# Patient Record
Sex: FEMALE | Race: BLACK OR AFRICAN AMERICAN | Employment: UNEMPLOYED | URBAN - METROPOLITAN AREA
[De-identification: names, ages, dates, MRNs, and addresses within clinical notes are randomized per-mention and may not be internally consistent; named-entity substitution may affect disease eponyms.]

---

## 2017-01-04 RX ORDER — ACYCLOVIR 400 MG/1
TABLET ORAL
Qty: 60 TAB | Refills: 5 | Status: SHIPPED | OUTPATIENT
Start: 2017-01-04 | End: 2017-06-29 | Stop reason: SDUPTHER

## 2017-01-09 DIAGNOSIS — J43.8 OTHER EMPHYSEMA (HCC): ICD-10-CM

## 2017-01-09 RX ORDER — FLUTICASONE PROPIONATE 50 MCG
SPRAY, SUSPENSION (ML) NASAL
Qty: 1 BOTTLE | Refills: 0 | Status: SHIPPED | OUTPATIENT
Start: 2017-01-09 | End: 2019-04-11 | Stop reason: SDUPTHER

## 2017-01-16 ENCOUNTER — HOSPITAL ENCOUNTER (EMERGENCY)
Age: 62
Discharge: HOME | End: 2017-01-16
Attending: EMERGENCY MEDICINE
Payer: MEDICARE

## 2017-01-16 VITALS
WEIGHT: 293 LBS | RESPIRATION RATE: 20 BRPM | DIASTOLIC BLOOD PRESSURE: 95 MMHG | OXYGEN SATURATION: 95 % | BODY MASS INDEX: 39.68 KG/M2 | TEMPERATURE: 97.5 F | HEART RATE: 109 BPM | HEIGHT: 72 IN | SYSTOLIC BLOOD PRESSURE: 160 MMHG

## 2017-01-16 DIAGNOSIS — M54.42 CHRONIC BILATERAL LOW BACK PAIN WITH BILATERAL SCIATICA: ICD-10-CM

## 2017-01-16 DIAGNOSIS — G89.29 CHRONIC BILATERAL LOW BACK PAIN WITH BILATERAL SCIATICA: ICD-10-CM

## 2017-01-16 DIAGNOSIS — M79.7 FIBROMYALGIA: ICD-10-CM

## 2017-01-16 DIAGNOSIS — M54.41 CHRONIC BILATERAL LOW BACK PAIN WITH BILATERAL SCIATICA: ICD-10-CM

## 2017-01-16 DIAGNOSIS — G89.4 CHRONIC PAIN SYNDROME: ICD-10-CM

## 2017-01-16 DIAGNOSIS — Z98.890 STATUS POST LAMINECTOMY: Primary | ICD-10-CM

## 2017-01-16 PROCEDURE — 99282 EMERGENCY DEPT VISIT SF MDM: CPT

## 2017-01-16 RX ORDER — NAPROXEN 500 MG/1
500 TABLET ORAL 2 TIMES DAILY WITH MEALS
Qty: 20 TAB | Refills: 0 | Status: SHIPPED | OUTPATIENT
Start: 2017-01-16 | End: 2017-01-26

## 2017-01-16 RX ORDER — HYDROCODONE BITARTRATE AND ACETAMINOPHEN 7.5; 325 MG/1; MG/1
1 TABLET ORAL
Qty: 15 TAB | Refills: 0 | Status: SHIPPED | OUTPATIENT
Start: 2017-01-16 | End: 2017-03-21 | Stop reason: DRUGHIGH

## 2017-01-16 NOTE — DISCHARGE INSTRUCTIONS
Back Pain: Care Instructions  Your Care Instructions    Back pain has many possible causes. It is often related to problems with muscles and ligaments of the back. It may also be related to problems with the nerves, discs, or bones of the back. Moving, lifting, standing, sitting, or sleeping in an awkward way can strain the back. Sometimes you don't notice the injury until later. Arthritis is another common cause of back pain. Although it may hurt a lot, back pain usually improves on its own within several weeks. Most people recover in 12 weeks or less. Using good home treatment and being careful not to stress your back can help you feel better sooner. Follow-up care is a key part of your treatment and safety. Be sure to make and go to all appointments, and call your doctor if you are having problems. Its also a good idea to know your test results and keep a list of the medicines you take. How can you care for yourself at home? · Sit or lie in positions that are most comfortable and reduce your pain. Try one of these positions when you lie down:  ¨ Lie on your back with your knees bent and supported by large pillows. ¨ Lie on the floor with your legs on the seat of a sofa or chair. Tina Shanita on your side with your knees and hips bent and a pillow between your legs. ¨ Lie on your stomach if it does not make pain worse. · Do not sit up in bed, and avoid soft couches and twisted positions. Bed rest can help relieve pain at first, but it delays healing. Avoid bed rest after the first day of back pain. · Change positions every 30 minutes. If you must sit for long periods of time, take breaks from sitting. Get up and walk around, or lie in a comfortable position. · Try using a heating pad on a low or medium setting for 15 to 20 minutes every 2 or 3 hours. Try a warm shower in place of one session with the heating pad. · You can also try an ice pack for 10 to 15 minutes every 2 to 3 hours.  Put a thin cloth between the ice pack and your skin. · Take pain medicines exactly as directed. ¨ If the doctor gave you a prescription medicine for pain, take it as prescribed. ¨ If you are not taking a prescription pain medicine, ask your doctor if you can take an over-the-counter medicine. · Take short walks several times a day. You can start with 5 to 10 minutes, 3 or 4 times a day, and work up to longer walks. Walk on level surfaces and avoid hills and stairs until your back is better. · Return to work and other activities as soon as you can. Continued rest without activity is usually not good for your back. · To prevent future back pain, do exercises to stretch and strengthen your back and stomach. Learn how to use good posture, safe lifting techniques, and proper body mechanics. When should you call for help? Call your doctor now or seek immediate medical care if:  · You have new or worsening numbness in your legs. · You have new or worsening weakness in your legs. (This could make it hard to stand up.)  · You lose control of your bladder or bowels. Watch closely for changes in your health, and be sure to contact your doctor if:  · Your pain gets worse. · You are not getting better after 2 weeks. Where can you learn more? Go to http://cassandra-monroe.info/. Enter H651 in the search box to learn more about \"Back Pain: Care Instructions. \"  Current as of: May 23, 2016  Content Version: 11.1  © 9313-0170 [employer]. Care instructions adapted under license by [employer] (which disclaims liability or warranty for this information). If you have questions about a medical condition or this instruction, always ask your healthcare professional. [address] any warranty or liability for your use of this information.

## 2017-01-16 NOTE — ED PROVIDER NOTES
HPI Comments: Carter Mensah is a 63 y.o. F with PMHx significant for Arthritis / Fibromyalgia / COPD / Asthma / Diabetes who presents ambulatory to [address] [facility] ED c/o lower back pain. She reports hx of back surgery six months ago with Dr. Grabiel Hoskins. Pt notes that on 17 she starts PT for her back. Pt states she is awaiting pain management follow-up and is not currently taking any medications at this time. She specifically denies any fever, nausea or vomiting. PCP: Daisy Canales MD    There are no other complaints, changes, or physical findings at this time. The history is provided by the patient. Past Medical History:   Diagnosis Date    Arthritis     Asthma     Chronic obstructive pulmonary disease [facility]     Chronic pain      back/hip    Diabetes ([facility]     Fibromyalgia     Gastroesophageal reflux disease without esophagitis 2016    Hyperlipidemia 2012    Hypertension     Obesity 2012    Unspecified sleep apnea      CAN'T TOLERATE CPAP       Past Surgical History:   Procedure Laterality Date    Hx orthopaedic       lubar fusion    Hx orthopaedic  2/16/15     RIGHT TOTAL KNEE ARTHROPLASTY    Hx orthopaedic  6/16/15     LEFT TOTAL KNEE ARTHROPLASTY       Hx hysterectomy       one ovary removed    Hx  section           Family History:   Problem Relation Age of Onset    Anesth Problems Mother     Cancer Mother      LUNG CA - SMOKER    Other Mother      CEREBRAL ANEURYSM    Diabetes Mother     Diabetes Father     Other Sister      VARICOSE VEINS THAT HURT AND BLEED    Heart Attack Brother     Other Other      MOTHER'S FATHER'S MOTHER  WITH ANEURYSM       Social History     Social History    Marital status: SINGLE     Spouse name: N/A    Number of children: N/A    Years of education: N/A     Occupational History    Not on file.      Social History Main Topics    Smoking status: Former Smoker     Packs/day: 1.00     Years: 37.00     Quit date: 12/4/2015    Smokeless tobacco: Never Used    Alcohol use Yes      Comment: VERY RARE WINE    Drug use: No    Sexual activity: Not Currently     Partners: Male     Other Topics Concern    Not on file     Social History Narrative         ALLERGIES: Allopurinol; Darvocet a500 [propoxyphene n-acetaminophen]; and Seafood [shellfish containing products]    Review of Systems   Constitutional: Negative for fatigue and fever. HENT: Negative for ear pain and sore throat. Eyes: Negative for pain, redness and visual disturbance. Respiratory: Negative for cough and shortness of breath. Cardiovascular: Negative for chest pain and palpitations. Gastrointestinal: Negative for abdominal pain, nausea and vomiting. Genitourinary: Negative for dysuria, frequency and urgency. Musculoskeletal: Positive for back pain. Negative for gait problem, neck pain and neck stiffness. Skin: Negative for rash and wound. Neurological: Negative for dizziness, weakness, light-headedness, numbness and headaches. Vitals:    01/16/17 1226   BP: (!) 160/95   Pulse: (!) 109   Resp: 20   Temp: 97.5 °F (36.4 °C)   SpO2: 95%   Weight: 140.8 kg (310 lb 6.5 oz)   Height: 6' (1.829 m)            Physical Exam   Constitutional: She is oriented to person, place, and time. She appears well-developed and well-nourished. HENT:   Head: Normocephalic and atraumatic. Right Ear: External ear normal.   Left Ear: External ear normal.   Nose: Nose normal.   Eyes: Conjunctivae and EOM are normal. Pupils are equal, round, and reactive to light. Neck: Normal range of motion. Neck supple. Cardiovascular: Regular rhythm and normal heart sounds. No murmur heard. Pulmonary/Chest: Effort normal and breath sounds normal. No respiratory distress. She has no wheezes. Abdominal: Soft. Bowel sounds are normal. There is no tenderness. There is no guarding. Musculoskeletal:   LOWER BACK:  Independently moves from laying to sitting to standing. Well-healed surgical scar with no bruising, redness or swelling. No step off. Diffuse muscular discomfort. No CVA tenderness   Neurological: She is alert and oriented to person, place, and time. No cranial nerve deficit or sensory deficit. Skin: Skin is warm and dry. No rash noted. Psychiatric: She has a normal mood and affect. Nursing note and vitals reviewed. MDM  Number of Diagnoses or Management Options  Chronic bilateral low back pain with bilateral sciatica:   Chronic pain syndrome:   Fibromyalgia:   Status post laminectomy:   Diagnosis management comments:     Afebrile; well appearing; no new injury; presentation reflects a chronic, well documented complaint. Plan as below. Amount and/or Complexity of Data Reviewed  Review and summarize past medical records: yes    Patient Progress  Patient progress: stable    ED Course       Procedures    IMPRESSION:  1. Status post laminectomy    2. Chronic bilateral low back pain with bilateral sciatica    3. Chronic pain syndrome    4. Fibromyalgia        PLAN:  1. Discharge Medication List as of 1/16/2017  2:08 PM      START taking these medications    Details   HYDROcodone-acetaminophen (NORCO) 7.5-325 mg per tablet Take 1 Tab by mouth every six (6) hours as needed for Pain. Max Daily Amount: 4 Tabs., Print, Disp-15 Tab, R-0      naproxen (NAPROSYN) 500 mg tablet Take 1 Tab by mouth two (2) times daily (with meals) for 10 days. , Print, Disp-20 Tab, R-0         CONTINUE these medications which have NOT CHANGED    Details   fluticasone (FLONASE) 50 mcg/actuation nasal spray INSTILL 2 SPRAYS IN EACH NOSTRIL EVERY DAY, Normal**Patient requests 90 days supply**Disp-1 Bottle, R-0      acyclovir (ZOVIRAX) 400 mg tablet TAKE 1 TABLET BY MOUTH TWICE DAILY, Normal, Disp-60 Tab, R-5      fluticasone-salmeterol (ADVAIR) 250-50 mcg/dose diskus inhaler Take 1 Puff by inhalation every twelve (12) hours. , Normal, Disp-1 Inhaler, R-0      potassium chloride SR (KLOR-CON 10) 10 mEq tablet TAKE 1 TABLET BY MOUTH EVERY DAY, Normal, Disp-100 Tab, R-0      clobetasol (TEMOVATE) 0.05 % ointment Historical Med      gemfibrozil (LOPID) 600 mg tablet TK 1 T PO BID, Historical Med, R-1      Loperamide (IMODIUM A-D) 2 mg chew Take 2 mg by mouth daily as needed., Normal, Disp-15 Tab, R-0      HYDROcodone-acetaminophen (NORCO) 5-325 mg per tablet Take 1 Tab by mouth daily as needed for Pain., Print, Disp-14 Tab, R-0      DULoxetine (CYMBALTA) 30 mg capsule 1 capsule twice daily. , Normal, Disp-180 Cap, R-1      omeprazole (PRILOSEC) 20 mg capsule TAKE 1 CAPSULE BY MOUTH EVERY DAY, Normal**Patient requests 90 days supply**Disp-30 Cap, R-5      cholecalciferol, vitamin D3, (VITAMIN D3) 2,000 unit tab Take 1 Tab by mouth daily. , Historical Med      SITagliptin (JANUVIA) 100 mg tablet Take 1 Tab by mouth daily. , Normal, Disp-90 Tab, R-3      lisinopril-hydrochlorothiazide (PRINZIDE, ZESTORETIC) 20-12.5 mg per tablet DO NOT TAKE FOR BLOOD PRESSURE LESS THAN 130/80, Normal, Disp-90 Tab, R-1      albuterol-ipratropium (DUO-NEB) 2.5 mg-0.5 mg/3 ml nebu INHALE 1 VIAL VIA NEBULIZER EVERY 4 HOURS AS NEEDED, Normal**Patient requests 90 days supply**Disp-1620 mL, R-5      metFORMIN (GLUCOPHAGE) 1,000 mg tablet TAKE 1 TABLET BY MOUTH TWICE DAILY WITH MEALS, Normal**Patient requests 90 days supply**Disp-180 Tab, R-2      gabapentin (NEURONTIN) 300 mg capsule TAKE 1 CAPSULE BY MOUTH THREE TIMES DAILY, Normal**Patient requests 90 days supply**Disp-270 Cap, R-2      albuterol (PROAIR HFA) 90 mcg/actuation inhaler Take 2 Puffs by inhalation every four (4) hours as needed for Wheezing., Normal, Disp-1 Inhaler, R-2      glucose blood VI test strips (ACCU-CHEK SMARTVIEW TEST STRIP) strip Test twice daily, Print, Disp-60 Each, R-11      Lancets (ACCU-CHEK FASTCLIX) misc Test twice daily, Print, Disp-60 Each, R-11           2.    Follow-up Information     Follow up With Details Comments [address] MD Solomon Schedule an appointment as soon as possible for a visit PRIMARY CARE: call to schedule follow up [facility]  [facility]   [phone]  MD Jewel Schedule an appointment as soon as possible for a visit PAIN MANAGEMENT: call to schedule follow up Joann Chambers [place]  [phone]          Return to ED if worse     DISCHARGE NOTE:  2:11 PM  The patient's results have been reviewed with family and/or caregiver. They verbally convey their understanding and agreement of the patient's signs, symptoms, diagnosis, treatment, and prognosis. They additionally agree to follow up as recommended in the discharge instructions or to return to the Emergency Room should the patient's condition change prior to their follow-up appointment. The family and/or caregiver verbally agrees with the care-plan and all of their questions have been answered. The discharge instructions have also been provided to the them along with educational information regarding the patient's diagnosis and a list of reasons why the patient would want to return to the ER prior to their follow-up appointment should their condition change. Written by Richelle Barreto. Shahab Cespedes ED Scribe, as dictated by Zuleyma Belcher. Attestations: This note is prepared by Richelle Barreto. Jess, acting as Scribe for Zuleyma Belcher. TESS Villaseñor: The scribe's documentation has been prepared under my direction and personally reviewed by me in its entirety. I confirm that the note above accurately reflects all work, treatment, procedures, and medical decision making performed by me.

## 2017-01-18 ENCOUNTER — PATIENT OUTREACH (OUTPATIENT)
Dept: FAMILY MEDICINE CLINIC | Age: 62
End: 2017-01-18

## 2017-01-18 NOTE — PROGRESS NOTES
This writer spoke with patient after verifying name and . Patient informed this writer that ortho provider believes pain is from fibromyalgia and  is helping to get appointment with Dr. Sanchez Cruz, pain management. His office has records and will advise patient of appointment. Patient is receiving outpatient physical therapy 2 days per week. She does not drive. Patient scheduled appointment with provider for tomorrow, 17 at 21 [phone]. Patient denies having pain medication stating she has many muscle relaxers but they do not work for her. This writer will meet with patient at appointment.

## 2017-01-19 ENCOUNTER — PATIENT OUTREACH (OUTPATIENT)
Dept: FAMILY MEDICINE CLINIC | Age: 62
End: 2017-01-19

## 2017-01-19 ENCOUNTER — OFFICE VISIT (OUTPATIENT)
Dept: FAMILY MEDICINE CLINIC | Age: 62
End: 2017-01-19

## 2017-01-19 VITALS
DIASTOLIC BLOOD PRESSURE: 81 MMHG | TEMPERATURE: 97 F | SYSTOLIC BLOOD PRESSURE: 138 MMHG | HEART RATE: 97 BPM | RESPIRATION RATE: 18 BRPM | WEIGHT: 293 LBS | BODY MASS INDEX: 39.68 KG/M2 | OXYGEN SATURATION: 98 % | HEIGHT: 72 IN

## 2017-01-19 DIAGNOSIS — G89.29 CHRONIC BILATERAL LOW BACK PAIN WITH BILATERAL SCIATICA: Primary | ICD-10-CM

## 2017-01-19 DIAGNOSIS — R53.82 CHRONIC FATIGUE: ICD-10-CM

## 2017-01-19 DIAGNOSIS — M48.061 SPINAL STENOSIS OF LUMBAR REGION: ICD-10-CM

## 2017-01-19 DIAGNOSIS — M79.7 FIBROMYALGIA: ICD-10-CM

## 2017-01-19 DIAGNOSIS — M54.42 CHRONIC BILATERAL LOW BACK PAIN WITH BILATERAL SCIATICA: Primary | ICD-10-CM

## 2017-01-19 DIAGNOSIS — M54.41 CHRONIC BILATERAL LOW BACK PAIN WITH BILATERAL SCIATICA: Primary | ICD-10-CM

## 2017-01-19 RX ORDER — NALOXONE HYDROCHLORIDE 0.4 MG/ML
0.4 INJECTION, SOLUTION INTRAMUSCULAR; INTRAVENOUS; SUBCUTANEOUS AS NEEDED
Qty: 1 ML | Refills: 0 | Status: SHIPPED | OUTPATIENT
Start: 2017-01-19 | End: 2017-07-18 | Stop reason: ALTCHOICE

## 2017-01-19 RX ORDER — POTASSIUM CHLORIDE 750 MG/1
TABLET, EXTENDED RELEASE ORAL
Refills: 11 | COMMUNITY
Start: 2016-11-10 | End: 2017-02-03 | Stop reason: SDUPTHER

## 2017-01-19 RX ORDER — BACLOFEN 10 MG/1
TABLET ORAL
Refills: 11 | COMMUNITY
Start: 2016-12-22 | End: 2017-10-27

## 2017-01-19 RX ORDER — LOPERAMIDE HYDROCHLORIDE 2 MG/1
CAPSULE ORAL
Refills: 0 | COMMUNITY
Start: 2016-12-06 | End: 2017-01-19 | Stop reason: SDUPTHER

## 2017-01-19 RX ORDER — HYDROCODONE BITARTRATE AND ACETAMINOPHEN 10; 325 MG/1; MG/1
1 TABLET ORAL
Qty: 15 TAB | Refills: 0 | Status: SHIPPED | OUTPATIENT
Start: 2017-01-19 | End: 2017-03-21 | Stop reason: SDUPTHER

## 2017-01-19 NOTE — PROGRESS NOTES
Chief Complaint   Patient presents with    Back Pain     severe, went to Ed at [address] [facility] on 1/16/17

## 2017-01-19 NOTE — PROGRESS NOTES
This writer meet with patient after verifying name and . Patient is currently using cane to aid in walking. She attended appointment with PCP. Ortho provider is Dr. Wilder Luis. This writer educated patient on importance of proper body mechanics. Patient informed this writer she is taking Naproxen and muscle relaxer and it is helping some. She uses the stronger pain medication for extreme pain only. Patient received call during visit to inform her she has appointment with pain management on 16. Patient agrees to this writer calling next week. for update.

## 2017-01-19 NOTE — MR AVS SNAPSHOT
Visit Information Date & Time Provider Department Dept. Phone Encounter #  
 1/19/2017 10:30 AM Roula Toussaint MD [facility] at [address] [id number] Upcoming Health Maintenance Date Due COLONOSCOPY 3/22/1975 BREAST CANCER SCRN MAMMOGRAM 6/5/2009 PAP AKA CERVICAL CYTOLOGY 7/14/2014 EYE EXAM RETINAL OR DILATED Q1 2/5/2015 FOOT EXAM Q1 1/21/2016 INFLUENZA AGE 9 TO ADULT 8/1/2016 LIPID PANEL Q1 8/26/2016 HEMOGLOBIN A1C Q6M 1/15/2017 MICROALBUMIN Q1 4/4/2017 DTaP/Tdap/Td series (2 - Td) 7/15/2026 Allergies as of 1/19/2017  Review Complete On: 1/19/2017 By: Roula Toussaint MD  
  
 Severity Noted Reaction Type Reactions Allopurinol  06/01/2011    Hives Darvocet A500 [Propoxyphene N-acetaminophen]  06/05/2011    Hives Seafood [Shellfish Containing Products]  06/01/2011    Itching NEW ALLERGY; REACTION WORSENS AS AMOUNT EATEN INCREASES Current Immunizations  Reviewed on 11/23/2015 Name Date Pneumococcal Vaccine (Unspecified Type) 1/1/2015 Tdap 7/15/2016 Not reviewed this visit You Were Diagnosed With   
  
 Codes Comments Chronic bilateral low back pain with bilateral sciatica    -  Primary ICD-10-CM: M54.42, M54.41, G89.29 ICD-9-CM: 724.2, 724.3, 338.29 Spinal stenosis of lumbar region     ICD-10-CM: M48.06 
ICD-9-CM: 724.02 Fibromyalgia     ICD-10-CM: M79.7 ICD-9-CM: 729.1 Chronic fatigue     ICD-10-CM: R53.82 
ICD-9-CM: 780.79 Vitals BP Pulse Temp Resp Height(growth percentile) Weight(growth percentile) 138/81 (BP 1 Location: Left arm, BP Patient Position: Sitting) 97 97 °F (36.1 °C) (Oral) 18 6' (1.829 m) 313 lb (142 kg) LMP SpO2 BMI OB Status Smoking Status 04/04/1996 98% 42.45 kg/m2 Hysterectomy Former Smoker Vitals History BMI and BSA Data Body Mass Index Body Surface Area  
 42.45 kg/m 2 2.69 m 2 Preferred Pharmacy Pharmacy Name Phone [address], [address] [place] [phone] Your Updated Medication List  
  
   
This list is accurate as of: 1/19/17 11:19 AM.  Always use your most recent med list.  
  
  
  
  
 acyclovir 400 mg tablet Commonly known as:  ZOVIRAX TAKE 1 TABLET BY MOUTH TWICE DAILY  
  
 albuterol 90 mcg/actuation inhaler Commonly known as:  PROAIR HFA Take 2 Puffs by inhalation every four (4) hours as needed for Wheezing. albuterol-ipratropium 2.5 mg-0.5 mg/3 ml Nebu Commonly known as:  Kiana Medicus INHALE 1 VIAL VIA NEBULIZER EVERY 4 HOURS AS NEEDED  
  
 baclofen 10 mg tablet Commonly known as:  LIORESAL TK 1 T PO Q 8 H PRF MUSCLE SPASM DULoxetine 30 mg capsule Commonly known as:  CYMBALTA 1 capsule twice daily. fluticasone 50 mcg/actuation nasal spray Commonly known as:  Lindfabián Gorham INSTILL 2 SPRAYS IN EACH NOSTRIL EVERY DAY  
  
 fluticasone-salmeterol 250-50 mcg/dose diskus inhaler Commonly known as:  ADVAIR Take 1 Puff by inhalation every twelve (12) hours. gabapentin 300 mg capsule Commonly known as:  NEURONTIN  
TAKE 1 CAPSULE BY MOUTH THREE TIMES DAILY gemfibrozil 600 mg tablet Commonly known as:  LOPID TK 1 T PO BID  
  
 glucose blood VI test strips strip Commonly known as:  [address] Test twice daily * HYDROcodone-acetaminophen 7.5-325 mg per tablet Commonly known as:  Nelma Rohan Take 1 Tab by mouth every six (6) hours as needed for Pain. Max Daily Amount: 4 Tabs. * HYDROcodone-acetaminophen  mg tablet Commonly known as:  Nelma Rohan Take 1 Tab by mouth every twelve (12) hours as needed for Pain. Max Daily Amount: 2 Tabs. Lancets Misc Commonly known as:  ACCU-CHEK FASTCLIX Test twice daily  
  
 lisinopril-hydroCHLOROthiazide 20-12.5 mg per tablet Commonly known as:  Helyn Blinks  
 DO NOT TAKE FOR BLOOD PRESSURE LESS THAN 130/80 Loperamide 2 mg Leeanna Goldmann Commonly known as:  IMODIUM A-D Take 2 mg by mouth daily as needed. metFORMIN 1,000 mg tablet Commonly known as:  GLUCOPHAGE  
TAKE 1 TABLET BY MOUTH TWICE DAILY WITH MEALS  
  
 naloxone 0.4 mg/mL injection Commonly known as:  NARCAN  
1 mL by IntraVENous route as needed. naproxen 500 mg tablet Commonly known as:  NAPROSYN Take 1 Tab by mouth two (2) times daily (with meals) for 10 days. omeprazole 20 mg capsule Commonly known as:  PRILOSEC  
TAKE 1 CAPSULE BY MOUTH EVERY DAY * potassium chloride 10 mEq tablet Commonly known as:  K-DUR, KLOR-CON TK 1 T PO  QD  
  
 * potassium chloride SR 10 mEq tablet Commonly known as:  KLOR-CON 10  
TAKE 1 TABLET BY MOUTH EVERY DAY SITagliptin 100 mg tablet Commonly known as:  Arthea Legato Take 1 Tab by mouth daily. VITAMIN D3 2,000 unit Tab Generic drug:  cholecalciferol (vitamin D3) Take 1 Tab by mouth daily. * Notice: This list has 4 medication(s) that are the same as other medications prescribed for you. Read the directions carefully, and ask your doctor or other care provider to review them with you. Prescriptions Printed Refills HYDROcodone-acetaminophen (NORCO)  mg tablet 0 Sig: Take 1 Tab by mouth every twelve (12) hours as needed for Pain. Max Daily Amount: 2 Tabs. Class: Print Route: Oral  
  
Prescriptions Sent to Pharmacy Refills  
 naloxone (NARCAN) 0.4 mg/mL injection 0 Si mL by IntraVENous route as needed. Class: Normal  
 Pharmacy: [facility] [address] [place] [address] Ph #: [phone] Route: IntraVENous We Performed the Following TSH RFX ON ABNORMAL TO FREE T4 [[id number] Custom] VITAMIN B12 & FOLATE [10108 CPT(R)] Introducing [facility]! [facility] introduces [employer] patient portal. Now you can access parts of your medical record, email your doctor's office, and request medication refills online. 1. In your internet browser, go to [email or web address] 2. Click on the First Time User? Click Here link in the Sign In box. You will see the New Member Sign Up page. 3. Enter your Xinrong Access Code exactly as it appears below. You will not need to use this code after youve completed the sign-up process. If you do not sign up before the expiration date, you must request a new code. · Xin[employer] Access Code: [email or web address] Expires: 2/27/2017  5:11 AM 
 
4. Enter the last four digits of your Social Security Number (xxxx) and Date of Birth (mm/dd/yyyy) as indicated and click Submit. You will be taken to the next sign-up page. 5. Create a Xinrong ID. This will be your Xinrong login ID and cannot be changed, so think of one that is secure and easy to remember. 6. Create a [employer]rong password. You can change your password at any time. 7. Enter your Password Reset Question and Answer. This can be used at a later time if you forget your password. 8. Enter your e-mail address. You will receive e-mail notification when new information is available in [address] 9. Click Sign Up. You can now view and download portions of your medical record. 10. Click the Download Summary menu link to download a portable copy of your medical information. If you have questions, please visit the Frequently Asked Questions section of the [employer] website. Remember, [employer]rong is NOT to be used for urgent needs. For medical emergencies, dial 911. Now available from your iPhone and Android! Please provide this summary of care documentation to your next provider. Your primary care clinician is listed as Lora Mcgee. If you have any questions after today's visit, please call [phone].

## 2017-01-19 NOTE — PROGRESS NOTES
Lula Carbone is a 63 y.o. female    1/16/17 went to ED for below chronic issue of back pain. PMHx significant for Arthritis / Fibromyalgia / COPD / Asthma / Diabetes who presents ambulatory to ED [facility] ED c/o lower back pain. She reports hx of back surgery six months ago with Dr. Christiano Mario. Pt notes that on 1/20/17 she starts PT for her back. Pt states she is awaiting pain management follow-up and is not currently taking any medications at this time. She specifically denies any fever, nausea or vomiting. She was given Norco 7.5/325 #15. She have 6 pills left, she have been taking 3pills per day. Low back pain without sciatica. Is seeing ortho and have had previous surgery and steroid injection. She have followed up with her orthopedic. She was then referred to Pain management, she have made contact, they're in the process of reviewing her case. No saddle anesthesia, loss of bowel or bladder control, fever, LE weakness. Discussed options. I will give her Norco, 10/325 but she's to cut the pill in half and use 1/2 pill BID PRN, with #15. And 1 refill until she can see her pain doctor. Also given the new recommendation i will also send out naloxone for her. She have been on more pain meds than this in the past.      Fatigue: chronic, with fibromyalgia, depression, chronic pain. Last TSH was in 2015. Review of Systems  Pertinent items are noted in HPI. Current Outpatient Prescriptions on File Prior to Visit   Medication Sig Dispense Refill    HYDROcodone-acetaminophen (NORCO) 7.5-325 mg per tablet Take 1 Tab by mouth every six (6) hours as needed for Pain. Max Daily Amount: 4 Tabs. 15 Tab 0    naproxen (NAPROSYN) 500 mg tablet Take 1 Tab by mouth two (2) times daily (with meals) for 10 days.  20 Tab 0    fluticasone (FLONASE) 50 mcg/actuation nasal spray INSTILL 2 SPRAYS IN EACH NOSTRIL EVERY DAY 1 Bottle 0    acyclovir (ZOVIRAX) 400 mg tablet TAKE 1 TABLET BY MOUTH TWICE DAILY 60 Tab 5    fluticasone-salmeterol (ADVAIR) 250-50 mcg/dose diskus inhaler Take 1 Puff by inhalation every twelve (12) hours. 1 Inhaler 0    potassium chloride SR (KLOR-CON 10) 10 mEq tablet TAKE 1 TABLET BY MOUTH EVERY  Tab 0    gemfibrozil (LOPID) 600 mg tablet TK 1 T PO BID  1    Loperamide (IMODIUM A-D) 2 mg chew Take 2 mg by mouth daily as needed. 15 Tab 0    DULoxetine (CYMBALTA) 30 mg capsule 1 capsule twice daily. 180 Cap 1    omeprazole (PRILOSEC) 20 mg capsule TAKE 1 CAPSULE BY MOUTH EVERY DAY 30 Cap 5    cholecalciferol, vitamin D3, (VITAMIN D3) 2,000 unit tab Take 1 Tab by mouth daily.  SITagliptin (JANUVIA) 100 mg tablet Take 1 Tab by mouth daily. 90 Tab 3    lisinopril-hydrochlorothiazide (PRINZIDE, ZESTORETIC) 20-12.5 mg per tablet DO NOT TAKE FOR BLOOD PRESSURE LESS THAN 130/80 90 Tab 1    albuterol-ipratropium (DUO-NEB) 2.5 mg-0.5 mg/3 ml nebu INHALE 1 VIAL VIA NEBULIZER EVERY 4 HOURS AS NEEDED 1620 mL 5    metFORMIN (GLUCOPHAGE) 1,000 mg tablet TAKE 1 TABLET BY MOUTH TWICE DAILY WITH MEALS (Patient taking differently: 500mg in am and 1000 mg pm) 180 Tab 2    gabapentin (NEURONTIN) 300 mg capsule TAKE 1 CAPSULE BY MOUTH THREE TIMES DAILY 270 Cap 2    albuterol (PROAIR HFA) 90 mcg/actuation inhaler Take 2 Puffs by inhalation every four (4) hours as needed for Wheezing. 1 Inhaler 2    glucose blood VI test strips (ACCU-CHEK SMARTVIEW TEST STRIP) strip Test twice daily 60 Each 11    Lancets (ACCU-CHEK FASTCLIX) misc Test twice daily 60 Each 11     No current facility-administered medications on file prior to visit.         Allergies   Allergen Reactions    Allopurinol Hives    Darvocet A500 [Propoxyphene N-Acetaminophen] Hives    Seafood [Shellfish Containing Products] Itching     NEW ALLERGY; REACTION WORSENS AS AMOUNT EATEN INCREASES       Past Medical History   Diagnosis Date    Arthritis     Asthma     Chronic obstructive pulmonary disease (HCC)     Chronic pain      back/hip    Diabetes ([facility] [address]     Fibromyalgia     Gastroesophageal reflux disease without esophagitis 2016    Hyperlipidemia 2012    Hypertension     Obesity 2012    Unspecified sleep apnea      CAN'T TOLERATE CPAP       Family History   Problem Relation Age of Onset    Anesth Problems Mother     Cancer Mother      LUNG CA - SMOKER    Other Mother      CEREBRAL ANEURYSM    Diabetes Mother     Diabetes Father     Other Sister      VARICOSE VEINS THAT HURT AND BLEED    Heart Attack Brother     Other Other      MOTHER'S FATHER'S MOTHER  WITH ANEURYSM   ([id number])    Social History     Social History    Marital status: SINGLE     Spouse name: N/A    Number of children: N/A    Years of education: N/A     Occupational History    Not on file. Social History Main Topics    Smoking status: Former Smoker     Packs/day: 1.00     Years: 37.00     Quit date: 2015    Smokeless tobacco: Never Used    Alcohol use Yes      Comment: VERY RARE WINE    Drug use: No    Sexual activity: Not Currently     Partners: Male     Other Topics Concern    Not on file     Social History Narrative       Physical Exam     Visit Vitals    /81 (BP 1 Location: Left arm, BP Patient Position: Sitting)    Pulse 97    Temp 97 °F (36.1 °C) (Oral)    Resp 18    Ht 6' (1.829 m)    Wt 313 lb (142 kg)    LMP 1996    SpO2 98%    BMI 42.45 kg/m2     General:  alert, cooperative, no distress, appears stated age, morbidly obese. Using the hospital wheelchair. Can walk well with walker   Head:  NCAT w/o lesions or tenderness.  Sclera white, eyes quiet    Mouth:  Lips, mucosa, and tongue normal. Teeth and gums normal   Lungs: clear to auscultation bilaterally   Heart:  regular rate and rhythm, S1, S2 normal, no murmur, click, rub or gallop   Abdomen: soft, non-tender   Neuro: normal without focal findings  mental status, speech normal, alert and oriented x iii  HAYLEE  cranial nerves 2-12 intact   Affect & Behavior: good grooming, full facial expressions, normal speech pattern and content, normal thought patterns, normal perception, good insight, normal reasoning        Assessments and Plans    Drew Gomez was seen today for back pain. Diagnoses and all orders for this visit:    Chronic bilateral low back pain with bilateral sciatica  -     HYDROcodone-acetaminophen (NORCO)  mg tablet; Take 1 Tab by mouth every twelve (12) hours as needed for Pain. Max Daily Amount: 2 Tabs. -     VITAMIN B12 & FOLATE  -     naloxone (NARCAN) 0.4 mg/mL injection; 1 mL by IntraVENous route as needed. Spinal stenosis of lumbar region  -     HYDROcodone-acetaminophen (NORCO)  mg tablet; Take 1 Tab by mouth every twelve (12) hours as needed for Pain. Max Daily Amount: 2 Tabs. -     VITAMIN B12 & FOLATE  -     naloxone (NARCAN) 0.4 mg/mL injection; 1 mL by IntraVENous route as needed. Fibromyalgia  -     HYDROcodone-acetaminophen (NORCO)  mg tablet; Take 1 Tab by mouth every twelve (12) hours as needed for Pain. Max Daily Amount: 2 Tabs. -     VITAMIN B12 & FOLATE  -     TSH RFX ON ABNORMAL TO FREE T4  -     naloxone (NARCAN) 0.4 mg/mL injection; 1 mL by IntraVENous route as needed. Chronic fatigue  -     VITAMIN B12 & FOLATE  -     TSH RFX ON ABNORMAL TO FREE T4      Follow-up Disposition:  Return in about 2 months (around 3/19/2017) for chronic care, fatigue, labs.       Yanely Lezama MD  1/19/2017

## 2017-01-30 DIAGNOSIS — F32.A DEPRESSION, UNSPECIFIED DEPRESSION TYPE: ICD-10-CM

## 2017-01-30 DIAGNOSIS — I10 ESSENTIAL HYPERTENSION WITH GOAL BLOOD PRESSURE LESS THAN 140/90: ICD-10-CM

## 2017-01-30 RX ORDER — LISINOPRIL AND HYDROCHLOROTHIAZIDE 12.5; 2 MG/1; MG/1
TABLET ORAL
Qty: 90 TAB | Refills: 1 | Status: SHIPPED | OUTPATIENT
Start: 2017-01-30 | End: 2017-03-21 | Stop reason: SDUPTHER

## 2017-01-30 RX ORDER — DULOXETIN HYDROCHLORIDE 30 MG/1
CAPSULE, DELAYED RELEASE ORAL
Qty: 180 CAP | Refills: 1 | Status: SHIPPED | OUTPATIENT
Start: 2017-01-30 | End: 2017-07-11

## 2017-02-01 ENCOUNTER — PATIENT OUTREACH (OUTPATIENT)
Dept: FAMILY MEDICINE CLINIC | Age: 62
End: 2017-02-01

## 2017-02-01 RX ORDER — DICLOFENAC SODIUM 75 MG/1
75 TABLET, DELAYED RELEASE ORAL 2 TIMES DAILY
COMMUNITY
Start: 2017-01-23 | End: 2017-04-10 | Stop reason: SDUPTHER

## 2017-02-01 NOTE — PROGRESS NOTES
This writer spoke with patient after verifying name and . Patient informed this writer she had appointment with pain management provider and was advised she has nerve damage. He prescribed Diclofenac 75 mg BID and it has improved pain. She is able to walk without cane. She is still attending PT and is able to sleep at night. She will follow up with pain management for injection on 17. This writer will follow up with patient after appointment.

## 2017-02-03 RX ORDER — POTASSIUM CHLORIDE 750 MG/1
TABLET, EXTENDED RELEASE ORAL
Qty: 90 TAB | Refills: 1 | Status: SHIPPED | OUTPATIENT
Start: 2017-02-03 | End: 2017-09-25 | Stop reason: SDUPTHER

## 2017-02-10 ENCOUNTER — PATIENT OUTREACH (OUTPATIENT)
Dept: FAMILY MEDICINE CLINIC | Age: 62
End: 2017-02-10

## 2017-02-10 NOTE — PROGRESS NOTES
This writer spoke with patient after verifying name and  regarding follow up to pian management. Patient informed this writer she had injection earlier this week. She does not complain of pain however is sore. It has gotten better everyday and she has appointment on Monday. She will call this writer and update after appointment.

## 2017-02-23 ENCOUNTER — PATIENT OUTREACH (OUTPATIENT)
Dept: FAMILY MEDICINE CLINIC | Age: 62
End: 2017-02-23

## 2017-03-07 RX ORDER — GABAPENTIN 300 MG/1
CAPSULE ORAL
Qty: 270 CAP | Refills: 2 | Status: SHIPPED | OUTPATIENT
Start: 2017-03-07 | End: 2017-11-01 | Stop reason: SDUPTHER

## 2017-03-15 ENCOUNTER — PATIENT OUTREACH (OUTPATIENT)
Dept: FAMILY MEDICINE CLINIC | Age: 62
End: 2017-03-15

## 2017-03-15 NOTE — PROGRESS NOTES
Spoke with patient after verifying name and . Patient informed this writer she is doing ok. She is saw pain management today, 3/15/17. She has PCP appointment on 3/21/17. She has attended PT due to transportation. This writer confirmed patient has medicare and medicaid. Patient advised medicaid offers transportation services and [employer]. Patient was not aware of service and  expressed thanks for information. Resolved CESILIA episode.

## 2017-03-20 RX ORDER — METFORMIN HYDROCHLORIDE 1000 MG/1
TABLET ORAL
Qty: 180 TAB | Refills: 2 | Status: SHIPPED | OUTPATIENT
Start: 2017-03-20 | End: 2017-05-02 | Stop reason: SDUPTHER

## 2017-03-21 ENCOUNTER — HOSPITAL ENCOUNTER (OUTPATIENT)
Dept: LAB | Age: 62
Discharge: HOME | End: 2017-03-21
Payer: MEDICARE

## 2017-03-21 ENCOUNTER — OFFICE VISIT (OUTPATIENT)
Dept: FAMILY MEDICINE CLINIC | Age: 62
End: 2017-03-21

## 2017-03-21 VITALS
OXYGEN SATURATION: 99 % | HEIGHT: 72 IN | SYSTOLIC BLOOD PRESSURE: 131 MMHG | HEART RATE: 92 BPM | WEIGHT: 293 LBS | RESPIRATION RATE: 16 BRPM | BODY MASS INDEX: 39.68 KG/M2 | TEMPERATURE: 95.7 F | DIASTOLIC BLOOD PRESSURE: 78 MMHG

## 2017-03-21 DIAGNOSIS — G89.4 CHRONIC PAIN SYNDROME: ICD-10-CM

## 2017-03-21 DIAGNOSIS — Z00.00 MEDICARE ANNUAL WELLNESS VISIT, INITIAL: Primary | ICD-10-CM

## 2017-03-21 DIAGNOSIS — I10 ESSENTIAL HYPERTENSION: ICD-10-CM

## 2017-03-21 DIAGNOSIS — I10 ESSENTIAL HYPERTENSION WITH GOAL BLOOD PRESSURE LESS THAN 140/90: ICD-10-CM

## 2017-03-21 DIAGNOSIS — M79.7 FIBROMYALGIA: ICD-10-CM

## 2017-03-21 DIAGNOSIS — N64.52 DISCHARGE FROM RIGHT NIPPLE: ICD-10-CM

## 2017-03-21 DIAGNOSIS — E08.8 DIABETES MELLITUS DUE TO UNDERLYING CONDITION, CONTROLLED, WITH COMPLICATION, WITHOUT LONG-TERM CURRENT USE OF INSULIN (HCC): ICD-10-CM

## 2017-03-21 DIAGNOSIS — E78.2 MIXED HYPERLIPIDEMIA: ICD-10-CM

## 2017-03-21 PROCEDURE — 84146 ASSAY OF PROLACTIN: CPT

## 2017-03-21 PROCEDURE — 83036 HEMOGLOBIN GLYCOSYLATED A1C: CPT

## 2017-03-21 PROCEDURE — 80053 COMPREHEN METABOLIC PANEL: CPT

## 2017-03-21 PROCEDURE — 80061 LIPID PANEL: CPT

## 2017-03-21 PROCEDURE — 84443 ASSAY THYROID STIM HORMONE: CPT

## 2017-03-21 PROCEDURE — 36415 COLL VENOUS BLD VENIPUNCTURE: CPT

## 2017-03-21 PROCEDURE — 82043 UR ALBUMIN QUANTITATIVE: CPT

## 2017-03-21 PROCEDURE — 85027 COMPLETE CBC AUTOMATED: CPT

## 2017-03-21 PROCEDURE — 84439 ASSAY OF FREE THYROXINE: CPT

## 2017-03-21 RX ORDER — HYDROCODONE BITARTRATE AND ACETAMINOPHEN 10; 325 MG/1; MG/1
1 TABLET ORAL
Qty: 15 TAB | Refills: 0 | Status: SHIPPED | OUTPATIENT
Start: 2017-03-21 | End: 2017-04-13 | Stop reason: DRUGHIGH

## 2017-03-21 RX ORDER — LISINOPRIL AND HYDROCHLOROTHIAZIDE 12.5; 2 MG/1; MG/1
TABLET ORAL
Qty: 90 TAB | Refills: 1 | Status: SHIPPED | OUTPATIENT
Start: 2017-03-21 | End: 2017-08-12 | Stop reason: SDUPTHER

## 2017-03-21 NOTE — PROGRESS NOTES
This is an Initial Medicare Annual Wellness Exam (AWV) (Performed 12 months after IPPE or effective date of Medicare Part B enrollment, Once in a lifetime)    I have reviewed the patient's medical history in detail and updated the computerized patient record. 3/15 and 3/17 Pt went to Pain specialist and had her nerves \"burned\" to decrease pain, will follow up with them in 6 weeks. Currently still have pain, not yet having resolution of her back pain, needs pain meds refill. Never had a colonoscopy. Will need one. Last mammogram a few years ago. No lump on self exam.  But sometimes she notice a thick white discharge out of her right breast.  She have seen OBGYN in the past.  It has resolved, but came back a couple of months back. Very little thick discharge. Doesn't stain her bra. It occurs maybe once every two weeks when she looks down notice a whitish coloration outside of her nipple. Unable to express any discharge at this time. Denies Headaches.      History     Past Medical History:   Diagnosis Date    Arthritis     Asthma     Chronic obstructive pulmonary disease (HCC)     Chronic pain     back/hip    Diabetes ([address]     Diabetes mellitus due to underlying condition, controlled, with complication, without long-term current use of insulin ([address] 3/21/2017    Fibromyalgia     Gastroesophageal reflux disease without esophagitis 2016    Hyperlipidemia 2012    Hypertension     Obesity 2012    Unspecified sleep apnea     CAN'T TOLERATE CPAP      Past Surgical History:   Procedure Laterality Date    HX  SECTION      HX HYSTERECTOMY      one ovary removed    HX ORTHOPAEDIC      lubar fusion    HX ORTHOPAEDIC  2/16/15    RIGHT TOTAL KNEE ARTHROPLASTY    HX ORTHOPAEDIC  6/16/15    LEFT TOTAL KNEE ARTHROPLASTY        Current Outpatient Prescriptions   Medication Sig Dispense Refill    HYDROcodone-acetaminophen (NORCO)  mg tablet Take 1 Tab by mouth every twelve (12) hours as needed for Pain. Max Daily Amount: 2 Tabs. 15 Tab 0    metFORMIN (GLUCOPHAGE) 1,000 mg tablet 500mg in am and 1000 mg pm 180 Tab 2    gabapentin (NEURONTIN) 300 mg capsule TAKE 1 CAPSULE BY MOUTH THREE TIMES DAILY 270 Cap 2    diclofenac EC (VOLTAREN) 75 mg EC tablet 75 mg two (2) times a day.  lisinopril-hydroCHLOROthiazide (PRINZIDE, ZESTORETIC) 20-12.5 mg per tablet DO NOT TAKE FOR BLOOD PRESSURE LESS THAN 130/80 90 Tab 1    DULoxetine (CYMBALTA) 30 mg capsule 1 capsule twice daily. 180 Cap 1    baclofen (LIORESAL) 10 mg tablet TK 1 T PO Q 8 H PRF MUSCLE SPASM  11    fluticasone (FLONASE) 50 mcg/actuation nasal spray INSTILL 2 SPRAYS IN EACH NOSTRIL EVERY DAY 1 Bottle 0    acyclovir (ZOVIRAX) 400 mg tablet TAKE 1 TABLET BY MOUTH TWICE DAILY 60 Tab 5    fluticasone-salmeterol (ADVAIR) 250-50 mcg/dose diskus inhaler Take 1 Puff by inhalation every twelve (12) hours. 1 Inhaler 0    potassium chloride SR (KLOR-CON 10) 10 mEq tablet TAKE 1 TABLET BY MOUTH EVERY  Tab 0    gemfibrozil (LOPID) 600 mg tablet TK 1 T PO BID  1    Loperamide (IMODIUM A-D) 2 mg chew Take 2 mg by mouth daily as needed. 15 Tab 0    omeprazole (PRILOSEC) 20 mg capsule TAKE 1 CAPSULE BY MOUTH EVERY DAY 30 Cap 5    cholecalciferol, vitamin D3, (VITAMIN D3) 2,000 unit tab Take 1 Tab by mouth daily.  SITagliptin (JANUVIA) 100 mg tablet Take 1 Tab by mouth daily. 90 Tab 3    albuterol-ipratropium (DUO-NEB) 2.5 mg-0.5 mg/3 ml nebu INHALE 1 VIAL VIA NEBULIZER EVERY 4 HOURS AS NEEDED 1620 mL 5    albuterol (PROAIR HFA) 90 mcg/actuation inhaler Take 2 Puffs by inhalation every four (4) hours as needed for Wheezing.  1 Inhaler 2    glucose blood VI test strips (ACCU-CHEK SMARTVIEW TEST STRIP) strip Test twice daily 60 Each 11    Lancets (ACCU-CHEK FASTCLIX) misc Test twice daily 60 Each 11    potassium chloride (K-DUR, KLOR-CON) 10 mEq tablet TK 1 T PO  QD 90 Tab 1    naloxone (NARCAN) 0.4 mg/mL injection 1 mL by IntraVENous route as needed. 1 mL 0     Allergies   Allergen Reactions    Allopurinol Hives    Darvocet A500 [Propoxyphene N-Acetaminophen] Hives    Seafood [Shellfish Containing Products] Itching     NEW ALLERGY; REACTION WORSENS AS AMOUNT EATEN INCREASES     Family History   Problem Relation Age of Onset    Anesth Problems Mother     Cancer Mother      LUNG CA - [de-identified]    Other Mother      CEREBRAL ANEURYSM    Diabetes Mother     Diabetes Father     Other Sister      VARICOSE VEINS THAT HURT AND BLEED    Heart Attack Brother     Other Other      MOTHER'S FATHER'S MOTHER  WITH ANEURYSM     Social History   Substance Use Topics    Smoking status: Former Smoker     Packs/day: 1.00     Years: 37.00     Quit date: 2015    Smokeless tobacco: Never Used    Alcohol use Yes      Comment: VERY RARE WINE     Patient Active Problem List   Diagnosis Code    Lumbar stenosis M48.06    Obesity E66.9    HTN (hypertension) I10    DM [facility]di[facility] mellitus) ([facility] E11.9    Hyperlipidemia E78.5    Depression F32.9    Asthma J45.909    Primary localized osteoarthrosis, lower leg M17.10    DJD (degenerative joint disease) of knee M17.9    Chronic pain G89.29    Fibromyalgia M79.7    COPD (chronic obstructive pulmonary disease) ([facility]) J44.9    Gastroesophageal reflux disease without esophagitis K21.9    Chronic low back pain with bilateral sciatica M54.41, M54.42, G89.29    Lumbar spondylosis M47.816    Spinal stenosis M48.00    Status post laminectomy Z98.890    Diabetes mellitus due to underlying condition, controlled, with complication, without long-term current use of insulin ([facility]) E08.8         Depression Risk Factor Screening:   No flowsheet data found. Alcohol Risk Factor Screening: On any occasion during the past 3 months, have you had more than 3 drinks containing alcohol? Yes    Do you average more than 7 drinks per week?   No    Functional Ability and Level of Safety:     Hearing Loss   normal-to-mild    Activities of Daily Living   Self-care. Requires assistance with: no ADLs    Fall Risk   No flowsheet data found. Abuse Screen   Patient is not abused    Review of Systems   A comprehensive review of systems was negative except for that written in the HPI. Physical Examination     No exam data present    Evaluation of Cognitive Function:  Mood/affect:  neutral  Appearance: age appropriate, overweight and well dressed  Family member/caregiver input: none present. Visit Vitals    /78 (BP 1 Location: Left arm, BP Patient Position: Sitting)    Pulse 92    Temp 95.7 °F (35.4 °C) (Oral)    Resp 16    Ht 6' (1.829 m)    Wt 321 lb (145.6 kg)    LMP 04/04/1996    SpO2 99%    BMI 43.54 kg/m2     General:  Alert, cooperative, no distress, appears stated age. Head:  Normocephalic, without obvious abnormality, atraumatic. Eyes:  Conjunctivae/corneas clear. PERRL, EOMs intact. Neck: Supple, symmetrical, trachea midline, no JVD. Lungs:   Clear to auscultation bilaterally. Chest wall:  No tenderness or deformity. Heart:  Regular rate and rhythm, S1, S2 normal, no murmur, click, rub or gallop. Breast Exam:  No tenderness, masses, or nipple abnormality. She was unable to express any discharge. Abdomen:   Soft, non-tender   Extremities: Extremities normal, atraumatic, no cyanosis or edema. Pulses: 2+ and symmetric all extremities. Skin: Skin color, texture, turgor normal. No rashes or lesions. Lymph nodes: Cervical, supraclavicular, and axillary nodes normal.   Neurologic: CNII-XII intact. Normal strength, sensation and reflexes throughout.        Patient Care Team:  Mona Zimmer MD as PCP - General (Family Practice)  Daphne Frye RN as Ambulatory Care Navigator    Advice/Referrals/Counseling   Education and counseling provided:  Are appropriate based on today's review and evaluation    Assessment/Plan     Sonia Camarena was seen today for annual wellness visit and back pain. Diagnoses and all orders for this visit:    Medicare annual wellness visit, initial  -     REFERRAL TO GASTROENTEROLOGY  -     ENEDINA MAMMO BI SCREENING INCL CAD; Future  -     CBC W/O DIFF  -     HEMOGLOBIN A1C W/O EAG  -     LIPID PANEL  -     MICROALBUMIN, UR, RAND W/ MICROALBUMIN/CREA RATIO  -     METABOLIC PANEL, COMPREHENSIVE  -     TSH RFX ON ABNORMAL TO FREE T4  -     CA SCREEN;PELVIC/BREAST EXAM    Chronic pain syndrome  -     HYDROcodone-acetaminophen (NORCO)  mg tablet; Take 1 Tab by mouth every twelve (12) hours as needed for Pain. Max Daily Amount: 2 Tabs. Discharge from right nipple - have had work up with previous doctor. No staining of bra. Unlikely nipple discharge in it's terminology, but will get prolactin to be safe. -     PROLACTIN    Fibromyalgia  -     HYDROcodone-acetaminophen (NORCO)  mg tablet; Take 1 Tab by mouth every twelve (12) hours as needed for Pain. Max Daily Amount: 2 Tabs. Diabetes mellitus due to underlying condition, controlled, with complication, without long-term current use of insulin (HCC)  -     HEMOGLOBIN A1C W/O EAG  -     LIPID PANEL  -     MICROALBUMIN, UR, RAND W/ MICROALBUMIN/CREA RATIO  -     METABOLIC PANEL, COMPREHENSIVE  -     REFERRAL TO OPHTHALMOLOGY    Essential hypertension  -     METABOLIC PANEL, COMPREHENSIVE    Mixed hyperlipidemia    Other orders  -     Cancel: REFERRAL FOR COLONOSCOPY    . Follow-up Disposition:  Return in about 4 weeks (around 4/18/2017) for labs, DM2, handicap donnieard. Hafsa Valencia MD  3/21/2017.

## 2017-03-21 NOTE — MR AVS SNAPSHOT
Visit Information Date & Time Provider Department Dept. Phone Encounter #  
 3/21/2017  8:45 AM Kelly Wall MD [facility] at [address] [id number] Follow-up Instructions Return in about 4 weeks (around 4/18/2017) for labs, DM2, handicap placard. Upcoming Health Maintenance Date Due COLONOSCOPY 3/22/1975 PAP AKA CERVICAL CYTOLOGY 7/14/2014 EYE EXAM RETINAL OR DILATED Q1 2/5/2015 FOOT EXAM Q1 1/21/2016 HEMOGLOBIN A1C Q6M 9/21/2017 MICROALBUMIN Q1 3/21/2018 LIPID PANEL Q1 3/21/2018 MEDICARE YEARLY EXAM 3/22/2018 BREAST CANCER SCRN MAMMOGRAM 3/21/2019 DTaP/Tdap/Td series (2 - Td) 7/15/2026 Allergies as of 3/21/2017  Review Complete On: 1/19/2017 By: Kelly Wall MD  
  
 Severity Noted Reaction Type Reactions Allopurinol  06/01/2011    Hives Darvocet A500 [Propoxyphene N-acetaminophen]  06/05/2011    Hives Seafood [Shellfish Containing Products]  06/01/2011    Itching NEW ALLERGY; REACTION WORSENS AS AMOUNT EATEN INCREASES Current Immunizations  Reviewed on 11/23/2015 Name Date Pneumococcal Vaccine (Unspecified Type) 1/1/2015 Tdap 7/15/2016 Not reviewed this visit You Were Diagnosed With   
  
 Codes Comments Medicare annual wellness visit, initial    -  Primary ICD-10-CM: Z00.00 ICD-9-CM: V70.0 Chronic pain syndrome     ICD-10-CM: G89.4 ICD-9-CM: 338.4 Discharge from right nipple     ICD-10-CM: N64.52 
ICD-9-CM: 611.79 Fibromyalgia     ICD-10-CM: M79.7 ICD-9-CM: 729.1 Diabetes mellitus due to underlying condition, controlled, with complication, without long-term current use of insulin ([facility]     ICD-10-CM: E08.8 ICD-9-CM: 249.90 Essential hypertension     ICD-10-CM: I10 
ICD-9-CM: 401.9 Mixed hyperlipidemia     ICD-10-CM: E78.2 ICD-9-CM: 272.2 Vitals BP Pulse Temp Resp Height(growth percentile) Weight(growth percentile) 131/78 (BP 1 Location: Left arm, BP Patient Position: Sitting) 92 95.7 °F (35.4 °C) (Oral) 16 6' (1.829 m) 321 lb (145.6 kg) LMP SpO2 BMI OB Status Smoking Status 04/04/1996 99% 43.54 kg/m2 Hysterectomy Former Smoker BMI and BSA Data Body Mass Index Body Surface Area 43.54 kg/m 2 2.72 m 2 Preferred Pharmacy Pharmacy Name Phone [facility] [place] [facility] [phone] Your Updated Medication List  
  
   
This list is accurate as of: 3/21/17  9:08 AM.  Always use your most recent med list.  
  
  
  
  
 acyclovir 400 mg tablet Commonly known as:  ZOVIRAX TAKE 1 TABLET BY MOUTH TWICE DAILY  
  
 albuterol 90 mcg/actuation inhaler Commonly known as:  PROAIR HFA Take 2 Puffs by inhalation every four (4) hours as needed for Wheezing. albuterol-ipratropium 2.5 mg-0.5 mg/3 ml Nebu Commonly known as:  Joselin Bennett INHALE 1 VIAL VIA NEBULIZER EVERY 4 HOURS AS NEEDED  
  
 baclofen 10 mg tablet Commonly known as:  LIORESAL TK 1 T PO Q 8 H PRF MUSCLE SPASM  
  
 diclofenac EC 75 mg EC tablet Commonly known as:  VOLTAREN  
75 mg two (2) times a day. DULoxetine 30 mg capsule Commonly known as:  CYMBALTA 1 capsule twice daily. fluticasone 50 mcg/actuation nasal spray Commonly known as:  Lis Shames INSTILL 2 SPRAYS IN EACH NOSTRIL EVERY DAY  
  
 fluticasone-salmeterol 250-50 mcg/dose diskus inhaler Commonly known as:  ADVAIR Take 1 Puff by inhalation every twelve (12) hours. gabapentin 300 mg capsule Commonly known as:  NEURONTIN  
TAKE 1 CAPSULE BY MOUTH THREE TIMES DAILY gemfibrozil 600 mg tablet Commonly known as:  LOPID TK 1 T PO BID  
  
 glucose blood VI test strips strip Commonly known as:  [address] Test twice daily HYDROcodone-acetaminophen  mg tablet Commonly known as:  Edwige Puckett Take 1 Tab by mouth every twelve (12) hours as needed for Pain.  Max Daily Amount: 2 Tabs. Lancets Misc Commonly known as:  ACCU-CHEK FASTCLIX Test twice daily  
  
 lisinopril-hydroCHLOROthiazide 20-12.5 mg per tablet Commonly known as:  PRINZIDE, ZESTORETIC  
DO NOT TAKE FOR BLOOD PRESSURE LESS THAN 130/80 Loperamide 2 mg Jeppie Nageotte Commonly known as:  IMODIUM A-D Take 2 mg by mouth daily as needed. metFORMIN 1,000 mg tablet Commonly known as:  GLUCOPHAGE  
500mg in am and 1000 mg pm  
  
 naloxone 0.4 mg/mL injection Commonly known as:  NARCAN  
1 mL by IntraVENous route as needed. omeprazole 20 mg capsule Commonly known as:  PRILOSEC  
TAKE 1 CAPSULE BY MOUTH EVERY DAY * potassium chloride SR 10 mEq tablet Commonly known as:  KLOR-CON 10  
TAKE 1 TABLET BY MOUTH EVERY DAY * potassium chloride 10 mEq tablet Commonly known as:  K-DUR, KLOR-CON TK 1 T PO  QD  
  
 SITagliptin 100 mg tablet Commonly known as:  Teodoro Carolina Take 1 Tab by mouth daily. VITAMIN D3 2,000 unit Tab Generic drug:  cholecalciferol (vitamin D3) Take 1 Tab by mouth daily. * Notice: This list has 2 medication(s) that are the same as other medications prescribed for you. Read the directions carefully, and ask your doctor or other care provider to review them with you. Prescriptions Printed Refills HYDROcodone-acetaminophen (NORCO)  mg tablet 0 Sig: Take 1 Tab by mouth every twelve (12) hours as needed for Pain. Max Daily Amount: 2 Tabs. Class: Print Route: Oral  
  
We Performed the Following CA SCREEN;PELVIC/BREAST EXAM [ [facility]] CBC W/O DIFF [93827 CPT(R)] HEMOGLOBIN A1C W/O EAG [22230 CPT(R)] LIPID PANEL [23045 CPT(R)] METABOLIC PANEL, COMPREHENSIVE [32902 CPT(R)] MICROALBUMIN, UR, RAND W/ MICROALBUMIN/CREA RATIO [id number] CPT(R)] PROLACTIN [49689 CPT(R)] REFERRAL TO GASTROENTEROLOGY [FZQ36 Custom] Comments:  
 Please evaluate patient for colonoscopy. REFERRAL TO OPHTHALMOLOGY [REF57 Custom] Comments:  
 Diabetic eye exam.  
 TSH RFX ON ABNORMAL TO FREE T4 [[id number] Custom] Follow-up Instructions Return in about 4 weeks (around 4/18/2017) for labs, DM2, handicap placard. To-Do List   
 03/21/2017 Imaging:  ENEDINA MAMMO BI SCREENING INCL CAD Referral Information Referral ID Referred By Referred To  
  
 [id number] [facility] [address] [facility] [address] [place], [address] [facility] Visits Status Start Date End Date 1 New Request 3/21/17 3/21/18 If your referral has a status of pending review or denied, additional information will be sent to support the outcome of this decision. Referral ID Referred By Referred To  
 [id number] [facility], [address] [facility] [address] [facility], [address] [facility] Phone: [phone] Fax: [phone] Visits Status Start Date End Date 1 New Request 3/21/17 3/21/18 If your referral has a status of pending review or denied, additional information will be sent to support the outcome of this decision. Introducing [facility]! Coral Dejesus introduces Zoove patient portal. Now you can access parts of your medical record, email your doctor's office, and request medication refills online. 1. In your internet browser, go to [email or web address] 2. Click on the First Time User? Click Here link in the Sign In box. You will see the New Member Sign Up page. 3. Enter your Zoove Access Code exactly as it appears below. You will not need to use this code after youve completed the sign-up process. If you do not sign up before the expiration date, you must request a new code. · Zoove Access Code: [email or web address] Expires: 6/19/2017  9:06 AM 
 
4.  Enter the last four digits of your Social Security Number (xxxx) and Date of Birth (mm/dd/yyyy) as indicated and click Submit. You will be taken to the next sign-up page. 5. Create a LearnStreet ID. This will be your LearnStreet login ID and cannot be changed, so think of one that is secure and easy to remember. 6. Create a LearnStreet password. You can change your password at any time. 7. Enter your Password Reset Question and Answer. This can be used at a later time if you forget your password. 8. Enter your e-mail address. You will receive e-mail notification when new information is available in [address] 9. Click Sign Up. You can now view and download portions of your medical record. 10. Click the Download Summary menu link to download a portable copy of your medical information. If you have questions, please visit the Frequently Asked Questions section of the Learn[employer] website. Remember, LearnStreet is NOT to be used for urgent needs. For medical emergencies, dial 911. Now available from your iPhone and Android! Please provide this summary of care documentation to your next provider. Your primary care clinician is listed as Tanner Mitchell. If you have any questions after today's visit, please call [phone].

## 2017-03-21 NOTE — PROGRESS NOTES
Chief Complaint   Patient presents with    Annual Wellness Visit     medicare    Back Pain     goes down front side of left leg   has white discharge coming out of nipple on right breast.

## 2017-03-22 LAB
ALBUMIN SERPL-MCNC: 4 G/DL (ref 3.5–5.5)
ALBUMIN/CREAT UR: 5.1 MG/G CREAT (ref 0–30)
ALBUMIN/GLOB SERPL: 1 {RATIO} (ref 1.2–2.2)
ALP SERPL-CCNC: 83 IU/L (ref 39–117)
ALT SERPL-CCNC: 12 IU/L (ref 0–32)
AST SERPL-CCNC: 15 IU/L (ref 0–40)
BILIRUB SERPL-MCNC: <0.2 MG/DL (ref 0–1.2)
BUN SERPL-MCNC: 23 MG/DL (ref 6–24)
BUN/CREAT SERPL: 22 (ref 9–23)
CALCIUM SERPL-MCNC: 9.5 MG/DL (ref 8.7–10.2)
CHLORIDE SERPL-SCNC: 99 MMOL/L (ref 96–106)
CHOLEST SERPL-MCNC: 207 MG/DL (ref 100–199)
CO2 SERPL-SCNC: 24 MMOL/L (ref 18–29)
CREAT SERPL-MCNC: 1.04 MG/DL (ref 0.57–1)
CREAT UR-MCNC: 247.8 MG/DL
ERYTHROCYTE [DISTWIDTH] IN BLOOD BY AUTOMATED COUNT: 13.8 % (ref 12.3–15.4)
GLOBULIN SER CALC-MCNC: 4.1 G/DL (ref 1.5–4.5)
GLUCOSE SERPL-MCNC: 159 MG/DL (ref 65–99)
HBA1C MFR BLD: 7.3 % (ref 4.8–5.6)
HCT VFR BLD AUTO: 33.9 % (ref 34–46.6)
HDLC SERPL-MCNC: 36 MG/DL
HGB BLD-MCNC: 10.9 G/DL (ref 11.1–15.9)
INTERPRETATION: NORMAL
LDLC SERPL CALC-MCNC: 140 MG/DL (ref 0–99)
Lab: NORMAL
MCH RBC QN AUTO: 26.9 PG (ref 26.6–33)
MCHC RBC AUTO-ENTMCNC: 32.2 G/DL (ref 31.5–35.7)
MCV RBC AUTO: 84 FL (ref 79–97)
MICROALBUMIN UR-MCNC: 12.6 UG/ML
PLATELET # BLD AUTO: 374 X10E3/UL (ref 150–379)
POTASSIUM SERPL-SCNC: 5 MMOL/L (ref 3.5–5.2)
PROLACTIN SERPL-MCNC: 13.8 NG/ML (ref 4.8–23.3)
PROT SERPL-MCNC: 8.1 G/DL (ref 6–8.5)
RBC # BLD AUTO: 4.05 X10E6/UL (ref 3.77–5.28)
SODIUM SERPL-SCNC: 138 MMOL/L (ref 134–144)
T4 FREE SERPL-MCNC: 0.98 NG/DL (ref 0.82–1.77)
TRIGL SERPL-MCNC: 154 MG/DL (ref 0–149)
TSH SERPL DL<=0.005 MIU/L-ACNC: 5.12 UIU/ML (ref 0.45–4.5)
VLDLC SERPL CALC-MCNC: 31 MG/DL (ref 5–40)
WBC # BLD AUTO: 8.4 X10E3/UL (ref 3.4–10.8)

## 2017-03-29 ENCOUNTER — PATIENT OUTREACH (OUTPATIENT)
Dept: FAMILY MEDICINE CLINIC | Age: 62
End: 2017-03-29

## 2017-03-29 NOTE — PROGRESS NOTES
Patient identified on high risk registry. NN evaluated patient for case management. Case management not needed at this time.

## 2017-04-05 ENCOUNTER — TELEPHONE (OUTPATIENT)
Dept: FAMILY MEDICINE CLINIC | Age: 62
End: 2017-04-05

## 2017-04-05 NOTE — TELEPHONE ENCOUNTER
Pls give pt a call, she has some questions about lab resulrt    Best number to reach her is [phone]

## 2017-04-06 ENCOUNTER — TELEPHONE (OUTPATIENT)
Dept: FAMILY MEDICINE CLINIC | Age: 62
End: 2017-04-06

## 2017-04-06 NOTE — TELEPHONE ENCOUNTER
Returned pts call, went over her concerns about lab work letter she received. She is concerned about kidney/ heart disease as she has lost her dad and 2 brothers to heart disease. Advised her to make appt. With Dr. Alicia Class if any further concerns.

## 2017-04-07 ENCOUNTER — HOSPITAL ENCOUNTER (OUTPATIENT)
Dept: MAMMOGRAPHY | Age: 62
Discharge: HOME | End: 2017-04-07
Attending: FAMILY MEDICINE
Payer: MEDICARE

## 2017-04-07 DIAGNOSIS — Z12.31 ENCOUNTER FOR SCREENING MAMMOGRAM FOR MALIGNANT NEOPLASM OF BREAST: ICD-10-CM

## 2017-04-07 PROCEDURE — 77067 SCR MAMMO BI INCL CAD: CPT

## 2017-04-10 DIAGNOSIS — M79.7 FIBROMYALGIA: ICD-10-CM

## 2017-04-10 DIAGNOSIS — G89.4 CHRONIC PAIN SYNDROME: ICD-10-CM

## 2017-04-10 NOTE — TELEPHONE ENCOUNTER
Pt wants a refill for HYDROcodone-acetaminophen (NORCO)  mg tablet   And diclofenac       Best number to reach her is [phone]

## 2017-04-11 ENCOUNTER — TELEPHONE (OUTPATIENT)
Dept: FAMILY MEDICINE CLINIC | Age: 62
End: 2017-04-11

## 2017-04-12 NOTE — TELEPHONE ENCOUNTER
Pt checking on med refills     She is afraid she will have to go to the emergency room   She knows she will have to  hydrocodone script at the office when it is written     Ref: refill request for HYDROcodone-acetaminophen (NORCO)  mg tablet   And diclofenac         Best number to reach her is [phone]

## 2017-04-12 NOTE — TELEPHONE ENCOUNTER
REf: Pt wants a refill for HYDROcodone-acetaminophen (NORCO)  mg tablet   And diclofenac         Best number to reach her is [phone]

## 2017-04-13 DIAGNOSIS — G89.29 CHRONIC LOW BACK PAIN WITH BILATERAL SCIATICA, UNSPECIFIED BACK PAIN LATERALITY: ICD-10-CM

## 2017-04-13 DIAGNOSIS — M48.061 SPINAL STENOSIS OF LUMBAR REGION: ICD-10-CM

## 2017-04-13 DIAGNOSIS — M54.41 CHRONIC LOW BACK PAIN WITH BILATERAL SCIATICA, UNSPECIFIED BACK PAIN LATERALITY: ICD-10-CM

## 2017-04-13 DIAGNOSIS — M54.42 CHRONIC LOW BACK PAIN WITH BILATERAL SCIATICA, UNSPECIFIED BACK PAIN LATERALITY: ICD-10-CM

## 2017-04-13 RX ORDER — HYDROCODONE BITARTRATE AND ACETAMINOPHEN 5; 325 MG/1; MG/1
1 TABLET ORAL
Qty: 15 TAB | Refills: 0 | Status: SHIPPED | OUTPATIENT
Start: 2017-04-13 | End: 2017-07-11

## 2017-04-13 RX ORDER — DICLOFENAC SODIUM 75 MG/1
75 TABLET, DELAYED RELEASE ORAL 2 TIMES DAILY
Qty: 60 TAB | Refills: 3 | Status: SHIPPED | OUTPATIENT
Start: 2017-04-13 | End: 2017-06-19 | Stop reason: SDUPTHER

## 2017-04-13 RX ORDER — HYDROCODONE BITARTRATE AND ACETAMINOPHEN 10; 325 MG/1; MG/1
1 TABLET ORAL
Qty: 15 TAB | Refills: 0 | OUTPATIENT
Start: 2017-04-13

## 2017-05-02 DIAGNOSIS — E08.8 DIABETES MELLITUS DUE TO UNDERLYING CONDITION, CONTROLLED, WITH COMPLICATION, WITHOUT LONG-TERM CURRENT USE OF INSULIN (HCC): Primary | ICD-10-CM

## 2017-05-02 RX ORDER — METFORMIN HYDROCHLORIDE 1000 MG/1
TABLET ORAL
Qty: 180 TAB | Refills: 2 | Status: SHIPPED | OUTPATIENT
Start: 2017-05-02 | End: 2017-09-06 | Stop reason: SDUPTHER

## 2017-05-02 NOTE — TELEPHONE ENCOUNTER
----- Message from Professor Tommie Miranda sent at 5/2/2017  9:05 AM EDT -----  Regarding: Dr. Lizbeth Chris SELF [facility]) is requesting a Rx refill of \"Metformin\" on behalf of the pt. Srinath Finley stated that his original refill request was sent to the practice via fax on 04/28/17, but they have not yet received a response. Please contact [phone] on the matter.

## 2017-05-17 ENCOUNTER — TELEPHONE (OUTPATIENT)
Dept: FAMILY MEDICINE CLINIC | Age: 62
End: 2017-05-17

## 2017-05-17 NOTE — TELEPHONE ENCOUNTER
----- Message from Kailey Medina sent at 5/17/2017  3:20 PM EDT -----  Regarding: Dr. Flash Bui  Patient needs a refill of Omeprazole [facility] at [phone].

## 2017-05-18 DIAGNOSIS — K21.9 GASTROESOPHAGEAL REFLUX DISEASE WITHOUT ESOPHAGITIS: ICD-10-CM

## 2017-05-18 RX ORDER — OMEPRAZOLE 20 MG/1
CAPSULE, DELAYED RELEASE ORAL
Qty: 30 CAP | Refills: 5 | Status: SHIPPED | OUTPATIENT
Start: 2017-05-18 | End: 2017-06-29 | Stop reason: SDUPTHER

## 2017-06-19 DIAGNOSIS — G89.29 OTHER CHRONIC PAIN: Primary | ICD-10-CM

## 2017-06-19 RX ORDER — DICLOFENAC SODIUM 75 MG/1
75 TABLET, DELAYED RELEASE ORAL 2 TIMES DAILY
Qty: 60 TAB | Refills: 1 | Status: SHIPPED | OUTPATIENT
Start: 2017-06-19 | End: 2017-07-12

## 2017-06-29 DIAGNOSIS — K21.9 GASTROESOPHAGEAL REFLUX DISEASE WITHOUT ESOPHAGITIS: ICD-10-CM

## 2017-06-29 RX ORDER — OMEPRAZOLE 20 MG/1
CAPSULE, DELAYED RELEASE ORAL
Qty: 30 CAP | Refills: 5 | Status: SHIPPED | OUTPATIENT
Start: 2017-06-29 | End: 2018-03-07 | Stop reason: SDUPTHER

## 2017-06-29 RX ORDER — ACYCLOVIR 400 MG/1
TABLET ORAL
Qty: 60 TAB | Refills: 5 | Status: SHIPPED | OUTPATIENT
Start: 2017-06-29 | End: 2017-10-29

## 2017-06-29 NOTE — TELEPHONE ENCOUNTER
Patient wants a refill for: acyclovir (ZOVIRAX) 400 mg tablet and   omeprazole (PRILOSEC) 20 mg capsule    To go to mail order pharmacy         Best number to reach her is [phone]

## 2017-07-12 ENCOUNTER — HOSPITAL ENCOUNTER (OUTPATIENT)
Age: 62
Setting detail: OUTPATIENT SURGERY
Discharge: HOME | End: 2017-07-12
Attending: SPECIALIST | Admitting: SPECIALIST
Payer: MEDICARE

## 2017-07-12 ENCOUNTER — ANESTHESIA (OUTPATIENT)
Dept: ENDOSCOPY | Age: 62
End: 2017-07-12
Payer: MEDICARE

## 2017-07-12 ENCOUNTER — ANESTHESIA EVENT (OUTPATIENT)
Dept: ENDOSCOPY | Age: 62
End: 2017-07-12
Payer: MEDICARE

## 2017-07-12 VITALS
WEIGHT: 293 LBS | DIASTOLIC BLOOD PRESSURE: 68 MMHG | TEMPERATURE: 97.8 F | BODY MASS INDEX: 39.68 KG/M2 | OXYGEN SATURATION: 97 % | RESPIRATION RATE: 16 BRPM | SYSTOLIC BLOOD PRESSURE: 123 MMHG | HEART RATE: 78 BPM | HEIGHT: 72 IN

## 2017-07-12 LAB
GLUCOSE BLD STRIP.AUTO-MCNC: 175 MG/DL (ref 65–100)
SERVICE CMNT-IMP: ABNORMAL

## 2017-07-12 PROCEDURE — 74011250637 HC RX REV CODE- 250/637: Performed by: SPECIALIST

## 2017-07-12 PROCEDURE — 74011250636 HC RX REV CODE- 250/636

## 2017-07-12 PROCEDURE — 88305 TISSUE EXAM BY PATHOLOGIST: CPT | Performed by: SPECIALIST

## 2017-07-12 PROCEDURE — 74011250636 HC RX REV CODE- 250/636: Performed by: SPECIALIST

## 2017-07-12 PROCEDURE — 74011000250 HC RX REV CODE- 250

## 2017-07-12 PROCEDURE — 77030009426 HC FCPS BIOP ENDOSC BSC -B: Performed by: SPECIALIST

## 2017-07-12 PROCEDURE — 76040000019: Performed by: SPECIALIST

## 2017-07-12 PROCEDURE — 76060000031 HC ANESTHESIA FIRST 0.5 HR: Performed by: SPECIALIST

## 2017-07-12 PROCEDURE — 82962 GLUCOSE BLOOD TEST: CPT

## 2017-07-12 RX ORDER — DEXTROMETHORPHAN/PSEUDOEPHED 2.5-7.5/.8
1.2 DROPS ORAL
Status: DISCONTINUED | OUTPATIENT
Start: 2017-07-12 | End: 2017-07-12 | Stop reason: HOSPADM

## 2017-07-12 RX ORDER — PROPOFOL 10 MG/ML
INJECTION, EMULSION INTRAVENOUS AS NEEDED
Status: DISCONTINUED | OUTPATIENT
Start: 2017-07-12 | End: 2017-07-12 | Stop reason: HOSPADM

## 2017-07-12 RX ORDER — SODIUM CHLORIDE 9 MG/ML
50 INJECTION, SOLUTION INTRAVENOUS CONTINUOUS
Status: DISCONTINUED | OUTPATIENT
Start: 2017-07-12 | End: 2017-07-12 | Stop reason: HOSPADM

## 2017-07-12 RX ORDER — SODIUM CHLORIDE 0.9 % (FLUSH) 0.9 %
5-10 SYRINGE (ML) INJECTION EVERY 8 HOURS
Status: DISCONTINUED | OUTPATIENT
Start: 2017-07-12 | End: 2017-07-12 | Stop reason: HOSPADM

## 2017-07-12 RX ORDER — SODIUM CHLORIDE 0.9 % (FLUSH) 0.9 %
5-10 SYRINGE (ML) INJECTION AS NEEDED
Status: DISCONTINUED | OUTPATIENT
Start: 2017-07-12 | End: 2017-07-12 | Stop reason: HOSPADM

## 2017-07-12 RX ORDER — LIDOCAINE HYDROCHLORIDE 20 MG/ML
INJECTION, SOLUTION EPIDURAL; INFILTRATION; INTRACAUDAL; PERINEURAL AS NEEDED
Status: DISCONTINUED | OUTPATIENT
Start: 2017-07-12 | End: 2017-07-12 | Stop reason: HOSPADM

## 2017-07-12 RX ORDER — FENTANYL CITRATE 50 UG/ML
25 INJECTION, SOLUTION INTRAMUSCULAR; INTRAVENOUS
Status: DISCONTINUED | OUTPATIENT
Start: 2017-07-12 | End: 2017-07-12 | Stop reason: HOSPADM

## 2017-07-12 RX ORDER — MIDAZOLAM HYDROCHLORIDE 1 MG/ML
1-2 INJECTION, SOLUTION INTRAMUSCULAR; INTRAVENOUS
Status: DISCONTINUED | OUTPATIENT
Start: 2017-07-12 | End: 2017-07-12 | Stop reason: HOSPADM

## 2017-07-12 RX ORDER — PROPOFOL 10 MG/ML
10-1000 INJECTION, EMULSION INTRAVENOUS
Status: DISCONTINUED | OUTPATIENT
Start: 2017-07-12 | End: 2017-07-12 | Stop reason: HOSPADM

## 2017-07-12 RX ADMIN — SODIUM CHLORIDE 50 ML/HR: 900 INJECTION, SOLUTION INTRAVENOUS at 11:20

## 2017-07-12 RX ADMIN — LIDOCAINE HYDROCHLORIDE 40 MG: 20 INJECTION, SOLUTION EPIDURAL; INFILTRATION; INTRACAUDAL; PERINEURAL at 11:29

## 2017-07-12 RX ADMIN — PROPOFOL 200 MG: 10 INJECTION, EMULSION INTRAVENOUS at 11:29

## 2017-07-12 RX ADMIN — SIMETHICONE 80 MG: 20 SUSPENSION/ DROPS ORAL at 11:46

## 2017-07-12 RX ADMIN — PROPOFOL 170 MG: 10 INJECTION, EMULSION INTRAVENOUS at 11:35

## 2017-07-12 NOTE — ANESTHESIA PREPROCEDURE EVALUATION
Anesthetic History   No history of anesthetic complications            Review of Systems / Medical History  Patient summary reviewed, nursing notes reviewed and pertinent labs reviewed    Pulmonary    COPD    Sleep apnea    Asthma        Neuro/Psych         Psychiatric history     Cardiovascular    Hypertension              Exercise tolerance: >4 METS     GI/Hepatic/Renal  Within defined limits              Endo/Other    Diabetes    Morbid obesity and arthritis     Other Findings              Physical Exam    Airway  Mallampati: II  TM Distance: 4 - 6 cm  Neck ROM: normal range of motion   Mouth opening: Normal     Cardiovascular  Regular rate and rhythm,  S1 and S2 normal,  no murmur, click, rub, or gallop             Dental  No notable dental hx       Pulmonary  Breath sounds clear to auscultation               Abdominal  GI exam deferred       Other Findings            Anesthetic Plan    ASA: 3  Anesthesia type: total IV anesthesia and MAC          Induction: Intravenous  Anesthetic plan and risks discussed with: Patient

## 2017-07-12 NOTE — PERIOP NOTES
Marbella Hicks  1955  [id number]    Situation:  Verbal report received from: Gladis Nelson; [facility], RN  Procedure: Procedure(s):  COLONOSCOPY  COLON BIOPSY    Background:    Preoperative diagnosis: SCREENING  Postoperative diagnosis: colon ulcer    :  Dr. Zohra Lance  Assistant(s): Endoscopy Technician-1: [facility]  Endoscopy RN-1: Festus Simmonds, RN    Specimens:   ID Type Source Tests Collected by Time Destination   1 : Ileo-cecal colon biopsy Preservative   Deanna Finley MD 7/12/2017 1138 Pathology   2 : Cecal ulcer biopsy Preservative   Deanna Finley MD 7/12/2017 1144 Pathology     H. Pylori  no    Assessment:  Intra-procedure medications   Anesthesia gave intra-procedure sedation and medications, see anesthesia flow sheet yes    Intravenous fluids: NS@ KVO     Vital signs stable     Abdominal assessment: round and soft     Recommendation:  Discharge patient per MD order.   Family or Friend   Permission to share finding with family or friend yes

## 2017-07-12 NOTE — H&P
Gastroenterology Outpatient History and Physical    Patient: Joe Oneil    Physician: Jessica Perez MD    Vital Signs: Blood pressure 117/75, pulse 84, temperature 98 °F (36.7 °C), resp. rate 25, height 6' (1.829 m), weight 146.2 kg (322 lb 5 oz), last menstrual period 1996, SpO2 97 %, not currently breastfeeding. Allergies: Allergies   Allergen Reactions    Allopurinol Hives    Darvocet A500 [Propoxyphene N-Acetaminophen] Hives    Seafood [Shellfish Containing Products] Itching     NEW ALLERGY; REACTION WORSENS AS AMOUNT EATEN INCREASES       Chief Complaint: CRC screening    History of Present Illness: 64 yo Obese BF for screening colonoscopy.     Justification for Procedure: above    History:  Past Medical History:   Diagnosis Date    Arthritis     Asthma     Chronic obstructive pulmonary disease (HCC)     Chronic pain     back/hip    Diabetes ([address]     Diabetes mellitus due to underlying condition, controlled, with complication, without long-term current use of insulin ([facility] 3/21/2017    Fibromyalgia     Gastroesophageal reflux disease without esophagitis 2016    Hyperlipidemia 2012    Hypertension     Obesity 2012    Unspecified sleep apnea     CAN'T TOLERATE CPAP      Past Surgical History:   Procedure Laterality Date    HX  SECTION      HX HYSTERECTOMY      one ovary removed    HX ORTHOPAEDIC  2012    lumbar fusion    HX ORTHOPAEDIC  2/16/15    RIGHT TOTAL KNEE ARTHROPLASTY    HX ORTHOPAEDIC  6/16/15    LEFT TOTAL KNEE ARTHROPLASTY         Social History     Social History    Marital status: SINGLE     Spouse name: N/A    Number of children: N/A    Years of education: N/A     Social History Main Topics    Smoking status: Former Smoker     Packs/day: 1.00     Years: 37.00     Quit date: 2015    Smokeless tobacco: Never Used    Alcohol use Yes      Comment: VERY RARE WINE    Drug use: No    Sexual activity: Not Currently Partners: Male     Other Topics Concern    None     Social History Narrative      Family History   Problem Relation Age of Onset    Anesth Problems Mother     Cancer Mother      LUNG CA - SMOKER    Other Mother      CEREBRAL ANEURYSM    Diabetes Mother     Diabetes Father     Other Sister      VARICOSE VEINS THAT HURT AND BLEED    Heart Attack Brother     Other Other      MOTHER'S FATHER'S MOTHER  WITH ANEURYSM       Medications:   Prior to Admission medications    Medication Sig Start Date End Date Taking? Authorizing Provider   omeprazole (PRILOSEC) 20 mg capsule TAKE 1 CAPSULE BY MOUTH EVERY DAY 17  Yes Pam Ricci MD   acyclovir (ZOVIRAX) 400 mg tablet TAKE 1 TABLET BY MOUTH TWICE DAILY 17  Yes Pam Ricci MD   diclofenac EC (VOLTAREN) 75 mg EC tablet Take 1 Tab by mouth two (2) times a day. 17  Yes Pam Ricci MD   metFORMIN (GLUCOPHAGE) 1,000 mg tablet 500mg in am and 1000 mg pm 17  Yes Pam Ricci MD   lisinopril-hydroCHLOROthiazide [facility], ZESTORETIC) 20-12.5 mg per tablet DO NOT TAKE FOR BLOOD PRESSURE LESS THAN 130/80 3/21/17  Yes Pam Ricci MD   gabapentin (NEURONTIN) 300 mg capsule TAKE 1 CAPSULE BY MOUTH THREE TIMES DAILY 3/7/17  Yes Pam Ricci MD   potassium chloride (K-DUR, KLOR-CON) 10 mEq tablet TK 1 T PO  QD 2/3/17  Yes Pam Ricci MD   baclofen (LIORESAL) 10 mg tablet TK 1 T PO Q 8 H PRF MUSCLE SPASM 16  Yes Historical Provider   fluticasone (FLONASE) 50 mcg/actuation nasal spray INSTILL 2 SPRAYS IN EACH NOSTRIL EVERY DAY 17  Yes Pam Ricci MD   fluticasone-salmeterol (ADVAIR) 250-50 mcg/dose diskus inhaler Take 1 Puff by inhalation every twelve (12) hours. 16  Yes Maria E Kumar MD   gemfibrozil (LOPID) 600 mg tablet TK 1 T PO BID 11/10/16  Yes Historical Provider   cholecalciferol, vitamin D3, (VITAMIN D3) 2,000 unit tab Take 1 Tab by mouth daily.    Yes Historical Provider   SITagliptin (JANUVIA) 100 mg tablet Take 1 Tab by mouth daily. 11/4/16  Yes Sanna Mccarty MD   albuterol-ipratropium (DUO-NEB) 2.5 mg-0.5 mg/3 ml nebu INHALE 1 VIAL VIA NEBULIZER EVERY 4 HOURS AS NEEDED 9/19/16  Yes Sanna Mccarty MD   albuterol (PROAIR HFA) 90 mcg/actuation inhaler Take 2 Puffs by inhalation every four (4) hours as needed for Wheezing. 12/31/15  Yes Sanna Mccarty MD   glucose blood VI test strips (ACCU-CHEK SMARTVIEW TEST STRIP) strip Test twice daily 6/16/15  Yes Bethany Hernández NP   naloxone [facility]) 0.4 mg/mL injection 1 mL by IntraVENous route as needed. 1/19/17   Sanna Mccarty MD   Lancets (ACCU-CHEK Aparna) misc Test twice daily 6/16/15   Bethany Hernández NP       Physical Exam:   General: alert, no distress   HEENT: Head: Normocephalic, no lesions, without obvious abnormality.    Heart: regular rate and rhythm, S1, S2 normal, no murmur, click, rub or gallop   Lungs: chest clear, no wheezing, rales, normal symmetric air entry   Abdominal: soft, obese, nontender, nondistended, + BS   Neurological: Grossly normal   Extremities: extremities normal, atraumatic, no cyanosis or edema     Findings/Diagnosis: CRC screening     Plan of Care/Planned Procedure: Colonoscopy with MAC    Signed By: Shant Rabago MD     July 12, 2017

## 2017-07-12 NOTE — ANESTHESIA POSTPROCEDURE EVALUATION
Post-Anesthesia Evaluation and Assessment    Patient: Ashley Barreto MRN: [id number]  SSN: [id number]    YOB: 1955  Age: 63 y.o. Sex: female       Cardiovascular Function/Vital Signs  Visit Vitals    BP 97/54    Pulse 84    Temp 36.6 °C (97.8 °F)    Resp 29    Ht 6' (1.829 m)    Wt 146.2 kg (322 lb 5 oz)    SpO2 98%    Breastfeeding No    BMI 43.71 kg/m2       Patient is status post total IV anesthesia, general anesthesia for Procedure(s):  COLONOSCOPY  COLON BIOPSY. Nausea/Vomiting: None    Postoperative hydration reviewed and adequate. Pain:  Pain Scale 1: Numeric (0 - 10) (07/12/17 1212)  Pain Intensity 1: 0 (07/12/17 1212)   Managed    Neurological Status: At baseline    Mental Status and Level of Consciousness: Arousable    Pulmonary Status:   O2 Device: Room air (07/12/17 1212)   Adequate oxygenation and airway patent    Complications related to anesthesia: None    Post-anesthesia assessment completed.  No concerns    Signed By: Rakesh Davis MD     July 12, 2017

## 2017-07-12 NOTE — IP AVS SNAPSHOT
[address] [facility] 
[phone] Patient: Bowen Ayala MRN: [id number] WPB:1[birth year]35[birth year]86 You are allergic to the following Allergen Reactions Allopurinol Hives Darvocet A500 (Propoxyphene N-Acetaminophen) Hives Seafood (Shellfish Containing Products) Itching NEW ALLERGY; REACTION WORSENS AS AMOUNT EATEN INCREASES Recent Documentation Height Weight Breastfeeding? BMI OB Status Smoking Status 1.829 m 146.2 kg No 43.71 kg/m2 Hysterectomy Former Smoker Emergency Contacts Name Discharge Info Relation Home Work Mobile [facility] FACILITY DISCHARGE CAREGIVER [3] Sister [23] [phone] Sandra Abbasi  Child [2] [phone] Victorina Mcneill  Other Relative [6] [phone] About your hospitalization You were admitted on:  July 12, 2017 You last received care in the:  [facility] You were discharged on:  July 12, 2017 Unit phone number:  [phone] Why you were hospitalized Your primary diagnosis was:  Not on File Providers Seen During Your Hospitalizations Provider Role Specialty Primary office phone Sean Jimenez MD Attending Provider Gastroenterology [phone] Your Primary Care Physician (PCP) Primary Care Physician Office Phone Office Fax Teresa Talley [phone] Follow-up Information None Your Appointments Tuesday July 18, 2017  8:30 AM EDT ROUTINE CARE with Brittany Richards MD  
[facility] at [facility] [address]) [address] [place] [address] [address] [facility]  
[phone] Current Discharge Medication List  
  
CONTINUE these medications which have NOT CHANGED Dose & Instructions Dispensing Information Comments Morning Noon Evening Bedtime  
 acyclovir 400 mg tablet Commonly known as:  ZOVIRAX Your last dose was: Your next dose is: TAKE 1 TABLET BY MOUTH TWICE DAILY Quantity:  60 Tab Refills:  5  
     
   
   
   
  
 albuterol 90 mcg/actuation inhaler Commonly known as:  PROAIR HFA Your last dose was: Your next dose is:    
   
   
 Dose:  2 Puff Take 2 Puffs by inhalation every four (4) hours as needed for Wheezing. Quantity:  1 Inhaler Refills:  2  
     
   
   
   
  
 albuterol-ipratropium 2.5 mg-0.5 mg/3 ml Nebu Commonly known as:  Joan Bowling Your last dose was: Your next dose is:    
   
   
 INHALE 1 VIAL VIA NEBULIZER EVERY 4 HOURS AS NEEDED Quantity:  1620 mL Refills:  5  
 **Patient requests 90 days supply**  
    
   
   
   
  
 baclofen 10 mg tablet Commonly known as:  LIORESAL Your last dose was: Your next dose is:    
   
   
 TK 1 T PO Q 8 H PRF MUSCLE SPASM Refills:  11  
     
   
   
   
  
 fluticasone 50 mcg/actuation nasal spray Commonly known as:  Cyndra Puna Your last dose was: Your next dose is:    
   
   
 INSTILL 2 SPRAYS IN EACH NOSTRIL EVERY DAY Quantity:  1 Bottle Refills:  0  
 **Patient requests 90 days supply**  
    
   
   
   
  
 fluticasone-salmeterol 250-50 mcg/dose diskus inhaler Commonly known as:  ADVAIR Your last dose was: Your next dose is:    
   
   
 Dose:  1 Puff Take 1 Puff by inhalation every twelve (12) hours. Quantity:  1 Inhaler Refills:  0  
     
   
   
   
  
 gabapentin 300 mg capsule Commonly known as:  NEURONTIN Your last dose was: Your next dose is: TAKE 1 CAPSULE BY MOUTH THREE TIMES DAILY Quantity:  270 Cap Refills:  2  
 **Patient requests 90 days supply**  
    
   
   
   
  
 gemfibrozil 600 mg tablet Commonly known as:  LOPID Your last dose was: Your next dose is:    
   
   
 TK 1 T PO BID Refills:  1 glucose blood VI test strips strip Commonly known as:  [address] Your last dose was: Your next dose is:    
   
   
 Test twice daily Quantity:  60 Each Refills:  11 Lancets Misc Commonly known as:  ACCU-CHEK FASTCLIX Your last dose was: Your next dose is:    
   
   
 Test twice daily Quantity:  60 Each Refills:  11  
     
   
   
   
  
 lisinopril-hydroCHLOROthiazide 20-12.5 mg per tablet Commonly known as:  Eather Revel Your last dose was: Your next dose is:    
   
   
 DO NOT TAKE FOR BLOOD PRESSURE LESS THAN 130/80 Quantity:  90 Tab Refills:  1  
     
   
   
   
  
 metFORMIN 1,000 mg tablet Commonly known as:  GLUCOPHAGE Your last dose was: Your next dose is:    
   
   
 500mg in am and 1000 mg pm  
 Quantity:  180 Tab Refills:  2  
 **Patient requests 90 days supply**  
    
   
   
   
  
 naloxone 0.4 mg/mL injection Commonly known as:  [employer] Your last dose was: Your next dose is:    
   
   
 Dose:  0.4 mg  
1 mL by IntraVENous route as needed. Quantity:  1 mL Refills:  0  
     
   
   
   
  
 omeprazole 20 mg capsule Commonly known as:  PRILOSEC Your last dose was: Your next dose is: TAKE 1 CAPSULE BY MOUTH EVERY DAY Quantity:  30 Cap Refills:  5  
 **Patient requests 90 days supply**  
    
   
   
   
  
 potassium chloride 10 mEq tablet Commonly known as:  KLOR-CON Your last dose was: Your next dose is:    
   
   
 TK 1 T PO  QD Quantity:  90 Tab Refills:  1 SITagliptin 100 mg tablet Commonly known as:  Sam Faye Your last dose was: Your next dose is:    
   
   
 Dose:  100 mg Take 1 Tab by mouth daily. Quantity:  90 Tab Refills:  3 VITAMIN D3 2,000 unit Tab Generic drug:  cholecalciferol (vitamin D3) Your last dose was: Your next dose is:    
   
   
 Dose:  1 Tab Take 1 Tab by mouth daily. Refills:  0 STOP taking these medications   
 diclofenac EC 75 mg EC tablet Commonly known as:  VOLTAREN Discharge Instructions Brittany Machado [id number] 
1955 COLON DISCHARGE INSTRUCTIONS Discomfort: 
Redness at IV site- apply warm compress to area; if redness or soreness persist- contact your physician There may be a slight amount of blood passed from the rectum Gaseous discomfort- walking, belching will help relieve any discomfort You may not operate a vehicle for 12 hours You may not engage in an occupation involving machinery or appliances for rest of today You may not drink alcoholic beverages for at least 12 hours Avoid making any critical decisions for at least 24 hour DIET: 
 Regular diet.  however -  remember your colon is empty and a heavy meal will produce gas. Avoid these foods:  vegetables, fried / greasy foods, carbonated drinks for today. MEDICATIONS: 
  
 
 Regarding Aspirin or Nonsteroidal medications, please see below. ACTIVITY: 
You may resume your normal daily activities it is recommended that you spend the remainder of the day resting -  avoid any strenuous activity. CALL M.D. ANY SIGN OF: Increasing pain, nausea, vomiting Abdominal distension (swelling) New increased bleeding (oral or rectal) Fever (chills) Pain in chest area Bloody discharge from nose or mouth Shortness of breath ONLY  Tylenol as needed for pain. Follow-up Instructions: 
 Call Dr. Joycelyn Zarate for results of procedure / biopsy in 4-5 days at telephone #  [phone] Make a followup visit with Dr. Joycelyn Zarate to discuss ulcer in the colon. Stop Diclofenac and other Ibuprofen-type medications due to the ulcers in the colon. Discharge Orders None ACO Transitions of Care Introducing [facility] [address]er offers a voluntary care coordination program to provide high quality service and care to [facility] fee-for-service beneficiaries. Merry Bumpers was designed to help you enhance your health and well-being through the following services: ? Transitions of Care  support for individuals who are transitioning from one care setting to another (example: Hospital to home). ? Chronic and Complex Care Coordination  support for individuals and caregivers of those with serious or chronic illnesses or with more than one chronic (ongoing) condition and those who take a number of different medications. If you meet specific medical criteria, a [address] [place] [address] Rd may call you directly to coordinate your care with your primary care physician and your other care providers. For questions about the [facility] programs, please, contact your physicians office. For general questions or additional information about Accountable Care Organizations: 
Please visit www.medicare.gov/acos. html or call 1-800-MEDICARE (1-531.939.4214) TTY users should call 3-930.388.1021. Introducing [facility]! Ariella Alicea introduces [employer] patient portal. Now you can access parts of your medical record, email your doctor's office, and request medication refills online. 1. In your internet browser, go to [email or web address] 2. Click on the First Time User? Click Here link in the Sign In box. You will see the New Member Sign Up page. 3. Enter your [employer] Access Code exactly as it appears below. You will not need to use this code after youve completed the sign-up process. If you do not sign up before the expiration date, you must request a new code. · [employer] Access Code: [email or web address] Expires: 10/10/2017  9:34 AM 
 
4.  Enter the last four digits of your Social Security Number (xxxx) and Date of Birth (mm/dd/yyyy) as indicated and click Submit. You will be taken to the next sign-up page. 5. Create a Clipsure ID. This will be your Clipsure login ID and cannot be changed, so think of one that is secure and easy to remember. 6. Create a Clipsure password. You can change your password at any time. 7. Enter your Password Reset Question and Answer. This can be used at a later time if you forget your password. 8. Enter your e-mail address. You will receive e-mail notification when new information is available in [address] 9. Click Sign Up. You can now view and download portions of your medical record. 10. Click the Download Summary menu link to download a portable copy of your medical information. If you have questions, please visit the Frequently Asked Questions section of the [employer] website. Remember, [employer]ure is NOT to be used for urgent needs. For medical emergencies, dial 911. Now available from your iPhone and Android! General Information Please provide this summary of care documentation to your next provider. Patient Signature:  ____________________________________________________________ Date:  ____________________________________________________________  
  
Kirsten [place] Provider Signature:  ____________________________________________________________ Date:  ____________________________________________________________

## 2017-07-12 NOTE — PERIOP NOTES
Anesthesia reports 370mg Propofol, 40mg Lidocaine and 500mL NS given during procedure. Received report from anesthesia staff on vital signs and status of patient.

## 2017-07-12 NOTE — PROCEDURES
Colonoscopy Procedure Note    Indications:   Screening colonoscopy    Referring Physician: Hipolito Georges MD  Anesthesia/Sedation: MAC anesthesia Propofol  Endoscopist:  Dr. Vu Mejia    Procedure in Detail:  Informed consent was obtained for the procedure, including sedation. Risks of perforation, hemorrhage, adverse drug reaction, and aspiration were discussed. The patient was placed in the left lateral decubitus position. Based on the pre-procedure assessment, including review of the patient's medical history, medications, allergies, and review of systems, she had been deemed to be an appropriate candidate for moderate sedation; she was therefore sedated with the medications listed above. The patient was monitored continuously with ECG tracing, pulse oximetry, blood pressure monitoring, and direct observations. A rectal examination was performed. The [id number] was inserted into the rectum and advanced under direct vision to the terminal ileum. The quality of the colonic preparation was adequate. A careful inspection was made as the colonoscope was withdrawn, including a retroflexed view of the rectum; findings and interventions are described below. Appropriate photodocumentation was obtained. Findings:     1. Scope advanced to the cecum and the most distal ileum. 2.  ++ Focal deep ulceration on the ileocecal valve distal end and another at the IC Valve orifice (S/P Bx in 2 separate jars). The appearance is suspicious for possible Crohn's ileocolitis vs. ? NSAID. The most distal ileum was normal upon inspection. Remaining mucosa throughout the entire colon was normal.  3.  No polyps seen. Therapies:  See above    Specimen: Specimens were collected as described above and sent to pathology. Complications: None were encountered during the procedure. EBL: < 10 ml.     Recommendations:   -f/u path to see if findings of Crohn's disease present;  F/u with Dr. Nathanael Bernheim  -Repeat colonoscopy in 5 years.   Signed By: Gloria Nuñez MD                        July 12, 2017

## 2017-07-12 NOTE — DISCHARGE INSTRUCTIONS
Yana May  [id number]  1955    COLON DISCHARGE INSTRUCTIONS  Discomfort:  Redness at IV site- apply warm compress to area; if redness or soreness persist- contact your physician  There may be a slight amount of blood passed from the rectum  Gaseous discomfort- walking, belching will help relieve any discomfort  You may not operate a vehicle for 12 hours  You may not engage in an occupation involving machinery or appliances for rest of today  You may not drink alcoholic beverages for at least 12 hours  Avoid making any critical decisions for at least 24 hour  DIET:   Regular diet. - however -  remember your colon is empty and a heavy meal will produce gas. Avoid these foods:  vegetables, fried / greasy foods, carbonated drinks for today. MEDICATIONS:        Regarding Aspirin or Nonsteroidal medications, please see below. ACTIVITY:  You may resume your normal daily activities it is recommended that you spend the remainder of the day resting -  avoid any strenuous activity. CALL M.D. ANY SIGN OF:  Increasing pain, nausea, vomiting  Abdominal distension (swelling)  New increased bleeding (oral or rectal)  Fever (chills)  Pain in chest area  Bloody discharge from nose or mouth  Shortness of breath    ONLY  Tylenol as needed for pain. Follow-up Instructions:   Call Dr. Maribel Patricio for results of procedure / biopsy in 4-5 days at telephone #  [phone]              Make a followup visit with Dr. Maribel Patricio to discuss ulcer in the colon. Stop Diclofenac and other Ibuprofen-type medications due to the ulcers in the colon.

## 2017-07-18 ENCOUNTER — HOSPITAL ENCOUNTER (OUTPATIENT)
Dept: LAB | Age: 62
Discharge: HOME | End: 2017-07-18
Payer: MEDICARE

## 2017-07-18 ENCOUNTER — OFFICE VISIT (OUTPATIENT)
Dept: FAMILY MEDICINE CLINIC | Age: 62
End: 2017-07-18

## 2017-07-18 VITALS
TEMPERATURE: 96.9 F | WEIGHT: 293 LBS | DIASTOLIC BLOOD PRESSURE: 75 MMHG | RESPIRATION RATE: 18 BRPM | SYSTOLIC BLOOD PRESSURE: 121 MMHG | OXYGEN SATURATION: 97 % | BODY MASS INDEX: 39.68 KG/M2 | HEART RATE: 88 BPM | HEIGHT: 72 IN

## 2017-07-18 DIAGNOSIS — M54.41 CHRONIC LOW BACK PAIN WITH BILATERAL SCIATICA, UNSPECIFIED BACK PAIN LATERALITY: ICD-10-CM

## 2017-07-18 DIAGNOSIS — M54.42 CHRONIC LOW BACK PAIN WITH BILATERAL SCIATICA, UNSPECIFIED BACK PAIN LATERALITY: ICD-10-CM

## 2017-07-18 DIAGNOSIS — G89.29 CHRONIC LOW BACK PAIN WITH BILATERAL SCIATICA, UNSPECIFIED BACK PAIN LATERALITY: ICD-10-CM

## 2017-07-18 DIAGNOSIS — I10 ESSENTIAL HYPERTENSION: ICD-10-CM

## 2017-07-18 DIAGNOSIS — E08.8 DIABETES MELLITUS DUE TO UNDERLYING CONDITION, CONTROLLED, WITH COMPLICATION, WITHOUT LONG-TERM CURRENT USE OF INSULIN (HCC): Primary | ICD-10-CM

## 2017-07-18 DIAGNOSIS — Z79.899 ENCOUNTER FOR LONG-TERM (CURRENT) USE OF OTHER MEDICATIONS: ICD-10-CM

## 2017-07-18 DIAGNOSIS — E78.2 MIXED HYPERLIPIDEMIA: ICD-10-CM

## 2017-07-18 PROCEDURE — 36415 COLL VENOUS BLD VENIPUNCTURE: CPT

## 2017-07-18 PROCEDURE — 82043 UR ALBUMIN QUANTITATIVE: CPT

## 2017-07-18 PROCEDURE — 80053 COMPREHEN METABOLIC PANEL: CPT

## 2017-07-18 PROCEDURE — 83036 HEMOGLOBIN GLYCOSYLATED A1C: CPT

## 2017-07-18 RX ORDER — ACETAMINOPHEN AND CODEINE PHOSPHATE 300; 30 MG/1; MG/1
1 TABLET ORAL
Qty: 30 TAB | Refills: 0 | Status: SHIPPED | OUTPATIENT
Start: 2017-07-18 | End: 2017-10-27

## 2017-07-18 RX ORDER — POTASSIUM CHLORIDE 750 MG/1
10 TABLET, FILM COATED, EXTENDED RELEASE ORAL DAILY
COMMUNITY
Start: 2017-07-05 | End: 2018-03-09 | Stop reason: SDUPTHER

## 2017-07-18 NOTE — PROGRESS NOTES
Narciso Quesada is a 63 y.o. female    Chronic pain: have been working with pain specialist, have had nerve denervation and was given lidoderm patch; though she think it helps minimally, she have been doing more with her life, she have been trying to walk daily, does more of ADL and even went to a cruise last weekend. She still suffer from pain after and haven't achieve enough control to do more of activities above. I discussed goal is not pain free but manageable so she can does ADL and some enjoyment. She'll f/u with pain clinic. She was supposed to get a spinal prednisone shot but the money issue didn't fall through, she'll f/u with them with her next disability check. I will write for her tylenol with codeine QDprn    Had colonoscopy with polyps. Also GI ulcer. To avoid all NSAIDs. DM2: she report taking her meds and no side effect. Last lab A1C 7.4%. needs lab monitoring. Review of Systems  Pertinent items are noted in HPI. Current Outpatient Prescriptions on File Prior to Visit   Medication Sig Dispense Refill    omeprazole (PRILOSEC) 20 mg capsule TAKE 1 CAPSULE BY MOUTH EVERY DAY 30 Cap 5    acyclovir (ZOVIRAX) 400 mg tablet TAKE 1 TABLET BY MOUTH TWICE DAILY 60 Tab 5    metFORMIN (GLUCOPHAGE) 1,000 mg tablet 500mg in am and 1000 mg pm 180 Tab 2    lisinopril-hydroCHLOROthiazide (PRINZIDE, ZESTORETIC) 20-12.5 mg per tablet DO NOT TAKE FOR BLOOD PRESSURE LESS THAN 130/80 90 Tab 1    gabapentin (NEURONTIN) 300 mg capsule TAKE 1 CAPSULE BY MOUTH THREE TIMES DAILY 270 Cap 2    potassium chloride (K-DUR, KLOR-CON) 10 mEq tablet TK 1 T PO  QD 90 Tab 1    baclofen (LIORESAL) 10 mg tablet TK 1 T PO Q 8 H PRF MUSCLE SPASM  11    fluticasone (FLONASE) 50 mcg/actuation nasal spray INSTILL 2 SPRAYS IN EACH NOSTRIL EVERY DAY 1 Bottle 0    fluticasone-salmeterol (ADVAIR) 250-50 mcg/dose diskus inhaler Take 1 Puff by inhalation every twelve (12) hours.  1 Inhaler 0    gemfibrozil (LOPID) 600 mg tablet TK 1 T PO BID  1    cholecalciferol, vitamin D3, (VITAMIN D3) 2,000 unit tab Take 1 Tab by mouth daily.  SITagliptin (JANUVIA) 100 mg tablet Take 1 Tab by mouth daily. 90 Tab 3    albuterol-ipratropium (DUO-NEB) 2.5 mg-0.5 mg/3 ml nebu INHALE 1 VIAL VIA NEBULIZER EVERY 4 HOURS AS NEEDED 1620 mL 5    albuterol (PROAIR HFA) 90 mcg/actuation inhaler Take 2 Puffs by inhalation every four (4) hours as needed for Wheezing. 1 Inhaler 2    glucose blood VI test strips (ACCU-CHEK SMARTVIEW TEST STRIP) strip Test twice daily 60 Each 11    Lancets (ACCU-CHEK FASTCLIX) misc Test twice daily 60 Each 11     No current facility-administered medications on file prior to visit.         Allergies   Allergen Reactions    Allopurinol Hives    [de-identified] A500 [Propoxyphene N-Acetaminophen] Hives    Seafood [Shellfish Containing Products] Itching     NEW ALLERGY; REACTION WORSENS AS AMOUNT EATEN INCREASES       Past Medical History:   Diagnosis Date    Arthritis     Asthma     Chronic obstructive pulmonary disease ([address]     Chronic pain     back/hip    Diabetes [facility]     Diabetes mellitus due to underlying condition, controlled, with complication, without long-term current use of insulin ([address] 3/21/2017    Fibromyalgia     Gastroesophageal reflux disease without esophagitis 2016    Hyperlipidemia 2012    Hypertension     Obesity 2012    Unspecified sleep apnea     CAN'T TOLERATE CPAP       Family History   Problem Relation Age of Onset    Anesth Problems Mother     Cancer Mother      LUNG CA - SMOKER    Other Mother      CEREBRAL ANEURYSM    Diabetes Mother     Diabetes Father     Other Sister      VARICOSE VEINS THAT HURT AND BLEED    Heart Attack Brother     Other Other      MOTHER'S FATHER'S MOTHER  WITH ANEURYSM     Social History     Social History    Marital status: SINGLE     Spouse name: N/A    Number of children: N/A    Years of education: N/A     Occupational History    Not on file. Social History Main Topics    Smoking status: Former Smoker     Packs/day: 1.00     Years: 37.00     Quit date: 12/4/2015    Smokeless tobacco: Never Used    Alcohol use Yes      Comment: VERY RARE WINE    Drug use: No    Sexual activity: Not Currently     Partners: Male     Other Topics Concern    Not on file     Social History Narrative       Physical Exam     Visit Vitals    /75 (BP 1 Location: Left arm, BP Patient Position: Sitting)    Pulse 88    Temp 96.9 °F (36.1 °C) (Oral)    Resp 18    Ht 6' (1.829 m)    Wt 325 lb 12.8 oz (147.8 kg)    LMP 04/04/1996 (LMP Unknown)    SpO2 97%    BMI 44.19 kg/m2     General:  alert, cooperative, no distress, appears stated age, morbidly obese. Walks with a cane. Head:  NCAT w/o lesions or tenderness. Sclera white, eyes quiet    Lungs: clear to auscultation bilaterally   Heart:  regular rate and rhythm, S1, S2 normal, no murmur, click, rub or gallop   Abdomen: soft, non-tender   Neuro: normal without focal findings  mental status, speech normal, alert and oriented x iii  HAYLEE  cranial nerves 2-12 intact   Affect & Behavior:  good grooming, full facial expressions, normal speech pattern and content, normal thought patterns, normal perception, good insight, normal reasoning        Assessments and Plans    Christopher Mcdaniel was seen today for results and diabetes.     Diagnoses and all orders for this visit:    Diabetes mellitus due to underlying condition, controlled, with complication, without long-term current use of insulin (HCC)  -     HEMOGLOBIN A1C WITH EAG  -     METABOLIC PANEL, COMPREHENSIVE  -     MICROALBUMIN, UR, RAND W/ MICROALBUMIN/CREA RATIO    Mixed hyperlipidemia  -     HEMOGLOBIN A1C WITH EAG  -     METABOLIC PANEL, COMPREHENSIVE  -     MICROALBUMIN, UR, RAND W/ MICROALBUMIN/CREA RATIO    Essential hypertension  -     HEMOGLOBIN A1C WITH EAG  -     METABOLIC PANEL, COMPREHENSIVE  -     MICROALBUMIN, UR, RAND W/ MICROALBUMIN/CREA RATIO    Encounter for long-term (current) use of other medications  -     HEMOGLOBIN A1C WITH EAG  -     METABOLIC PANEL, COMPREHENSIVE  -     MICROALBUMIN, UR, RAND W/ MICROALBUMIN/CREA RATIO    Chronic low back pain with bilateral sciatica, unspecified back pain laterality  -     acetaminophen-codeine (TYLENOL #3) 300-30 mg per tablet; Take 1 Tab by mouth daily as needed for Pain. Follow-up Disposition:  Return in about 3 months (around 10/18/2017) for diabetes, cholesterol, labs, pain.       Kristin Menchaca MD  7/18/2017

## 2017-07-18 NOTE — MR AVS SNAPSHOT
Visit Information Date & Time Provider Department Dept. Phone Encounter #  
 7/18/2017  8:30 AM Nayeli Sosa MD [facility] at [address] [id number] Follow-up Instructions Return in about 3 months (around 10/18/2017) for diabetes, cholesterol, labs, pain. Upcoming Health Maintenance Date Due  
 PAP AKA CERVICAL CYTOLOGY 7/14/2014 EYE EXAM RETINAL OR DILATED Q1 2/5/2015 FOOT EXAM Q1 1/21/2016 ZOSTER VACCINE AGE 60> 3/22/2017 INFLUENZA AGE 9 TO ADULT 8/1/2017 HEMOGLOBIN A1C Q6M 9/21/2017 MICROALBUMIN Q1 3/21/2018 LIPID PANEL Q1 3/21/2018 MEDICARE YEARLY EXAM 3/22/2018 BREAST CANCER SCRN MAMMOGRAM 4/7/2019 DTaP/Tdap/Td series (2 - Td) 7/15/2026 COLONOSCOPY 7/12/2027 Allergies as of 7/18/2017  Review Complete On: 7/18/2017 By: Nayeli Sosa MD  
  
 Severity Noted Reaction Type Reactions Allopurinol  06/01/2011    Hives Darvocet A500 [Propoxyphene N-acetaminophen]  06/05/2011    Hives Seafood [Shellfish Containing Products]  06/01/2011    Itching NEW ALLERGY; REACTION WORSENS AS AMOUNT EATEN INCREASES Current Immunizations  Reviewed on 11/23/2015 Name Date Pneumococcal Vaccine (Unspecified Type) 1/1/2015 Tdap 7/15/2016 Not reviewed this visit You Were Diagnosed With   
  
 Codes Comments Diabetes mellitus due to underlying condition, controlled, with complication, without long-term current use of insulin ([facility]    -  Primary ICD-10-CM: E08.8 ICD-9-CM: 249.90 Mixed hyperlipidemia     ICD-10-CM: E78.2 ICD-9-CM: 272.2 Essential hypertension     ICD-10-CM: I10 
ICD-9-CM: 401.9 Encounter for long-term (current) use of other medications     ICD-10-CM: Z79.899 ICD-9-CM: V58.69 Chronic low back pain with bilateral sciatica, unspecified back pain laterality     ICD-10-CM: M54.41, G89.29, M54.42 
ICD-9-CM: 724.2, 338.29 Vitals BP Pulse Temp Resp Height(growth percentile) Weight(growth percentile) 121/75 (BP 1 Location: Left arm, BP Patient Position: Sitting) 88 96.9 °F (36.1 °C) (Oral) 18 6' (1.829 m) 325 lb 12.8 oz (147.8 kg) LMP SpO2 BMI OB Status Smoking Status 04/04/1996 (LMP Unknown) 97% 44.19 kg/m2 Hysterectomy Former Smoker Vitals History BMI and BSA Data Body Mass Index Body Surface Area  
 44.19 kg/m 2 2.74 m 2 Preferred Pharmacy Pharmacy Name Phone Krunal Barraza [facility] [phone] Your Updated Medication List  
  
   
This list is accurate as of: 7/18/17  8:42 AM.  Always use your most recent med list.  
  
  
  
  
 acetaminophen-codeine 300-30 mg per tablet Commonly known as:  TYLENOL #3 Take 1 Tab by mouth daily as needed for Pain. acyclovir 400 mg tablet Commonly known as:  ZOVIRAX TAKE 1 TABLET BY MOUTH TWICE DAILY  
  
 albuterol 90 mcg/actuation inhaler Commonly known as:  PROAIR HFA Take 2 Puffs by inhalation every four (4) hours as needed for Wheezing. albuterol-ipratropium 2.5 mg-0.5 mg/3 ml Nebu Commonly known as:  Chau Rogue INHALE 1 VIAL VIA NEBULIZER EVERY 4 HOURS AS NEEDED  
  
 baclofen 10 mg tablet Commonly known as:  LIORESAL TK 1 T PO Q 8 H PRF MUSCLE SPASM  
  
 fluticasone 50 mcg/actuation nasal spray Commonly known as:  Charlie Bellevue INSTILL 2 SPRAYS IN EACH NOSTRIL EVERY DAY  
  
 fluticasone-salmeterol 250-50 mcg/dose diskus inhaler Commonly known as:  ADVAIR Take 1 Puff by inhalation every twelve (12) hours. gabapentin 300 mg capsule Commonly known as:  NEURONTIN  
TAKE 1 CAPSULE BY MOUTH THREE TIMES DAILY gemfibrozil 600 mg tablet Commonly known as:  LOPID TK 1 T PO BID  
  
 glucose blood VI test strips strip Commonly known as:  [address] Test twice daily Lancets Misc Commonly known as:  ACCU-CHEK FASTCLIX Test twice daily lisinopril-hydroCHLOROthiazide 20-12.5 mg per tablet Commonly known as:  PRINZIDE, ZESTORETIC  
DO NOT TAKE FOR BLOOD PRESSURE LESS THAN 130/80  
  
 metFORMIN 1,000 mg tablet Commonly known as:  GLUCOPHAGE  
500mg in am and 1000 mg pm  
  
 omeprazole 20 mg capsule Commonly known as:  PRILOSEC  
TAKE 1 CAPSULE BY MOUTH EVERY DAY * potassium chloride 10 mEq tablet Commonly known as:  KLOR-CON TK 1 T PO  QD  
  
 * potassium chloride SR 10 mEq tablet Commonly known as:  KLOR-CON 10 SITagliptin 100 mg tablet Commonly known as:  Yair Chen Take 1 Tab by mouth daily. VITAMIN D3 2,000 unit Tab Generic drug:  cholecalciferol (vitamin D3) Take 1 Tab by mouth daily. * Notice: This list has 2 medication(s) that are the same as other medications prescribed for you. Read the directions carefully, and ask your doctor or other care provider to review them with you. Prescriptions Printed Refills  
 acetaminophen-codeine (TYLENOL #3) 300-30 mg per tablet 0 Sig: Take 1 Tab by mouth daily as needed for Pain. Class: Print Route: Oral  
  
We Performed the Following HEMOGLOBIN A1C WITH EAG [03597 CPT(R)] METABOLIC PANEL, COMPREHENSIVE [98866 CPT(R)] MICROALBUMIN, UR, RAND W/ MICROALBUMIN/CREA RATIO P264390 CPT(R)] Follow-up Instructions Return in about 3 months (around 10/18/2017) for diabetes, cholesterol, labs, pain. Introducing [facility]! [employer] introduces [employer] patient portal. Now you can access parts of your medical record, email your doctor's office, and request medication refills online. 1. In your internet browser, go to [email or web address] 2. Click on the First Time User? Click Here link in the Sign In box. You will see the New Member Sign Up page. 3. Enter your [employer] Access Code exactly as it appears below. You will not need to use this code after youve completed the sign-up process.  If you do not sign up before the expiration date, you must request a new code. · [employer] Access Code: [email or web address] Expires: 10/10/2017  9:34 AM 
 
4. Enter the last four digits of your Social Security Number (xxxx) and Date of Birth (mm/dd/yyyy) as indicated and click Submit. You will be taken to the next sign-up page. 5. Create a [employer] ID. This will be your [employer] login ID and cannot be changed, so think of one that is secure and easy to remember. 6. Create a [employer] password. You can change your password at any time. 7. Enter your Password Reset Question and Answer. This can be used at a later time if you forget your password. 8. Enter your e-mail address. You will receive e-mail notification when new information is available in [address] 9. Click Sign Up. You can now view and download portions of your medical record. 10. Click the Download Summary menu link to download a portable copy of your medical information. If you have questions, please visit the Frequently Asked Questions section of the [employer] website. Remember, [employer] is NOT to be used for urgent needs. For medical emergencies, dial 911. Now available from your iPhone and Android! Please provide this summary of care documentation to your next provider. Your primary care clinician is listed as Gordo Magdaleno. If you have any questions after today's visit, please call [phone].

## 2017-07-18 NOTE — PROGRESS NOTES
Chief Complaint   Patient presents with    Results     labs, results of colonoscopy, did a biopsy    Diabetes

## 2017-07-19 LAB
ALBUMIN SERPL-MCNC: 4 G/DL (ref 3.6–4.8)
ALBUMIN/CREAT UR: <2.5 MG/G CREAT (ref 0–30)
ALBUMIN/GLOB SERPL: 1 {RATIO} (ref 1.2–2.2)
ALP SERPL-CCNC: 76 IU/L (ref 39–117)
ALT SERPL-CCNC: 10 IU/L (ref 0–32)
AST SERPL-CCNC: 11 IU/L (ref 0–40)
BILIRUB SERPL-MCNC: 0.2 MG/DL (ref 0–1.2)
BUN SERPL-MCNC: 37 MG/DL (ref 8–27)
BUN/CREAT SERPL: 32 (ref 12–28)
CALCIUM SERPL-MCNC: 9.9 MG/DL (ref 8.7–10.3)
CHLORIDE SERPL-SCNC: 99 MMOL/L (ref 96–106)
CO2 SERPL-SCNC: 22 MMOL/L (ref 18–29)
CREAT SERPL-MCNC: 1.14 MG/DL (ref 0.57–1)
CREAT UR-MCNC: 121 MG/DL
EST. AVERAGE GLUCOSE BLD GHB EST-MCNC: 171 MG/DL
GLOBULIN SER CALC-MCNC: 3.9 G/DL (ref 1.5–4.5)
GLUCOSE SERPL-MCNC: 144 MG/DL (ref 65–99)
HBA1C MFR BLD: 7.6 % (ref 4.8–5.6)
INTERPRETATION: NORMAL
Lab: NORMAL
MICROALBUMIN UR-MCNC: <3 UG/ML
POTASSIUM SERPL-SCNC: 5.3 MMOL/L (ref 3.5–5.2)
PROT SERPL-MCNC: 7.9 G/DL (ref 6–8.5)
SODIUM SERPL-SCNC: 139 MMOL/L (ref 134–144)

## 2017-08-16 DIAGNOSIS — I10 ESSENTIAL HYPERTENSION WITH GOAL BLOOD PRESSURE LESS THAN 140/90: ICD-10-CM

## 2017-08-16 NOTE — TELEPHONE ENCOUNTER
MD Tiffanie Nicholas LPN        Caller: Unspecified (Today, 12:22 PM)                     Hyun Stephen,     i'm not sure why the prescription says that, I just clicked on refill, didn't check.  She should take it everyday.       Please let pharmacist know. Thank you;.      Venkat Batista MD   8/16/2017     for Pharmacist

## 2017-08-16 NOTE — TELEPHONE ENCOUNTER
----- Message from Joleen Jain sent at 8/16/2017 12:12 PM EDT -----  Regarding: Dr. Lopez Whittington from [address] [place] [address] is requesting a call back to discuss  verification on directions for \"Lisinopril HTZ\"  20/12.5mg medication. Best contact phone number is [phone] option 3. And fax number [phone].

## 2017-08-18 RX ORDER — LISINOPRIL AND HYDROCHLOROTHIAZIDE 12.5; 2 MG/1; MG/1
TABLET ORAL
Qty: 90 TAB | Refills: 1 | Status: SHIPPED | OUTPATIENT
Start: 2017-08-18 | End: 2018-06-05 | Stop reason: DRUGHIGH

## 2017-09-06 DIAGNOSIS — E08.8 DIABETES MELLITUS DUE TO UNDERLYING CONDITION, CONTROLLED, WITH COMPLICATION, WITHOUT LONG-TERM CURRENT USE OF INSULIN (HCC): ICD-10-CM

## 2017-09-06 RX ORDER — METFORMIN HYDROCHLORIDE 1000 MG/1
TABLET ORAL
Qty: 180 TAB | Refills: 0 | Status: SHIPPED | OUTPATIENT
Start: 2017-09-06 | End: 2018-07-30 | Stop reason: SDUPTHER

## 2017-09-19 DIAGNOSIS — J43.8 OTHER EMPHYSEMA (HCC): ICD-10-CM

## 2017-09-20 RX ORDER — FLUTICASONE PROPIONATE AND SALMETEROL 250; 50 UG/1; UG/1
1 POWDER RESPIRATORY (INHALATION) EVERY 12 HOURS
Qty: 1 INHALER | Refills: 5 | Status: SHIPPED | OUTPATIENT
Start: 2017-09-20 | End: 2018-02-05 | Stop reason: SDUPTHER

## 2017-09-25 RX ORDER — POTASSIUM CHLORIDE 750 MG/1
TABLET, EXTENDED RELEASE ORAL
Qty: 90 TAB | Refills: 1 | Status: SHIPPED | OUTPATIENT
Start: 2017-09-25 | End: 2017-10-27

## 2017-10-04 DIAGNOSIS — E11.21 CONTROLLED TYPE 2 DIABETES MELLITUS WITH DIABETIC NEPHROPATHY, WITHOUT LONG-TERM CURRENT USE OF INSULIN (HCC): ICD-10-CM

## 2017-10-27 ENCOUNTER — HOSPITAL ENCOUNTER (OUTPATIENT)
Age: 62
Setting detail: OBSERVATION
Discharge: HOME | End: 2017-10-29
Attending: EMERGENCY MEDICINE | Admitting: INTERNAL MEDICINE
Payer: MEDICARE

## 2017-10-27 ENCOUNTER — APPOINTMENT (OUTPATIENT)
Dept: GENERAL RADIOLOGY | Age: 62
End: 2017-10-27
Attending: EMERGENCY MEDICINE
Payer: MEDICARE

## 2017-10-27 ENCOUNTER — APPOINTMENT (OUTPATIENT)
Dept: CT IMAGING | Age: 62
End: 2017-10-27
Attending: EMERGENCY MEDICINE
Payer: MEDICARE

## 2017-10-27 DIAGNOSIS — J81.0 ACUTE PULMONARY EDEMA (HCC): Primary | ICD-10-CM

## 2017-10-27 PROBLEM — I26.99 ACUTE PULMONARY EMBOLISM (HCC): Status: ACTIVE | Noted: 2017-10-27

## 2017-10-27 LAB
ALBUMIN SERPL-MCNC: 3.5 G/DL (ref 3.5–5)
ALBUMIN/GLOB SERPL: 0.6 {RATIO} (ref 1.1–2.2)
ALP SERPL-CCNC: 83 U/L (ref 45–117)
ALT SERPL-CCNC: 23 U/L (ref 12–78)
ANION GAP SERPL CALC-SCNC: 12 MMOL/L (ref 5–15)
AST SERPL-CCNC: 12 U/L (ref 15–37)
BASOPHILS # BLD: 0 K/UL (ref 0–0.1)
BASOPHILS NFR BLD: 0 % (ref 0–1)
BILIRUB SERPL-MCNC: 0.5 MG/DL (ref 0.2–1)
BNP SERPL-MCNC: 2510 PG/ML (ref 0–125)
BUN SERPL-MCNC: 21 MG/DL (ref 6–20)
BUN/CREAT SERPL: 18 (ref 12–20)
CALCIUM SERPL-MCNC: 9.3 MG/DL (ref 8.5–10.1)
CHLORIDE SERPL-SCNC: 98 MMOL/L (ref 97–108)
CO2 SERPL-SCNC: 24 MMOL/L (ref 21–32)
CREAT SERPL-MCNC: 1.19 MG/DL (ref 0.55–1.02)
EOSINOPHIL # BLD: 0.1 K/UL (ref 0–0.4)
EOSINOPHIL NFR BLD: 1 % (ref 0–7)
ERYTHROCYTE [DISTWIDTH] IN BLOOD BY AUTOMATED COUNT: 12.8 % (ref 11.5–14.5)
GLOBULIN SER CALC-MCNC: 5.7 G/DL (ref 2–4)
GLUCOSE BLD STRIP.AUTO-MCNC: 220 MG/DL (ref 65–100)
GLUCOSE BLD STRIP.AUTO-MCNC: 306 MG/DL (ref 65–100)
GLUCOSE SERPL-MCNC: 295 MG/DL (ref 65–100)
HCT VFR BLD AUTO: 40.3 % (ref 35–47)
HGB BLD-MCNC: 13.2 G/DL (ref 11.5–16)
LYMPHOCYTES # BLD: 2.9 K/UL (ref 0.8–3.5)
LYMPHOCYTES NFR BLD: 32 % (ref 12–49)
MCH RBC QN AUTO: 27.6 PG (ref 26–34)
MCHC RBC AUTO-ENTMCNC: 32.8 G/DL (ref 30–36.5)
MCV RBC AUTO: 84.3 FL (ref 80–99)
MONOCYTES # BLD: 0.7 K/UL (ref 0–1)
MONOCYTES NFR BLD: 8 % (ref 5–13)
NEUTS SEG # BLD: 5.2 K/UL (ref 1.8–8)
NEUTS SEG NFR BLD: 59 % (ref 32–75)
PLATELET # BLD AUTO: 457 K/UL (ref 150–400)
POTASSIUM SERPL-SCNC: 3.9 MMOL/L (ref 3.5–5.1)
PROT SERPL-MCNC: 9.2 G/DL (ref 6.4–8.2)
RBC # BLD AUTO: 4.78 M/UL (ref 3.8–5.2)
SERVICE CMNT-IMP: ABNORMAL
SERVICE CMNT-IMP: ABNORMAL
SODIUM SERPL-SCNC: 134 MMOL/L (ref 136–145)
TROPONIN I SERPL-MCNC: <0.04 NG/ML
WBC # BLD AUTO: 8.9 K/UL (ref 3.6–11)

## 2017-10-27 PROCEDURE — 99285 EMERGENCY DEPT VISIT HI MDM: CPT

## 2017-10-27 PROCEDURE — 74011000250 HC RX REV CODE- 250: Performed by: EMERGENCY MEDICINE

## 2017-10-27 PROCEDURE — 65660000000 HC RM CCU STEPDOWN

## 2017-10-27 PROCEDURE — 94760 N-INVAS EAR/PLS OXIMETRY 1: CPT

## 2017-10-27 PROCEDURE — 82962 GLUCOSE BLOOD TEST: CPT

## 2017-10-27 PROCEDURE — 74011250636 HC RX REV CODE- 250/636: Performed by: EMERGENCY MEDICINE

## 2017-10-27 PROCEDURE — 94640 AIRWAY INHALATION TREATMENT: CPT

## 2017-10-27 PROCEDURE — 36415 COLL VENOUS BLD VENIPUNCTURE: CPT | Performed by: EMERGENCY MEDICINE

## 2017-10-27 PROCEDURE — 77030029684 HC NEB SM VOL KT MONA -A

## 2017-10-27 PROCEDURE — 71275 CT ANGIOGRAPHY CHEST: CPT

## 2017-10-27 PROCEDURE — 93005 ELECTROCARDIOGRAM TRACING: CPT

## 2017-10-27 PROCEDURE — 85025 COMPLETE CBC W/AUTO DIFF WBC: CPT | Performed by: EMERGENCY MEDICINE

## 2017-10-27 PROCEDURE — 74011636320 HC RX REV CODE- 636/320: Performed by: EMERGENCY MEDICINE

## 2017-10-27 PROCEDURE — 74011250637 HC RX REV CODE- 250/637: Performed by: INTERNAL MEDICINE

## 2017-10-27 PROCEDURE — 74011000250 HC RX REV CODE- 250: Performed by: INTERNAL MEDICINE

## 2017-10-27 PROCEDURE — 83880 ASSAY OF NATRIURETIC PEPTIDE: CPT | Performed by: EMERGENCY MEDICINE

## 2017-10-27 PROCEDURE — 80053 COMPREHEN METABOLIC PANEL: CPT | Performed by: EMERGENCY MEDICINE

## 2017-10-27 PROCEDURE — 71010 XR CHEST PORT: CPT

## 2017-10-27 PROCEDURE — 74011636637 HC RX REV CODE- 636/637: Performed by: INTERNAL MEDICINE

## 2017-10-27 PROCEDURE — 84484 ASSAY OF TROPONIN QUANT: CPT | Performed by: EMERGENCY MEDICINE

## 2017-10-27 PROCEDURE — 74011250637 HC RX REV CODE- 250/637: Performed by: EMERGENCY MEDICINE

## 2017-10-27 RX ORDER — DULOXETIN HYDROCHLORIDE 30 MG/1
30 CAPSULE, DELAYED RELEASE ORAL 2 TIMES DAILY
Status: DISCONTINUED | OUTPATIENT
Start: 2017-10-27 | End: 2017-10-29 | Stop reason: HOSPADM

## 2017-10-27 RX ORDER — MAGNESIUM SULFATE 100 %
4 CRYSTALS MISCELLANEOUS AS NEEDED
Status: DISCONTINUED | OUTPATIENT
Start: 2017-10-27 | End: 2017-10-29 | Stop reason: HOSPADM

## 2017-10-27 RX ORDER — SODIUM CHLORIDE 0.9 % (FLUSH) 0.9 %
5-10 SYRINGE (ML) INJECTION AS NEEDED
Status: DISCONTINUED | OUTPATIENT
Start: 2017-10-27 | End: 2017-10-29 | Stop reason: HOSPADM

## 2017-10-27 RX ORDER — INSULIN LISPRO 100 [IU]/ML
INJECTION, SOLUTION INTRAVENOUS; SUBCUTANEOUS
Status: DISCONTINUED | OUTPATIENT
Start: 2017-10-27 | End: 2017-10-29 | Stop reason: HOSPADM

## 2017-10-27 RX ORDER — LISINOPRIL 20 MG/1
20 TABLET ORAL DAILY
Status: DISCONTINUED | OUTPATIENT
Start: 2017-10-28 | End: 2017-10-29 | Stop reason: HOSPADM

## 2017-10-27 RX ORDER — BACLOFEN 10 MG/1
10 TABLET ORAL 2 TIMES DAILY
COMMUNITY
End: 2018-06-05 | Stop reason: ALTCHOICE

## 2017-10-27 RX ORDER — POTASSIUM CHLORIDE 750 MG/1
10 TABLET, FILM COATED, EXTENDED RELEASE ORAL DAILY
Status: DISCONTINUED | OUTPATIENT
Start: 2017-10-28 | End: 2017-10-29 | Stop reason: HOSPADM

## 2017-10-27 RX ORDER — DULOXETIN HYDROCHLORIDE 30 MG/1
30 CAPSULE, DELAYED RELEASE ORAL 2 TIMES DAILY
COMMUNITY
End: 2017-11-01 | Stop reason: SDUPTHER

## 2017-10-27 RX ORDER — MELATONIN
2000 DAILY
Status: DISCONTINUED | OUTPATIENT
Start: 2017-10-28 | End: 2017-10-29 | Stop reason: HOSPADM

## 2017-10-27 RX ORDER — ASPIRIN 325 MG
325 TABLET ORAL
Status: COMPLETED | OUTPATIENT
Start: 2017-10-27 | End: 2017-10-27

## 2017-10-27 RX ORDER — SODIUM CHLORIDE 0.9 % (FLUSH) 0.9 %
10 SYRINGE (ML) INJECTION
Status: COMPLETED | OUTPATIENT
Start: 2017-10-27 | End: 2017-10-27

## 2017-10-27 RX ORDER — HYDROCHLOROTHIAZIDE 25 MG/1
12.5 TABLET ORAL DAILY
Status: DISCONTINUED | OUTPATIENT
Start: 2017-10-28 | End: 2017-10-29 | Stop reason: HOSPADM

## 2017-10-27 RX ORDER — IPRATROPIUM BROMIDE AND ALBUTEROL SULFATE 2.5; .5 MG/3ML; MG/3ML
3 SOLUTION RESPIRATORY (INHALATION)
Status: DISCONTINUED | OUTPATIENT
Start: 2017-10-27 | End: 2017-10-28

## 2017-10-27 RX ORDER — GEMFIBROZIL 600 MG/1
600 TABLET, FILM COATED ORAL DAILY
Status: DISCONTINUED | OUTPATIENT
Start: 2017-10-28 | End: 2017-10-29 | Stop reason: HOSPADM

## 2017-10-27 RX ORDER — SODIUM CHLORIDE 0.9 % (FLUSH) 0.9 %
5-10 SYRINGE (ML) INJECTION EVERY 8 HOURS
Status: DISCONTINUED | OUTPATIENT
Start: 2017-10-27 | End: 2017-10-29 | Stop reason: HOSPADM

## 2017-10-27 RX ORDER — PANTOPRAZOLE SODIUM 40 MG/1
40 TABLET, DELAYED RELEASE ORAL
Status: DISCONTINUED | OUTPATIENT
Start: 2017-10-27 | End: 2017-10-29 | Stop reason: HOSPADM

## 2017-10-27 RX ORDER — SODIUM CHLORIDE 9 MG/ML
1000 INJECTION, SOLUTION INTRAVENOUS CONTINUOUS
Status: DISCONTINUED | OUTPATIENT
Start: 2017-10-27 | End: 2017-10-29 | Stop reason: HOSPADM

## 2017-10-27 RX ORDER — IPRATROPIUM BROMIDE AND ALBUTEROL SULFATE 2.5; .5 MG/3ML; MG/3ML
3 SOLUTION RESPIRATORY (INHALATION)
Status: COMPLETED | OUTPATIENT
Start: 2017-10-27 | End: 2017-10-27

## 2017-10-27 RX ORDER — DOCUSATE SODIUM 100 MG/1
100 CAPSULE, LIQUID FILLED ORAL
Status: DISCONTINUED | OUTPATIENT
Start: 2017-10-27 | End: 2017-10-29 | Stop reason: HOSPADM

## 2017-10-27 RX ORDER — FLUTICASONE FUROATE AND VILANTEROL 100; 25 UG/1; UG/1
1 POWDER RESPIRATORY (INHALATION) DAILY
Status: DISCONTINUED | OUTPATIENT
Start: 2017-10-28 | End: 2017-10-29 | Stop reason: HOSPADM

## 2017-10-27 RX ORDER — GABAPENTIN 300 MG/1
300 CAPSULE ORAL 2 TIMES DAILY
Status: DISCONTINUED | OUTPATIENT
Start: 2017-10-27 | End: 2017-10-29 | Stop reason: HOSPADM

## 2017-10-27 RX ORDER — SODIUM CHLORIDE 9 MG/ML
50 INJECTION, SOLUTION INTRAVENOUS
Status: COMPLETED | OUTPATIENT
Start: 2017-10-27 | End: 2017-10-27

## 2017-10-27 RX ORDER — DEXTROSE 50 % IN WATER (D50W) INTRAVENOUS SYRINGE
12.5-25 AS NEEDED
Status: DISCONTINUED | OUTPATIENT
Start: 2017-10-27 | End: 2017-10-29 | Stop reason: HOSPADM

## 2017-10-27 RX ORDER — ONDANSETRON 2 MG/ML
4 INJECTION INTRAMUSCULAR; INTRAVENOUS
Status: DISCONTINUED | OUTPATIENT
Start: 2017-10-27 | End: 2017-10-29 | Stop reason: HOSPADM

## 2017-10-27 RX ORDER — BACLOFEN 10 MG/1
10 TABLET ORAL 2 TIMES DAILY
Status: DISCONTINUED | OUTPATIENT
Start: 2017-10-27 | End: 2017-10-29 | Stop reason: HOSPADM

## 2017-10-27 RX ADMIN — ASPIRIN 325 MG: 325 TABLET ORAL at 11:00

## 2017-10-27 RX ADMIN — SODIUM CHLORIDE 1000 ML: 900 INJECTION, SOLUTION INTRAVENOUS at 11:01

## 2017-10-27 RX ADMIN — IPRATROPIUM BROMIDE AND ALBUTEROL SULFATE 3 ML: .5; 3 SOLUTION RESPIRATORY (INHALATION) at 19:43

## 2017-10-27 RX ADMIN — GABAPENTIN 300 MG: 300 CAPSULE ORAL at 17:42

## 2017-10-27 RX ADMIN — DULOXETINE 30 MG: 30 CAPSULE, DELAYED RELEASE ORAL at 17:42

## 2017-10-27 RX ADMIN — BACLOFEN 10 MG: 10 TABLET ORAL at 17:47

## 2017-10-27 RX ADMIN — Medication 10 ML: at 17:43

## 2017-10-27 RX ADMIN — INSULIN LISPRO 3 UNITS: 100 INJECTION, SOLUTION INTRAVENOUS; SUBCUTANEOUS at 17:44

## 2017-10-27 RX ADMIN — IPRATROPIUM BROMIDE AND ALBUTEROL SULFATE 3 ML: .5; 3 SOLUTION RESPIRATORY (INHALATION) at 11:00

## 2017-10-27 RX ADMIN — Medication 10 ML: at 12:00

## 2017-10-27 RX ADMIN — Medication 10 ML: at 21:15

## 2017-10-27 RX ADMIN — PANTOPRAZOLE SODIUM 40 MG: 40 TABLET, DELAYED RELEASE ORAL at 17:42

## 2017-10-27 RX ADMIN — APIXABAN 10 MG: 5 TABLET, FILM COATED ORAL at 13:39

## 2017-10-27 RX ADMIN — INSULIN LISPRO 4 UNITS: 100 INJECTION, SOLUTION INTRAVENOUS; SUBCUTANEOUS at 21:15

## 2017-10-27 RX ADMIN — SODIUM CHLORIDE 50 ML/HR: 900 INJECTION, SOLUTION INTRAVENOUS at 12:00

## 2017-10-27 RX ADMIN — IOPAMIDOL 90 ML: 755 INJECTION, SOLUTION INTRAVENOUS at 12:00

## 2017-10-27 NOTE — IP AVS SNAPSHOT
[address] [facility] 
[phone] Patient: Shelby Evans MRN: [id number] [facility]:[birth year] My Medications STOP taking these medications   
 acyclovir 400 mg tablet Commonly known as:  ZOVIRAX TAKE these medications as instructed Instructions Each Dose to Equal  
 Morning Noon Evening Bedtime  
 albuterol 90 mcg/actuation inhaler Commonly known as:  PROAIR HFA Your last dose was: Your next dose is: Take 2 Puffs by inhalation every four (4) hours as needed for Wheezing. 2 Puff  
    
   
   
   
  
 albuterol-ipratropium 2.5 mg-0.5 mg/3 ml Nebu Commonly known as:  Mayela Enter Your last dose was: Your next dose is:    
   
   
 INHALE 1 VIAL VIA NEBULIZER EVERY 4 HOURS AS NEEDED  
     
   
   
   
  
 baclofen 10 mg tablet Commonly known as:  LIORESAL Your last dose was: Your next dose is: Take 10 mg by mouth two (2) times a day. Indications: muscle spasms 10 mg DULoxetine 30 mg capsule Commonly known as:  CYMBALTA Your last dose was: Your next dose is: Take 30 mg by mouth two (2) times a day. 30 mg  
    
   
   
   
  
 fluticasone 50 mcg/actuation nasal spray Commonly known as:  Destiny Belcher Your last dose was: Your next dose is:    
   
   
 INSTILL 2 SPRAYS IN EACH NOSTRIL EVERY DAY  
     
   
   
   
  
 fluticasone-salmeterol 250-50 mcg/dose diskus inhaler Commonly known as:  ADVAIR Your last dose was: Your next dose is: Take 1 Puff by inhalation every twelve (12) hours. 1 Puff  
    
   
   
   
  
 gabapentin 300 mg capsule Commonly known as:  NEURONTIN Your last dose was: Your next dose is: TAKE 1 CAPSULE BY MOUTH THREE TIMES DAILY gemfibrozil 600 mg tablet Commonly known as:  LOPID Your last dose was: Your next dose is:    
   
   
 TK 1 T PO BID  
     
   
   
   
  
 lisinopril-hydroCHLOROthiazide 20-12.5 mg per tablet Commonly known as:  Indra Wright Your last dose was: Your next dose is: Take 1 tab Daily  
     
   
   
   
  
 metFORMIN 1,000 mg tablet Commonly known as:  GLUCOPHAGE Your last dose was: Your next dose is:    
   
   
 500mg in am and 1000 mg pm  
     
   
   
   
  
 omeprazole 20 mg capsule Commonly known as:  PRILOSEC Your last dose was: Your next dose is: TAKE 1 CAPSULE BY MOUTH EVERY DAY  
     
   
   
   
  
 potassium chloride SR 10 mEq tablet Commonly known as:  KLOR-CON 10 Your last dose was: Your next dose is: Take 10 mEq by mouth daily. 10 mEq * rivaroxaban 15 mg Tab tablet Commonly known as:  Chelle Craig Your last dose was: Your next dose is: Take 1 Tab by mouth two (2) times daily (with meals) for 21 days. Start taking today with dinner 15 mg  
    
   
   
   
  
 * rivaroxaban 20 mg Tab tablet Commonly known as:  Chelle Craig Start taking on:  11/20/2017 Your last dose was: Your next dose is: Take 1 Tab by mouth daily (with breakfast). 20 mg SITagliptin 100 mg tablet Commonly known as:  Slim Rincon Your last dose was: Your next dose is: Take 1 Tab by mouth daily. 100 mg  
    
   
   
   
  
 VITAMIN D3 2,000 unit Tab Generic drug:  cholecalciferol (vitamin D3) Your last dose was: Your next dose is: Take 1 Tab by mouth daily. 1 Tab * Notice: This list has 2 medication(s) that are the same as other medications prescribed for you.  Read the directions carefully, and ask your doctor or other care provider to review them with you. Where to Get Your Medications Information on where to get these meds will be given to you by the nurse or doctor. ! Ask your nurse or doctor about these medications  
  rivaroxaban 15 mg Tab tablet  
 rivaroxaban 20 mg Tab tablet

## 2017-10-27 NOTE — ED PROVIDER NOTES
[address]  EMERGENCY DEPARTMENT HISTORY AND PHYSICAL EXAM       Date of Service: 10/27/2017   Patient Name: Margaret Muniz   YOB: 1955  Medical Record Number: [id number]    History of Presenting Illness     Chief Complaint   Patient presents with    Shortness of Breath     Pt came in with complaints of SOB for the last couple of days. History Provided By:  patient    Additional History:   Margaret Muniz is a 63 y.o. female with PMhx significant for fibromyalgia, COPD, hyperlipidemia, asthma, DM, and HTN who presents ambualtory to the ED with cc of KELLER and near syncopal feelings with ambulation x today. Pt notes that she has a hx of COPD and has not experienced sxs this bad in the past. She also reports a cough productive of mucous. She denies any recent travel, abx use or surgeries. She does not have a hx of DVT or PE. She is not taking anticoagulants. She denies any fevers, chills, sinus pressure, congestion, calf tenderness, CP, sore throat or abd pain. Social Hx: - Tobacco, + EtOH, - Illicit Drugs    There are no other complaints, changes or physical findings at this time.     Primary Care Provider: Yadira Weber MD       Past History     Past Medical History:   Past Medical History:   Diagnosis Date    Arthritis     Asthma     Chronic obstructive pulmonary disease ([facility] [place] [address]     Chronic pain     back/hip    Diabetes ([facility] [place] [address]     Diabetes mellitus due to underlying condition, controlled, with complication, without long-term current use of insulin ([facility] [place] [address] 3/21/2017    Fibromyalgia     Gastroesophageal reflux disease without esophagitis 5/13/2016    Hyperlipidemia 5/9/2012    Hypertension     Obesity 5/9/2012    Unspecified sleep apnea     CAN'T TOLERATE CPAP        Past Surgical History:   Past Surgical History:   Procedure Laterality Date    COLONOSCOPY N/A 7/12/2017    COLONOSCOPY performed by Kira Victor MD at [facility] ENDOSCOPY    HX [address]    HX HYSTERECTOMY      one ovary removed    HX ORTHOPAEDIC  2012    lumbar fusion    HX ORTHOPAEDIC  2/16/15    RIGHT TOTAL KNEE ARTHROPLASTY    HX ORTHOPAEDIC  6/16/15    LEFT TOTAL KNEE ARTHROPLASTY           Family History:   Family History   Problem Relation Age of Onset    Anesth Problems Mother     Cancer Mother      LUNG CA - SMOKER    Other Mother      CEREBRAL ANEURYSM    Diabetes Mother     Diabetes Father     Other Sister      VARICOSE VEINS THAT HURT AND BLEED    Heart Attack Brother     Other Other      MOTHER'S FATHER'S MOTHER  WITH ANEURYSM        Social History:   Social History   Substance Use Topics    Smoking status: Former Smoker     Packs/day: 1.00     Years: 37.00     Quit date: 2015    Smokeless tobacco: Never Used    Alcohol use Yes      Comment: VERY RARE WINE        Allergies: Allergies   Allergen Reactions    Allopurinol Hives    Darvocet A500 [Propoxyphene N-Acetaminophen] Hives    Seafood [Shellfish Containing Products] Itching     NEW ALLERGY; REACTION WORSENS AS AMOUNT EATEN INCREASES         Review of Systems   Review of Systems   Constitutional: Negative for chills and fever. HENT: Negative for congestion and sore throat. Eyes: Negative for visual disturbance. Respiratory: Positive for shortness of breath. Negative for cough. Cardiovascular: Negative for chest pain and leg swelling. Gastrointestinal: Negative for abdominal pain, blood in stool, diarrhea and nausea. Endocrine: Negative for polyuria. Genitourinary: Negative for dysuria, flank pain, vaginal bleeding and vaginal discharge. Musculoskeletal: Negative for myalgias. Skin: Negative for rash. Allergic/Immunologic: Negative for immunocompromised state. Neurological: Positive for syncope (near). Negative for weakness and headaches. Psychiatric/Behavioral: Negative for confusion.          Physical Exam  Physical Exam   Constitutional: She is oriented to person, place, and time. She appears well-developed and well-nourished. Obese   HENT:   Head: Normocephalic and atraumatic. Moist mucous membranes   Eyes: Conjunctivae are normal. Pupils are equal, round, and reactive to light. Right eye exhibits no discharge. Left eye exhibits no discharge. Neck: Normal range of motion. Neck supple. No tracheal deviation present. Cardiovascular: Normal rate, regular rhythm and normal heart sounds. No murmur heard. Pulmonary/Chest: Effort normal and breath sounds normal. Tachypnea noted. No respiratory distress. She has no wheezes. She has no rales. Abdominal: Soft. Bowel sounds are normal. There is no tenderness. There is no rebound and no guarding. Musculoskeletal: Normal range of motion. She exhibits no tenderness or deformity. Nonpitting edema of RLE   Neurological: She is alert and oriented to person, place, and time. Skin: Skin is warm and dry. No rash noted. No erythema. Psychiatric: Her behavior is normal.   Nursing note and vitals reviewed. Medical Decision Making   I am the first provider for this patient. I reviewed the vital signs, available nursing notes, past medical history, past surgical history, family history and social history. Old Medical Records: Old medical records. Previous electrocardiograms. Provider Notes:     DDx: ACS, PE, PNA, PTX, COPD exacerbation, CHF          ED Course:  10:46 AM   Initial assessment performed. The patients presenting problems have been discussed, and they are in agreement with the care plan formulated and outlined with them. I have encouraged them to ask questions as they arise throughout their visit. Progress Notes:   2:20 PM   Pt reports she is not comfortable going home today due to severity of SOB earlier. Is requesting hospitalization. She was unable to ambulate without significant difficulty. She did not desaturate, but was significantly short of breath.      CONSULT NOTE:   2:41 PM  HerokuDO spoke with Dr. Laura Johnson,   Specialty: Hospitalist  Discussed pt's hx, disposition, and available diagnostic and imaging results. Reviewed care plans. Consultant will evaluate pt for admission. Written by Amanda Zuñiga ED Scribe, as dictated by HerokuDO. Diagnostic Study Results     Labs -      Recent Results (from the past 12 hour(s))   EKG, 12 LEAD, INITIAL    Collection Time: 10/27/17 10:19 AM   Result Value Ref Range    Ventricular Rate 101 BPM    Atrial Rate 101 BPM    P-R Interval 140 ms    QRS Duration 80 ms    Q-T Interval 358 ms    QTC Calculation (Bezet) 464 ms    Calculated P Axis 25 degrees    Calculated R Axis -18 degrees    Calculated T Axis 30 degrees    Diagnosis       Sinus tachycardia  Low voltage QRS  When compared with ECG of 18-DEC-2015 16:56,  premature atrial complexes are no longer present     CBC WITH AUTOMATED DIFF    Collection Time: 10/27/17 10:35 AM   Result Value Ref Range    WBC 8.9 3.6 - 11.0 K/uL    RBC 4.78 3.80 - 5.20 M/uL    HGB 13.2 11.5 - 16.0 g/dL    HCT 40.3 35.0 - 47.0 %    MCV 84.3 80.0 - 99.0 FL    MCH 27.6 26.0 - 34.0 PG    MCHC 32.8 30.0 - 36.5 g/dL    RDW 12.8 11.5 - 14.5 %    PLATELET 559 (H) 774 - 400 K/uL    NEUTROPHILS 59 32 - 75 %    LYMPHOCYTES 32 12 - 49 %    MONOCYTES 8 5 - 13 %    EOSINOPHILS 1 0 - 7 %    BASOPHILS 0 0 - 1 %    ABS. NEUTROPHILS 5.2 1.8 - 8.0 K/UL    ABS. LYMPHOCYTES 2.9 0.8 - 3.5 K/UL    ABS. MONOCYTES 0.7 0.0 - 1.0 K/UL    ABS. EOSINOPHILS 0.1 0.0 - 0.4 K/UL    ABS.  BASOPHILS 0.0 0.0 - 0.1 K/UL   METABOLIC PANEL, COMPREHENSIVE    Collection Time: 10/27/17 10:35 AM   Result Value Ref Range    Sodium 134 (L) 136 - 145 mmol/L    Potassium 3.9 3.5 - 5.1 mmol/L    Chloride 98 97 - 108 mmol/L    CO2 24 21 - 32 mmol/L    Anion gap 12 5 - 15 mmol/L    Glucose 295 (H) 65 - 100 mg/dL    BUN 21 (H) 6 - 20 MG/DL    Creatinine 1.19 (H) 0.55 - 1.02 MG/DL    BUN/Creatinine ratio 18 12 - 20      GFR est AA 56 (L) >60 ml/min/1.73m2    GFR est non-AA 46 (L) >60 ml/min/1.73m2    Calcium 9.3 8.5 - 10.1 MG/DL    Bilirubin, total 0.5 0.2 - 1.0 MG/DL    ALT (SGPT) 23 12 - 78 U/L    AST (SGOT) 12 (L) 15 - 37 U/L    Alk. phosphatase 83 45 - 117 U/L    Protein, total 9.2 (H) 6.4 - 8.2 g/dL    Albumin 3.5 3.5 - 5.0 g/dL    Globulin 5.7 (H) 2.0 - 4.0 g/dL    A-G Ratio 0.6 (L) 1.1 - 2.2     NT-PRO BNP    Collection Time: 10/27/17 10:35 AM   Result Value Ref Range    NT pro-BNP 2510 (H) 0 - 125 PG/ML   TROPONIN I    Collection Time: 10/27/17 10:35 AM   Result Value Ref Range    Troponin-I, Qt. <0.04 <0.05 ng/mL       Radiologic Studies -  The following have been ordered and reviewed:  CTA CHEST W OR W WO CONT   Final Result      XR CHEST PORT   Final Result        CT Results  (Last 48 hours)               10/27/17 1203  CTA CHEST W OR W WO CONT Final result    Impression:  IMPRESSION:        Acute Right lower lobe pulmonary embolism. No evidence for peripheral   infarction. This result was verbally relayed to Odilon Gentile, the charge nurse, at 1226 hours by   myself   [id number]                   Narrative:  EXAM:  CTA CHEST W OR W WO CONT       INDICATION:   Chest pain, acute, pulmonary embolism (PE) suspected. Shortness of   breath for a few days. History of COPD. COMPARISON: None. CONTRAST:  90 mL of Isovue-370. TECHNIQUE:    Precontrast  images were obtained to localize the volume for acquisition. Multislice helical CT arteriography was performed from the diaphragm to the   thoracic inlet during uneventful rapid bolus intravenous contrast   administration. Lung and soft tissue windows were generated. Coronal and   sagittal images were generated and 3D post processing consisting of coronal   maximum intensity images was performed. CT dose reduction was achieved through   use of a standardized protocol tailored for this examination and automatic   exposure control for dose modulation.        FINDINGS:   The lungs are clear of mass, nodule, airspace disease or edema. There is partial opacification of the peripheral branch of the right lower lobe   (image 46 through 38). There is no mediastinal or hilar adenopathy or mass. The aorta enhances normally   without evidence of aneurysm or dissection. The visualized portions of the upper abdominal organs are normal.       There is disc desiccation at multiple lower thoracic levels. CXR Results  (Last 48 hours)               10/27/17 1232  XR CHEST PORT Final result    Impression:  IMPRESSION: No evidence of acute cardiopulmonary process. Narrative:  INDICATION:  Dyspnea        COMPARISON: 12/18/2015       FINDINGS: Single AP portable view of the chest obtained at 1214 demonstrates a   stable cardiomediastinal silhouette. The lungs are clear bilaterally. No osseous   abnormalities are seen. Vital Signs-Reviewed the patient's vital signs. Patient Vitals for the past 12 hrs:   Temp Pulse Resp BP SpO2   10/27/17 1027 97.5 °F (36.4 °C) 100 18 122/61 100 %       Medications Given in the ED:  Medications   0.9% sodium chloride infusion 1,000 mL (1,000 mL IntraVENous New Bag 10/27/17 1101)   aspirin (ASPIRIN) tablet 325 mg (325 mg Oral Given 10/27/17 1100)   albuterol-ipratropium (DUO-NEB) 2.5 MG-0.5 MG/3 ML (3 mL Nebulization Given 10/27/17 1100)   iopamidol (ISOVUE-370) 76 % injection 100 mL (90 mL IntraVENous Given 10/27/17 1200)   0.9% sodium chloride infusion (50 mL/hr IntraVENous New Bag 10/27/17 1200)   sodium chloride (NS) flush 10 mL (10 mL IntraVENous Given 10/27/17 1200)   apixaban (ELIQUIS) tablet 10 mg (10 mg Oral Given 10/27/17 1339)       Pulse Oximetry Analysis - Normal 100% on RA       EKG interpretation: (Preliminary) 1019  Rhythm: sinus tachycardia; and regular . Rate (approx.): 101; Axis: normal; OK interval: normal; QRS interval: normal ; ST/T wave: normal; Other findings: no ischemic ST or T wave changes.   Written by James Eason ED Scribe, as dictated by Amanda Arreaga DO                                                                                                                                                                       Diagnosis   Clinical Impression:   1. Acute pulmonary edema (HCC)         Plan:  1: Admit     Disposition Note:  Admit Note:  2:54 PM  Pt is being admitted by Dr. Lenny Vázquez. The results of their tests and reason(s) for their admission have been discussed with pt and/or available family. They convey agreement and understanding for the need to be admitted and for admission diagnosis. This note is prepared by James Eason, acting as a Scribe for Amanda Arreaga DO. Amanda Arreaga DO: The scribe's documentation has been prepared under my direction and personally reviewed by me in its entirety. I confirm that the notes above accurately reflects all work, treatment, procedures, and medical decision making performed by me.      _______________________________   Attestations:                               Attestations: This note is prepared by James Eason acting as Scribe for Amanda Arreaga DO    The scribe's documentation has been prepared under my direction and personally reviewed by me in its entirety. I confirm that the note above accurately reflects all work, treatment, procedures, and medical decision making performed by me.   Amanda Arreaga DO    _______________________________

## 2017-10-27 NOTE — PROGRESS NOTES
Pharmacy Automatic Direct Oral Anticoagulant Renal Dosing Protocol    Patient currently receiving Apixaban 10 mg bid for 7 days then 5 mg bid  with an indication of Acute PE     Start date: 10-28-17    Major interacting medications: None    Platelet inhibitors (dose/frequency): None    Labs:  Recent Labs      10/27/17   1035   CREA  1.19*   HGB  13.2   PLT  457*     Wt Readings from Last 1 Encounters:   10/27/17 145.7 kg (321 lb 3.4 oz)       Creatinine Clearance: 60    Impression/Plan: Age < 80, Wt > 60 kg, Cr < 1.5  - 10mg po bid through 11-3-17, then start 5 mg po bid on 11/4/17 as appropriate for indication. Pharmacy will follow daily and adjust as appropriate.     Thank you,  Rubens Lenz, [facility]

## 2017-10-27 NOTE — PROGRESS NOTES
Problem: Falls - Risk of  Goal: *Absence of Falls  Document Kiana Fall Risk and appropriate interventions in the flowsheet.    Outcome: Progressing Towards Goal  Fall Risk Interventions:            Medication Interventions: Patient to call before getting OOB, Teach patient to arise slowly

## 2017-10-27 NOTE — ED NOTES
TRANSFER - OUT REPORT:    Verbal report given to Alyssa(name) on Tanner Recio  being transferred to [facility](unit) for ordered procedure       Report consisted of patients Situation, Background, Assessment and   Recommendations(SBAR). Information from the following report(s) SBAR, Kardex and Intake/Output was reviewed with the receiving nurse. Lines:   Peripheral IV 10/27/17 Left Antecubital (Active)   Site Assessment Clean, dry, & intact 10/27/2017 10:29 AM   Phlebitis Assessment 0 10/27/2017 10:29 AM   Infiltration Assessment 0 10/27/2017 10:29 AM   Dressing Status Clean, dry, & intact 10/27/2017 10:29 AM   Dressing Type Tape;Transparent 10/27/2017 10:29 AM        Opportunity for questions and clarification was provided.       Patient transported with:

## 2017-10-27 NOTE — IP AVS SNAPSHOT
[address] [facility] 
[phone] Patient: Al Haynes MRN: [id number] About your hospitalization You were admitted on:  October 27, 2017 You last received care in the:  [facility] CARDIOPULMONARY CARE You were discharged on:  October 29, 2017 Why you were hospitalized Your primary diagnosis was:  Acute Pulmonary Embolism (Hcc) Your diagnoses also included:  Weakness, Obesity, Hyperlipidemia, Htn (Hypertension), Gastroesophageal Reflux Disease Without Esophagitis, Fibromyalgia, Copd (Chronic Obstructive Pulmonary Disease) (Hcc) Things You Need To Do (next 8 weeks) Follow up with Brii Junior MD in 2 week(s)  
for post hospitali follow up appointment with Hematologist.  
  
Phone:  [phone] Where:  [address] [place] [address], [address] [place] [id number] Follow up with Tanner Mitchell MD in 5 day(s)  
for post hospital follow up appoinment. Phone:  [phone] Where:  [address] [place] [address] , [facility] [place] [facility] [address] [facility] , [address] [place] [address] Discharge Orders None A check mathew indicates which time of day the medication should be taken. My Medications STOP taking these medications   
 acyclovir 400 mg tablet Commonly known as:  ZOVIRAX TAKE these medications as instructed Instructions Each Dose to Equal  
 Morning Noon Evening Bedtime  
 albuterol 90 mcg/actuation inhaler Commonly known as:  PROAIR HFA Your last dose was: Your next dose is: Take 2 Puffs by inhalation every four (4) hours as needed for Wheezing. 2 Puff  
    
   
   
   
  
 albuterol-ipratropium 2.5 mg-0.5 mg/3 ml Nebu Commonly known as:  Feliciano Josefa Your last dose was: Your next dose is:    
   
   
 INHALE 1 VIAL VIA NEBULIZER EVERY 4 HOURS AS NEEDED  
     
   
   
   
  
 baclofen 10 mg tablet Commonly known as:  LIORESAL Your last dose was: Your next dose is: Take 10 mg by mouth two (2) times a day. Indications: muscle spasms 10 mg DULoxetine 30 mg capsule Commonly known as:  CYMBALTA Your last dose was: Your next dose is: Take 30 mg by mouth two (2) times a day. 30 mg  
    
   
   
   
  
 fluticasone 50 mcg/actuation nasal spray Commonly known as:  Lella Solum Your last dose was: Your next dose is:    
   
   
 INSTILL 2 SPRAYS IN EACH NOSTRIL EVERY DAY  
     
   
   
   
  
 fluticasone-salmeterol 250-50 mcg/dose diskus inhaler Commonly known as:  ADVAIR Your last dose was: Your next dose is: Take 1 Puff by inhalation every twelve (12) hours. 1 Puff  
    
   
   
   
  
 gabapentin 300 mg capsule Commonly known as:  NEURONTIN Your last dose was: Your next dose is: TAKE 1 CAPSULE BY MOUTH THREE TIMES DAILY gemfibrozil 600 mg tablet Commonly known as:  LOPID Your last dose was: Your next dose is:    
   
   
 TK 1 T PO BID  
     
   
   
   
  
 lisinopril-hydroCHLOROthiazide 20-12.5 mg per tablet Commonly known as:  Floyce Plenty Your last dose was: Your next dose is: Take 1 tab Daily  
     
   
   
   
  
 metFORMIN 1,000 mg tablet Commonly known as:  GLUCOPHAGE Your last dose was: Your next dose is:    
   
   
 500mg in am and 1000 mg pm  
     
   
   
   
  
 omeprazole 20 mg capsule Commonly known as:  PRILOSEC Your last dose was: Your next dose is: TAKE 1 CAPSULE BY MOUTH EVERY DAY  
     
   
   
   
  
 potassium chloride SR 10 mEq tablet Commonly known as:  KLOR-CON 10 Your last dose was: Your next dose is: Take 10 mEq by mouth daily. 10 mEq * rivaroxaban 15 mg Tab tablet Commonly known as:  Aura Teran Your last dose was: Your next dose is: Take 1 Tab by mouth two (2) times daily (with meals) for 21 days. Start taking today with dinner 15 mg  
    
   
   
   
  
 * rivaroxaban 20 mg Tab tablet Commonly known as:  Aura Teran Start taking on:  11/20/2017 Your last dose was: Your next dose is: Take 1 Tab by mouth daily (with breakfast). 20 mg SITagliptin 100 mg tablet Commonly known as:  Asuncion Isaacs Your last dose was: Your next dose is: Take 1 Tab by mouth daily. 100 mg  
    
   
   
   
  
 VITAMIN D3 2,000 unit Tab Generic drug:  cholecalciferol (vitamin D3) Your last dose was: Your next dose is: Take 1 Tab by mouth daily. 1 Tab * Notice: This list has 2 medication(s) that are the same as other medications prescribed for you. Read the directions carefully, and ask your doctor or other care provider to review them with you. Where to Get Your Medications Information on where to get these meds will be given to you by the nurse or doctor. ! Ask your nurse or doctor about these medications  
  rivaroxaban 15 mg Tab tablet  
 rivaroxaban 20 mg Tab tablet Discharge Instructions Patient Discharge Instructions Pt Name  Soha Brown Date of Birth 1955 Age  63 y.o. Medical Record Number  [id number] PCP Paul Hollis MD   
Admit date:  10/27/2017 @    [address] Room Number  2207/01 Date of Discharge 10/29/2017 Admission Diagnoses:     Acute pulmonary embolism ([facility] [address] Allergies Allergen Reactions  Allopurinol Hives  Darvocet A500 [Propoxyphene N-Acetaminophen] Hives  Seafood [Shellfish Containing Products] Itching   NEW ALLERGY; REACTION WORSENS AS AMOUNT EATEN INCREASES  
  
 
 You were admitted to [address] for  Acute pulmonary embolism ([facility] [address] YOUR OTHER MEDICAL DIAGNOSES INCLUDE (BUT NOT LIMITED TO ): 
Present on Admission:  Acute pulmonary embolism ([facility] [address]  Obesity  Hyperlipidemia 
 HTN (hypertension)  Gastroesophageal reflux disease without esophagitis  Fibromyalgia  COPD (chronic obstructive pulmonary disease) (HCC) DIET:  Cardiac Diet and Diabetic Diet Recommended activity: Activity as tolerated Follow up : Follow-up Information Follow up With Details Comments Contact Info Brii Day MD In 2 weeks  [address] 
[address] [address] 
[phone] Jaclyn Cardenas MD In 5 days  [address] [facility] [address] [facility] 
[phone] Jaclyn Cardenas MD   [address] [address] [address] [facility] 
[phone] ** Xarelto 15mg twice a day until 11/19/2017 ** Xarelto 20mg daily once a day starting 11/20/2017 Rivaroxaban (Xarelto, Xarelto Starter Pack) - (By mouth) Why this medicine is used:  
Treats and prevents blood clots. Contact a nurse or doctor right away if you have: 
· Sudden or severe headache · Back pain, numbness, tingling, weakness in your legs or feet · Loss of bladder or bowel control · Bloody vomit or vomit that looks like coffee grounds; bloody or black, tarry stools · Bleeding that does not stop or bruises that do not heal  
 
Common side effects: · Minor bleeding or bruising © 2017 [facility] Information is for End User's use only and may not be sold, redistributed or otherwise used for commercial purposes. · It is important that you take the medication exactly as they are prescribed. · Keep your medication in the bottles provided by the pharmacist and keep a list of the medication names, dosages, and times to be taken in your wallet. · Do not take other medications without consulting your doctor. ** You have to follow up with Dr. Cecy Dueñas within 2 weeks for PE follow up ADDITIONAL INFORMATION: If you experience any of the following symptoms or have any health problem not listed below, then please call your primary care physician or return to the emergency room if you cannot get hold of your doctor: Fever, chills, nausea, vomiting, diarrhea, change in mentation, falling, bleeding, shortness of breath. I understand that if any problems occur once I am discharged, I am supposed to call my Primary care physician for further care or seek help in the Emergency Department at the nearest Healthcare facility. I have had an opportunity to discuss my clinical issues with my doctor and nursing staff. I understand and acknowledge receipt of the above instructions. Physician's or R.N.'s Signature                                                            Date/Time Patient or Representative Signature                                                 Date/Time 
 
 
 
       
 
 
ACO Transitions of Care Introducing [employer] [address] Marija Schrader offers a voluntary care coordination program to provide high quality service and care to [facility] fee-for-service beneficiaries. Gaurav Portillo was designed to help you enhance your health and well-being through the following services: ? Transitions of Care  support for individuals who are transitioning from one care setting to another (example: Hospital to home). ?  Chronic and Complex Care Coordination  support for individuals and caregivers of those with serious or chronic illnesses or with more than one chronic (ongoing) condition and those who take a number of different medications. If you meet specific medical criteria, a [facility] [address] may call you directly to coordinate your care with your primary care physician and your other care providers. For questions about the [facility] programs, please, contact your physicians office. For general questions or additional information about Accountable Care Organizations: 
Please visit www.medicare.gov/acos. html or call 1-800-MEDICARE (6-624.987.9441) TTY users should call 8-342.179.3195. [employer] Announcement We are excited to announce that we are making your provider's discharge notes available to you in [employer]. You will see these notes when they are completed and signed by the physician that discharged you from your recent hospital stay. If you have any questions or concerns about any information you see in [employer], please call the Health Information Department where you were seen or reach out to your Primary Care Provider for more information about your plan of care. Introducing [facility]! [employer] introduces [employer] patient portal. Now you can access parts of your medical record, email your doctor's office, and request medication refills online. 1. In your internet browser, go to [email or web address] 2. Click on the First Time User? Click Here link in the Sign In box. You will see the New Member Sign Up page. 3. Enter your [employer] Access Code exactly as it appears below. You will not need to use this code after youve completed the sign-up process. If you do not sign up before the expiration date, you must request a new code. · [employer] Access Code: [email or web address] Expires: 1/25/2018 10:24 AM 
 
4.  Enter the last four digits of your Social Security Number (xxxx) and Date of Birth (mm/dd/yyyy) as indicated and click Submit. You will be taken to the next sign-up page. 5. Create a Offerboard ID. This will be your Offerboard login ID and cannot be changed, so think of one that is secure and easy to remember. 6. Create a [employer] password. You can change your password at any time. 7. Enter your Password Reset Question and Answer. This can be used at a later time if you forget your password. 8. Enter your e-mail address. You will receive e-mail notification when new information is available in [address] 9. Click Sign Up. You can now view and download portions of your medical record. 10. Click the Download Summary menu link to download a portable copy of your medical information. If you have questions, please visit the Frequently Asked Questions section of the [employer] website. Remember, [employer] is NOT to be used for urgent needs. For medical emergencies, dial 911. Now available from your iPhone and Android! Providers Seen During Your Hospitalization Provider Specialty Primary office phone Lucille Piña DO Emergency Medicine [phone] Prieto Yang MD Internal Medicine [phone] Your Primary Care Physician (PCP) Primary Care Physician Office Phone Office Fax Alem Quezada [phone] You are allergic to the following Allergen Reactions Allopurinol Hives Darvocet A500 (Propoxyphene N-Acetaminophen) Hives Seafood (Shellfish Containing Products) Itching NEW ALLERGY; REACTION WORSENS AS AMOUNT EATEN INCREASES Recent Documentation Height Weight BMI OB Status Smoking Status 1.854 m 145.7 kg 42.38 kg/m2 Hysterectomy Former Smoker Emergency Contacts Name Discharge Info Relation Home Work Mobile [facility] FACILITY DISCHARGE CAREGIVER [3] Sister [23] [phone] ElroySandra  Child [2] [phone] Victorina Mcneill  Other Relative [6] [phone] Patient Belongings The following personal items are in your possession at time of discharge: 
  Dental Appliances: None  Visual Aid: Glasses, At home      Home Medications: None   Jewelry: Ring, Earrings  Clothing: At bedside    Other Valuables: Cell Phone Please provide this summary of care documentation to your next provider. Signatures-by signing, you are acknowledging that this After Visit Summary has been reviewed with you and you have received a copy. Patient Signature:  ____________________________________________________________ Date:  ____________________________________________________________  
  
[facility] Signature:  ____________________________________________________________ Date:  ____________________________________________________________

## 2017-10-27 NOTE — H&P
Hospitalist Admission Note    NAME: Avinash Shine   :  1955   MRN:  [id number]     Date/Time:  10/27/2017 3:14 PM    Patient PCP: Lars Allen MD  ________________________________________________________________________    My assessment of this patient's clinical condition and my plan of care is as follows. Assessment / Plan:  Acute Rt lung  pulmonary embolism hemodynamically stable unprovoked  -it is most likely unprovoked as no recent travel, surgery or immobilization.  -she does have positive family history of blood clots in her sister at the age of 36  -she needs age appropriate screening. She had a colonoscopy 3 months and path shows benign mucosa and she did have ileal ulcer. Check UA to look for urine protein loss. She will need hypercoagulable panel work up on out pt basis. -start Eliquis and consult case mgmt for assistance with medications. -wean o2 as tolerated and currently she is on room air. DM  -hold oral hypoglycemic and start SSI    HTN  -lisinopril and hctz    Dyslipidemia  -lopid    Hx of COPD stale  -c/w Symbicort and prn albuterol    Fibromyalgia  -baclofen and Cymbalta    Hx of GERD  -on omeprazole         Code Status: Full  Surrogate Decision Maker: Coy Veloz    DVT Prophylaxis: Eliquis  GI Prophylaxis: not indicated    Baseline: independent        Subjective:   CHIEF COMPLAINT: Sob    HISTORY OF PRESENT ILLNESS:     Ping Power is a 63 y.o.  AA  female who presents with Sob since the last 3 days. She reports being in her usual state of health until 3 days ago when she started having  Exertional sob along with dizziness. She reports sob is worse on minimal exertion especially  On walking. She also dizziness after walking from sob. She also reported diaphoresis with sob. She denies cough or phlegm. She denies Chest pain, palpitations or syncopal attacks. She denies fever or chills. She denies long distance  Travel or recent surgery or recent immobilization. She reports having a colonoscopy 3 months ago  And was noted to have ulcer according to her. She reports history of blood clots in her sister at the age  Of 36. On arrival to ER, she had CT chest done which showed Acute PE and was ambulated and she was very dizzy and  Also became short of breath. We were asked to admit for work up and evaluation of the above problems.      Past Medical History:   Diagnosis Date    Arthritis     Asthma     [facility] obstructive [facility] disease ([facility] [address]     Chronic pain     back/hip    Diabetes ([facility] [address]     Diabetes mellitus due to underlying condition, controlled, with complication, without long-term current use of insulin ([facility] [address] 3/21/2017    Fibromyalgia     Gastroesophageal reflux disease without esophagitis 2016    Hyperlipidemia 2012    Hypertension     Obesity 2012    Unspecified sleep apnea     CAN'T TOLERATE CPAP        Past Surgical History:   Procedure Laterality Date    COLONOSCOPY N/A 2017    COLONOSCOPY performed by Remigio Rider MD at [facility] ENDOSCOPY     [facility]    one ovary removed    HX ORTHOPAEDIC  2012    lumbar fusion    HX ORTHOPAEDIC  2/16/15    RIGHT TOTAL KNEE ARTHROPLASTY    HX ORTHOPAEDIC  6/16/15    LEFT TOTAL KNEE ARTHROPLASTY          Social History   Substance Use Topics    Smoking status: Former Smoker     Packs/day: 1.00     Years: 37.00     Quit date: 2015    Smokeless tobacco: Never Used    Alcohol use Yes      Comment: VERY RARE WINE        Family History   Problem Relation Age of Onset    Anesth Problems Mother     Cancer Mother      LUNG CA - SMOKER    Other Mother      CEREBRAL ANEURYSM    Diabetes Mother     Diabetes Father     Other Sister      VARICOSE VEINS THAT HURT AND BLEED    Heart Attack Brother     Other Other      MOTHER'S FATHER'S MOTHER  WITH ANEURYSM     Allergies   Allergen Reactions    Allopurinol Hives    Darvocet A500 [Propoxyphene N-Acetaminophen] Hives    Seafood [Shellfish Containing Products] Itching     NEW ALLERGY; REACTION WORSENS AS AMOUNT EATEN INCREASES        Prior to Admission medications    Medication Sig Start Date End Date Taking? Authorizing Provider   baclofen (LIORESAL) 10 mg tablet Take 10 mg by mouth two (2) times a day. Indications: muscle spasms   Yes Historical Provider   DULoxetine (CYMBALTA) 30 mg capsule Take 30 mg by mouth two (2) times a day. Yes Historical Provider   SITagliptin (JANUVIA) 100 mg tablet Take 1 Tab by mouth daily. 10/4/17  Yes Cristal Sandra MD   fluticasone-salmeterol (ADVAIR) 250-50 mcg/dose diskus inhaler Take 1 Puff by inhalation every twelve (12) hours. 9/20/17  Yes Cristal Sandra MD   metFORMIN (GLUCOPHAGE) 1,000 mg tablet 500mg in am and 1000 mg pm 9/6/17  Yes Heidi Horowitz MD   lisinopril-hydroCHLOROthiazide (PRINZIDE, ZESTORETIC) 20-12.5 mg per tablet Take 1 tab Daily 8/18/17  Yes Cristal Sandra MD   potassium chloride SR (KLOR-CON 10) 10 mEq tablet Take 10 mEq by mouth daily. 7/5/17  Yes Historical Provider   omeprazole (PRILOSEC) 20 mg capsule TAKE 1 CAPSULE BY MOUTH EVERY DAY 6/29/17  Yes Cristal Sandra MD   acyclovir (ZOVIRAX) 400 mg tablet TAKE 1 TABLET BY MOUTH TWICE DAILY  Patient taking differently: Take 400 mg by mouth two (2) times a day. TAKE 1 TABLET BY MOUTH TWICE DAILY 6/29/17  Yes Cristal Sandra MD   gabapentin (NEURONTIN) 300 mg capsule TAKE 1 CAPSULE BY MOUTH THREE TIMES DAILY 3/7/17  Yes Cristal Sandra MD   fluticasone (FLONASE) 50 mcg/actuation nasal spray INSTILL 2 SPRAYS IN EACH NOSTRIL EVERY DAY 1/9/17  Yes Cristal Sandra MD   gemfibrozil (LOPID) 600 mg tablet TK 1 T PO BID 11/10/16  Yes Historical Provider   cholecalciferol, vitamin D3, (VITAMIN D3) 2,000 unit tab Take 1 Tab by mouth daily.    Yes Historical Provider   albuterol-ipratropium (DUO-NEB) 2.5 mg-0.5 mg/3 ml nebu INHALE 1 VIAL VIA NEBULIZER EVERY 4 HOURS AS NEEDED 9/19/16  Yes Cristal Sandra MD albuterol (PROAIR HFA) 90 mcg/actuation inhaler Take 2 Puffs by inhalation every four (4) hours as needed for Wheezing. 12/31/15  Yes Charles Cruz MD       REVIEW OF SYSTEMS:     I am not able to complete the review of systems because: The patient is intubated and sedated    The patient has altered mental status due to his acute medical problems    The patient has baseline aphasia from prior stroke(s)    The patient has baseline dementia and is not reliable historian    The patient is in acute medical distress and unable to provide information           Total of 12 systems reviewed as follows:       POSITIVE= underlined text  Negative = text not underlined  General:  fever, chills, sweats, generalized weakness, weight loss/gain,      loss of appetite   Eyes:    blurred vision, eye pain, loss of vision, double vision  ENT:    rhinorrhea, pharyngitis   Respiratory:   cough, sputum production, SOB, KELLER, wheezing, pleuritic pain   Cardiology:   chest pain, palpitations, orthopnea, PND, edema, syncope   Gastrointestinal:  abdominal pain , N/V, diarrhea, dysphagia, constipation, bleeding   Genitourinary:  frequency, urgency, dysuria, hematuria, incontinence   Muskuloskeletal :  arthralgia, myalgia, back pain  Hematology:  easy bruising, nose or gum bleeding, lymphadenopathy   Dermatological: rash, ulceration, pruritis, color change / jaundice  Endocrine:   hot flashes or polydipsia   Neurological:  headache, dizziness, confusion, focal weakness, paresthesia,     Speech difficulties, memory loss, gait difficulty  Psychological: Feelings of anxiety, depression, agitation    Objective:   VITALS:    Visit Vitals    /61 (BP 1 Location: Right arm, BP Patient Position: At rest)    Pulse 100    Temp 97.5 °F (36.4 °C)    Resp 18    Ht 6' 1\" (1.854 m)    Wt 145.7 kg (321 lb 3.4 oz)    SpO2 100%    BMI 42.38 kg/m2       PHYSICAL EXAM:    General:    Alert, cooperative, no distress, appears stated age. HEENT: Atraumatic, anicteric sclerae, pink conjunctivae     No oral ulcers, mucosa moist, throat clear, dentition fair  Neck:  Supple, symmetrical,  thyroid: non tender  Lungs:   Clear to auscultation bilaterally. No Wheezing or Rhonchi. No rales. Chest wall:  No tenderness  No Accessory muscle use. Heart:   Regular  rhythm,  No  murmur   No edema  Abdomen:   Soft, non-tender. Not distended. Bowel sounds normal  Extremities: No cyanosis. No clubbing,      Skin turgor normal, Capillary refill normal, Radial dial pulse 2+  Skin:     Not pale. Not Jaundiced  No rashes   Psych:  Good insight. Not depressed. Not anxious or agitated. Neurologic: EOMs intact. No facial asymmetry. No aphasia or slurred speech. Symmetrical strength, Sensation grossly intact. Alert and oriented X 4.     _______________________________________________________________________  Care Plan discussed with:    Comments   Patient y    [facility]:      _______________________________________________________________________  Expected  Disposition:   Home with Family y   HH/PT/OT/RN    SNF/LTC    [facility]    ________________________________________________________________________  TOTAL TIME:  61  Minutes    Critical Care Provided     Minutes non procedure based      Comments    y Reviewed previous records   >50% of visit spent in counseling and coordination of care y Discussion with patient and/or family and questions answered       ________________________________________________________________________  Signed: Vicki Islas MD    Procedures: see electronic medical records for all procedures/Xrays and details which were not copied into this note but were reviewed prior to creation of Plan.     LAB DATA REVIEWED:    Recent Results (from the past 24 hour(s))   EKG, 12 LEAD, INITIAL    Collection Time: 10/27/17 10:19 AM   Result Value Ref Range    Ventricular Rate 101 BPM    Atrial Rate 101 BPM P-R Interval 140 ms    QRS Duration 80 ms    Q-T Interval 358 ms    QTC Calculation (Bezet) 464 ms    Calculated P Axis 25 degrees    Calculated R Axis -18 degrees    Calculated T Axis 30 degrees    Diagnosis       Sinus tachycardia  Low voltage QRS  When compared with ECG of 18-DEC-2015 16:56,  premature atrial complexes are no longer present     CBC WITH AUTOMATED DIFF    Collection Time: 10/27/17 10:35 AM   Result Value Ref Range    WBC 8.9 3.6 - 11.0 K/uL    RBC 4.78 3.80 - 5.20 M/uL    HGB 13.2 11.5 - 16.0 g/dL    HCT 40.3 35.0 - 47.0 %    MCV 84.3 80.0 - 99.0 FL    MCH 27.6 26.0 - 34.0 PG    MCHC 32.8 30.0 - 36.5 g/dL    RDW 12.8 11.5 - 14.5 %    PLATELET 161 (H) 024 - 400 K/uL    NEUTROPHILS 59 32 - 75 %    LYMPHOCYTES 32 12 - 49 %    MONOCYTES 8 5 - 13 %    EOSINOPHILS 1 0 - 7 %    BASOPHILS 0 0 - 1 %    ABS. NEUTROPHILS 5.2 1.8 - 8.0 K/UL    ABS. LYMPHOCYTES 2.9 0.8 - 3.5 K/UL    ABS. MONOCYTES 0.7 0.0 - 1.0 K/UL    ABS. EOSINOPHILS 0.1 0.0 - 0.4 K/UL    ABS. BASOPHILS 0.0 0.0 - 0.1 K/UL   METABOLIC PANEL, COMPREHENSIVE    Collection Time: 10/27/17 10:35 AM   Result Value Ref Range    Sodium 134 (L) 136 - 145 mmol/L    Potassium 3.9 3.5 - 5.1 mmol/L    Chloride 98 97 - 108 mmol/L    CO2 24 21 - 32 mmol/L    Anion gap 12 5 - 15 mmol/L    Glucose 295 (H) 65 - 100 mg/dL    BUN 21 (H) 6 - 20 MG/DL    Creatinine 1.19 (H) 0.55 - 1.02 MG/DL    BUN/Creatinine ratio 18 12 - 20      GFR est AA 56 (L) >60 ml/min/1.73m2    GFR est non-AA 46 (L) >60 ml/min/1.73m2    Calcium 9.3 8.5 - 10.1 MG/DL    Bilirubin, total 0.5 0.2 - 1.0 MG/DL    ALT (SGPT) 23 12 - 78 U/L    AST (SGOT) 12 (L) 15 - 37 U/L    Alk.  phosphatase 83 45 - 117 U/L    Protein, total 9.2 (H) 6.4 - 8.2 g/dL    Albumin 3.5 3.5 - 5.0 g/dL    Globulin 5.7 (H) 2.0 - 4.0 g/dL    A-G Ratio 0.6 (L) 1.1 - 2.2     NT-PRO BNP    Collection Time: 10/27/17 10:35 AM   Result Value Ref Range    NT pro-BNP 2510 (H) 0 - 125 PG/ML   TROPONIN I    Collection Time: 10/27/17 10:35 AM   Result Value Ref Range    Troponin-I, Qt. <0.04 <0.05 ng/mL

## 2017-10-27 NOTE — PROGRESS NOTES
TRANSFER - IN REPORT:    Verbal report received from Rosenda Chavis (name) on Salvatore Muller  being received from ED (unit) for routine progression of care      Report consisted of patients Situation, Background, Assessment and   Recommendations(SBAR). Information from the following report(s) SBAR, Kardex, ED Summary, Intake/Output, MAR, Recent Results and Cardiac Rhythm NSR was reviewed with the receiving nurse. Opportunity for questions and clarification was provided. Assessment completed upon patients arrival to unit and care assumed. Bedside shift change report given to [address] [place] [address] (oncoming nurse) by Krista Kruger (offgoing nurse). Report included the following information SBAR, Kardex, Intake/Output, MAR, Recent Results and Cardiac Rhythm NSR-sinus tachy. SHIFT SUMMARY:        [address]  NURSING NOTE   Admission Date 10/27/2017   Admission Diagnosis Acute pulmonary embolism ([address]   Consults None      Cardiac Monitoring [x] Yes [] No      Purposeful Hourly Rounding [x] Yes    Kiana Score Total Score: 1   Kiana score 3 or > [] Bed Alarm [] Avasys [] 1:1 sitter [] Patient refused (Place signed refusal form in chart)   Gunner Score Gunner Score: 20   Gunner score 14 or < [] PMT consult [] Wound Care consult    []  Specialty bed  [] Nutrition consult      Influenza Vaccine Received Flu Vaccine for Current Season (usually Sept-March): No    Patient/Guardian Refused (Notify MD): Yes      Oxygen needs?  [] Room air Oxygen @  []1L    []2L    []3L   []4L    []5L   []6L     Use home O2? [] Yes [] No  Perform O2 challenge test using  smartphrase (.Homeoxygen)      Last bowel movement Last Bowel Movement Date: 10/27/17      Urinary Catheter             LDAs               Peripheral IV 10/27/17 Left Antecubital (Active)   Site Assessment Clean, dry, & intact 10/27/2017  4:43 PM   Phlebitis Assessment 0 10/27/2017  4:43 PM   Infiltration Assessment 0 10/27/2017  4:43 PM   Dressing Status Clean, dry, & intact 10/27/2017 4:43 PM   Dressing Type Transparent;Tape 10/27/2017  4:43 PM   Hub Color/Line Status Pink 10/27/2017  4:43 PM   Alcohol Cap Used No 10/27/2017  4:43 PM                         Readmission Risk Assessment Tool Score Medium Risk            18       Total Score        3 Has Seen PCP in Last 6 Months (Yes=3, No=0)    9 Pt. Coverage (Medicare=5 , Medicaid, or Self-Pay=4)    6 Charlson Comorbidity Score (Age + Comorbid Conditions)        Criteria that do not apply:    . Living with Significant Other. Assisted Living. LTAC. SNF.  or   Rehab    Patient Length of Stay (>5 days = 3)    IP Visits Last 12 Months (1-3=4, 4=9, >4=11)       Expected Length of Stay - - -   Actual Length of Stay 0

## 2017-10-27 NOTE — PROGRESS NOTES
Pharmacy Clarification of Prior to Admission Medication Regimen     The patient was interviewed regarding clarification of the prior to admission medication regimen. She was questioned regarding use of any other inhalers, topical products, over the counter medications, herbal medications, vitamin products or ophthalmic/nasal/otic medication use. Information Obtained From: Patient    Pertinent Pharmacy Findings:     Pt has been out of her gabapentin and gemfibrozil for a week and she says she needs to see her PCP to get those Rx's renewed    PTA medication list was corrected to the following:     Prior to Admission Medications   Prescriptions Last Dose Informant Patient Reported? Taking? DULoxetine (CYMBALTA) 30 mg capsule 10/26/2017 at Unknown time  Yes Yes   Sig: Take 30 mg by mouth two (2) times a day. SITagliptin (JANUVIA) 100 mg tablet 10/26/2017 at Unknown time  No Yes   Sig: Take 1 Tab by mouth daily. acyclovir (ZOVIRAX) 400 mg tablet 10/26/2017 at Unknown time  No Yes   Sig: TAKE 1 TABLET BY MOUTH TWICE DAILY   albuterol (PROAIR HFA) 90 mcg/actuation inhaler 10/23/2017  No Yes   Sig: Take 2 Puffs by inhalation every four (4) hours as needed for Wheezing. albuterol-ipratropium (DUO-NEB) 2.5 mg-0.5 mg/3 ml nebu 9/27/2017 at Unknown time  No Yes   Sig: INHALE 1 VIAL VIA NEBULIZER EVERY 4 HOURS AS NEEDED   baclofen (LIORESAL) 10 mg tablet 10/26/2017 at Unknown time  Yes Yes   Sig: Take 10 mg by mouth two (2) times a day. Indications: muscle spasms   cholecalciferol, vitamin D3, (VITAMIN D3) 2,000 unit tab 10/26/2017 at Unknown time  Yes Yes   Sig: Take 1 Tab by mouth daily. fluticasone (FLONASE) 50 mcg/actuation nasal spray 9/27/2017 at Unknown time  No Yes   Sig: INSTILL 2 SPRAYS IN EACH NOSTRIL EVERY DAY   fluticasone-salmeterol (ADVAIR) 250-50 mcg/dose diskus inhaler 10/26/2017 at Unknown time  No Yes   Sig: Take 1 Puff by inhalation every twelve (12) hours.    gabapentin (NEURONTIN) 300 mg capsule 10/20/2017 at Unknown time  No Yes   Sig: TAKE 1 CAPSULE BY MOUTH THREE TIMES DAILY   gemfibrozil (LOPID) 600 mg tablet 10/20/2017 at Unknown time  Yes Yes   Sig: TK 1 T PO BID   lisinopril-hydroCHLOROthiazide (PRINZIDE, ZESTORETIC) 20-12.5 mg per tablet 10/26/2017 at Unknown time  No Yes   Sig: Take 1 tab Daily   metFORMIN (GLUCOPHAGE) 1,000 mg tablet 10/26/2017 at Unknown time  No Yes   Simg in am and 1000 mg pm   omeprazole (PRILOSEC) 20 mg capsule 10/26/2017 at Unknown time  No Yes   Sig: TAKE 1 CAPSULE BY MOUTH EVERY DAY   potassium chloride SR (KLOR-CON 10) 10 mEq tablet 10/26/2017 at Unknown time  Yes Yes   Sig: Take 10 mEq by mouth daily.       Facility-Administered Medications: None          Thank you,  Sandi Power

## 2017-10-27 NOTE — PROGRESS NOTES
TRANSFER - IN REPORT:    Verbal report received from Em Fulton (name) on Soha Brown  being received from ED (unit) for routine progression of care      Report consisted of patients Situation, Background, Assessment and   Recommendations(SBAR). Information from the following report(s) SBAR, Kardex, ED Summary, Intake/Output, MAR, Recent Results and Cardiac Rhythm NSR was reviewed with the receiving nurse. Opportunity for questions and clarification was provided. Assessment completed upon patients arrival to unit and care assumed. Bedside shift change report given to nurse (oncoming nurse) by "[employer] (offgoing nurse). Report included the following information SBAR, Kardex, Intake/Output, MAR, Recent Results and Cardiac Rhythm NSR-sinus tachy. SHIFT SUMMARY:        [address] NURSING NOTE   Admission Date 10/27/2017   Admission Diagnosis Acute pulmonary embolism ([facility] [address]   Consults None      Cardiac Monitoring [x] Yes [] No      Purposeful Hourly Rounding [x] Yes    Kiana Score Total Score: 1   Kiana score 3 or > [] Bed Alarm [] Avasys [] 1:1 sitter [] Patient refused (Place signed refusal form in chart)   Gunner Score Gunner Score: 20   Gunner score 14 or < [] PMT consult [] Wound Care consult    []  Specialty bed  [] Nutrition consult      Influenza Vaccine Received Flu Vaccine for Current Season (usually Sept-March): No    Patient/Guardian Refused (Notify MD): Yes      Oxygen needs?  [] Room air Oxygen @  []1L    []2L    []3L   []4L    []5L   []6L     Use home O2? [] Yes [] No  Perform O2 challenge test using  smartphrase (.Homeoxygen)      Last bowel movement Last Bowel Movement Date: 10/27/17      Urinary Catheter             LDAs               Peripheral IV 10/27/17 Left Antecubital (Active)   Site Assessment Clean, dry, & intact 10/27/2017  4:43 PM   Phlebitis Assessment 0 10/27/2017  4:43 PM   Infiltration Assessment 0 10/27/2017  4:43 PM   Dressing Status Clean, dry, & intact 10/27/2017 4:43 PM   Dressing Type Transparent;Tape 10/27/2017  4:43 PM   Hub Color/Line Status Pink 10/27/2017  4:43 PM   Alcohol Cap Used No 10/27/2017  4:43 PM                         Readmission Risk Assessment Tool Score Medium Risk            18       Total Score        3 Has Seen PCP in Last 6 Months (Yes=3, No=0)    9 Pt. Coverage (Medicare=5 , Medicaid, or Self-Pay=4)    6 Charlson Comorbidity Score (Age + Comorbid Conditions)        Criteria that do not apply:    . Living with Significant Other. Assisted Living. LTAC. SNF.  or   Rehab    Patient Length of Stay (>5 days = 3)    IP Visits Last 12 Months (1-3=4, 4=9, >4=11)       Expected Length of Stay - - -   Actual Length of Stay 0

## 2017-10-27 NOTE — PROGRESS NOTES
Primary Nurse Fahad Felix RN and Katt Olvera RN performed a dual skin assessment on this patient No impairment noted  Gunner score is 20. Pt has a bilateral scars on her knees and scar on her mid back from past surgeries. Pt also has tattoos. No open areas on skin noted.

## 2017-10-28 PROBLEM — R53.1 WEAKNESS: Status: ACTIVE | Noted: 2017-10-28

## 2017-10-28 LAB
ANION GAP SERPL CALC-SCNC: 8 MMOL/L (ref 5–15)
APPEARANCE UR: CLEAR
ATRIAL RATE: 101 BPM
BACTERIA URNS QL MICRO: NEGATIVE /HPF
BASOPHILS # BLD: 0 K/UL (ref 0–0.1)
BASOPHILS NFR BLD: 0 % (ref 0–1)
BILIRUB UR QL: NEGATIVE
BUN SERPL-MCNC: 17 MG/DL (ref 6–20)
BUN/CREAT SERPL: 16 (ref 12–20)
CALCIUM SERPL-MCNC: 8.8 MG/DL (ref 8.5–10.1)
CALCULATED P AXIS, ECG09: 25 DEGREES
CALCULATED R AXIS, ECG10: -18 DEGREES
CALCULATED T AXIS, ECG11: 30 DEGREES
CHLORIDE SERPL-SCNC: 100 MMOL/L (ref 97–108)
CO2 SERPL-SCNC: 27 MMOL/L (ref 21–32)
COLOR UR: ABNORMAL
CREAT SERPL-MCNC: 1.06 MG/DL (ref 0.55–1.02)
DIAGNOSIS, 93000: NORMAL
EOSINOPHIL # BLD: 0.2 K/UL (ref 0–0.4)
EOSINOPHIL NFR BLD: 2 % (ref 0–7)
EPITH CASTS URNS QL MICRO: ABNORMAL /LPF
ERYTHROCYTE [DISTWIDTH] IN BLOOD BY AUTOMATED COUNT: 13 % (ref 11.5–14.5)
GLUCOSE BLD STRIP.AUTO-MCNC: 238 MG/DL (ref 65–100)
GLUCOSE BLD STRIP.AUTO-MCNC: 251 MG/DL (ref 65–100)
GLUCOSE BLD STRIP.AUTO-MCNC: 281 MG/DL (ref 65–100)
GLUCOSE BLD STRIP.AUTO-MCNC: 290 MG/DL (ref 65–100)
GLUCOSE SERPL-MCNC: 286 MG/DL (ref 65–100)
GLUCOSE UR STRIP.AUTO-MCNC: 250 MG/DL
HCT VFR BLD AUTO: 35.3 % (ref 35–47)
HGB BLD-MCNC: 11.1 G/DL (ref 11.5–16)
HGB UR QL STRIP: NEGATIVE
HYALINE CASTS URNS QL MICRO: ABNORMAL /LPF (ref 0–5)
KETONES UR QL STRIP.AUTO: NEGATIVE MG/DL
LEUKOCYTE ESTERASE UR QL STRIP.AUTO: NEGATIVE
LYMPHOCYTES # BLD: 3 K/UL (ref 0.8–3.5)
LYMPHOCYTES NFR BLD: 38 % (ref 12–49)
MCH RBC QN AUTO: 26.6 PG (ref 26–34)
MCHC RBC AUTO-ENTMCNC: 31.4 G/DL (ref 30–36.5)
MCV RBC AUTO: 84.7 FL (ref 80–99)
MONOCYTES # BLD: 0.7 K/UL (ref 0–1)
MONOCYTES NFR BLD: 9 % (ref 5–13)
NEUTS SEG # BLD: 4 K/UL (ref 1.8–8)
NEUTS SEG NFR BLD: 51 % (ref 32–75)
NITRITE UR QL STRIP.AUTO: NEGATIVE
P-R INTERVAL, ECG05: 140 MS
PH UR STRIP: 6 [PH] (ref 5–8)
PLATELET # BLD AUTO: 349 K/UL (ref 150–400)
POTASSIUM SERPL-SCNC: 3.5 MMOL/L (ref 3.5–5.1)
PROT UR STRIP-MCNC: NEGATIVE MG/DL
Q-T INTERVAL, ECG07: 358 MS
QRS DURATION, ECG06: 80 MS
QTC CALCULATION (BEZET), ECG08: 464 MS
RBC # BLD AUTO: 4.17 M/UL (ref 3.8–5.2)
RBC #/AREA URNS HPF: ABNORMAL /HPF (ref 0–5)
SERVICE CMNT-IMP: ABNORMAL
SODIUM SERPL-SCNC: 135 MMOL/L (ref 136–145)
SP GR UR REFRACTOMETRY: 1.02 (ref 1–1.03)
TROPONIN I SERPL-MCNC: <0.04 NG/ML
UA: UC IF INDICATED,UAUC: ABNORMAL
UROBILINOGEN UR QL STRIP.AUTO: 1 EU/DL (ref 0.2–1)
VENTRICULAR RATE, ECG03: 101 BPM
WBC # BLD AUTO: 7.9 K/UL (ref 3.6–11)
WBC URNS QL MICRO: ABNORMAL /HPF (ref 0–4)

## 2017-10-28 PROCEDURE — 99218 HC RM OBSERVATION: CPT

## 2017-10-28 PROCEDURE — G8978 MOBILITY CURRENT STATUS: HCPCS

## 2017-10-28 PROCEDURE — 74011250637 HC RX REV CODE- 250/637: Performed by: INTERNAL MEDICINE

## 2017-10-28 PROCEDURE — 84484 ASSAY OF TROPONIN QUANT: CPT | Performed by: INTERNAL MEDICINE

## 2017-10-28 PROCEDURE — 94640 AIRWAY INHALATION TREATMENT: CPT

## 2017-10-28 PROCEDURE — 82962 GLUCOSE BLOOD TEST: CPT

## 2017-10-28 PROCEDURE — G8979 MOBILITY GOAL STATUS: HCPCS

## 2017-10-28 PROCEDURE — 97161 PT EVAL LOW COMPLEX 20 MIN: CPT

## 2017-10-28 PROCEDURE — 36415 COLL VENOUS BLD VENIPUNCTURE: CPT | Performed by: INTERNAL MEDICINE

## 2017-10-28 PROCEDURE — 93306 TTE W/DOPPLER COMPLETE: CPT

## 2017-10-28 PROCEDURE — 80048 BASIC METABOLIC PNL TOTAL CA: CPT | Performed by: INTERNAL MEDICINE

## 2017-10-28 PROCEDURE — 85025 COMPLETE CBC W/AUTO DIFF WBC: CPT | Performed by: INTERNAL MEDICINE

## 2017-10-28 PROCEDURE — 74011636637 HC RX REV CODE- 636/637: Performed by: INTERNAL MEDICINE

## 2017-10-28 PROCEDURE — 74011000250 HC RX REV CODE- 250: Performed by: INTERNAL MEDICINE

## 2017-10-28 PROCEDURE — 93306 TTE W/DOPPLER COMPLETE: CPT | Performed by: INTERNAL MEDICINE

## 2017-10-28 PROCEDURE — 81001 URINALYSIS AUTO W/SCOPE: CPT | Performed by: INTERNAL MEDICINE

## 2017-10-28 RX ORDER — IPRATROPIUM BROMIDE AND ALBUTEROL SULFATE 2.5; .5 MG/3ML; MG/3ML
3 SOLUTION RESPIRATORY (INHALATION)
Status: DISCONTINUED | OUTPATIENT
Start: 2017-10-28 | End: 2017-10-29 | Stop reason: HOSPADM

## 2017-10-28 RX ORDER — METFORMIN HYDROCHLORIDE 500 MG/1
1000 TABLET ORAL EVERY EVENING
Status: DISCONTINUED | OUTPATIENT
Start: 2017-10-28 | End: 2017-10-29 | Stop reason: HOSPADM

## 2017-10-28 RX ORDER — METFORMIN HYDROCHLORIDE 500 MG/1
500 TABLET ORAL DAILY
Status: DISCONTINUED | OUTPATIENT
Start: 2017-10-28 | End: 2017-10-29 | Stop reason: HOSPADM

## 2017-10-28 RX ADMIN — IPRATROPIUM BROMIDE AND ALBUTEROL SULFATE 3 ML: .5; 3 SOLUTION RESPIRATORY (INHALATION) at 07:41

## 2017-10-28 RX ADMIN — DULOXETINE 30 MG: 30 CAPSULE, DELAYED RELEASE ORAL at 09:02

## 2017-10-28 RX ADMIN — LISINOPRIL 20 MG: 20 TABLET ORAL at 09:02

## 2017-10-28 RX ADMIN — INSULIN LISPRO 2 UNITS: 100 INJECTION, SOLUTION INTRAVENOUS; SUBCUTANEOUS at 21:19

## 2017-10-28 RX ADMIN — Medication 5 ML: at 06:00

## 2017-10-28 RX ADMIN — GABAPENTIN 300 MG: 300 CAPSULE ORAL at 17:46

## 2017-10-28 RX ADMIN — POTASSIUM CHLORIDE 10 MEQ: 750 TABLET, FILM COATED, EXTENDED RELEASE ORAL at 09:02

## 2017-10-28 RX ADMIN — VITAMIN D, TAB 1000IU (100/BT) 2000 UNITS: 25 TAB at 09:03

## 2017-10-28 RX ADMIN — PANTOPRAZOLE SODIUM 40 MG: 40 TABLET, DELAYED RELEASE ORAL at 09:02

## 2017-10-28 RX ADMIN — BACLOFEN 10 MG: 10 TABLET ORAL at 09:02

## 2017-10-28 RX ADMIN — Medication 10 ML: at 21:20

## 2017-10-28 RX ADMIN — IPRATROPIUM BROMIDE AND ALBUTEROL SULFATE 3 ML: .5; 3 SOLUTION RESPIRATORY (INHALATION) at 20:56

## 2017-10-28 RX ADMIN — IPRATROPIUM BROMIDE AND ALBUTEROL SULFATE 3 ML: .5; 3 SOLUTION RESPIRATORY (INHALATION) at 01:13

## 2017-10-28 RX ADMIN — APIXABAN 10 MG: 5 TABLET, FILM COATED ORAL at 17:46

## 2017-10-28 RX ADMIN — INSULIN LISPRO 5 UNITS: 100 INJECTION, SOLUTION INTRAVENOUS; SUBCUTANEOUS at 09:04

## 2017-10-28 RX ADMIN — GABAPENTIN 300 MG: 300 CAPSULE ORAL at 09:03

## 2017-10-28 RX ADMIN — INSULIN LISPRO 5 UNITS: 100 INJECTION, SOLUTION INTRAVENOUS; SUBCUTANEOUS at 17:46

## 2017-10-28 RX ADMIN — HYDROCHLOROTHIAZIDE 12.5 MG: 25 TABLET ORAL at 09:03

## 2017-10-28 RX ADMIN — BACLOFEN 10 MG: 10 TABLET ORAL at 17:46

## 2017-10-28 RX ADMIN — APIXABAN 10 MG: 5 TABLET, FILM COATED ORAL at 09:02

## 2017-10-28 RX ADMIN — IPRATROPIUM BROMIDE AND ALBUTEROL SULFATE 3 ML: .5; 3 SOLUTION RESPIRATORY (INHALATION) at 13:41

## 2017-10-28 RX ADMIN — DULOXETINE 30 MG: 30 CAPSULE, DELAYED RELEASE ORAL at 17:46

## 2017-10-28 RX ADMIN — INSULIN LISPRO 5 UNITS: 100 INJECTION, SOLUTION INTRAVENOUS; SUBCUTANEOUS at 13:03

## 2017-10-28 RX ADMIN — GEMFIBROZIL 600 MG: 600 TABLET ORAL at 09:02

## 2017-10-28 RX ADMIN — FLUTICASONE FUROATE AND VILANTEROL TRIFENATATE 1 PUFF: 100; 25 POWDER RESPIRATORY (INHALATION) at 17:49

## 2017-10-28 RX ADMIN — Medication 10 ML: at 13:56

## 2017-10-28 NOTE — PROGRESS NOTES
Bedside shift change report given to [facility] (oncoming nurse) by Angela Allison  (offgoing nurse). Report included the following information SBAR, Kardex, Intake/Output, MAR, Recent Results and Cardiac Rhythm NSR-sinus tachy. Bedside shift change report given to Angela Allison (oncoming nurse) by Doc Limtel (offgoing nurse). Report included the following information SBAR, Kardex, Intake/Output, MAR, Recent Results and Cardiac Rhythm NSR-sinus tachy. SHIFT SUMMARY:      [facility]   Admission Date 10/27/2017   Admission Diagnosis Acute pulmonary embolism ([facility] [address]   Consults None      Cardiac Monitoring [x] Yes [] No      Purposeful Hourly Rounding [x] Yes    Kiana Score Total Score: 1   Kiana score 3 or > [] Bed Alarm [] Avasys [] 1:1 sitter [] Patient refused (Place signed refusal form in chart)   Gunner Score Gunner Score: 20   Gunner score 14 or < [] PMT consult [] Wound Care consult    []  Specialty bed  [] Nutrition consult      Influenza Vaccine Received Flu Vaccine for Current Season (usually Sept-March): No    Patient/Guardian Refused (Notify MD): Yes      Oxygen needs?  [x] Room air Oxygen @  []1L    []2L    []3L   []4L    []5L   []6L     Use home O2? [] Yes [] No  Perform O2 challenge test using  smartphrase (.Homeoxygen)      Last bowel movement Last Bowel Movement Date: 10/27/17      Urinary Catheter             LDAs               Peripheral IV 10/27/17 Left Antecubital (Active)   Site Assessment Clean, dry, & intact 10/28/2017  2:03 PM   Phlebitis Assessment 0 10/28/2017  2:03 PM   Infiltration Assessment 0 10/28/2017  2:03 PM   Dressing Status Clean, dry, & intact 10/28/2017  2:03 PM   Dressing Type Transparent;Tape 10/28/2017  2:03 PM   Hub Color/Line Status Pink;Flushed;Patent 10/28/2017  2:03 PM   Alcohol Cap Used No 10/28/2017  2:03 PM                         Readmission Risk Assessment Tool Score Medium Risk            18       Total Score        3 Has Seen PCP in Last 6 Months (Yes=3, No=0)    9 Pt. Coverage (Medicare=5 , Medicaid, or Self-Pay=4)    6 Charlson Comorbidity Score (Age + Comorbid Conditions)        Criteria that do not apply:    . Living with Significant Other. Assisted Living. LTAC. SNF.  or   Rehab    Patient Length of Stay (>5 days = 3)    IP Visits Last 12 Months (1-3=4, 4=9, >4=11)       Expected Length of Stay - - -   Actual Length of Stay 1

## 2017-10-28 NOTE — PROGRESS NOTES
Problem: Mobility Impaired (Adult and Pediatric)  Goal: *Acute Goals and Plan of Care (Insert Text)  Physical Therapy Goals  Initiated 10/28/2017  1. Patient will move from supine to sit and sit to supine  in bed with independence within 7 day(s). 2.  Patient will transfer from bed to chair and chair to bed with independence using the least restrictive device within 7 day(s). 3.  Patient will perform sit to stand with independence within 7 day(s). 4.  Patient will ambulate with modified independent for 150 feet with the least restrictive device within 7 day(s). physical Therapy EVALUATION  Patient: Shemar Murry (56 y.o. female)  Date: 10/28/2017  Primary Diagnosis: Acute pulmonary embolism ([facility]        Precautions:       ASSESSMENT :  Based on the objective data described below, the patient presents with decreased endurance limiting functional mobility. Received pt sitting upright on couch, and cleared for mobility by nursing, on room air. Pt was agreeable to PT session. Had pt transfer from sit to stand CGA. Ambulated in the hallway 90' CGA with RW, took a break [facility] through due to fatigue, O2 in the high 90s and HR was 130s. After resting less than 1 minute, returned back to sitting in couch CGA. Left pt sitting upright on couch. Will work in increasing ambulation distance in future sessions to address impaired endurance. Patient will benefit from skilled intervention to address the above impairments.   Patients rehabilitation potential is considered to be Good  Factors which may influence rehabilitation potential include:   [x]         None noted  []         Mental ability/status  [x]         Medical condition  []         Home/family situation and support systems  []         Safety awareness  []         Pain tolerance/management  []         Other:      PLAN :  Recommendations and Planned Interventions:  [x]           Bed Mobility Training             []    Neuromuscular Re-Education  [x] Transfer Training                   []    Orthotic/Prosthetic Training  [x]           Gait Training                         []    Modalities  [x]           Therapeutic Exercises           []    Edema Management/Control  [x]           Therapeutic Activities            [x]    Patient and Family Training/Education  []           Other (comment):    Frequency/Duration: Patient will be followed by physical therapy  3 times a week to address goals. Discharge Recommendations: Home Health  Further Equipment Recommendations for Discharge: has DME     SUBJECTIVE:   Patient stated I want to move, I'm tired of lying around.     OBJECTIVE DATA SUMMARY:   HISTORY:    Past Medical History:   Diagnosis Date    Arthritis     Asthma     [facility] obstructive pulmonary disease ([facility]     Chronic pain     back/hip    Diabetes ([facility]     Diabetes mellitus due to underlying condition, controlled, with complication, without long-term current use of insulin ([facility] 3/21/2017    Fibromyalgia     Gastroesophageal reflux disease without esophagitis 5/13/2016    Hyperlipidemia 5/9/2012    Hypertension     Obesity 5/9/2012    Unspecified sleep apnea     CAN'T TOLERATE CPAP     Past Surgical History:   Procedure Laterality Date    COLONOSCOPY N/A 7/12/2017    COLONOSCOPY performed [facility]ev Hernandez [facility] at [facility] ENDOSCOPY     [facility]    one ovary removed    HX ORTHOPAEDIC  06/2012    lumbar fusion    HX ORTHOPAEDIC  2/16/15    RIGHT TOTAL KNEE ARTHROPLASTY    HX ORTHOPAEDIC  6/16/15    LEFT TOTAL KNEE ARTHROPLASTY        Prior Level of Function/Home Situation: pt was modified independent with mobility using rollator at home, but rests frequently due to quick onset of fatigue. Lives with sister and is able to provide assistance with tasks such as cooking. Pt reports being in bed a lot recently. Lives in 1 story apartment with no stairs to enter. Pt does not currently drive.     Personal factors and/or comorbidities impacting plan of care:     Home Situation  Home Environment: Apartment  # Steps to Enter: 0  Rails to Enter: No  One/Two Story Residence: One story  Living Alone: No  Support Systems: Family member(s)  Patient Expects to be Discharged to[de-identified] Private residence  Current DME Used/Available at Home: [employer] Sylvia, straight, Walker, rollator  Tub or Shower Type: Shower    EXAMINATION/PRESENTATION/DECISION MAKING:   Critical Behavior:  Neurologic State: Alert  Orientation Level: Appropriate for age  Cognition: Appropriate decision making     Hearing: Auditory  Auditory Impairment: None  Skin:  WDL  Edema: WDL  Range Of Motion:  AROM: Within functional limits                       Strength:    Strength: Within functional limits                    Tone & Sensation:                                  Coordination:  Coordination: Within functional limits  Vision:      Functional Mobility:  Bed Mobility:              Transfers:  Sit to Stand: Contact guard assistance  Stand to Sit: Contact guard assistance                       Balance:   Sitting: Intact  Sitting - Static: Good (unsupported)  Sitting - Dynamic: Good (unsupported)  Standing - Static: Good  Standing - Dynamic : Good (with RW)  Ambulation/Gait Training:  Distance (ft): 90 Feet (ft)  Assistive Device: Walker, rolling  Ambulation - Level of Assistance: Contact guard assistance                                                         Therapeutic Exercises:       Functional Measure:  Barthel Index:    Bathin  Bladder: 10  Bowels: 10  Groomin  Dressin  Feeding: 10  Mobility: 5  Stairs: 5  Toilet Use: 10  Transfer (Bed to Chair and Back): 15  Total: 80       Barthel and G-code impairment scale:  Percentage of impairment CH  0% CI  1-19% CJ  20-39% CK  40-59% CL  60-79% CM  80-99% CN  100%   Barthel Score 0-100 100 99-80 79-60 59-40 20-39 1-19   0   Barthel Score 0-20 20 17-19 13-16 9-12 5-8 1-4 0      The Barthel ADL Index: Guidelines  1. The index should be used as a record of what a patient does, not as a record of what a patient could do. 2. The main aim is to establish degree of independence from any help, physical or verbal, however minor and for whatever reason. 3. The need for supervision renders the patient not independent. 4. A patient's performance should be established using the best available evidence. Asking the patient, friends/relatives and nurses are the usual sources, but direct observation and common sense are also important. However direct testing is not needed. 5. Usually the patient's performance over the preceding 24-48 hours is important, but occasionally longer periods will be relevant. 6. Middle categories imply that the patient supplies over 50 per cent of the effort. 7. Use of aids to be independent is allowed. Harlee Cowden., Barthel, D.W. (5725). Functional evaluation: the Barthel Index. [facility] (14)2. Lior Borjas marlene KASSANDRA Gunn, Quoc Crowe, Dick Gallardo., [facility], [facility] (1999). Measuring the change indisability after inpatient rehabilitation; comparison of the responsiveness of the Barthel Index and Functional [place] Measure. Journal of Neurology, Neurosurgery, and Psychiatry, 66(4), 001-366. Juliet Glover, N.J.A, NICOLE Lunsford.RADHA, & Romel Hubbard, M.A. (2004.) Assessment of post-stroke quality of life in cost-effectiveness studies: The usefulness of the Barthel Index and the EuroQoL-5D. Quality of Life Research, 13, 571-40       G codes: In compliance with CMSs Claims Based Outcome Reporting, the following G-code set was chosen for this patient based on their primary functional limitation being treated: The outcome measure chosen to determine the severity of the functional limitation was the CI with a score of 80/100 which was correlated with the impairment scale.     ? Mobility - Walking and Moving Around:     - CURRENT STATUS: CI - 1%-19% impaired, limited or restricted    - GOAL STATUS: CH - 0% impaired, limited or restricted    - D/C STATUS:  ---------------To be determined---------------      Physical Therapy Evaluation Charge Determination   History Examination Presentation Decision-Making   MEDIUM  Complexity : 1-2 comorbidities / personal factors will impact the outcome/ POC  LOW Complexity : 1-2 Standardized tests and measures addressing body structure, function, activity limitation and / or participation in recreation  LOW Complexity : Stable, uncomplicated  LOW Complexity : FOTO score of       Based on the above components, the patient evaluation is determined to be of the following complexity level: LOW     Pain:  Pain Scale 1: Numeric (0 - 10)  Pain Intensity 1: 3              Activity Tolerance:   Pt tolerated session well, fatigue onsets quickly after ambulating shorter distances  Please refer to the flowsheet for vital signs taken during this treatment. After treatment:   [x]         Patient left in no apparent distress sitting up in chair  []         Patient left in no apparent distress in bed  [x]         Call bell left within reach  [x]         Nursing notified  []         Caregiver present  []         Bed alarm activated    COMMUNICATION/EDUCATION:   The patients plan of care was discussed with: Registered Nurse. [x]         Fall prevention education was provided and the patient/caregiver indicated understanding. [x]         Patient/family have participated as able in goal setting and plan of care. [x]         Patient/family agree to work toward stated goals and plan of care. []         Patient understands intent and goals of therapy, but is neutral about his/her participation. []         Patient is unable to participate in goal setting and plan of care.     Thank you for this referral.  Trish Rubio, SPT   Time Calculation: 16 mins

## 2017-10-28 NOTE — PROGRESS NOTES
ADULT PROTOCOL: JET AEROSOL ASSESSMENT    Patient  Tk Hatfield     63 y.o.   female     10/28/2017  10:00 AM    Breath Sounds Pre Procedure: Right Breath Sounds: Diminished                               Left Breath Sounds: Diminished    Breath Sounds Post Procedure: Right Breath Sounds: Diminished                                 Left Breath Sounds: Diminished    Breathing pattern: Pre procedure Breathing Pattern: Regular          Post procedure Breathing Pattern: Regular    Heart Rate: Pre procedure Pulse: 88           Post procedure Pulse: 101    Resp Rate: Pre procedure Respirations: 16           Post procedure Respirations: 16      Cough: Pre procedure Cough: Non-productive               Post procedure Cough: Non-productive      Oxygen: O2 Device: Room air   room air     Changed: NO    SpO2: Pre procedure SpO2: 98 %   without oxygen              Post procedure SpO2: 98 %  without oxygen    Nebulizer Therapy: Current medications Aerosolized Medications: DuoNeb      Changed: YES/ changed to TID and Q4 PRN    Smoking History: former smoker    Problem List:   Patient Active Problem List   Diagnosis Code    Lumbar stenosis M48.061    Obesity E66.9    HTN (hypertension) I10    [facility] E11.9    Hyperlipidemia E78.5    Depression F32.9    Asthma J45.909    Primary localized osteoarthrosis, lower leg M17.10    DJD (degenerative joint disease) of knee M17.10    Chronic pain G89.29    Fibromyalgia M79.7    COPD (chronic obstructive pulmonary disease) ([facility]) J44.9    Gastroesophageal reflux disease without esophagitis K21.9    Chronic low back pain with bilateral sciatica M54.41, M54.42, G89.29    Lumbar spondylosis M47.816    Spinal stenosis M48.00    Status post laminectomy Z98.890    Diabetes mellitus due to underlying condition, controlled, with complication, without long-term current use of insulin ([facility]) E08.8    Acute pulmonary embolism ([facility]) I26.99       Respiratory Therapist: Ara Zuniga, RT

## 2017-10-28 NOTE — PROGRESS NOTES
Hospitalist Progress Note    NAME: Marlene Flank   :  1955   MRN:  [id number]       Assessment / Plan:  Acute Rt lung  pulmonary embolism hemodynamically stable unprovoked  -no hx of recent immobilization. Has fam hx of blood clotsin her sister at age 36  -last Cscope 3 months ago shows benign mucosaand ileal ulcer  e age of 38  -due to unprovoked PE, needs hypercoagulable panel work up at [facility] f/u  -cont' lovenox, TREY consulted for for pricing/assistance with Eliquis  -stable on RA  -obtain Echo to eval RV funx  -anticipation of discharge tomorrow if can get her on Eliquis outpt     DM  -SSI, resume metformin     HTN  -lisinopril and hctz     Dyslipidemia  -lopid     Hx of COPD stale  -c/w Symbicort and prn albuterol     Fibromyalgia  -baclofen and Cymbalta     Hx of GERD  -on omeprazole            Code Status: Full  Surrogate Decision Maker: Son Nataly Nelson     DVT Prophylaxis: Eliquis  GI Prophylaxis: not indicated     Baseline: independent     Subjective:     Chief Complaint / Reason for Physician Visit  Pt seen at bedside. C/o of brief dizziness with standing up, improved from previous days. No cp/sob. Discussed with RN events overnight. Review of Systems:  Symptom Y/N Comments  Symptom Y/N Comments   Fever/Chills n   Chest Pain n    Poor Appetite    Edema n    Cough    Abdominal Pain     Sputum    Joint Pain     SOB/KELLER n   Pruritis/Rash     Nausea/vomit    Tolerating PT/OT     Diarrhea    Tolerating Diet     Constipation    Other       Could NOT obtain due to:      Objective:     VITALS:   Last 24hrs VS reviewed since prior progress note.  Most recent are:  Patient Vitals for the past 24 hrs:   Temp Pulse Resp BP SpO2   10/28/17 0741 - - - - 98 %   10/28/17 0736 97.7 °F (36.5 °C) 95 18 113/53 100 %   10/28/17 0250 98.2 °F (36.8 °C) 100 18 119/60 98 %   10/28/17 0113 - - - - 98 %   10/27/17 2249 98.1 °F (36.7 °C) 98 18 127/73 99 %   10/27/17 1943 - - - - 98 %   10/27/17 1940 98.2 °F (36.8 °C) (!) 107 18 125/61 99 %   10/27/17 1645 98.5 °F (36.9 °C) (!) 105 18 125/76 98 %       Intake/Output Summary (Last 24 hours) at 10/28/17 1036  Last data filed at 10/28/17 1011   Gross per 24 hour   Intake              480 ml   Output                0 ml   Net              480 ml        PHYSICAL EXAM:  General: WD, WN. Alert, cooperative, no acute distress    EENT:  EOMI. Anicteric sclerae. MMM  Resp:  CTA bilaterally, no wheezing or rales. No accessory muscle use  CV:  Regular  rhythm,  No edema  GI:  Soft, Non distended, Non tender.  +Bowel sounds  Neurologic:  Alert and oriented X 3, normal speech  Psych:   Good insight. Not anxious nor agitated  Skin:  No rashes. No jaundice    Reviewed most current lab test results and cultures  YES  Reviewed most current radiology test results   YES  Review and summation of old records today    NO  Reviewed patient's current orders and MAR    YES  PMH/SH reviewed - no change compared to H&P  ________________________________________________________________________  Care Plan discussed with:    Comments   Patient x    Family      RN x    Care Manager     Consultant                        Multidiciplinary team rounds were held today with , nursing, pharmacist and clinical coordinator. Patient's plan of care was discussed; medications were reviewed and discharge planning was addressed. ________________________________________________________________________  Total NON critical care TIME:  35   Minutes    Total CRITICAL CARE TIME Spent:   Minutes non procedure based      Comments   >50% of visit spent in counseling and coordination of care     ________________________________________________________________________  Mikaela Anthony MD     Procedures: see electronic medical records for all procedures/Xrays and details which were not copied into this note but were reviewed prior to creation of Plan.       LABS:  I reviewed today's most current labs and imaging studies.   Pertinent labs include:  Recent Labs      10/28/17   0300  10/27/17   1035   WBC  7.9  8.9   HGB  11.1*  13.2   HCT  35.3  40.3   PLT  349  457*     Recent Labs      10/28/17   0300  10/27/17   1035   NA  135*  134*   K  3.5  3.9   CL  100  98   CO2  27  24   GLU  286*  295*   BUN  17  21*   CREA  1.06*  1.19*   CA  8.8  9.3   ALB   --   3.5   TBILI   --   0.5   SGOT   --   12*   ALT   --   23       Signed: Konrad Yanes MD

## 2017-10-29 VITALS
OXYGEN SATURATION: 100 % | HEIGHT: 72 IN | HEART RATE: 96 BPM | RESPIRATION RATE: 18 BRPM | TEMPERATURE: 98.1 F | DIASTOLIC BLOOD PRESSURE: 44 MMHG | WEIGHT: 293 LBS | BODY MASS INDEX: 39.68 KG/M2 | SYSTOLIC BLOOD PRESSURE: 113 MMHG

## 2017-10-29 LAB
GLUCOSE BLD STRIP.AUTO-MCNC: 246 MG/DL (ref 65–100)
GLUCOSE BLD STRIP.AUTO-MCNC: 256 MG/DL (ref 65–100)
SERVICE CMNT-IMP: ABNORMAL
SERVICE CMNT-IMP: ABNORMAL

## 2017-10-29 PROCEDURE — 99218 HC RM OBSERVATION: CPT

## 2017-10-29 PROCEDURE — 74011250637 HC RX REV CODE- 250/637: Performed by: INTERNAL MEDICINE

## 2017-10-29 PROCEDURE — 74011636637 HC RX REV CODE- 636/637: Performed by: INTERNAL MEDICINE

## 2017-10-29 PROCEDURE — 74011000250 HC RX REV CODE- 250: Performed by: INTERNAL MEDICINE

## 2017-10-29 PROCEDURE — 82962 GLUCOSE BLOOD TEST: CPT

## 2017-10-29 PROCEDURE — 94640 AIRWAY INHALATION TREATMENT: CPT

## 2017-10-29 RX ORDER — ACETAMINOPHEN 325 MG/1
650 TABLET ORAL
Status: DISCONTINUED | OUTPATIENT
Start: 2017-10-29 | End: 2017-10-29 | Stop reason: HOSPADM

## 2017-10-29 RX ADMIN — METFORMIN HYDROCHLORIDE 500 MG: 500 TABLET ORAL at 09:00

## 2017-10-29 RX ADMIN — IPRATROPIUM BROMIDE AND ALBUTEROL SULFATE 3 ML: .5; 3 SOLUTION RESPIRATORY (INHALATION) at 07:55

## 2017-10-29 RX ADMIN — INSULIN LISPRO 5 UNITS: 100 INJECTION, SOLUTION INTRAVENOUS; SUBCUTANEOUS at 10:40

## 2017-10-29 RX ADMIN — VITAMIN D, TAB 1000IU (100/BT) 2000 UNITS: 25 TAB at 10:33

## 2017-10-29 RX ADMIN — BACLOFEN 10 MG: 10 TABLET ORAL at 10:33

## 2017-10-29 RX ADMIN — DULOXETINE 30 MG: 30 CAPSULE, DELAYED RELEASE ORAL at 10:36

## 2017-10-29 RX ADMIN — POTASSIUM CHLORIDE 10 MEQ: 750 TABLET, FILM COATED, EXTENDED RELEASE ORAL at 10:36

## 2017-10-29 RX ADMIN — PANTOPRAZOLE SODIUM 40 MG: 40 TABLET, DELAYED RELEASE ORAL at 10:33

## 2017-10-29 RX ADMIN — FLUTICASONE FUROATE AND VILANTEROL TRIFENATATE 1 PUFF: 100; 25 POWDER RESPIRATORY (INHALATION) at 10:39

## 2017-10-29 RX ADMIN — LISINOPRIL 20 MG: 20 TABLET ORAL at 10:36

## 2017-10-29 RX ADMIN — GEMFIBROZIL 600 MG: 600 TABLET ORAL at 04:00

## 2017-10-29 RX ADMIN — ACETAMINOPHEN 650 MG: 325 TABLET ORAL at 13:18

## 2017-10-29 RX ADMIN — APIXABAN 10 MG: 5 TABLET, FILM COATED ORAL at 10:34

## 2017-10-29 RX ADMIN — GABAPENTIN 300 MG: 300 CAPSULE ORAL at 10:35

## 2017-10-29 RX ADMIN — INSULIN LISPRO 3 UNITS: 100 INJECTION, SOLUTION INTRAVENOUS; SUBCUTANEOUS at 13:18

## 2017-10-29 RX ADMIN — HYDROCHLOROTHIAZIDE 12.5 MG: 25 TABLET ORAL at 10:37

## 2017-10-29 NOTE — PROGRESS NOTES
Pt is a 62 y/o female that was admitted on 10/27/17 d/t a Dx of Acute R Lung PE hemodynamiically stable unprovoked, DM, HTN, Dyslipidemia, Hx of COPD, Fibromyalgia, Hx of GERD. Pt is a Full Code. Pt is alert and oriented. Pt lives with her sister in a apartment with no DAVID. Pt has a cane, rollator, and BSC at home. Pt has had HH in the past but can not remember the name of the provider. Pt has been to [facility] in the past.  Pt indicated that she is I W ADLs. Pt does not drive but has supportive family that can transport her. Pt pharmacy is [facility] on [address]: [id number]. CM was notified this morning that we need to check to see if Eliquis is covered under her insurance. CM called [facility] and spoke to the pharmacist.. She indicated that this medication requires preauthorization. NP/MD will need to call [phone] to obtain preauthorization. CM called ANISHA Randall back to let her know. 11:05 AM   CM spoke to ANISHA Randall and she needed the Insurance Number on this Pt. Medicare: [id number]    Medicaid: [id number]  Group: [id number]    CM will continue to monitor discharge plan. 11:10 AM   NP asked me to give the Pt the 30 day free trial coupon. CM will deliver it to her room right now. 12:05 PM   CM received a consult for ANISHA Qiu requesting that on Monday we have CM specialist make MD appointments that are listed on  one stop. TREY sent a email to CM specialist and gave her the information needed to make appointments on Monday. CM notified that Pt is ready to transition home. NP decided to use Xarelto which only cost $8.00 per month for this Pt. No further CM needs. Care Management Interventions  PCP Verified by CM: Yes (Pt saw PCP about 6 months ago. )  Palliative Care Criteria Met (RRAT>21 & CHF Dx)?: No  Mode of Transport at Discharge:  Other (see comment) (Family will be transporting Pt home in car)  Transition of Care Consult (CM Consult): Discharge Planning  Discharge Durable Medical Equipment: No  Physical Therapy Consult: Yes  Occupational Therapy Consult: No  Speech Therapy Consult: No  Current Support Network: Relative's Home  Confirm Follow Up Transport: Self  Plan discussed with Pt/Family/Caregiver: Yes  Freedom of Choice Offered: Yes  Discharge Location  Discharge Placement: [address], Weekend [address]   Ext [phone]

## 2017-10-29 NOTE — DISCHARGE INSTRUCTIONS
Patient Discharge Instructions     Pt Name  Osmany Corrales   Date of Birth 1955   Age  63 y.o. Medical Record Number  [id number]   PCP Kelly Wall MD    Admit date:  10/27/2017 @    [address] [place] [address]    Room Number  2207/01   Date of Discharge 10/29/2017     Admission Diagnoses:     Acute pulmonary embolism ([facility]          Allergies   Allergen Reactions    Allopurinol Hives    Darvocet A500 [Propoxyphene N-Acetaminophen] Hives    Seafood [Shellfish Containing Products] Itching     NEW ALLERGY; REACTION WORSENS AS AMOUNT EATEN INCREASES        You were admitted to [address] [place] [address] for  Acute pulmonary embolism ([facility]    YOUR OTHER MEDICAL DIAGNOSES INCLUDE (BUT NOT LIMITED TO ):  Present on Admission:   Acute pulmonary embolism ([facility]   Obesity   Hyperlipidemia   HTN (hypertension)   Gastroesophageal reflux disease without esophagitis   Fibromyalgia   COPD (chronic obstructive pulmonary disease) (HCC)      DIET:  Cardiac Diet and Diabetic Diet       Recommended activity: Activity as tolerated  Follow up : Follow-up Information     Follow up With Details Comments Contact Info    Purvi K. Dayton Mortimer, MD In 2 weeks  [address] [place] [address]  [phone]      Kelly Wall MD In 5 days  [address] [facility] [address] [place] [address]  [phone]      Kelly Wall MD   [address] [place] [address] [address] [place] [address]  [phone]          ** Xarelto 15mg twice a day until 11/19/2017  ** Xarelto 20mg daily once a day starting 11/20/2017     Rivaroxaban (Xarelto, Xarelto Starter Pack) - (By mouth)   Why this medicine is used:   Treats and prevents blood clots.   Contact a nurse or doctor right away if you have:  · Sudden or severe headache  · Back pain, numbness, tingling, weakness in your legs or feet  · Loss of bladder or bowel control  · Bloody vomit or vomit that looks like coffee grounds; bloody or black, tarry stools  · Bleeding that does not stop or bruises that do not heal     Common side effects:  · Minor bleeding or bruising  © 2017 [facility] Information is for End User's use only and may not be sold, redistributed or otherwise used for commercial purposes. · It is important that you take the medication exactly as they are prescribed. · Keep your medication in the bottles provided by the pharmacist and keep a list of the medication names, dosages, and times to be taken in your wallet. · Do not take other medications without consulting your doctor. ** You have to follow up with Dr. Arden Sung within 2 weeks for PE follow up    ADDITIONAL INFORMATION: If you experience any of the following symptoms or have any health problem not listed below, then please call your primary care physician or return to the emergency room if you cannot get hold of your doctor: Fever, chills, nausea, vomiting, diarrhea, change in mentation, falling, bleeding, shortness of breath. I understand that if any problems occur once I am discharged, I am supposed to call my Primary care physician for further care or seek help in the Emergency Department at the nearest Healthcare facility. I have had an opportunity to discuss my clinical issues with my doctor and nursing staff. I understand and acknowledge receipt of the above instructions.                                                                                                                                            Physician's or R.N.'s Signature                                                            Date/Time                                                                                                                                              Patient or Representative Signature                                                 Date/Time

## 2017-10-29 NOTE — PROGRESS NOTES
1430  Pt D/C home. All d/c instructions, medications, and follow up appts were discussed and pt stated understanding. All IV/tele removed. Pt left with all personal belonging inc Rxs via W/c.

## 2017-10-29 NOTE — DISCHARGE SUMMARY
Hospitalist Discharge Summary     Patient ID:  Ewa Galvez  [id number]  66 y.o.  1955    PCP on record: Giorgi Castañeda MD    Admit date: 10/27/2017  Discharge date and time: 10/29/2017      DISCHARGE DIAGNOSIS:  Acute Rt lung  pulmonary embolism hemodynamically stable unprovoked  -no hx of recent immobilization. Has fam hx of blood clotsin her sister at age 36  -last Cscope 3 months ago shows benign mucosaand ileal ulcer  e age of 38  -due to unprovoked PE, needs hypercoagulable panel work up at [facility] f/u  -cont' lovenox, CM consulted for for pricing/assistance with Eliquis  -stable on RA  -obtain Echo to eval RV funx  -anticipation of discharge tomorrow if can get her on Eliquis outpt     DM  -SSI, resume metformin      HTN  -lisinopril and hctz      Dyslipidemia  -lopid      Hx of COPD stale  -c/w Symbicort and prn albuterol      Fibromyalgia  -baclofen and Cymbalta      Hx of GERD  -on omeprazole               Code Status: Full  Surrogate Decision Maker: Coy Zavaleta      DVT Prophylaxis: Eliquis  GI Prophylaxis: not indicated      Baseline: independent      CONSULTATIONS:  None    Excerpted HPI from H&P of Dr. Aquino Space:  Mary Lou Cornejo is a 63 y.o.  AA  female who presents with Sob since the last 3 days. She reports being in her usual state of health until 3 days ago when she started having  Exertional sob along with dizziness. She reports sob is worse on minimal exertion especially  On walking. She also dizziness after walking from sob. She also reported diaphoresis with sob. She denies cough or phlegm. She denies Chest pain, palpitations or syncopal attacks. She denies fever or chills. She denies long distance  Travel or recent surgery or recent immobilization. She reports having a colonoscopy 3 months ago  And was noted to have ulcer according to her.  She reports history of blood clots in her sister at the age  Of 36.     On arrival to ER, she had CT chest done which showed Acute PE and was ambulated and she was very dizzy and  Also became short of breath.     ______________________________________________________________________  DISCHARGE SUMMARY/HOSPITAL COURSE:  for full details see H&P, daily progress notes, labs, consult notes. Acute Rt lung  pulmonary embolism hemodynamically stable unprovoked  - no hx of recent immobilization. Has fam hx of blood clot; her sister at age 36  - last Cscope 3 months ago shows benign mucosaand ileal ulcer age of 38  - due to unprovoked PE, needs hypercoagulable panel work up at [facility] f/u. Pt to follow up with Dr. Yana Frank (heme/onc). Request CM to set up a follow up visit. - cont' lovenox, Insurance covers Xarelto without pre-auth, with low copayment ($8.00/month)     Xarelto 15mg twice a day until 11/19/2017     Xarelto 20mg once a day starting 90/10/9612  - Echo: Systolic function was normal. Ejection fraction was estimated in the range of 60 % to 65 %. No obvious  wall motion abnormalities identified in the views obtained. There was moderate concentric hypertrophy.     DM  - resume metformin & Januvia      HTN  - resume Lisinopril/HCTZ      Dyslipidemia  - lopid      Hx of COPD stale  - c/w Symbicort and prn albuterol      Fibromyalgia  - baclofen and Cymbalta      Hx of GERD  - on omeprazole       Morbidly obese  Wt 145.7kg (321 lbs) Ht 6'1\" BMI 42      _______________________________________________________________________  Patient seen and examined by me on discharge day. Pertinent Findings:  Gen:    Not in distress  Chest: Clear lungs  CVS:   Regular rhythm. No edema  Abd:  Soft, not distended, not tender  Neuro:  Alert, orient x 4  _______________________________________________________________________  DISCHARGE MEDICATIONS:   Current Discharge Medication List      START taking these medications    Details   !! rivaroxaban (XARELTO) 20 mg tab tablet Take 1 Tab by mouth daily (with breakfast).   Qty: 30 Tab, Refills: 0      !! rivaroxaban (XARELTO) 15 mg tab tablet Take 1 Tab by mouth two (2) times daily (with meals) for 21 days. Start taking today with dinner  Qty: 42 Tab, Refills: 0       !! - Potential duplicate medications found. Please discuss with provider. CONTINUE these medications which have NOT CHANGED    Details   baclofen (LIORESAL) 10 mg tablet Take 10 mg by mouth two (2) times a day. Indications: muscle spasms      DULoxetine (CYMBALTA) 30 mg capsule Take 30 mg by mouth two (2) times a day. SITagliptin (JANUVIA) 100 mg tablet Take 1 Tab by mouth daily. Qty: 80 Tab, Refills: 1    Associated Diagnoses: Controlled type 2 diabetes mellitus with diabetic nephropathy, without long-term current use of insulin ([facility])      fluticasone-salmeterol (ADVAIR) 250-50 mcg/dose diskus inhaler Take 1 Puff by inhalation every twelve (12) hours. Qty: 1 Inhaler, Refills: 5    Associated Diagnoses: Other emphysema ([address]      metFORMIN (GLUCOPHAGE) 1,000 mg tablet 500mg in am and 1000 mg pm  Qty: 180 Tab, Refills: 0    Comments: **Patient requests 90 days supply**  Associated Diagnoses: Diabetes mellitus due to underlying condition, controlled, with complication, without long-term current use of insulin ([facility])      lisinopril-hydroCHLOROthiazide (PRINZIDE, ZESTORETIC) 20-12.5 mg per tablet Take 1 tab Daily  Qty: 90 Tab, Refills: 1    Associated Diagnoses: Essential hypertension with goal blood pressure less than 140/90      potassium chloride SR (KLOR-CON 10) 10 mEq tablet Take 10 mEq by mouth daily.       omeprazole (PRILOSEC) 20 mg capsule TAKE 1 CAPSULE BY MOUTH EVERY DAY  Qty: 30 Cap, Refills: 5    Comments: **Patient requests 90 days supply**  Associated Diagnoses: Gastroesophageal reflux disease without esophagitis      gabapentin (NEURONTIN) 300 mg capsule TAKE 1 CAPSULE BY MOUTH THREE TIMES DAILY  Qty: 270 Cap, Refills: 2    Comments: **Patient requests 90 days supply**      fluticasone (FLONASE) 50 mcg/actuation nasal spray INSTILL 2 SPRAYS IN EACH NOSTRIL EVERY DAY  Qty: 1 Bottle, Refills: 0    Comments: **Patient requests 90 days supply**  Associated Diagnoses: Other emphysema (HCC)      gemfibrozil (LOPID) 600 mg tablet TK 1 T PO BID  Refills: 1    Associated Diagnoses: Diarrhea, unspecified type      cholecalciferol, vitamin D3, (VITAMIN D3) 2,000 unit tab Take 1 Tab by mouth daily. albuterol-ipratropium (DUO-NEB) 2.5 mg-0.5 mg/3 ml nebu INHALE 1 VIAL VIA NEBULIZER EVERY 4 HOURS AS NEEDED  Qty: 1620 mL, Refills: 5    Comments: **Patient requests 90 days supply**  Associated Diagnoses: Chronic obstructive pulmonary disease, unspecified COPD type (HCC)      albuterol (PROAIR HFA) 90 mcg/actuation inhaler Take 2 Puffs by inhalation every four (4) hours as needed for Wheezing. Qty: 1 Inhaler, Refills: 2    Associated Diagnoses: COPD (chronic obstructive pulmonary disease) (HCC)         STOP taking these medications       acyclovir (ZOVIRAX) 400 mg tablet Comments:   Reason for Stopping:               My Recommended Diet, Activity, Wound Care, and follow-up labs are listed in the patient's Discharge Insturctions which I have personally completed and reviewed. _______________________________________________________________________  DISPOSITION:    Home with Family: y   Home with HH/PT/OT/RN:    SNF/LTC:    FANTA:    OTHER:        Condition at Discharge:  Stable  _______________________________________________________________________  Follow up with:   PCP : Yanely Lezama MD  Follow-up Information     Follow up With Details Comments Contact Info    Brii Choe MD In 2 weeks  [address]  [address] [id number]  [phone]      Yanely Lezama MD In 5 days  [address]   MOB 1 [facility] [address] [address] Brittany Carbajal MD   [facility] [phone] [facility]  [phone]                Total time in minutes spent coordinating this discharge (includes going over instructions, follow-up, prescriptions, and preparing report for sign off to her PCP) :  30 minutes    Signed:  Thomas Guaman NP

## 2017-10-30 ENCOUNTER — PATIENT OUTREACH (OUTPATIENT)
Dept: FAMILY MEDICINE CLINIC | Age: 62
End: 2017-10-30

## 2017-10-30 NOTE — PROGRESS NOTES
Marlene Mancuso is a 63 y.o. female   This patient was received as a referral from [facility] Score: 18  Patient's challenges to self management identified:  depression, ineffective coping and medication management    Medication Management:  poor adherence and poor understanding with Cymbalta and gabapentin Good understanding and adherence with others. Xarelto 20 mg prescription is on hold at pharmacy until time to fill. Xarelto 15 mg BID til 11/19/17 then 20 mg 11/20/17 QD    Summary of patients top three problems:     Problem 1: depression- patient has not been taking Cymbalta ran out and did not get refilled, she reports being in bed for 2.5 months due to back pain. Decrease in appetite. DENIES SI/HI. Does not feel like she is getting help or relief from pain. She was pain free earlier in the year but now feels will have rest of life. She has stopped PT and exercises. Problem 2: PE- patient reports she was in bed for 2.5 months, stopped PT and exercising due to back pain. She ran out of gabapentin and Cymbalta and did not ask for refills. Concern is she will not get 20 mg tablets of Xarelto from pharmacy when needed. Advance Care Planning:   Patient was offered the opportunity to discuss advance care planning:  yes     Does patient have an Advance Directive:  no   If no, did you provide information on Advance Care Planning? Will be given at appointment 11/1/17. [employer], Referrals, and Durable Medical Equipment: Declines referral to therapist at this time. Follow up appointments: Dr. Devora Payne 11/1/17, Dr. Susannah Villalobos 11/16/17 No follow up appointment scheduled to follow up with back specialist at this time  Goals      Improve activity tolerance and quality of life (ie. identify issue such as depression)            10/30/17 Patient advised the uses for Cymbalta. Patient reports she believes timing of running out of medication and her depression starting was 2.5 months ago.  Advised patient to discuss medication options for depression with provider at appointment. Advised to write out feelings and if needed to see therapist. Patient does not feel she needs therapist at this time and will begin writing feelings and discuss medications options with PCP at appointment on 11/1/17. Will follow up next week. REGINALD          Patient verbalized understanding of all information discussed. Patient has this Nurse Navigators contact information for any further questions, concerns, or needs. 1430 spoke with PCP regarding patient Cymbalta. PCP will address at appointment on 11/1/17.

## 2017-10-30 NOTE — ACP (ADVANCE CARE PLANNING)
Spoke with patient regarding Advanced Care planning. Patient educated on what an advance care directive is, importance of having advanced directive, filling out the [employer] form. Your Right to Decide booklet, Advance Directive Tool Kit, and [facility] form, and my contact information will be given to patient at appointment 11/1/17. Patient given an opportunity to ask questions, repeated information, and verbalized understanding.

## 2017-10-30 NOTE — LETTER
10/30/2017 2:19 PM 
 
Ms. Kennedy [facility] [phone] I am a Nurse Navigator working with [facility] . As discussed  part of my job is to follow up with patients who have been in the emergency department or have been hospitalized to see how they are feeling, answer any questions they may have about their visit and also make sure they have a follow-up appointment to see their primary care doctor. I also perform Medicare Wellness Visits, talk about advanced care planning, chronic disease management and education Thank you for taking the time to speak with me today. Enclosed you will find the advanced care planning information that we discussed. In the meantime, if you have any questions or concerns, please feel free to call me on my direct line at [phone]  between 8 am and 5 pm.  
 
Thank you for allowing us to participate in your care! Enclosed: Your Right to Decide Booklet Advanced Directive Tool Kit [address] [place] [address] Directive for Healthcare form My Business Card Sincerely, Rosie Valenzuela RN

## 2017-11-01 ENCOUNTER — OFFICE VISIT (OUTPATIENT)
Dept: FAMILY MEDICINE CLINIC | Age: 62
End: 2017-11-01

## 2017-11-01 ENCOUNTER — PATIENT OUTREACH (OUTPATIENT)
Dept: FAMILY MEDICINE CLINIC | Age: 62
End: 2017-11-01

## 2017-11-01 VITALS
TEMPERATURE: 98.1 F | HEART RATE: 103 BPM | WEIGHT: 293 LBS | BODY MASS INDEX: 39.68 KG/M2 | HEIGHT: 72 IN | SYSTOLIC BLOOD PRESSURE: 110 MMHG | OXYGEN SATURATION: 96 % | RESPIRATION RATE: 18 BRPM | DIASTOLIC BLOOD PRESSURE: 72 MMHG

## 2017-11-01 DIAGNOSIS — M79.7 FIBROMYALGIA: ICD-10-CM

## 2017-11-01 DIAGNOSIS — I26.92 ACUTE SADDLE PULMONARY EMBOLISM WITHOUT ACUTE COR PULMONALE (HCC): ICD-10-CM

## 2017-11-01 DIAGNOSIS — F33.1 MODERATE EPISODE OF RECURRENT MAJOR DEPRESSIVE DISORDER (HCC): ICD-10-CM

## 2017-11-01 DIAGNOSIS — E11.42 DIABETIC POLYNEUROPATHY ASSOCIATED WITH TYPE 2 DIABETES MELLITUS (HCC): ICD-10-CM

## 2017-11-01 DIAGNOSIS — Z09 HOSPITAL DISCHARGE FOLLOW-UP: Primary | ICD-10-CM

## 2017-11-01 RX ORDER — DULOXETIN HYDROCHLORIDE 30 MG/1
30 CAPSULE, DELAYED RELEASE ORAL 2 TIMES DAILY
Qty: 60 CAP | Refills: 2 | Status: SHIPPED | OUTPATIENT
Start: 2017-11-01 | End: 2017-12-22 | Stop reason: SDUPTHER

## 2017-11-01 RX ORDER — GABAPENTIN 300 MG/1
CAPSULE ORAL
Qty: 270 CAP | Refills: 1 | Status: SHIPPED | OUTPATIENT
Start: 2017-11-01 | End: 2018-04-06 | Stop reason: SDUPTHER

## 2017-11-01 NOTE — PROGRESS NOTES
Soha Brown is a 63 y.o. female    Here for hospital discharge follow up.      has a past medical history of Acute saddle pulmonary embolism without acute cor pulmonale ([facility] (11/3/2017); Arthritis; Asthma; Chronic obstructive pulmonary disease ([facility] Chronic pain; Diabetes ([facility] Diabetes mellitus due to underlying condition, controlled, with complication, without long-term current use of insulin ([facility] (3/21/2017); Diabetic polyneuropathy associated with type 2 diabetes mellitus ([facility] (11/1/2017); Fibromyalgia; Gastroesophageal reflux disease without esophagitis (5/13/2016); Hyperlipidemia (5/9/2012); Hypertension; Moderate episode of recurrent major depressive disorder ([facility] (11/1/2017); Obesity (5/9/2012); and Unspecified sleep apnea. PE: her sister did not have clotting disorder, it was a mistake. She have been depressed and haven't done anything, goes anywhere and just sat at home, this is likely cause for her PE. On xarelto. Denies hematuria, easily bruising, melena. [facility] appointment on 11/16/17. About 2 months ago she have ran out of both cymbalta and gabapentin and never called for refill. Her depression started and also her neuropathy. She ends up just sitting at home and not doing much until the hospitalization. Advice that the cymbalta can help with depression and not to let that lapse. We'll restart both. For the 1st 3 days cymbalta just once a day then increase to BID. Gabapentin first 3 days just BID then TID. She denies SI or HI. Reviewed: active problem list, medication list, allergies, notes from last encounter, lab results    A comprehensive review of systems was negative except for that written in the HPI.     -----------------------------------------------------------------  Admit date: 10/27/2017  Discharge date and time: 10/29/2017     Acute RLL PE  Pt with no blood clot disorder.   Initially thought sister had unprovoked blood clots at age 36, however further clarification with pt's sister, there was no hx of blood clot disorder. Pt now reports of recent 3 months of immobilization at home due to back pain. She remained HD stable since admission. Has not required O2 suppl. Was on Eliquis on admission, however due to financial issues with insurance, pt transitioned Xarelto($8/month). Pt to follow up with hematology for further work up. DISCHARGE DIAGNOSIS:  Acute Rt lung  pulmonary embolism hemodynamically stable unprovoked  -no hx of recent immobilization.  Has fam hx of blood clotsin her sister at age 36   -last Cscope 3 months ago shows benign mucosaand ileal ulcer e age of 38   -due to unprovoked PE, needs hypercoagulable panel work up at [facility] f/u  -cont' lovenox, TREY consulted for for pricing/assistance with Eliquis  -stable on RA  -obtain Echo to eval RV funx  -anticipation of discharge tomorrow if can get her on Eliquis outpt      DM  -SSI, resume metformin      HTN  -lisinopril and hctz      Dyslipidemia  -lopid      Hx of COPD stale  -c/w Symbicort and prn albuterol      Fibromyalgia  -baclofen and Cymbalta      Hx of GERD  -on omeprazole           Code Status: Full  Surrogate Decision Maker: Son Erlinda Mcghee      DVT Prophylaxis: Eliquis  GI Prophylaxis: not indicated      Baseline: independent        CONSULTATIONS:  None     Excerpted HPI from H&P of Dr. Sharita Kaye:  Sherley Abbasi is a 63 y.o.  AA  female who presents with Sob since the last 3 days. She reports being in her usual state of health until 3 days ago when she started having exertional sob along with dizziness. She reports sob is worse on minimal exertion especially on walking. She also dizziness after walking from sob. She also reported diaphoresis with sob. She denies cough or phlegm. She denies Chest pain, palpitations or syncopal attacks. She denies fever or chills. She denies long distance travel or recent surgery or recent immobilization.  She reports having a colonoscopy 3 months ago and was noted to have ulcer according to her. She reports history of blood clots in her sister at the age of 36.      On arrival to ER, she had CT chest done which showed Acute PE and was ambulated and she was very dizzy and also became short of breath.      ______________________________________________________________________  DISCHARGE SUMMARY/HOSPITAL COURSE:  for full details see H&P, daily progress notes, labs, consult notes.      Acute Rt lung  pulmonary embolism hemodynamically stable unprovoked  - no hx of recent immobilization.  Has fam hx of blood clot; her sister at age 36  - last Cscope 3 months ago shows benign mucosaand ileal ulcer age of 38  - due to unprovoked PE, needs hypercoagulable panel work up at [facility] f/u. Pt to follow up with Dr. Dayton Mortimer (heme/onc). Request CM to set up a follow up visit. - cont' lovenox, Insurance covers Xarelto without pre-auth, with low copayment ($8.00/month)     Xarelto 15mg twice a day until 11/19/2017     Xarelto 20mg once a day starting 59/84/7562  - Echo: Systolic function was normal. Ejection fraction was estimated in the range of 60 % to 65 %. No obvious wall motion abnormalities identified in the views obtained. There was moderate concentric hypertrophy.     DM  - resume metformin & Januvia      HTN  - resume Lisinopril/HCTZ      Dyslipidemia  - lopid      Hx of COPD stale  - c/w Symbicort and prn albuterol      Fibromyalgia  - baclofen and Cymbalta      Hx of GERD  - on omeprazole       Morbidly obese  Wt 145.7kg (321 lbs) Ht 6'1\" BMI 42      _______________________________________________________________________  DISCHARGE MEDICATIONS:   START taking these medications     Details   !! rivaroxaban (XARELTO) 20 mg tab tablet Take 1 Tab by mouth daily (with breakfast). Qty: 30 Tab, Refills: 0       !! rivaroxaban (XARELTO) 15 mg tab tablet Take 1 Tab by mouth two (2) times daily (with meals) for 21 days.  Start taking today with dinner  Qty: 42 Tab, Refills: 0 CONTINUE these medications which have NOT CHANGED     Details   baclofen (LIORESAL) 10 mg tablet Take 10 mg by mouth two (2) times a day. Indications: muscle spasms       DULoxetine (CYMBALTA) 30 mg capsule Take 30 mg by mouth two (2) times a day.       SITagliptin (JANUVIA) 100 mg tablet Take 1 Tab by mouth daily. Qty: 80 Tab, Refills: 1     Associated Diagnoses: Controlled type 2 diabetes mellitus with diabetic nephropathy, without long-term current use of insulin ([facility])       fluticasone-salmeterol (ADVAIR) 250-50 mcg/dose diskus inhaler Take 1 Puff by inhalation every twelve (12) hours.   Qty: 1 Inhaler, Refills: 5     Associated Diagnoses: Other emphysema (HCC)       metFORMIN (GLUCOPHAGE) 1,000 mg tablet 500mg in am and 1000 mg pm  Qty: 180 Tab, Refills: 0     Comments: **Patient requests 90 days supply**  Associated Diagnoses: Diabetes mellitus due to underlying condition, controlled, with complication, without long-term current use of insulin ([facility])       lisinopril-hydroCHLOROthiazide (PRINZIDE, ZESTORETIC) 20-12.5 mg per tablet Take 1 tab Daily  Qty: 90 Tab, Refills: 1     Associated Diagnoses: Essential hypertension with goal blood pressure less than 140/90       potassium chloride SR (KLOR-CON 10) 10 mEq tablet Take 10 mEq by mouth daily.       omeprazole (PRILOSEC) 20 mg capsule TAKE 1 CAPSULE BY MOUTH EVERY DAY  Qty: 30 Cap, Refills: 5     Comments: **Patient requests 90 days supply**  Associated Diagnoses: Gastroesophageal reflux disease without esophagitis       gabapentin (NEURONTIN) 300 mg capsule TAKE 1 CAPSULE BY MOUTH THREE TIMES DAILY  Qty: 270 Cap, Refills: 2     Comments: **Patient requests 90 days supply**       fluticasone (FLONASE) 50 mcg/actuation nasal spray INSTILL 2 SPRAYS IN EACH NOSTRIL EVERY DAY  Qty: 1 Bottle, Refills: 0     Comments: **Patient requests 90 days supply**  Associated Diagnoses: Other emphysema (HCC)       gemfibrozil (LOPID) 600 mg tablet TK 1 T PO BID  Refills: 1   Associated Diagnoses: Diarrhea, unspecified type       cholecalciferol, vitamin D3, (VITAMIN D3) 2,000 unit tab Take 1 Tab by mouth daily.       albuterol-ipratropium (DUO-NEB) 2.5 mg-0.5 mg/3 ml nebu INHALE 1 VIAL VIA NEBULIZER EVERY 4 HOURS AS NEEDED  Qty: 1620 mL, Refills: 5     Comments: **Patient requests 90 days supply**  Associated Diagnoses: Chronic obstructive pulmonary disease, unspecified COPD type ([facility])       albuterol (PROAIR HFA) 90 mcg/actuation inhaler Take 2 Puffs by inhalation every four (4) hours as needed for Wheezing. Qty: 1 Inhaler, Refills: 2     Associated Diagnoses: COPD (chronic obstructive pulmonary disease) ([facility])       STOP taking these medications         acyclovir (ZOVIRAX) 400 mg tablet Comments:   Reason for Stopping      -----------------------------------------------------------------    Allergies   Allergen Reactions    Allopurinol Hives    Darvocet A500 [Propoxyphene N-Acetaminophen] Hives    Influenza Virus Vaccine, Specific Other (comments)     Allergy documented in admission data base    Seafood [Shellfish Containing Products] Itching     NEW ALLERGY; REACTION WORSENS AS AMOUNT EATEN INCREASES     Current Outpatient Prescriptions on File Prior to Visit   Medication Sig Dispense Refill    rivaroxaban (XARELTO) 15 mg tab tablet Take 1 Tab by mouth two (2) times daily (with meals) for 21 days. Start taking today with dinner 42 Tab 0    baclofen (LIORESAL) 10 mg tablet Take 10 mg by mouth two (2) times a day. Indications: muscle spasms      SITagliptin (JANUVIA) 100 mg tablet Take 1 Tab by mouth daily. 90 Tab 1    fluticasone-salmeterol (ADVAIR) 250-50 mcg/dose diskus inhaler Take 1 Puff by inhalation every twelve (12) hours.  1 Inhaler 5    metFORMIN (GLUCOPHAGE) 1,000 mg tablet 500mg in am and 1000 mg pm 180 Tab 0    lisinopril-hydroCHLOROthiazide (PRINZIDE, ZESTORETIC) 20-12.5 mg per tablet Take 1 tab Daily 90 Tab 1    potassium chloride SR (KLOR-CON 10) 10 mEq tablet Take 10 mEq by mouth daily.  omeprazole (PRILOSEC) 20 mg capsule TAKE 1 CAPSULE BY MOUTH EVERY DAY 30 Cap 5    fluticasone (FLONASE) 50 mcg/actuation nasal spray INSTILL 2 SPRAYS IN EACH NOSTRIL EVERY DAY 1 Bottle 0    cholecalciferol, vitamin D3, (VITAMIN D3) 2,000 unit tab Take 1 Tab by mouth daily.  albuterol-ipratropium (DUO-NEB) 2.5 mg-0.5 mg/3 ml nebu INHALE 1 VIAL VIA NEBULIZER EVERY 4 HOURS AS NEEDED 1620 mL 5    albuterol (PROAIR HFA) 90 mcg/actuation inhaler Take 2 Puffs by inhalation every four (4) hours as needed for Wheezing. 1 Inhaler 2    [START ON 11/20/2017] rivaroxaban (XARELTO) 20 mg tab tablet Take 1 Tab by mouth daily (with breakfast). 30 Tab 0     No current facility-administered medications on file prior to visit.       Patient Active Problem List   Diagnosis Code    Lumbar stenosis M48.061    Obesity E66.9    HTN (hypertension) I10    DM (diabetes mellitus) ([facility] E11.9    Hyperlipidemia E78.5    Depression F32.9    Asthma J45.909    Primary localized osteoarthrosis, lower leg M17.10    DJD (degenerative joint disease) of knee M17.10    Chronic pain G89.29    Fibromyalgia M79.7    COPD (chronic obstructive pulmonary disease) ([facility]) J44.9    Gastroesophageal reflux disease without esophagitis K21.9    Chronic low back pain with bilateral sciatica M54.41, M54.42, G89.29    Lumbar spondylosis M47.816    Spinal stenosis M48.00    Status post laminectomy Z98.890    Diabetes mellitus due to underlying condition, controlled, with complication, without long-term current use of insulin ([facility]) E08.8    Acute pulmonary embolism ([facility]) I26.99    Weakness R53.1    Moderate episode of recurrent major depressive disorder ([facility]) F33.1    Diabetic polyneuropathy associated with type 2 diabetes mellitus ([facility]) E11.42    Acute saddle pulmonary embolism without acute cor pulmonale ([facility]) I26.92       Visit Vitals    /72    Pulse (!) 103    Temp 98.1 °F (36.7 °C) (Oral)    Resp 18    Ht 6' 1\" (1.854 m)    Wt 319 lb 3.2 oz (144.8 kg)    SpO2 96%    BMI 42.11 kg/m2       General appearance: alert, cooperative, no distress, appears stated age  Neurologic: Alert and oriented X 3, normal strength and tone, symmetric. Normal without focal findings. Cranial nerves 2-12 intact. Normal coordination and gait. Mental status: Alert, oriented, thought content appropriate, affect: stable, mood-congruent. Head: Normocephalic, without obvious abnormality, atraumatic  Eyes: conjunctivae/corneas clear. PERRL, EOM's intact. Neck: supple, symmetrical, trachea midline, no JVD  Lungs: clear to auscultation bilaterally  Heart: regular rate and rhythm, S1, S2 normal, no murmur, click, rub or gallop  Abdomen: soft, non-tender. Extremities: extremities normal, atraumatic, no cyanosis or edema      Assessment/Plans:    Diagnoses and all orders for this visit:    1. Hospital discharge follow-up    2. Acute saddle pulmonary embolism without acute cor pulmonale (HCC)    3. Fibromyalgia  -     DULoxetine (CYMBALTA) 30 mg capsule; Take 1 Cap by mouth two (2) times a day. 4. Moderate episode of recurrent major depressive disorder (HCC)  -     DULoxetine (CYMBALTA) 30 mg capsule; Take 1 Cap by mouth two (2) times a day. 5. Diabetic polyneuropathy associated with type 2 diabetes mellitus (HCC)  -     gabapentin (NEURONTIN) 300 mg capsule; TAKE 1 CAPSULE BY MOUTH THREE TIMES DAILY      Discussed plans, risk/benefits of treatments/observations. Through the use of shared decision making, above plans were agreed upon. Medication compliance advised. Patient verbalized understanding. Follow-up Disposition:  Return in about 3 weeks (around 11/22/2017) for depression, chronic pain.       Theodore Garza MD  11/3/2017

## 2017-11-01 NOTE — PROGRESS NOTES
Chief Complaint   Patient presents with   [facility] Follow Up     in ED [facility] blood clot in lungs       1. Have you been to the ER, urgent care clinic since your last visit? Hospitalized since your last visit? yes    2. Have you seen or consulted any other health care providers outside of the [address] [place] [address] since your last visit? Include any pap smears or colon screening.  Yes When: last week ED [facility] blood clot in lungs

## 2017-11-01 NOTE — MR AVS SNAPSHOT
Visit Information Date & Time Provider Department Dept. Phone Encounter #  
 11/1/2017 11:20 AM Mohit Guthrie MD [facility] at [address] [id number] Follow-up Instructions Return in about 3 weeks (around 11/22/2017) for depression, chronic pain. Upcoming Health Maintenance Date Due  
 PAP AKA CERVICAL CYTOLOGY 7/14/2014 EYE EXAM RETINAL OR DILATED Q1 2/5/2015 FOOT EXAM Q1 1/21/2016 ZOSTER VACCINE AGE 60> 1/22/2017 HEMOGLOBIN A1C Q6M 1/18/2018 LIPID PANEL Q1 3/21/2018 MEDICARE YEARLY EXAM 3/22/2018 MICROALBUMIN Q1 7/18/2018 BREAST CANCER SCRN MAMMOGRAM 4/7/2019 DTaP/Tdap/Td series (2 - Td) 7/15/2026 COLONOSCOPY 7/12/2027 Allergies as of 11/1/2017  Review Complete On: 11/1/2017 By: Lovely Denny LPN Severity Noted Reaction Type Reactions Allopurinol  06/01/2011    Hives Darvocet A500 [Propoxyphene N-acetaminophen]  06/05/2011    Hives Influenza Virus Vaccine, Specific  10/30/2017    Other (comments) Allergy documented in admission data base Seafood [Shellfish Containing Products]  06/01/2011    Itching NEW ALLERGY; REACTION WORSENS AS AMOUNT EATEN INCREASES Current Immunizations  Reviewed on 11/23/2015 Name Date Pneumococcal Vaccine (Unspecified Type) 1/1/2015 Tdap 7/15/2016 Not reviewed this visit You Were Diagnosed With   
  
 Codes Comments Hospital discharge follow-up    -  Primary ICD-10-CM: [address] ICD-9-CM: V67.59 Fibromyalgia     ICD-10-CM: M79.7 ICD-9-CM: 729.1 Moderate episode of recurrent major depressive disorder (HCC)     ICD-10-CM: F33.1 ICD-9-CM: 296.32 Diabetic polyneuropathy associated with type 2 diabetes mellitus ([facility]     ICD-10-CM: E11.42 
ICD-9-CM: 250.60, 357.2 Vitals BP Pulse Temp Resp Height(growth percentile) Weight(growth percentile) 110/72 (!) 103 98.1 °F (36.7 °C) (Oral) 18 6' 1\" (1.854 m) 319 lb 3.2 oz (144.8 kg) LMP SpO2 BMI OB Status Smoking Status 04/04/1996 (LMP Unknown) 96% 42.11 kg/m2 Hysterectomy Former Smoker Vitals History BMI and BSA Data Body Mass Index Body Surface Area  
 42.11 kg/m 2 2.73 m 2 Preferred Pharmacy Pharmacy Name Phone [phone] Your Updated Medication List  
  
   
This list is accurate as of: 11/1/17 12:01 PM.  Always use your most recent med list.  
  
  
  
  
 albuterol 90 mcg/actuation inhaler Commonly known as:  PROAIR HFA Take 2 Puffs by inhalation every four (4) hours as needed for Wheezing. albuterol-ipratropium 2.5 mg-0.5 mg/3 ml Nebu Commonly known as:  Eligio Embs INHALE 1 VIAL VIA NEBULIZER EVERY 4 HOURS AS NEEDED  
  
 baclofen 10 mg tablet Commonly known as:  LIORESAL Take 10 mg by mouth two (2) times a day. Indications: muscle spasms DULoxetine 30 mg capsule Commonly known as:  CYMBALTA Take 1 Cap by mouth two (2) times a day. fluticasone 50 mcg/actuation nasal spray Commonly known as:  [place] INSTILL 2 SPRAYS IN EACH NOSTRIL EVERY DAY  
  
 fluticasone-salmeterol 250-50 mcg/dose diskus inhaler Commonly known as:  ADVAIR Take 1 Puff by inhalation every twelve (12) hours. gabapentin 300 mg capsule Commonly known as:  NEURONTIN  
TAKE 1 CAPSULE BY MOUTH THREE TIMES DAILY gemfibrozil 600 mg tablet Commonly known as:  LOPID TK 1 T PO BID  
  
 lisinopril-hydroCHLOROthiazide 20-12.5 mg per tablet Commonly known as:  Loyce Harden Take 1 tab Daily  
  
 metFORMIN 1,000 mg tablet Commonly known as:  GLUCOPHAGE  
500mg in am and 1000 mg pm  
  
 omeprazole 20 mg capsule Commonly known as:  PRILOSEC  
TAKE 1 CAPSULE BY MOUTH EVERY DAY  
  
 potassium chloride SR 10 mEq tablet Commonly known as:  KLOR-CON 10 Take 10 mEq by mouth daily. * rivaroxaban 15 mg Tab tablet Commonly known as:  Corrine Anderson Take 1 Tab by mouth two (2) times daily (with meals) for 21 days. Start taking today with dinner * rivaroxaban 20 mg Tab tablet Commonly known as:  Sarika Logan Take 1 Tab by mouth daily (with breakfast). Start taking on:  11/20/2017 SITagliptin 100 mg tablet Commonly known as:  Oriana Peck Take 1 Tab by mouth daily. VITAMIN D3 2,000 unit Tab Generic drug:  cholecalciferol (vitamin D3) Take 1 Tab by mouth daily. * Notice: This list has 2 medication(s) that are the same as other medications prescribed for you. Read the directions carefully, and ask your doctor or other care provider to review them with you. Prescriptions Sent to Pharmacy Refills DULoxetine (CYMBALTA) 30 mg capsule 2 Sig: Take 1 Cap by mouth two (2) times a day. Class: Normal  
 Pharmacy: [address] Ph #: [phone] Route: Oral  
 gabapentin (NEURONTIN) 300 mg capsule 1 Sig: TAKE 1 CAPSULE BY MOUTH THREE TIMES DAILY Class: Normal  
 Pharmacy: [address] Ph #: [phone] Follow-up Instructions Return in about 3 weeks (around 11/22/2017) for depression, chronic pain. Introducing \[facility]\"" & HEALTH SERVICES! Tania Hernandez introduces [employer] patient portal. Now you can access parts of your medical record, email your doctor's office, and request medication refills online. 1. In your internet browser, go to [email or web address] 2. Click on the First Time User? Click Here link in the Sign In box. You will see the New Member Sign Up page. 3. Enter your [employer] Access Code exactly as it appears below. You will not need to use this code after youve completed the sign-up process. If you do not sign up before the expiration date, you must request a new code. · [employer] Access Code: [email or web address] Expires: 1/25/2018 10:24 AM 
 
 4. Enter the last four digits of your Social Security Number (xxxx) and Date of Birth (mm/dd/yyyy) as indicated and click Submit. You will be taken to the next sign-up page. 5. Create a [employer] ID. This will be your [employer] login ID and cannot be changed, so think of one that is secure and easy to remember. 6. Create a [employer] password. You can change your password at any time. 7. Enter your Password Reset Question and Answer. This can be used at a later time if you forget your password. 8. Enter your e-mail address. You will receive e-mail notification when new information is available in [address] 9. Click Sign Up. You can now view and download portions of your medical record. 10. Click the Download Summary menu link to download a portable copy of your medical information. If you have questions, please visit the Frequently Asked Questions section of the [employer] website. Remember, [employer] is NOT to be used for urgent needs. For medical emergencies, dial 911. Now available from your iPhone and Android! Please provide this summary of care documentation to your next provider. Your primary care clinician is listed as Yadira Weber. If you have any questions after today's visit, please call [phone].

## 2017-11-02 NOTE — PROGRESS NOTES
Spoke with patient after verifying name and . Goals Addressed      Improve activity tolerance and quality of life (ie. identify issue such as depression)                  10/30/17 Patient advised the uses for Cymbalta. Patient reports she believes timing of running out of medication and her depression starting was 2.5 months ago. Advised patient to discuss medication options for depression with provider at appointment. Advised to write out feelings and if needed to see therapist. Patient does not feel she needs therapist at this time and will begin writing feelings and discuss medications options with PCP at appointment on 17. Will follow up next week. REGINALD    17 Patient reports not using journal and declines therapist at this time. Discussed refill on Cymbalta with PCP at appointment Will fill prescription and take as prescribed. Will follow up with patient Friday to ensure she has filled prescription.  REGINALD

## 2017-11-02 NOTE — TELEPHONE ENCOUNTER
Called pts pharmacy spoke with [address] clarified order for neurontin, start BID for 2 days then increase to TID.  Also she asked for a refill of  Lopid, pended to Dr. Duy Mejia

## 2017-11-02 NOTE — TELEPHONE ENCOUNTER
Pls call [facility] ref to:   gemfibrozil (LOPID) 600 mg tablet    Also, questioning gabapentin dosage       Best number to reach them [phone]    Ref # [id number]

## 2017-11-03 ENCOUNTER — PATIENT OUTREACH (OUTPATIENT)
Dept: FAMILY MEDICINE CLINIC | Age: 62
End: 2017-11-03

## 2017-11-03 PROBLEM — I26.92 ACUTE SADDLE PULMONARY EMBOLISM WITHOUT ACUTE COR PULMONALE (HCC): Status: ACTIVE | Noted: 2017-11-03

## 2017-11-08 DIAGNOSIS — F33.1 MODERATE EPISODE OF RECURRENT MAJOR DEPRESSIVE DISORDER (HCC): Primary | ICD-10-CM

## 2017-11-08 RX ORDER — BUPROPION HYDROCHLORIDE 100 MG/1
100 TABLET ORAL 2 TIMES DAILY
Qty: 60 TAB | Refills: 2 | Status: SHIPPED | OUTPATIENT
Start: 2017-11-08 | End: 2018-01-11 | Stop reason: SDUPTHER

## 2017-11-08 NOTE — PROGRESS NOTES
17 spoke with patient to inform her provider has sent  Wellbutrin to [facility] pack pharmacy. Keep follow up appointment on 17 to evaluate medication changes. Patient reports to this writer the new prescription for Gabapentin 300 mg is written for 1 tablet TID and her old prescription was for 2 tablets TID. She does not feel the lower dose is working and would like to increase to the 2 tablets like before. [phone] spoke with provider regarding Gabapentin. Verbal order per Dr. Inez Louie is patient can increase to 2 tablets TID. Follow up appointment needed. [phone] with patient after verifying name and . Patient advised per Dr. Inez Louie to increase gabapentin 300 mg from 1 tablet TID to 2 tablets TID. Patient given an opportunity to ask questions, repeated information, and verbalized understanding.

## 2017-11-08 NOTE — PROGRESS NOTES
Goals Addressed      Improve activity tolerance and quality of life (ie. identify issue such as depression)                  10/30/17 Patient advised the uses for Cymbalta. Patient reports she believes timing of running out of medication and her depression starting was 2.5 months ago. Advised patient to discuss medication options for depression with provider at appointment. Advised to write out feelings and if needed to see therapist. Patient does not feel she needs therapist at this time and will begin writing feelings and discuss medications options with PCP at appointment on 11/1/17. Will follow up next week. [facility]    11/1/17 Patient reports not using journal and declines therapist at this time. Discussed refill on Cymbalta with PCP at appointment Will fill prescription and take as prescribed. Will follow up with patient Friday to ensure she has filled prescription. [facility]    11/3/17 Patient reports to this writer she filled prescription and realized she has been taking Cymbalta as prescribed and is unsure what caused depression. Will speak with provider and see if he wants to do medication adjustment since she has been on Cymbalta 30 mg BID.

## 2017-11-15 ENCOUNTER — PATIENT OUTREACH (OUTPATIENT)
Dept: FAMILY MEDICINE CLINIC | Age: 62
End: 2017-11-15

## 2017-11-15 NOTE — PROGRESS NOTES
Spoke with patient after verifying name and . Goals      Improve activity tolerance and quality of life (ie. identify issue such as depression)            10/30/17 Patient advised the uses for Cymbalta. Patient reports she believes timing of running out of medication and her depression starting was 2.5 months ago. Advised patient to discuss medication options for depression with provider at appointment. Advised to write out feelings and if needed to see therapist. Patient does not feel she needs therapist at this time and will begin writing feelings and discuss medications options with PCP at appointment on 17. Will follow up next week. [facility]    17 Patient reports not using journal and declines therapist at this time. Discussed refill on Cymbalta with PCP at appointment Will fill prescription and take as prescribed. Will follow up with patient Friday to ensure she has filled prescription. [facility]    11/3/17 Patient reports to this writer she filled prescription and realized she has been taking Cymbalta as prescribed and is unsure what caused depression. Will speak with provider and see if he wants to do medication adjustment since she has been on Cymbalta 30 mg BID.    11/15/17 Patient reports starting Wellbutrin as prescribed. Increase in Gabapentin has helped pain. Feeling better, sleep quality better, been out shopping. Patient advised to slowly increase exercise. Patient will report to this writer number of laps or number of minutes she is exercising per day at next outreach. Will follow up next week.  [facility]

## 2017-11-22 ENCOUNTER — PATIENT OUTREACH (OUTPATIENT)
Dept: FAMILY MEDICINE CLINIC | Age: 62
End: 2017-11-22

## 2017-11-22 ENCOUNTER — OFFICE VISIT (OUTPATIENT)
Dept: FAMILY MEDICINE CLINIC | Age: 62
End: 2017-11-22

## 2017-11-22 VITALS
TEMPERATURE: 95.8 F | SYSTOLIC BLOOD PRESSURE: 123 MMHG | HEIGHT: 72 IN | WEIGHT: 293 LBS | HEART RATE: 100 BPM | BODY MASS INDEX: 39.68 KG/M2 | DIASTOLIC BLOOD PRESSURE: 83 MMHG | OXYGEN SATURATION: 95 % | RESPIRATION RATE: 20 BRPM

## 2017-11-22 DIAGNOSIS — I10 ESSENTIAL HYPERTENSION WITH GOAL BLOOD PRESSURE LESS THAN 140/90: ICD-10-CM

## 2017-11-22 DIAGNOSIS — G89.29 CHRONIC BILATERAL LOW BACK PAIN WITHOUT SCIATICA: ICD-10-CM

## 2017-11-22 DIAGNOSIS — M54.50 CHRONIC BILATERAL LOW BACK PAIN WITHOUT SCIATICA: ICD-10-CM

## 2017-11-22 DIAGNOSIS — G25.0 ESSENTIAL TREMOR: Primary | ICD-10-CM

## 2017-11-22 RX ORDER — HYDROCODONE BITARTRATE AND ACETAMINOPHEN 5; 325 MG/1; MG/1
1 TABLET ORAL
Qty: 20 TAB | Refills: 0 | Status: SHIPPED | OUTPATIENT
Start: 2017-11-22 | End: 2018-06-05 | Stop reason: SDUPTHER

## 2017-11-22 RX ORDER — ACYCLOVIR 400 MG/1
TABLET ORAL
COMMUNITY
Start: 2017-11-21 | End: 2018-02-06 | Stop reason: SDUPTHER

## 2017-11-22 RX ORDER — LISINOPRIL AND HYDROCHLOROTHIAZIDE 10; 12.5 MG/1; MG/1
1 TABLET ORAL DAILY
Qty: 90 TAB | Refills: 1 | Status: SHIPPED | OUTPATIENT
Start: 2017-11-22 | End: 2018-06-20 | Stop reason: SDUPTHER

## 2017-11-22 RX ORDER — PROPRANOLOL HYDROCHLORIDE 60 MG/1
60 TABLET ORAL 2 TIMES DAILY
Qty: 60 TAB | Refills: 2 | Status: SHIPPED | OUTPATIENT
Start: 2017-11-22 | End: 2018-02-05 | Stop reason: SDUPTHER

## 2017-11-22 NOTE — PROGRESS NOTES
Spoke with patient after verifying name and . Goals Addressed      Improve activity tolerance and quality of life (ie. identify issue such as depression)                  10/30/17 Patient advised the uses for Cymbalta. Patient reports she believes timing of running out of medication and her depression starting was 2.5 months ago. Advised patient to discuss medication options for depression with provider at appointment. Advised to write out feelings and if needed to see therapist. Patient does not feel she needs therapist at this time and will begin writing feelings and discuss medications options with PCP at appointment on 17. Will follow up next week. \[facility]\""    17 Patient reports not using journal and declines therapist at this time. Discussed refill on Cymbalta with PCP at appointment Will fill prescription and take as prescribed. Will follow up with patient Friday to ensure she has filled prescription. \[facility]\""    11/3/17 Patient reports to this writer she filled prescription and realized she has been taking Cymbalta as prescribed and is unsure what caused depression. Will speak with provider and see if he wants to do medication adjustment since she has been on Cymbalta 30 mg BID.    11/15/17 Patient reports starting Wellbutrin as prescribed. Increase in Gabapentin has helped pain. Feeling better, sleep quality better, been out shopping. Patient advised to slowly increase exercise. Patient will report to this writer number of laps or number of minutes she is exercising per day at next outreach. Will follow up next week. \[facility]\""    17 Patient reports since starting Wellbutrin she is sleeping deeper, longer and better. She does not report feeling more energy however she has been cooking and cleaning home in preparation for holiday. She is having pain and will discuss with PCP at appointment today. Will outreach patient next week.  \[facility]\""

## 2017-11-22 NOTE — MR AVS SNAPSHOT
Visit Information Date & Time Provider Department Dept. Phone Encounter #  
 11/22/2017 11:40 AM Tisha Wynn MD [facility] at [address] [id number] Follow-up Instructions Return in about 2 months (around 1/22/2018) for meds, labs monitoring. Upcoming Health Maintenance Date Due  
 PAP AKA CERVICAL CYTOLOGY 7/14/2014 EYE EXAM RETINAL OR DILATED Q1 2/5/2015 HEMOGLOBIN A1C Q6M 1/18/2018 LIPID PANEL Q1 3/21/2018 MEDICARE YEARLY EXAM 3/22/2018 MICROALBUMIN Q1 7/18/2018 FOOT EXAM Q1 11/22/2018 BREAST CANCER SCRN MAMMOGRAM 4/7/2019 DTaP/Tdap/Td series (2 - Td) 7/15/2026 COLONOSCOPY 7/12/2027 Allergies as of 11/22/2017  Review Complete On: 11/22/2017 By: Tisha Wynn MD  
  
 Severity Noted Reaction Type Reactions Allopurinol  06/01/2011    Hives Darvocet A500 [Propoxyphene N-acetaminophen]  06/05/2011    Hives Influenza Virus Vaccine, Specific  10/30/2017    Other (comments) Allergy documented in admission data base Seafood [Shellfish Containing Products]  06/01/2011    Itching NEW ALLERGY; REACTION WORSENS AS AMOUNT EATEN INCREASES Current Immunizations  Reviewed on 11/23/2015 Name Date Pneumococcal Vaccine (Unspecified Type) 1/1/2015 Tdap 7/15/2016 Not reviewed this visit You Were Diagnosed With   
  
 Codes Comments Essential tremor    -  Primary ICD-10-CM: G25.0 ICD-9-CM: 333.1 Chronic bilateral low back pain without sciatica     ICD-10-CM: M54.5, G89.29 ICD-9-CM: 724.2, 338.29 Essential hypertension with goal blood pressure less than 140/90     ICD-10-CM: I10 
ICD-9-CM: 401.9 Vitals BP Pulse Temp Resp Height(growth percentile) Weight(growth percentile) 123/83 100 95.8 °F (35.4 °C) (Oral) 20 6' 1\" (1.854 m) 307 lb (139.3 kg) LMP SpO2 BMI OB Status Smoking Status 04/04/1996 (LMP Unknown) 95% 40.5 kg/m2 Hysterectomy Former Smoker Vitals History BMI and BSA Data Body Mass Index Body Surface Area 40.5 kg/m 2 2.68 m 2 Preferred Pharmacy Pharmacy Name Phone [facility] [place] [facility] [phone] Your Updated Medication List  
  
   
This list is accurate as of: 11/22/17 12:27 PM.  Always use your most recent med list.  
  
  
  
  
 acyclovir 400 mg tablet Commonly known as:  ZOVIRAX  
  
 albuterol 90 mcg/actuation inhaler Commonly known as:  PROAIR HFA Take 2 Puffs by inhalation every four (4) hours as needed for Wheezing. albuterol-ipratropium 2.5 mg-0.5 mg/3 ml Nebu Commonly known as:  Luis A Aurora INHALE 1 VIAL VIA NEBULIZER EVERY 4 HOURS AS NEEDED  
  
 baclofen 10 mg tablet Commonly known as:  LIORESAL Take 10 mg by mouth two (2) times a day. Indications: muscle spasms buPROPion 100 mg tablet Commonly known as:  [facility] Take 1 Tab by mouth two (2) times a day. DULoxetine 30 mg capsule Commonly known as:  CYMBALTA Take 1 Cap by mouth two (2) times a day. fluticasone 50 mcg/actuation nasal spray Commonly known as:  Angel Redo INSTILL 2 SPRAYS IN EACH NOSTRIL EVERY DAY  
  
 fluticasone-salmeterol 250-50 mcg/dose diskus inhaler Commonly known as:  ADVAIR Take 1 Puff by inhalation every twelve (12) hours. gabapentin 300 mg capsule Commonly known as:  NEURONTIN  
TAKE 1 CAPSULE BY MOUTH THREE TIMES DAILY gemfibrozil 600 mg tablet Commonly known as:  LOPID Take 1 Tab by mouth two (2) times a day. HYDROcodone-acetaminophen 5-325 mg per tablet Commonly known as:  Lara Minor Take 1 Tab by mouth daily as needed for Pain. * lisinopril-hydroCHLOROthiazide 20-12.5 mg per tablet Commonly known as:  Pablo Notice Take 1 tab Daily * lisinopril-hydroCHLOROthiazide 10-12.5 mg per tablet Commonly known as:  Pablo Notice Take 1 Tab by mouth daily. metFORMIN 1,000 mg tablet Commonly known as:  GLUCOPHAGE  
500mg in am and 1000 mg pm  
  
 omeprazole 20 mg capsule Commonly known as:  PRILOSEC  
TAKE 1 CAPSULE BY MOUTH EVERY DAY  
  
 potassium chloride SR 10 mEq tablet Commonly known as:  KLOR-CON 10 Take 10 mEq by mouth daily. propranolol 60 mg tablet Commonly known as:  INDERAL Take 1 Tab by mouth two (2) times a day. rivaroxaban 20 mg Tab tablet Commonly known as:  Graydon Primus Take 1 Tab by mouth daily (with breakfast). SITagliptin 100 mg tablet Commonly known as:  Charlotte Pipe Take 1 Tab by mouth daily. VITAMIN D3 2,000 unit Tab Generic drug:  cholecalciferol (vitamin D3) Take 1 Tab by mouth daily. * Notice: This list has 2 medication(s) that are the same as other medications prescribed for you. Read the directions carefully, and ask your doctor or other care provider to review them with you. Prescriptions Printed Refills HYDROcodone-acetaminophen (NORCO) 5-325 mg per tablet 0 Sig: Take 1 Tab by mouth daily as needed for Pain. Class: Print Route: Oral  
  
Prescriptions Sent to Pharmacy Refills  
 propranolol (INDERAL) 60 mg tablet 2 Sig: Take 1 Tab by mouth two (2) times a day. Class: Normal  
 Pharmacy: [address] [place] [address] Ph #: [phone] Route: Oral  
 lisinopril-hydroCHLOROthiazide (PRINZIDE, ZESTORETIC) 10-12.5 mg per tablet 1 Sig: Take 1 Tab by mouth daily. Class: Normal  
 Pharmacy: [address] [place] [address] Ph #: [phone] Route: Oral  
  
Follow-up Instructions Return in about 2 months (around 1/22/2018) for meds, labs monitoring. Introducing [facility]! Haleigh Allison introduces [employer] patient portal. Now you can access parts of your medical record, email your doctor's office, and request medication refills online. 1. In your internet browser, go to [email or web address] 2. Click on the First Time User? Click Here link in the Sign In box. You will see the New Member Sign Up page. 3. Enter your [facility] Access Code exactly as it appears below. You will not need to use this code after youve completed the sign-up process. If you do not sign up before the expiration date, you must request a new code. · [employer] Access Code: [email or web address] Expires: 1/25/2018  9:24 AM 
 
4. Enter the last four digits of your Social Security Number (xxxx) and Date of Birth (mm/dd/yyyy) as indicated and click Submit. You will be taken to the next sign-up page. 5. Create a [facility] ID. This will be your [facility] login ID and cannot be changed, so think of one that is secure and easy to remember. 6. Create a [facility] password. You can change your password at any time. 7. Enter your Password Reset Question and Answer. This can be used at a later time if you forget your password. 8. Enter your e-mail address. You will receive e-mail notification when new information is available in [address] 9. Click Sign Up. You can now view and download portions of your medical record. 10. Click the Download Summary menu link to download a portable copy of your medical information. If you have questions, please visit the Frequently Asked Questions section of the [employer] website. Remember, [employer] is NOT to be used for urgent needs. For medical emergencies, dial 911. Now available from your iPhone and Android! Please provide this summary of care documentation to your next provider. Your primary care clinician is listed as Lora Mcgee. If you have any questions after today's visit, please call [phone].

## 2017-11-22 NOTE — PROGRESS NOTES
Francisca Musa is a 63 y.o. female     has a past medical history of Acute saddle pulmonary embolism without acute cor pulmonale ([facility] (11/3/2017); Arthritis; Asthma; Chronic obstructive pulmonary disease ([facility] Chronic pain; Diabetes ([facility] Diabetes mellitus due to underlying condition, controlled, with complication, without long-term current use of insulin ([facility] (3/21/2017); Diabetic polyneuropathy associated with type 2 diabetes mellitus ([facility] (11/1/2017); Fibromyalgia; Gastroesophageal reflux disease without esophagitis (5/13/2016); Hyperlipidemia (5/9/2012); Hypertension; Moderate episode of recurrent major depressive disorder ([facility] (11/1/2017); Obesity (5/9/2012); and Unspecified sleep apnea. PE: her sister did not have clotting disorder, it was a mistake. She have been depressed and haven't done anything, goes anywhere and just sat at home, this is likely cause for her PE. On xarelto. Denies hematuria, easily bruising, melena. Saw [facility] appointment on 11/16/17, for now to continue anticoagulation for 6 months and to f/u for eval length of treatment. Anxiety and depression. She have restarted wellbutrin and cymbalta. She is a lot better today. She is sleeping through, more motivated, appears cheery, smiling. She's preparing thanks giving for family from out of town. She denies SI or HI. Continue course. Chronic low back pain. Usually she manages on her own, takes norco seldomly. This time she is requesting that I write her PRN. She is taking gabapentin. Discussed drowsiness. i've written for her #20 X2 prescriptions throught the holidays. Denies redflags symptoms. Essential tremors, with writing and picking up a cup, it's been progressively worse over the past 5 months.  bilat upper hands. Start propranolol BID. Decrease her antihypertensive Lisnopril/hctz 20/12.5mg to 10/12.5mg.        Reviewed: active problem list, medication list, allergies, notes from last encounter, lab results    A comprehensive review of systems was negative except for that written in the HPI. Allergies   Allergen Reactions    Allopurinol Hives    [de-identified] A500 [Propoxyphene N-Acetaminophen] Hives    Influenza Virus Vaccine, Specific Other (comments)     Allergy documented in admission data base    Seafood [Shellfish Containing Products] Itching     NEW ALLERGY; REACTION WORSENS AS AMOUNT EATEN INCREASES     Current Outpatient Prescriptions on File Prior to Visit   Medication Sig Dispense Refill    buPROPion (WELLBUTRIN) 100 mg tablet Take 1 Tab by mouth two (2) times a day. 60 Tab 2    gemfibrozil (LOPID) 600 mg tablet Take 1 Tab by mouth two (2) times a day. 180 Tab 1    DULoxetine (CYMBALTA) 30 mg capsule Take 1 Cap by mouth two (2) times a day. 60 Cap 2    gabapentin (NEURONTIN) 300 mg capsule TAKE 1 CAPSULE BY MOUTH THREE TIMES DAILY 270 Cap 1    rivaroxaban (XARELTO) 20 mg tab tablet Take 1 Tab by mouth daily (with breakfast). 30 Tab 0    baclofen (LIORESAL) 10 mg tablet Take 10 mg by mouth two (2) times a day. Indications: muscle spasms      SITagliptin (JANUVIA) 100 mg tablet Take 1 Tab by mouth daily. 90 Tab 1    fluticasone-salmeterol (ADVAIR) 250-50 mcg/dose diskus inhaler Take 1 Puff by inhalation every twelve (12) hours. 1 Inhaler 5    metFORMIN (GLUCOPHAGE) 1,000 mg tablet 500mg in am and 1000 mg pm 180 Tab 0    lisinopril-hydroCHLOROthiazide (PRINZIDE, ZESTORETIC) 20-12.5 mg per tablet Take 1 tab Daily 90 Tab 1    potassium chloride SR (KLOR-CON 10) 10 mEq tablet Take 10 mEq by mouth daily.  omeprazole (PRILOSEC) 20 mg capsule TAKE 1 CAPSULE BY MOUTH EVERY DAY 30 Cap 5    fluticasone (FLONASE) 50 mcg/actuation nasal spray INSTILL 2 SPRAYS IN EACH NOSTRIL EVERY DAY 1 Bottle 0    cholecalciferol, vitamin D3, (VITAMIN D3) 2,000 unit tab Take 1 Tab by mouth daily.       albuterol-ipratropium (DUO-NEB) 2.5 mg-0.5 mg/3 ml nebu INHALE 1 VIAL VIA NEBULIZER EVERY 4 HOURS AS NEEDED 1620 mL 5    albuterol (PROAIR HFA) 90 mcg/actuation inhaler Take 2 Puffs by inhalation every four (4) hours as needed for Wheezing. 1 Inhaler 2     No current facility-administered medications on file prior to visit. Patient Active Problem List   Diagnosis Code    Lumbar stenosis M48.061    Obesity E66.9    HTN (hypertension) I10    DM (diabetes mellitus) ([facility] E11.9    Hyperlipidemia E78.5    Depression F32.9    Asthma J45.909    Primary localized osteoarthrosis, lower leg M17.10    DJD (degenerative joint disease) of knee M17.10    Chronic pain G89.29    Fibromyalgia M79.7    COPD (chronic obstructive pulmonary disease) ([facility]) J44.9    Gastroesophageal reflux disease without esophagitis K21.9    Chronic low back pain with bilateral sciatica M54.41, M54.42, G89.29    Lumbar spondylosis M47.816    Spinal stenosis M48.00    Status post laminectomy Z98.890    Diabetes mellitus due to underlying condition, controlled, with complication, without long-term current use of insulin ([facility]) E08.8    Acute pulmonary embolism ([facility]) I26.99    Weakness R53.1    Moderate episode of recurrent major depressive disorder ([facility]) F33.1    Diabetic polyneuropathy associated with type 2 diabetes mellitus ([facility]) E11.42    Acute saddle pulmonary embolism without acute cor pulmonale ([facility]) I26.92       Visit Vitals    /83    Pulse 100    Temp 95.8 °F (35.4 °C) (Oral)    Resp 20    Ht 6' 1\" (1.854 m)    Wt 307 lb (139.3 kg)    SpO2 95%    BMI 40.5 kg/m2     General appearance: alert, cooperative, no distress, appears stated age  Neurologic: Alert and oriented X 3, normal strength and tone, symmetric. Normal without focal findings. Cranial nerves 2-12 intact. Normal coordination and gait. Essential tremors with nose finger touch. No resting tremors. Mental status: Alert, oriented, thought content appropriate, affect: stable, mood-congruent.     Head: Normocephalic, without obvious abnormality, atraumatic  Eyes: conjunctivae/corneas clear. PERRL, EOM's intact. Neck: supple, symmetrical, trachea midline, no JVD  Lungs: clear to auscultation bilaterally  Heart: regular rate and rhythm, S1, S2 normal, no murmur, click, rub or gallop  Abdomen: soft, non-tender. Extremities: extremities normal, atraumatic, no cyanosis or edema      Assessment/Plans:    2 prescription of norco #20 given. Diagnoses and all orders for this visit:    1. Essential tremor  -     propranolol (INDERAL) 60 mg tablet; Take 1 Tab by mouth two (2) times a day. 2. Chronic bilateral low back pain without sciatica  -     HYDROcodone-acetaminophen (NORCO) 5-325 mg per tablet; Take 1 Tab by mouth daily as needed for Pain. 3. Essential hypertension with goal blood pressure less than 140/90  -     lisinopril-hydroCHLOROthiazide (PRINZIDE, ZESTORETIC) 10-12.5 mg per tablet; Take 1 Tab by mouth daily. Discussed plans, risk/benefits of treatments/observations. Through the use of shared decision making, above plans were agreed upon. Medication compliance advised. Patient verbalized understanding. Follow-up Disposition:  Return in about 2 months (around 1/22/2018) for meds, labs monitoring.       Rebekah Limon MD  11/22/2017

## 2017-11-28 ENCOUNTER — PATIENT OUTREACH (OUTPATIENT)
Dept: FAMILY MEDICINE CLINIC | Age: 62
End: 2017-11-28

## 2017-11-28 DIAGNOSIS — M54.50 CHRONIC LOW BACK PAIN WITHOUT SCIATICA, UNSPECIFIED BACK PAIN LATERALITY: Primary | ICD-10-CM

## 2017-11-28 DIAGNOSIS — G89.29 CHRONIC LOW BACK PAIN WITHOUT SCIATICA, UNSPECIFIED BACK PAIN LATERALITY: Primary | ICD-10-CM

## 2017-11-28 NOTE — PROGRESS NOTES
Spoke with patient after verifying name and . Goals      Improve activity tolerance and quality of life (ie. identify issue such as depression)            10/30/17 Patient advised the uses for Cymbalta. Patient reports she believes timing of running out of medication and her depression starting was 2.5 months ago. Advised patient to discuss medication options for depression with provider at appointment. Advised to write out feelings and if needed to see therapist. Patient does not feel she needs therapist at this time and will begin writing feelings and discuss medications options with PCP at appointment on 17. Will follow up next week. [facility]    17 Patient reports not using journal and declines therapist at this time. Discussed refill on Cymbalta with PCP at appointment Will fill prescription and take as prescribed. Will follow up with patient Friday to ensure she has filled prescription. [facility]    11/3/17 Patient reports to this writer she filled prescription and realized she has been taking Cymbalta as prescribed and is unsure what caused depression. Will speak with provider and see if he wants to do medication adjustment since she has been on Cymbalta 30 mg BID.    11/15/17 Patient reports starting Wellbutrin as prescribed. Increase in Gabapentin has helped pain. Feeling better, sleep quality better, been out shopping. Patient advised to slowly increase exercise. Patient will report to this writer number of laps or number of minutes she is exercising per day at next outreach. Will follow up next week. [facility]    17 Patient reports since starting Wellbutrin she is sleeping deeper, longer and better. She does not report feeling more energy however she has been cooking and cleaning home in preparation for holiday. She is having pain and will discuss with PCP at appointment today. Will outreach patient next week.  [facility]    17 Patient reports taking Cymbalta and Wellbutrin,  feeling good and sleep continues to be better. She has been able to stand up straight, has back brace that helps some with pain, and would like to go back to PT at [address] [place] [address]. Advised patient to call [facility] and schedule an appointment. Patient is expressing interest doing things that bring her pleasure. Will follow up with patient next week. [facility]                1200 spoke with Dr. Shabana Ugalde regarding outpatient PT orders for patient due to back pain. Verbal order received for referral.     1215 Referral entered for PT per verbal order of Dr. Shabana Ugalde and this writer informed referral desk patient is scheduling her own appointment.

## 2017-12-05 ENCOUNTER — PATIENT OUTREACH (OUTPATIENT)
Dept: FAMILY MEDICINE CLINIC | Age: 62
End: 2017-12-05

## 2017-12-05 NOTE — PROGRESS NOTES
This writer received message requesting a callback. Contact number is . Spoke with patient after verifying name and . Patient reports attempting to get appointment with [facility] but unable to due to referral needed. Advised referral has been placed since 17. Patient unable to provide this writer with information to call  will call back with information later today. 1820 spoke with Sheri at [address] [place] [address] to inquire where to send referral for patient. Sheri provided fax number [phone] to send referral. This writer faxed referral to [address] [place] [address]. Fax confirmation received stating transmission\"ok\". Will update patient when she returns call. 1226 Spoke with patient after verifying name and . Advised patient referral has been faxed to [address] [place] [address]. Patient report she has spoken with [facility] and has appointment.

## 2017-12-12 ENCOUNTER — PATIENT OUTREACH (OUTPATIENT)
Dept: FAMILY MEDICINE CLINIC | Age: 62
End: 2017-12-12

## 2017-12-12 NOTE — PROGRESS NOTES
Spoke with patient after verifying name and . Goals Addressed      COMPLETED: Improve activity tolerance and quality of life (ie. identify issue such as depression)                  10/30/17 Patient advised the uses for Cymbalta. Patient reports she believes timing of running out of medication and her depression starting was 2.5 months ago. Advised patient to discuss medication options for depression with provider at appointment. Advised to write out feelings and if needed to see therapist. Patient does not feel she needs therapist at this time and will begin writing feelings and discuss medications options with PCP at appointment on 17. Will follow up next week. [facility]    17 Patient reports not using journal and declines therapist at this time. Discussed refill on Cymbalta with PCP at appointment Will fill prescription and take as prescribed. Will follow up with patient Friday to ensure she has filled prescription. [facility]    11/3/17 Patient reports to this writer she filled prescription and realized she has been taking Cymbalta as prescribed and is unsure what caused depression. Will speak with provider and see if he wants to do medication adjustment since she has been on Cymbalta 30 mg BID.    11/15/17 Patient reports starting Wellbutrin as prescribed. Increase in Gabapentin has helped pain. Feeling better, sleep quality better, been out shopping. Patient advised to slowly increase exercise. Patient will report to this writer number of laps or number of minutes she is exercising per day at next outreach. Will follow up next week. [facility]    17 Patient reports since starting Wellbutrin she is sleeping deeper, longer and better. She does not report feeling more energy however she has been cooking and cleaning home in preparation for holiday. She is having pain and will discuss with PCP at appointment today. Will outreach patient next week.  [facility]    17 Patient reports taking Cymbalta and Wellbutrin, feeling good and sleep continues to be better. She has been able to stand up straight, has back brace that helps some with pain, and would like to go back to PT at [address] [place] [address]. Advised patient to call [facility] and schedule an appointment. Patient is expressing interest doing things that bring her pleasure. Will follow up with patient next week. [facility]    12/12/17 Patient reports wanting to participate with family activities and sleep continues to be of good quality and quantity. Energy level has improved. She is scheduled to start outpatient PT at [address] [place] [address] on 12/13/17. [facility]          Patient has this writer contact information for future reference or need. Episode resolved.

## 2018-03-07 DIAGNOSIS — K21.9 GASTROESOPHAGEAL REFLUX DISEASE WITHOUT ESOPHAGITIS: ICD-10-CM

## 2018-03-09 RX ORDER — OMEPRAZOLE 20 MG/1
CAPSULE, DELAYED RELEASE ORAL
Qty: 30 CAP | Refills: 0 | Status: SHIPPED | OUTPATIENT
Start: 2018-03-09 | End: 2018-07-30 | Stop reason: SDUPTHER

## 2018-03-09 RX ORDER — POTASSIUM CHLORIDE 750 MG/1
TABLET, FILM COATED, EXTENDED RELEASE ORAL
Qty: 30 TAB | Refills: 0 | Status: SHIPPED | OUTPATIENT
Start: 2018-03-09 | End: 2018-04-07 | Stop reason: SDUPTHER

## 2018-04-30 DIAGNOSIS — R19.7 DIARRHEA, UNSPECIFIED TYPE: ICD-10-CM

## 2018-04-30 DIAGNOSIS — F33.1 MODERATE EPISODE OF RECURRENT MAJOR DEPRESSIVE DISORDER (HCC): ICD-10-CM

## 2018-04-30 RX ORDER — GEMFIBROZIL 600 MG/1
600 TABLET, FILM COATED ORAL 2 TIMES DAILY
Qty: 180 TAB | Refills: 1 | Status: SHIPPED | OUTPATIENT
Start: 2018-04-30 | End: 2018-08-08 | Stop reason: SDUPTHER

## 2018-04-30 RX ORDER — BUPROPION HYDROCHLORIDE 100 MG/1
TABLET ORAL
Qty: 180 TAB | Refills: 0 | Status: SHIPPED | OUTPATIENT
Start: 2018-04-30 | End: 2018-07-30 | Stop reason: SDUPTHER

## 2018-05-11 ENCOUNTER — TELEPHONE (OUTPATIENT)
Dept: FAMILY MEDICINE CLINIC | Age: 63
End: 2018-05-11

## 2018-05-11 DIAGNOSIS — E08.8 DIABETES MELLITUS DUE TO UNDERLYING CONDITION, CONTROLLED, WITH COMPLICATION, WITHOUT LONG-TERM CURRENT USE OF INSULIN (HCC): ICD-10-CM

## 2018-06-05 ENCOUNTER — OFFICE VISIT (OUTPATIENT)
Dept: FAMILY MEDICINE CLINIC | Age: 63
End: 2018-06-05

## 2018-06-05 VITALS
HEIGHT: 72 IN | HEART RATE: 70 BPM | TEMPERATURE: 97.5 F | WEIGHT: 293 LBS | RESPIRATION RATE: 18 BRPM | OXYGEN SATURATION: 98 % | DIASTOLIC BLOOD PRESSURE: 59 MMHG | SYSTOLIC BLOOD PRESSURE: 114 MMHG | BODY MASS INDEX: 39.68 KG/M2

## 2018-06-05 DIAGNOSIS — Z00.00 MEDICARE ANNUAL WELLNESS VISIT, SUBSEQUENT: Primary | ICD-10-CM

## 2018-06-05 DIAGNOSIS — E66.01 SEVERE OBESITY (BMI 35.0-39.9): ICD-10-CM

## 2018-06-05 DIAGNOSIS — E78.2 MIXED HYPERLIPIDEMIA: ICD-10-CM

## 2018-06-05 DIAGNOSIS — G89.29 CHRONIC BILATERAL LOW BACK PAIN WITHOUT SCIATICA: ICD-10-CM

## 2018-06-05 DIAGNOSIS — E08.8 DIABETES MELLITUS DUE TO UNDERLYING CONDITION, CONTROLLED, WITH COMPLICATION, WITHOUT LONG-TERM CURRENT USE OF INSULIN (HCC): ICD-10-CM

## 2018-06-05 DIAGNOSIS — I10 ESSENTIAL HYPERTENSION: ICD-10-CM

## 2018-06-05 DIAGNOSIS — Z79.899 ENCOUNTER FOR LONG-TERM CURRENT USE OF MEDICATION: ICD-10-CM

## 2018-06-05 DIAGNOSIS — M54.50 CHRONIC BILATERAL LOW BACK PAIN WITHOUT SCIATICA: ICD-10-CM

## 2018-06-05 RX ORDER — HYDROCODONE BITARTRATE AND ACETAMINOPHEN 5; 325 MG/1; MG/1
1 TABLET ORAL
Qty: 20 TAB | Refills: 0 | Status: SHIPPED | OUTPATIENT
Start: 2018-06-05 | End: 2018-06-28 | Stop reason: SDUPTHER

## 2018-06-05 NOTE — PROGRESS NOTES
Chief Complaint   Patient presents with    Annual Wellness Visit    Back Pain     Medicare wellness. Having back pain. 1. Have you been to the ER, urgent care clinic since your last visit? Hospitalized since your last visit? no    2. Have you seen or consulted any other health care providers outside of the [address] [place] [address] since your last visit? Include any pap smears or colon screening.  no

## 2018-06-05 NOTE — MR AVS SNAPSHOT
Sage Cordero [address] [facility] 
[phone] Patient: Larry Chen MRN: [id number] COREY:[birth year] Visit Information Date & Time Provider Department Dept. Phone Encounter #  
 6/5/2018 11:20 AM Hesham Esqueda MD [facility] at [address] [id number] Follow-up Instructions Return in about 2 months (around 8/5/2018) for meds refill, diabetes. Upcoming Health Maintenance Date Due  
 PAP AKA CERVICAL CYTOLOGY 7/14/2014 EYE EXAM RETINAL OR DILATED Q1 2/5/2015 HEMOGLOBIN A1C Q6M 1/18/2018 LIPID PANEL Q1 3/21/2018 MEDICARE YEARLY EXAM 3/22/2018 MICROALBUMIN Q1 7/18/2018 Influenza Age 5 to Adult 8/1/2018 FOOT EXAM Q1 11/22/2018 BREAST CANCER SCRN MAMMOGRAM 4/7/2019 DTaP/Tdap/Td series (2 - Td) 7/15/2026 COLONOSCOPY 7/12/2027 Allergies as of 6/5/2018  Review Complete On: 6/5/2018 By: Kendall Valiente LPN Severity Noted Reaction Type Reactions Allopurinol  06/01/2011    Hives Darvocet A500 [Propoxyphene N-acetaminophen]  06/05/2011    Hives Influenza Virus Vaccine, Specific  10/30/2017    Other (comments) Allergy documented in admission data base Seafood [Shellfish Containing Products]  06/01/2011    Itching NEW ALLERGY; REACTION WORSENS AS AMOUNT EATEN INCREASES Current Immunizations  Reviewed on 11/23/2015 Name Date Pneumococcal Vaccine (Unspecified Type) 1/1/2015 Tdap 7/15/2016 Not reviewed this visit You Were Diagnosed With   
  
 Codes Comments Medicare annual wellness visit, subsequent    -  Primary ICD-10-CM: Z00.00 ICD-9-CM: V70.0 Diabetes mellitus due to underlying condition, controlled, with complication, without long-term current use of insulin ([facility]     ICD-10-CM: E08.8 ICD-9-CM: 249.90 Essential hypertension     ICD-10-CM: I10 
ICD-9-CM: 401.9 Mixed hyperlipidemia     ICD-10-CM: E78.2 ICD-9-CM: 272.2 Severe obesity (BMI 35.0-39.9) ([facility])     ICD-10-CM: E66.01 
ICD-9-CM: 278.01 Chronic bilateral low back pain without sciatica     ICD-10-CM: M54.5, G89.29 ICD-9-CM: 724.2, 338.29 Encounter for long-term current use of medication     ICD-10-CM: Z79.899 ICD-9-CM: V58.69 Vitals BP Pulse Temp Resp Height(growth percentile) Weight(growth percentile) 114/59 (BP 1 Location: Left arm, BP Patient Position: Sitting) 70 97.5 °F (36.4 °C) (Axillary) 18 6' 1\" (1.854 m) 297 lb 12.8 oz (135.1 kg) LMP SpO2 BMI OB Status Smoking Status 04/04/1996 (LMP Unknown) 98% 39.29 kg/m2 Hysterectomy Former Smoker BMI and BSA Data Body Mass Index Body Surface Area  
 39.29 kg/m 2 2.64 m 2 Preferred Pharmacy Pharmacy Name Phone [facility], [address] Your Updated Medication List  
  
   
This list is accurate as of 6/5/18 12:17 PM.  Always use your most recent med list.  
  
  
  
  
 acyclovir 400 mg tablet Commonly known as:  ZOVIRAX TAKE 1 TABLET BY MOUTH TWICE DAILY  
  
 albuterol 90 mcg/actuation inhaler Commonly known as:  PROAIR HFA Take 2 Puffs by inhalation every four (4) hours as needed for Wheezing. albuterol-ipratropium 2.5 mg-0.5 mg/3 ml Nebu Commonly known as:  Jason Bowman INHALE 1 VIAL VIA NEBULIZER EVERY 4 HOURS AS NEEDED  
  
 buPROPion 100 mg tablet Commonly known as:  [facility] Take 1 Tab by mouth two (2) times a day. DULoxetine 30 mg capsule Commonly known as:  CYMBALTA Take 1 Cap by mouth two (2) times a day. fluticasone 50 mcg/actuation nasal spray Commonly known as:  Deandre La Blanca INSTILL 2 SPRAYS IN EACH NOSTRIL EVERY DAY  
  
 fluticasone-salmeterol 250-50 mcg/dose diskus inhaler Commonly known as:  ADVAIR Take 1 Puff by inhalation every twelve (12) hours. gabapentin 300 mg capsule Commonly known as:  NEURONTIN  
 TAKE 1 CAPSULE BY MOUTH THREE TIMES DAILY gemfibrozil 600 mg tablet Commonly known as:  LOPID Take 1 Tab by mouth two (2) times a day. HYDROcodone-acetaminophen 5-325 mg per tablet Commonly known as:  Rhenda Dee Take 1 Tab by mouth daily as needed for Pain. JANUVIA 100 mg tablet Generic drug:  SITagliptin Take 1 Tab by mouth daily. lisinopril-hydroCHLOROthiazide 10-12.5 mg per tablet Commonly known as:  Omega  Take 1 Tab by mouth daily. metFORMIN 1,000 mg tablet Commonly known as:  GLUCOPHAGE  
500mg in am and 1000 mg pm  
  
 omeprazole 20 mg capsule Commonly known as:  PRILOSEC  
TAKE 1 CAPSULE BY MOUTH EVERY DAY  
  
 potassium chloride SR 10 mEq tablet Commonly known as:  KLOR-CON 10 TK 1 T PO QD  
  
 propranolol 60 mg tablet Commonly known as:  INDERAL Take 1 Tab by mouth two (2) times a day. VITAMIN D3 2,000 unit Tab Generic drug:  cholecalciferol (vitamin D3) Take 1 Tab by mouth daily. Prescriptions Printed Refills HYDROcodone-acetaminophen (NORCO) 5-325 mg per tablet 0 Sig: Take 1 Tab by mouth daily as needed for Pain. Class: Print Route: Oral  
  
We Performed the Following CBC W/O DIFF [12346 CPT(R)] HEMOGLOBIN A1C W/O EAG [22821 CPT(R)] LIPID PANEL [99973 CPT(R)] METABOLIC PANEL, COMPREHENSIVE [60639 CPT(R)] MICROALBUMIN, UR, RAND W/ MICROALB/CREAT RATIO [id number] CPT(R)] TSH RFX ON ABNORMAL TO FREE T4 [[id number] Custom] Follow-up Instructions Return in about 2 months (around 8/5/2018) for meds refill, diabetes. Introducing [facility]! [place] Pompa introduces [employer] patient portal. Now you can access parts of your medical record, email your doctor's office, and request medication refills online. 1. In your internet browser, go to [email or web address] 2. Click on the First Time User? Click Here link in the Sign In box.  You will see the New Member Sign Up page. 3. Enter your [employer] Access Code exactly as it appears below. You will not need to use this code after youve completed the sign-up process. If you do not sign up before the expiration date, you must request a new code. · [employer] Access Code: [email or web address] Expires: 9/3/2018 12:17 PM 
 
4. Enter the last four digits of your Social Security Number (xxxx) and Date of Birth (mm/dd/yyyy) as indicated and click Submit. You will be taken to the next sign-up page. 5. Create a [employer] ID. This will be your [employer] login ID and cannot be changed, so think of one that is secure and easy to remember. 6. Create a [employer] password. You can change your password at any time. 7. Enter your Password Reset Question and Answer. This can be used at a later time if you forget your password. 8. Enter your e-mail address. You will receive e-mail notification when new information is available in [address]. 9. Click Sign Up. You can now view and download portions of your medical record. 10. Click the Download Summary menu link to download a portable copy of your medical information. If you have questions, please visit the Frequently Asked Questions section of the [employer] website. Remember, [employer] is NOT to be used for urgent needs. For medical emergencies, dial 911. Now available from your iPhone and Android! Please provide this summary of care documentation to your next provider. Your primary care clinician is listed as Gwen Bangura. If you have any questions after today's visit, please call [phone].

## 2018-06-05 NOTE — PROGRESS NOTES
This is a Jessica Exam (AWV)     I have reviewed the patient's medical history in detail and updated the computerized patient record. Narciso Quesada is a 66 y.o. female    Last saw pt 7 months ago, didn't f/u sooner as advised. has a past medical history of Acute saddle pulmonary embolism without acute cor pulmonale ([facility] (11/3/2017); Arthritis; Asthma; Chronic obstructive pulmonary disease ([facility] Chronic pain; Diabetes mellitus due to underlying condition, controlled, with complication, without long-term current use of insulin ([facility] (3/21/2017); Diabetic polyneuropathy associated with type 2 diabetes mellitus ([facility] (11/1/2017); Essential hypertension (6/5/2018); Fibromyalgia; Gastroesophageal reflux disease without esophagitis (5/13/2016); Mixed hyperlipidemia (6/5/2018); Moderate episode of recurrent major depressive disorder ([facility] (11/1/2017); Severe obesity (BMI 35.0-39.9) ([facility] (6/5/2018); and Unspecified sleep apnea. PE: due to immobility. She have been depressed and haven't done anything, goes anywhere and just sat at home, this is likely cause for her PE. On xarelto. Denies hematuria, easily bruising, melena. Saw [facility] appointment on 11/16/17, for now to continue anticoagulation for 6 months. She have stopped xarelto 2 months ago.      Chronic low back pain. Denies redflags symptoms. She is taking gabapentin 300mg TID. Tramadol doesn't help much. Have tried lidocaine patch and it didn't help. Have trien TENS unit it helps while on it. Usually she manages on her own, takes norco seldomly. We'll write for norco #15 X 2 months. F/u for re-eval.     Anxiety and depression. She have restarted wellbutrin and cymbalta. She is a lot better today. She is sleeping through, more motivated, appears cheery, smiling. She denies SI or HI.   Continue course.      Essential tremors, with writing and picking up a cup, it's been progressively worse over the past 5 months.  bilat upper hands. Propranolol BID. HTN: BP at goal, Lisnopril/hctz 10/12.5mg. History     Past Medical History:   Diagnosis Date    Acute saddle pulmonary embolism without acute cor pulmonale (HCC) 11/3/2017    Arthritis     Asthma     Chronic obstructive pulmonary disease (HCC)     Chronic pain     back/hip    Diabetes mellitus due to underlying condition, controlled, with complication, without long-term current use of insulin ([facility] 3/21/2017    Diabetic polyneuropathy associated with type 2 diabetes mellitus ([facility] 11/1/2017    Essential hypertension 6/5/2018    Fibromyalgia     Gastroesophageal reflux disease without esophagitis 5/13/2016    Mixed hyperlipidemia 6/5/2018    Moderate episode of recurrent major depressive disorder ([facility] 11/1/2017    Severe obesity (BMI 35.0-39.9) ([facility] 6/5/2018    Unspecified sleep apnea     CAN'T TOLERATE CPAP      Past Surgical History:   Procedure Laterality Date    COLONOSCOPY N/A 7/12/2017    COLONOSCOPY performed [facility] Sean Jimenez MD at [facility] ENDOSCOPY     [facility]    one ovary removed    HX ORTHOPAEDIC  06/2012    lumbar fusion    HX ORTHOPAEDIC  2/16/15    RIGHT TOTAL KNEE ARTHROPLASTY    HX ORTHOPAEDIC  6/16/15    LEFT TOTAL KNEE ARTHROPLASTY        Current Outpatient Prescriptions   Medication Sig Dispense Refill    HYDROcodone-acetaminophen (NORCO) 5-325 mg per tablet Take 1 Tab by mouth daily as needed for Pain. 20 Tab 0    buPROPion (WELLBUTRIN) 100 mg tablet Take 1 Tab by mouth two (2) times a day. 180 Tab 0    gemfibrozil (LOPID) 600 mg tablet Take 1 Tab by mouth two (2) times a day. 180 Tab 1    potassium chloride SR (KLOR-CON 10) 10 mEq tablet TK 1 T PO QD 30 Tab 3    JANUVIA 100 mg tablet Take 1 Tab by mouth daily.  90 Tab 1    gabapentin (NEURONTIN) 300 mg capsule TAKE 1 CAPSULE BY MOUTH THREE TIMES DAILY 270 Cap 0    DULoxetine (CYMBALTA) 30 mg capsule Take 1 Cap by mouth two (2) times a day. 60 Cap 2    omeprazole (PRILOSEC) 20 mg capsule TAKE 1 CAPSULE BY MOUTH EVERY DAY 30 Cap 0    acyclovir (ZOVIRAX) 400 mg tablet TAKE 1 TABLET BY MOUTH TWICE DAILY 60 Tab 4    propranolol (INDERAL) 60 mg tablet Take 1 Tab by mouth two (2) times a day. 60 Tab 3    fluticasone-salmeterol (ADVAIR) 250-50 mcg/dose diskus inhaler Take 1 Puff by inhalation every twelve (12) hours. 60 Each 4    lisinopril-hydroCHLOROthiazide (PRINZIDE, ZESTORETIC) 10-12.5 mg per tablet Take 1 Tab by mouth daily. 90 Tab 1    metFORMIN (GLUCOPHAGE) 1,000 mg tablet 500mg in am and 1000 mg pm 180 Tab 0    fluticasone (FLONASE) 50 mcg/actuation nasal spray INSTILL 2 SPRAYS IN EACH NOSTRIL EVERY DAY 1 Bottle 0    cholecalciferol, vitamin D3, (VITAMIN D3) 2,000 unit tab Take 1 Tab by mouth daily.  albuterol-ipratropium (DUO-NEB) 2.5 mg-0.5 mg/3 ml nebu INHALE 1 VIAL VIA NEBULIZER EVERY 4 HOURS AS NEEDED 1620 mL 5    albuterol (PROAIR HFA) 90 mcg/actuation inhaler Take 2 Puffs by inhalation every four (4) hours as needed for Wheezing.  1 Inhaler 2     Allergies   Allergen Reactions    Allopurinol Hives    Darvocet A500 [Propoxyphene N-Acetaminophen] Hives    Influenza Virus Vaccine, Specific Other (comments)     Allergy documented in admission data base    Seafood [Shellfish Containing Products] Itching     NEW ALLERGY; REACTION WORSENS AS AMOUNT EATEN INCREASES     Family History   Problem Relation Age of Onset    Anesth Problems Mother     Cancer Mother      LUNG CA - SMOKER    Other Mother      CEREBRAL ANEURYSM    Diabetes Mother     Diabetes Father     Other Sister      VARICOSE VEINS THAT HURT AND BLEED    Heart Attack Brother     Other Other      MOTHER'S FATHER'S MOTHER  WITH ANEURYSM     Social History   Substance Use Topics    Smoking status: Former Smoker     Packs/day: 1.00     Years: 37.00     Quit date: 2015    Smokeless tobacco: Never Used    Alcohol use Yes      Comment: [employer] Patient Active Problem List   Diagnosis Code    Lumbar stenosis M48.061    HTN (hypertension) I10    DM (diabetes mellitus) ([facility] E11.9    Hyperlipidemia E78.5    Depression F32.9    Asthma J45.909    Primary localized osteoarthrosis, lower leg M17.10    DJD (degenerative joint disease) of knee M17.10    Chronic pain G89.29    Fibromyalgia M79.7    COPD (chronic obstructive pulmonary disease) ([facility]) J44.9    Gastroesophageal reflux disease without esophagitis K21.9    Chronic low back pain with bilateral sciatica M54.41, M54.42, G89.29    Lumbar spondylosis M47.816    Spinal stenosis M48.00    Status post laminectomy Z98.890    Diabetes mellitus due to underlying condition, controlled, with complication, without long-term current use of insulin ([facility]) E08.8    Acute pulmonary embolism ([facility]) I26.99    Weakness R53.1    Moderate episode of recurrent major depressive disorder ([facility]) F33.1    Diabetic polyneuropathy associated with type 2 diabetes mellitus ([facility]) E11.42    Acute saddle pulmonary embolism without acute cor pulmonale ([facility]) I26.92    Severe obesity (BMI 35.0-39.9) ([facility]) E66.01    Essential hypertension I10    Mixed hyperlipidemia E78.2         Depression Risk Factor Screening:   No flowsheet data found. Alcohol Risk Factor Screening: You do not drink alcohol or very rarely. Functional Ability and Level of Safety:     Hearing Loss   Hearing is good. Activities of Daily Living   Requires minor helps with making bed, washing certain part of body, or reaching certain area. Her sister helps her. Fall Risk   No flowsheet data found. Abuse Screen   Patient is not abused    Review of Systems   A comprehensive review of systems was negative except for that written in the HPI. Physical Examination     Evaluation of Cognitive Function:  Mood/affect:  neutral, happy  Appearance: age appropriate and casually dressed  Family member/caregiver input: none present or needed. Visit Vitals    /59 (BP 1 Location: Left arm, BP Patient Position: Sitting)    Pulse 70    Temp 97.5 °F (36.4 °C) (Axillary)    Resp 18    Ht 6' 1\" (1.854 m)    Wt 297 lb 12.8 oz (135.1 kg)    SpO2 98%    BMI 39.29 kg/m2     General appearance: alert, cooperative, no distress, appears stated age  Neurologic: Alert and oriented X 3, normal strength and tone, symmetric. Normal without focal findings. Cranial nerves 2-12 intact. Normal coordination and gait. Mental status: Alert, oriented, thought content appropriate, affect: stable, mood-congruent. Head: Normocephalic, without obvious abnormality, atraumatic  Eyes: conjunctivae/corneas clear. PERRL, EOM's intact. Neck: supple, symmetrical, trachea midline, no JVD  Lungs: clear to auscultation bilaterally  Heart: regular rate and rhythm, S1, S2 normal, no murmur, click, rub or gallop  Abdomen: soft, non-tender. Extremities: extremities normal, atraumatic, no cyanosis or edema    Patient Care Team:  Gordo Magdaleno MD as PCP - General (Family Practice)  Aditya Gillis RN as Ambulatory Care Navigator    Advice/Referrals/Counseling   Education and counseling provided:  Are appropriate based on today's review and evaluation  Pneumococcal Vaccine  Influenza Vaccine  Screening Mammography  Screening Pap and pelvic (covered once every 2 years)  Colorectal cancer screening tests  Diabetes outpatient self-management training services      Assessment/Plan     Diagnoses and all orders for this visit:    1. Medicare annual wellness visit, subsequent    2. Diabetes mellitus due to underlying condition, controlled, with complication, without long-term current use of insulin (HCC)  -     HEMOGLOBIN A1C W/O EAG  -     METABOLIC PANEL, COMPREHENSIVE  -     LIPID PANEL  -     MICROALBUMIN, UR, RAND W/ MICROALB/CREAT RATIO  -     CBC W/O DIFF  -     TSH RFX ON ABNORMAL TO FREE T4    3.  Essential hypertension  -     METABOLIC PANEL, COMPREHENSIVE  -     TSH RFX ON ABNORMAL TO FREE T4    4. Mixed hyperlipidemia  -     METABOLIC PANEL, COMPREHENSIVE  -     LIPID PANEL  -     TSH RFX ON ABNORMAL TO FREE T4    5. Severe obesity (BMI 35.0-39.9) ([facility]    6. Chronic bilateral low back pain without sciatica  -     HYDROcodone-acetaminophen (NORCO) 5-325 mg per tablet; Take 1 Tab by mouth daily as needed for Pain. 7. Encounter for long-term current use of medication  -     HEMOGLOBIN A1C W/O EAG  -     METABOLIC PANEL, COMPREHENSIVE  -     LIPID PANEL  -     MICROALBUMIN, UR, RAND W/ MICROALB/CREAT RATIO  -     CBC W/O DIFF  -     TSH RFX ON ABNORMAL TO FREE T4      Follow-up Disposition:  Return in about 2 months (around 8/5/2018) for meds refill, diabetes. Gordo Magdaleno MD  6/5/2018.

## 2018-06-20 DIAGNOSIS — I10 ESSENTIAL HYPERTENSION WITH GOAL BLOOD PRESSURE LESS THAN 140/90: ICD-10-CM

## 2018-06-20 RX ORDER — LISINOPRIL AND HYDROCHLOROTHIAZIDE 10; 12.5 MG/1; MG/1
1 TABLET ORAL DAILY
Qty: 90 TAB | Refills: 1 | Status: SHIPPED | OUTPATIENT
Start: 2018-06-20 | End: 2018-08-03 | Stop reason: SDUPTHER

## 2018-06-20 NOTE — TELEPHONE ENCOUNTER
[facility] is requesting refill for: lisinopril-hydroCHLOROthiazide         They can be reached at [phone]   Fax  [phone]

## 2018-06-21 DIAGNOSIS — G25.0 ESSENTIAL TREMOR: ICD-10-CM

## 2018-06-21 RX ORDER — PROPRANOLOL HYDROCHLORIDE 60 MG/1
TABLET ORAL
Qty: 180 TAB | Refills: 1 | Status: SHIPPED | OUTPATIENT
Start: 2018-06-21 | End: 2018-08-06 | Stop reason: SDUPTHER

## 2018-06-21 NOTE — TELEPHONE ENCOUNTER
----- Message from Evi Ring sent at 6/21/2018  9:57 AM EDT -----  Regarding: Dr. Jackie Ayala Refill   Cara calling from [place] [address] requesting a refill for the pt's \"Propranolol 60 mg\". Cara's contact [phone] (f) [phone].

## 2018-06-26 ENCOUNTER — TELEPHONE (OUTPATIENT)
Dept: FAMILY MEDICINE CLINIC | Age: 63
End: 2018-06-26

## 2018-06-27 NOTE — TELEPHONE ENCOUNTER
Spoke with Pharmacist. Patient told them you gave her 2 rx's for hydrocodone, 1 for May & 1 for June. She went to pharmacy to get filled & they told her they didn't have it. She said that she had left for them to hold. Pharmacist wanted to check to see if you gave her 2. Pharmacist checked  she hadn't filled anywhere else. Can she get a new rx ?

## 2018-06-28 DIAGNOSIS — G89.29 CHRONIC BILATERAL LOW BACK PAIN WITHOUT SCIATICA: ICD-10-CM

## 2018-06-28 DIAGNOSIS — M54.50 CHRONIC BILATERAL LOW BACK PAIN WITHOUT SCIATICA: ICD-10-CM

## 2018-06-28 RX ORDER — HYDROCODONE BITARTRATE AND ACETAMINOPHEN 5; 325 MG/1; MG/1
1 TABLET ORAL
Qty: 20 TAB | Refills: 0 | Status: SHIPPED | OUTPATIENT
Start: 2018-06-28 | End: 2018-08-09 | Stop reason: SDUPTHER

## 2018-07-05 DIAGNOSIS — F33.1 MODERATE EPISODE OF RECURRENT MAJOR DEPRESSIVE DISORDER (HCC): ICD-10-CM

## 2018-07-05 DIAGNOSIS — E11.42 DIABETIC POLYNEUROPATHY ASSOCIATED WITH TYPE 2 DIABETES MELLITUS (HCC): ICD-10-CM

## 2018-07-05 DIAGNOSIS — M79.7 FIBROMYALGIA: ICD-10-CM

## 2018-07-05 RX ORDER — GABAPENTIN 300 MG/1
CAPSULE ORAL
Qty: 270 CAP | Refills: 0 | Status: SHIPPED | OUTPATIENT
Start: 2018-07-05 | End: 2018-08-08 | Stop reason: SDUPTHER

## 2018-07-05 RX ORDER — ALBUTEROL SULFATE 90 UG/1
2 AEROSOL, METERED RESPIRATORY (INHALATION)
Qty: 1 INHALER | Refills: 2 | Status: SHIPPED | OUTPATIENT
Start: 2018-07-05 | End: 2018-08-06 | Stop reason: SDUPTHER

## 2018-07-05 RX ORDER — ACYCLOVIR 400 MG/1
TABLET ORAL
Qty: 60 TAB | Refills: 4 | Status: SHIPPED | OUTPATIENT
Start: 2018-07-05 | End: 2018-08-06 | Stop reason: SDUPTHER

## 2018-07-05 RX ORDER — DULOXETIN HYDROCHLORIDE 30 MG/1
CAPSULE, DELAYED RELEASE ORAL
Qty: 60 CAP | Refills: 2 | Status: SHIPPED | OUTPATIENT
Start: 2018-07-05 | End: 2018-08-08 | Stop reason: SDUPTHER

## 2018-07-30 DIAGNOSIS — J43.8 OTHER EMPHYSEMA (HCC): ICD-10-CM

## 2018-07-30 DIAGNOSIS — K21.9 GASTROESOPHAGEAL REFLUX DISEASE WITHOUT ESOPHAGITIS: ICD-10-CM

## 2018-07-30 DIAGNOSIS — E08.8 DIABETES MELLITUS DUE TO UNDERLYING CONDITION, CONTROLLED, WITH COMPLICATION, WITHOUT LONG-TERM CURRENT USE OF INSULIN (HCC): ICD-10-CM

## 2018-07-30 DIAGNOSIS — F33.1 MODERATE EPISODE OF RECURRENT MAJOR DEPRESSIVE DISORDER (HCC): ICD-10-CM

## 2018-07-30 RX ORDER — FLUTICASONE PROPIONATE AND SALMETEROL 250; 50 UG/1; UG/1
POWDER RESPIRATORY (INHALATION)
Qty: 60 EACH | Refills: 4 | Status: SHIPPED | OUTPATIENT
Start: 2018-07-30 | End: 2018-08-06 | Stop reason: SDUPTHER

## 2018-07-30 RX ORDER — POTASSIUM CHLORIDE 750 MG/1
TABLET, FILM COATED, EXTENDED RELEASE ORAL
Qty: 90 TAB | Refills: 1 | Status: SHIPPED | OUTPATIENT
Start: 2018-07-30 | End: 2018-08-06 | Stop reason: SDUPTHER

## 2018-07-30 RX ORDER — OMEPRAZOLE 20 MG/1
CAPSULE, DELAYED RELEASE ORAL
Qty: 90 CAP | Refills: 1 | Status: SHIPPED | OUTPATIENT
Start: 2018-07-30 | End: 2018-08-06 | Stop reason: SDUPTHER

## 2018-07-30 RX ORDER — METFORMIN HYDROCHLORIDE 1000 MG/1
TABLET ORAL
Qty: 180 TAB | Refills: 0 | Status: SHIPPED | OUTPATIENT
Start: 2018-07-30 | End: 2018-08-03 | Stop reason: SDUPTHER

## 2018-07-30 RX ORDER — BUPROPION HYDROCHLORIDE 100 MG/1
TABLET ORAL
Qty: 180 TAB | Refills: 0 | Status: SHIPPED | OUTPATIENT
Start: 2018-07-30 | End: 2018-08-06 | Stop reason: SDUPTHER

## 2018-07-30 NOTE — TELEPHONE ENCOUNTER
Patient requests refills be sent to [facility]   She thinks it is all of her medications     Best number to reach her is [phone]

## 2018-08-03 DIAGNOSIS — E08.8 DIABETES MELLITUS DUE TO UNDERLYING CONDITION, CONTROLLED, WITH COMPLICATION, WITHOUT LONG-TERM CURRENT USE OF INSULIN (HCC): ICD-10-CM

## 2018-08-03 DIAGNOSIS — I10 ESSENTIAL HYPERTENSION WITH GOAL BLOOD PRESSURE LESS THAN 140/90: ICD-10-CM

## 2018-08-03 DIAGNOSIS — E11.21 CONTROLLED TYPE 2 DIABETES MELLITUS WITH DIABETIC NEPHROPATHY, WITHOUT LONG-TERM CURRENT USE OF INSULIN (HCC): ICD-10-CM

## 2018-08-03 RX ORDER — METFORMIN HYDROCHLORIDE 1000 MG/1
TABLET ORAL
Qty: 180 TAB | Refills: 1 | Status: SHIPPED | OUTPATIENT
Start: 2018-08-03 | End: 2019-04-11 | Stop reason: SDUPTHER

## 2018-08-03 RX ORDER — LISINOPRIL AND HYDROCHLOROTHIAZIDE 10; 12.5 MG/1; MG/1
1 TABLET ORAL DAILY
Qty: 90 TAB | Refills: 1 | Status: SHIPPED | OUTPATIENT
Start: 2018-08-03 | End: 2019-03-18 | Stop reason: SDUPTHER

## 2018-08-06 ENCOUNTER — OFFICE VISIT (OUTPATIENT)
Dept: FAMILY MEDICINE CLINIC | Age: 63
End: 2018-08-06

## 2018-08-06 VITALS
SYSTOLIC BLOOD PRESSURE: 104 MMHG | BODY MASS INDEX: 39.68 KG/M2 | OXYGEN SATURATION: 98 % | DIASTOLIC BLOOD PRESSURE: 62 MMHG | TEMPERATURE: 95.8 F | HEIGHT: 72 IN | WEIGHT: 293 LBS | RESPIRATION RATE: 18 BRPM | HEART RATE: 69 BPM

## 2018-08-06 DIAGNOSIS — M31.6 TEMPORAL ARTERITIS (HCC): Primary | ICD-10-CM

## 2018-08-06 DIAGNOSIS — G89.29 OTHER CHRONIC PAIN: ICD-10-CM

## 2018-08-06 DIAGNOSIS — G25.0 ESSENTIAL TREMOR: ICD-10-CM

## 2018-08-06 DIAGNOSIS — M62.838 MUSCLE SPASM: ICD-10-CM

## 2018-08-06 DIAGNOSIS — E08.8 DIABETES MELLITUS DUE TO UNDERLYING CONDITION, CONTROLLED, WITH COMPLICATION, WITHOUT LONG-TERM CURRENT USE OF INSULIN (HCC): ICD-10-CM

## 2018-08-06 DIAGNOSIS — E78.2 MIXED HYPERLIPIDEMIA: ICD-10-CM

## 2018-08-06 DIAGNOSIS — J43.8 OTHER EMPHYSEMA (HCC): ICD-10-CM

## 2018-08-06 DIAGNOSIS — M79.7 FIBROMYALGIA: ICD-10-CM

## 2018-08-06 DIAGNOSIS — F33.1 MODERATE EPISODE OF RECURRENT MAJOR DEPRESSIVE DISORDER (HCC): ICD-10-CM

## 2018-08-06 DIAGNOSIS — K21.9 GASTROESOPHAGEAL REFLUX DISEASE WITHOUT ESOPHAGITIS: ICD-10-CM

## 2018-08-06 DIAGNOSIS — I10 ESSENTIAL HYPERTENSION: ICD-10-CM

## 2018-08-06 DIAGNOSIS — Z91.199 NONCOMPLIANCE: ICD-10-CM

## 2018-08-06 PROBLEM — E11.21 TYPE 2 DIABETES WITH NEPHROPATHY (HCC): Status: ACTIVE | Noted: 2018-08-06

## 2018-08-06 PROBLEM — R53.1 WEAKNESS: Status: RESOLVED | Noted: 2017-10-28 | Resolved: 2018-08-06

## 2018-08-06 RX ORDER — LANCETS
EACH MISCELLANEOUS
Qty: 100 EACH | Refills: 3 | Status: SHIPPED | OUTPATIENT
Start: 2018-08-06 | End: 2020-07-31 | Stop reason: SDUPTHER

## 2018-08-06 RX ORDER — ACYCLOVIR 400 MG/1
TABLET ORAL
Qty: 180 TAB | Refills: 1 | Status: SHIPPED | OUTPATIENT
Start: 2018-08-06 | End: 2019-04-11 | Stop reason: SDUPTHER

## 2018-08-06 RX ORDER — PREDNISONE 10 MG/1
TABLET ORAL
Qty: 21 TAB | Refills: 0 | Status: SHIPPED | OUTPATIENT
Start: 2018-08-06 | End: 2019-04-11 | Stop reason: ALTCHOICE

## 2018-08-06 RX ORDER — POTASSIUM CHLORIDE 750 MG/1
TABLET, FILM COATED, EXTENDED RELEASE ORAL
Qty: 90 TAB | Refills: 1 | Status: SHIPPED | OUTPATIENT
Start: 2018-08-06 | End: 2019-04-11 | Stop reason: SDUPTHER

## 2018-08-06 RX ORDER — CYCLOBENZAPRINE HCL 10 MG
5 TABLET ORAL
Qty: 30 TAB | Refills: 0 | Status: SHIPPED | OUTPATIENT
Start: 2018-08-06 | End: 2019-04-11

## 2018-08-06 RX ORDER — PREDNISONE 10 MG/1
TABLET ORAL
Qty: 21 TAB | Refills: 0 | Status: SHIPPED | OUTPATIENT
Start: 2018-08-06 | End: 2018-08-06 | Stop reason: ALTCHOICE

## 2018-08-06 RX ORDER — ISOPROPYL ALCOHOL 70 ML/100ML
SWAB TOPICAL
Qty: 100 PAD | Refills: 3 | Status: SHIPPED | OUTPATIENT
Start: 2018-08-06 | End: 2020-07-17 | Stop reason: ALTCHOICE

## 2018-08-06 RX ORDER — PROPRANOLOL HYDROCHLORIDE 40 MG/1
TABLET ORAL
Qty: 60 TAB | Refills: 2 | Status: SHIPPED | OUTPATIENT
Start: 2018-08-06 | End: 2019-04-11 | Stop reason: SDUPTHER

## 2018-08-06 RX ORDER — FLUTICASONE PROPIONATE AND SALMETEROL 250; 50 UG/1; UG/1
POWDER RESPIRATORY (INHALATION)
Qty: 180 EACH | Refills: 1 | Status: SHIPPED | OUTPATIENT
Start: 2018-08-06 | End: 2019-04-11 | Stop reason: SDUPTHER

## 2018-08-06 RX ORDER — BUPROPION HYDROCHLORIDE 100 MG/1
TABLET ORAL
Qty: 180 TAB | Refills: 0 | Status: SHIPPED | OUTPATIENT
Start: 2018-08-06 | End: 2019-04-11 | Stop reason: SDUPTHER

## 2018-08-06 RX ORDER — BLOOD-GLUCOSE METER
EACH MISCELLANEOUS
Qty: 1 EACH | Refills: 0 | Status: SHIPPED | OUTPATIENT
Start: 2018-08-06 | End: 2020-07-17 | Stop reason: ALTCHOICE

## 2018-08-06 RX ORDER — OMEPRAZOLE 20 MG/1
CAPSULE, DELAYED RELEASE ORAL
Qty: 90 CAP | Refills: 1 | Status: SHIPPED | OUTPATIENT
Start: 2018-08-06 | End: 2019-04-11 | Stop reason: SDUPTHER

## 2018-08-06 RX ORDER — ALBUTEROL SULFATE 90 UG/1
2 AEROSOL, METERED RESPIRATORY (INHALATION)
Qty: 3 INHALER | Refills: 1 | Status: SHIPPED | OUTPATIENT
Start: 2018-08-06 | End: 2019-04-11 | Stop reason: SDUPTHER

## 2018-08-06 NOTE — MR AVS SNAPSHOT
[address] [facility] 
[phone] Patient: Nandini Lezama MRN: [id number] AJX:5/68/8136 Visit Information Date & Time Provider Department Dept. Phone Encounter #  
 8/6/2018 10:40 AM Sushma Worthington MD [facility] at [address] [id number] Follow-up Instructions Return in about 3 days (around 8/9/2018). Upcoming Health Maintenance Date Due  
 PAP AKA CERVICAL CYTOLOGY 7/14/2014 EYE EXAM RETINAL OR DILATED Q1 2/5/2015 HEMOGLOBIN A1C Q6M 1/18/2018 LIPID PANEL Q1 3/21/2018 MICROALBUMIN Q1 7/18/2018 Influenza Age 5 to Adult 8/1/2018 FOOT EXAM Q1 11/22/2018 BREAST CANCER SCRN MAMMOGRAM 4/7/2019 MEDICARE YEARLY EXAM 6/6/2019 DTaP/Tdap/Td series (2 - Td) 7/15/2026 COLONOSCOPY 7/12/2027 Allergies as of 8/6/2018  Review Complete On: 8/6/2018 By: Sushma Worthington MD  
  
 Severity Noted Reaction Type Reactions Allopurinol  06/01/2011    Hives Darvocet A500 [Propoxyphene N-acetaminophen]  06/05/2011    Hives Influenza Virus Vaccine, Specific  10/30/2017    Other (comments) Allergy documented in admission data base Seafood [Shellfish Containing Products]  06/01/2011    Itching NEW ALLERGY; REACTION WORSENS AS AMOUNT EATEN INCREASES Current Immunizations  Reviewed on 11/23/2015 Name Date Pneumococcal Vaccine (Unspecified Type) 1/1/2015 Tdap 7/15/2016 Not reviewed this visit You Were Diagnosed With   
  
 Codes Comments Temporal arteritis (HCC)    -  Primary ICD-10-CM: M31.6 ICD-9-CM: 446.5 Diabetes mellitus due to underlying condition, controlled, with complication, without long-term current use of insulin ([facility]     ICD-10-CM: E08.8 ICD-9-CM: 249.90 Essential hypertension     ICD-10-CM: I10 
ICD-9-CM: 401.9 Mixed hyperlipidemia     ICD-10-CM: E78.2 ICD-9-CM: 272.2 Fibromyalgia     ICD-10-CM: M79.7 ICD-9-CM: 729.1 Other chronic pain     ICD-10-CM: G89.29 ICD-9-CM: 338.29 Essential tremor     ICD-10-CM: G25.0 ICD-9-CM: 333.1 Noncompliance     ICD-10-CM: Z91.19 ICD-9-CM: V15.81 Vitals BP Pulse Temp Resp Height(growth percentile) Weight(growth percentile) 104/62 (BP 1 Location: Left arm, BP Patient Position: Sitting) 69 95.8 °F (35.4 °C) (Oral) 18 6' 1\" (1.854 m) 297 lb 6.4 oz (134.9 kg) LMP SpO2 BMI OB Status Smoking Status 04/04/1996 (LMP Unknown) 98% 39.24 kg/m2 Hysterectomy Former Smoker BMI and BSA Data Body Mass Index Body Surface Area  
 39.24 kg/m 2 2.64 m 2 Preferred Pharmacy Pharmacy Name Phone [address] [phone] Your Updated Medication List  
  
   
This list is accurate as of 8/6/18 11:34 AM.  Always use your most recent med list.  
  
  
  
  
 acyclovir 400 mg tablet Commonly known as:  ZOVIRAX TAKE 1 TABLET BY MOUTH TWICE DAILY  
  
 albuterol 90 mcg/actuation inhaler Commonly known as:  PROAIR HFA Take 2 Puffs by inhalation every four (4) hours as needed for Wheezing. albuterol-ipratropium 2.5 mg-0.5 mg/3 ml Nebu Commonly known as:  Daggett Rosaura INHALE 1 VIAL VIA NEBULIZER EVERY 4 HOURS AS NEEDED  
  
 buPROPion 100 mg tablet Commonly known as:  [facility] Take 1 Tab by mouth two (2) times a day. DULoxetine 30 mg capsule Commonly known as:  CYMBALTA Take 1 Cap by mouth two (2) times a day. fluticasone 50 mcg/actuation nasal spray Commonly known as:  Lella Solum INSTILL 2 SPRAYS IN EACH NOSTRIL EVERY DAY  
  
 fluticasone-salmeterol 250-50 mcg/dose diskus inhaler Commonly known as:  ADVAIR Take 1 Puff by inhalation every twelve (12) hours. gabapentin 300 mg capsule Commonly known as:  NEURONTIN  
TAKE 1 CAPSULE BY MOUTH THREE TIMES DAILY gemfibrozil 600 mg tablet Commonly known as:  LOPID  
 Take 1 Tab by mouth two (2) times a day. HYDROcodone-acetaminophen 5-325 mg per tablet Commonly known as:  Katharine Patel Take 1 Tab by mouth daily as needed for Pain. lisinopril-hydroCHLOROthiazide 10-12.5 mg per tablet Commonly known as:  Lucjose Rader Take 1 Tab by mouth daily. metFORMIN 1,000 mg tablet Commonly known as:  GLUCOPHAGE  
500mg in am and 1000 mg pm  
  
 omeprazole 20 mg capsule Commonly known as:  PRILOSEC  
TAKE 1 CAPSULE BY MOUTH EVERY DAY  
  
 potassium chloride SR 10 mEq tablet Commonly known as:  KLOR-CON 10 TK 1 T PO QD  
  
 predniSONE 10 mg dose pack Commonly known as:  STERAPRED DS See administration instruction per 10mg dose pack  
  
 propranolol 40 mg tablet Commonly known as:  INDERAL Take 1 Tab by mouth two (2) times a day. SITagliptin 100 mg tablet Commonly known as:  Santana Brett Take 1 Tab by mouth daily. VITAMIN D3 2,000 unit Tab Generic drug:  cholecalciferol (vitamin D3) Take 1 Tab by mouth daily. Prescriptions Sent to Pharmacy Refills  
 propranolol (INDERAL) 40 mg tablet 2 Sig: Take 1 Tab by mouth two (2) times a day. Class: Normal  
 Pharmacy: [address] [place] [address] Ph #: [phone]  
 predniSONE (STERAPRED DS) 10 mg dose pack 0 Sig: See administration instruction per 10mg dose pack Class: Normal  
 Pharmacy: [address] [place] [address] Ph #: [phone] We Performed the Following CRP, HIGH SENSITIVITY [56376 CPT(R)] SED RATE (ESR) [id number] CPT(R)] Follow-up Instructions Return in about 3 days (around 8/9/2018). Introducing [facility]! René Bergeron introduces [employer] patient portal. Now you can access parts of your medical record, email your doctor's office, and request medication refills online.    
 
1. In your internet browser, go to [email or web address] 2. Click on the First Time User? Click Here link in the Sign In box. You will see the New Member Sign Up page. 3. Enter your Panizon Access Code exactly as it appears below. You will not need to use this code after youve completed the sign-up process. If you do not sign up before the expiration date, you must request a new code. · Panizon Access Code: [email or web address] Expires: 9/3/2018 12:17 PM 
 
4. Enter the last four digits of your Social Security Number (xxxx) and Date of Birth (mm/dd/yyyy) as indicated and click Submit. You will be taken to the next sign-up page. 5. Create a [employer] Y[employer]at ID. This will be your Panizon login ID and cannot be changed, so think of one that is secure and easy to remember. 6. Create a Panizon password. You can change your password at any time. 7. Enter your Password Reset Question and Answer. This can be used at a later time if you forget your password. 8. Enter your e-mail address. You will receive e-mail notification when new information is available in [address] 9. Click Sign Up. You can now view and download portions of your medical record. 10. Click the Download Summary menu link to download a portable copy of your medical information. If you have questions, please visit the Frequently Asked Questions section of the Panizon website. Remember, Panizon is NOT to be used for urgent needs. For medical emergencies, dial 911. Now available from your iPhone and Android! Please provide this summary of care documentation to your next provider. Your primary care clinician is listed as Alley Guerrero. If you have any questions after today's visit, please call [phone].

## 2018-08-06 NOTE — PROGRESS NOTES
Chief Complaint   Patient presents with    Diabetes    Head Pain     2 mo check. Fell & hit head. Now left side of head hurts. 1. Have you been to the ER, urgent care clinic since your last visit? Hospitalized since your last visit? no  2. Have you seen or consulted any other health care providers outside of the [facility] since your last visit? Include any pap smears or colon screening.  no

## 2018-08-06 NOTE — PROGRESS NOTES
Estrellita Mauricio is a 66 y.o. female     has a past medical history of Acute saddle pulmonary embolism without acute cor pulmonale ([facility] (11/3/2017); Arthritis; Asthma; Chronic obstructive pulmonary disease ([facility] Chronic pain; Diabetes mellitus due to underlying condition, controlled, with complication, without long-term current use of insulin ([facility] (3/21/2017); Diabetic polyneuropathy associated with type 2 diabetes mellitus ([facility] (11/1/2017); Essential hypertension (6/5/2018); Fibromyalgia; Gastroesophageal reflux disease without esophagitis (5/13/2016); Mixed hyperlipidemia (6/5/2018); Moderate episode of recurrent major depressive disorder ([facility] (11/1/2017); Noncompliance (8/6/2018); Severe obesity (BMI 35.0-39.9) ([facility] (6/5/2018); and Unspecified sleep apnea. Noncompliance: She hadn't done any of my blood work for >1 year now. Advise her to get them done today. Hx of Dm2. Need to get her labs done. She fell about 10 days ago, got up and was dizzy, think she might of hit her head. She report if she gets up and walk and do some activities she'll get light headed. Currently her BP is low. Discussed hydration. She's currently not dizzy or lightheaded. No falls since. Denies CP, SOB, KELLER. Hypotension: on Lisnipril/hctz 10/12.5 and propranolol 60mg bid. We'll lower propranolol to 40mg BID. Temporal arteritis vs. Shingles. Currently no lesions or rash on head. But she's very sensitive to touch on her front right/temporal aread of her head. Sensation of burning, electric. She denies fever, vision changes. We'll start on prednisone 10mg dose taper and f/u in 3 days. Check esr    Chronic low back pain.  Denies redflags symptoms. She is taking gabapentin 300mg TID. Tramadol doesn't help much. Have tried lidocaine patch and it didn't help. Have trien TENS unit it helps while on it. Usually she manages on her own, takes norco seldomly. We'll write for norco #15 X 2 months.   F/u for re-eval.      Anxiety and depression. Stable on Wellbutrin and cymbalta.  She is sleeping through, more motivated, appears cheery, smiling. She denies SI or HI.  Continue course.         Reviewed: active problem list, medication list, allergies, notes from last encounter, lab results    A comprehensive review of systems was negative except for that written in the HPI. Allergies   Allergen Reactions    Allopurinol Hives    [de-identified] A500 [Propoxyphene N-Acetaminophen] Hives    Influenza Virus Vaccine, Specific Other (comments)     Allergy documented in admission data base    Seafood [Shellfish Containing Products] Itching     NEW ALLERGY; REACTION WORSENS AS AMOUNT EATEN INCREASES     Current Outpatient Prescriptions on File Prior to Visit   Medication Sig Dispense Refill    lisinopril-hydroCHLOROthiazide (PRINZIDE, ZESTORETIC) 10-12.5 mg per tablet Take 1 Tab by mouth daily. 90 Tab 1    SITagliptin (JANUVIA) 100 mg tablet Take 1 Tab by mouth daily. 90 Tab 1    metFORMIN (GLUCOPHAGE) 1,000 mg tablet 500mg in am and 1000 mg pm 180 Tab 1    HYDROcodone-acetaminophen (NORCO) 5-325 mg per tablet Take 1 Tab by mouth daily as needed for Pain. 20 Tab 0    fluticasone (FLONASE) 50 mcg/actuation nasal spray INSTILL 2 SPRAYS IN EACH NOSTRIL EVERY DAY 1 Bottle 0    cholecalciferol, vitamin D3, (VITAMIN D3) 2,000 unit tab Take 1 Tab by mouth daily.  albuterol-ipratropium (DUO-NEB) 2.5 mg-0.5 mg/3 ml nebu INHALE 1 VIAL VIA NEBULIZER EVERY 4 HOURS AS NEEDED 1620 mL 5     No current facility-administered medications on file prior to visit.       Patient Active Problem List   Diagnosis Code    Lumbar stenosis M48.061    Asthma J45.909    Primary localized osteoarthrosis, lower leg M17.10    DJD (degenerative joint disease) of knee M17.10    Chronic pain G89.29    Fibromyalgia M79.7    COPD (chronic obstructive pulmonary disease) ([facility]) J44.9    Gastroesophageal reflux disease without esophagitis K21.9  Chronic low back pain with bilateral sciatica M54.41, M54.42, G89.29    Lumbar spondylosis M47.816    Spinal stenosis M48.00    Status post laminectomy Z98.890    Diabetes mellitus due to underlying condition, controlled, with complication, without long-term current use of insulin ([facility]) E08.8    Acute pulmonary embolism ([facility]) I26.99    Moderate episode of recurrent major depressive disorder ([facility] F33.1    Diabetic polyneuropathy associated with type 2 diabetes mellitus ([facility]) E11.42    Acute saddle pulmonary embolism without acute cor pulmonale ([facility]) I26.92    Severe obesity (BMI 35.0-39.9) ([facility]) E66.01    Essential hypertension I10    Mixed hyperlipidemia E78.2    Type 2 diabetes with nephropathy ([facility]) E11.21    Noncompliance Z91.19       Visit Vitals    /62 (BP 1 Location: Left arm, BP Patient Position: Sitting)    Pulse 69    Temp 95.8 °F (35.4 °C) (Oral)    Resp 18    Ht 6' 1\" (1.854 m)    Wt 297 lb 6.4 oz (134.9 kg)    SpO2 98%    BMI 39.24 kg/m2     General appearance: alert, cooperative, no distress, appears stated age  Neurologic: Alert and oriented X 3, normal strength and tone, symmetric. Normal without focal findings. Cranial nerves 2-12 intact. Normal coordination and gait. Mental status: Alert, oriented, thought content appropriate, affect: stable, mood-congruent. Head: Normocephalic, without obvious abnormality, atraumatic   Sensitive to touch of her skin at frontal right temporal area. Skin normal.   Eyes: conjunctivae/corneas clear. PERRL, EOM's intact. Neck: supple, symmetrical, trachea midline, no JVD  Lungs: clear to auscultation bilaterally  Heart: regular rate and rhythm, S1, S2 normal, no murmur, click, rub or gallop  Abdomen: soft, non-tender. Extremities: extremities normal, atraumatic, no cyanosis or edema        Assessment/Plans:    Diagnoses and all orders for this visit:    1.  Temporal arteritis ([facility])  -     SED RATE (ESR)  -     CRP, HIGH SENSITIVITY  -     predniSONE (STERAPRED DS) 10 mg dose pack; See administration instruction per 10mg dose pack    2. Diabetes mellitus due to underlying condition, controlled, with complication, without long-term current use of insulin ([address]    3. Essential hypertension    4. Mixed hyperlipidemia    5. Fibromyalgia    6. Other chronic pain    7. Essential tremor  -     propranolol (INDERAL) 40 mg tablet; Take 1 Tab by mouth two (2) times a day. 8. Noncompliance    9. Muscle spasm  -     cyclobenzaprine (FLEXERIL) 10 mg tablet; Take 0.5 Tabs by mouth nightly. Discussed plans, risk/benefits of treatments/observations. Through the use of shared decision making, above plans were agreed upon. Medication compliance advised. Patient verbalized understanding. Follow-up Disposition:  Return in about 3 days (around 8/9/2018).       Kelly Wall MD  8/8/2018

## 2018-08-07 LAB
ALBUMIN SERPL-MCNC: 4.7 G/DL (ref 3.6–4.8)
ALBUMIN/CREAT UR: 6.5 MG/G CREAT (ref 0–30)
ALBUMIN/GLOB SERPL: 1.1 {RATIO} (ref 1.2–2.2)
ALP SERPL-CCNC: 70 IU/L (ref 39–117)
ALT SERPL-CCNC: 12 IU/L (ref 0–32)
AST SERPL-CCNC: 14 IU/L (ref 0–40)
BILIRUB SERPL-MCNC: 0.2 MG/DL (ref 0–1.2)
BUN SERPL-MCNC: 29 MG/DL (ref 8–27)
BUN/CREAT SERPL: 18 (ref 12–28)
CALCIUM SERPL-MCNC: 10.1 MG/DL (ref 8.7–10.3)
CHLORIDE SERPL-SCNC: 96 MMOL/L (ref 96–106)
CHOLEST SERPL-MCNC: 223 MG/DL (ref 100–199)
CO2 SERPL-SCNC: 23 MMOL/L (ref 20–29)
CREAT SERPL-MCNC: 1.65 MG/DL (ref 0.57–1)
CREAT UR-MCNC: 253 MG/DL
CRP SERPL HS-MCNC: 34.14 MG/L (ref 0–3)
ERYTHROCYTE [DISTWIDTH] IN BLOOD BY AUTOMATED COUNT: 13.6 % (ref 12.3–15.4)
ERYTHROCYTE [SEDIMENTATION RATE] IN BLOOD BY WESTERGREN METHOD: 17 MM/HR (ref 0–40)
GLOBULIN SER CALC-MCNC: 4.1 G/DL (ref 1.5–4.5)
GLUCOSE SERPL-MCNC: 141 MG/DL (ref 65–99)
HBA1C MFR BLD: 6.8 % (ref 4.8–5.6)
HCT VFR BLD AUTO: 36.7 % (ref 34–46.6)
HDLC SERPL-MCNC: 38 MG/DL
HGB BLD-MCNC: 12.4 G/DL (ref 11.1–15.9)
INTERPRETATION: NORMAL
LDLC SERPL CALC-MCNC: 156 MG/DL (ref 0–99)
Lab: NORMAL
MCH RBC QN AUTO: 28.8 PG (ref 26.6–33)
MCHC RBC AUTO-ENTMCNC: 33.8 G/DL (ref 31.5–35.7)
MCV RBC AUTO: 85 FL (ref 79–97)
MICROALBUMIN UR-MCNC: 16.5 UG/ML
PLATELET # BLD AUTO: 414 X10E3/UL (ref 150–379)
POTASSIUM SERPL-SCNC: 4.7 MMOL/L (ref 3.5–5.2)
PROT SERPL-MCNC: 8.8 G/DL (ref 6–8.5)
RBC # BLD AUTO: 4.3 X10E6/UL (ref 3.77–5.28)
SODIUM SERPL-SCNC: 136 MMOL/L (ref 134–144)
TRIGL SERPL-MCNC: 145 MG/DL (ref 0–149)
TSH SERPL DL<=0.005 MIU/L-ACNC: 3.4 UIU/ML (ref 0.45–4.5)
VLDLC SERPL CALC-MCNC: 29 MG/DL (ref 5–40)
WBC # BLD AUTO: 9.1 X10E3/UL (ref 3.4–10.8)

## 2018-08-08 DIAGNOSIS — E11.42 DIABETIC POLYNEUROPATHY ASSOCIATED WITH TYPE 2 DIABETES MELLITUS (HCC): ICD-10-CM

## 2018-08-08 DIAGNOSIS — M79.7 FIBROMYALGIA: ICD-10-CM

## 2018-08-08 DIAGNOSIS — R19.7 DIARRHEA, UNSPECIFIED TYPE: ICD-10-CM

## 2018-08-08 DIAGNOSIS — F33.1 MODERATE EPISODE OF RECURRENT MAJOR DEPRESSIVE DISORDER (HCC): ICD-10-CM

## 2018-08-08 RX ORDER — DULOXETIN HYDROCHLORIDE 30 MG/1
CAPSULE, DELAYED RELEASE ORAL
Qty: 180 CAP | Refills: 0 | Status: SHIPPED | OUTPATIENT
Start: 2018-08-08 | End: 2019-04-11 | Stop reason: SDUPTHER

## 2018-08-08 RX ORDER — GABAPENTIN 300 MG/1
CAPSULE ORAL
Qty: 270 CAP | Refills: 0 | Status: SHIPPED | OUTPATIENT
Start: 2018-08-08 | End: 2019-02-13 | Stop reason: SDUPTHER

## 2018-08-08 RX ORDER — GEMFIBROZIL 600 MG/1
600 TABLET, FILM COATED ORAL 2 TIMES DAILY
Qty: 180 TAB | Refills: 1 | Status: SHIPPED | OUTPATIENT
Start: 2018-08-08 | End: 2019-04-11 | Stop reason: SDUPTHER

## 2018-08-09 ENCOUNTER — OFFICE VISIT (OUTPATIENT)
Dept: FAMILY MEDICINE CLINIC | Age: 63
End: 2018-08-09

## 2018-08-09 VITALS
HEART RATE: 77 BPM | RESPIRATION RATE: 18 BRPM | TEMPERATURE: 96.2 F | WEIGHT: 293 LBS | SYSTOLIC BLOOD PRESSURE: 109 MMHG | HEIGHT: 72 IN | DIASTOLIC BLOOD PRESSURE: 67 MMHG | BODY MASS INDEX: 39.68 KG/M2 | OXYGEN SATURATION: 97 %

## 2018-08-09 DIAGNOSIS — N17.9 AKI (ACUTE KIDNEY INJURY) (HCC): ICD-10-CM

## 2018-08-09 DIAGNOSIS — M31.6 TEMPORAL ARTERITIS (HCC): Primary | ICD-10-CM

## 2018-08-09 DIAGNOSIS — E08.8 DIABETES MELLITUS DUE TO UNDERLYING CONDITION, CONTROLLED, WITH COMPLICATION, WITHOUT LONG-TERM CURRENT USE OF INSULIN (HCC): ICD-10-CM

## 2018-08-09 DIAGNOSIS — F33.1 MODERATE EPISODE OF RECURRENT MAJOR DEPRESSIVE DISORDER (HCC): ICD-10-CM

## 2018-08-09 DIAGNOSIS — G89.29 OTHER CHRONIC PAIN: ICD-10-CM

## 2018-08-09 DIAGNOSIS — G89.29 CHRONIC BILATERAL LOW BACK PAIN WITHOUT SCIATICA: ICD-10-CM

## 2018-08-09 DIAGNOSIS — M54.50 CHRONIC BILATERAL LOW BACK PAIN WITHOUT SCIATICA: ICD-10-CM

## 2018-08-09 DIAGNOSIS — I10 ESSENTIAL HYPERTENSION: ICD-10-CM

## 2018-08-09 DIAGNOSIS — M79.7 FIBROMYALGIA: ICD-10-CM

## 2018-08-09 RX ORDER — PREDNISONE 10 MG/1
TABLET ORAL
Qty: 26 TAB | Refills: 0 | Status: SHIPPED | OUTPATIENT
Start: 2018-08-09 | End: 2019-04-11 | Stop reason: ALTCHOICE

## 2018-08-09 RX ORDER — HYDROCODONE BITARTRATE AND ACETAMINOPHEN 5; 325 MG/1; MG/1
1 TABLET ORAL
Qty: 30 TAB | Refills: 0 | Status: SHIPPED | OUTPATIENT
Start: 2018-08-09 | End: 2018-08-19

## 2018-08-09 NOTE — MR AVS SNAPSHOT
[address] [facility] 
[phone] Patient: Nicolasa Sanchez MRN: [id number] EAR:[birth year] Visit Information Date & Time Provider Department Dept. Phone Encounter #  
 8/9/2018  9:40 AM Chloe Galindo MD [facility] at [address] [id number] Follow-up Instructions Return in about 4 weeks (around 9/6/2018) for DM2, pain, sooner if not better in 3 days. Upcoming Health Maintenance Date Due  
 PAP AKA CERVICAL CYTOLOGY 7/14/2014 EYE EXAM RETINAL OR DILATED Q1 2/5/2015 Influenza Age 5 to Adult 8/1/2018 FOOT EXAM Q1 11/22/2018 HEMOGLOBIN A1C Q6M 2/6/2019 BREAST CANCER SCRN MAMMOGRAM 4/7/2019 MEDICARE YEARLY EXAM 6/6/2019 MICROALBUMIN Q1 8/6/2019 LIPID PANEL Q1 8/6/2019 DTaP/Tdap/Td series (2 - Td) 7/15/2026 COLONOSCOPY 7/12/2027 Allergies as of 8/9/2018  Review Complete On: 8/9/2018 By: Chase Hood LPN Severity Noted Reaction Type Reactions Allopurinol  06/01/2011    Hives Darvocet A500 [Propoxyphene N-acetaminophen]  06/05/2011    Hives Influenza Virus Vaccine, Specific  10/30/2017    Other (comments) Allergy documented in admission data base Seafood [Shellfish Containing Products]  06/01/2011    Itching NEW ALLERGY; REACTION WORSENS AS AMOUNT EATEN INCREASES Current Immunizations  Reviewed on 11/23/2015 Name Date Pneumococcal Vaccine (Unspecified Type) 1/1/2015 Tdap 7/15/2016 Not reviewed this visit You Were Diagnosed With   
  
 Codes Comments Diabetes mellitus due to underlying condition, controlled, with complication, without long-term current use of insulin ([facility]    -  Primary ICD-10-CM: E08.8 ICD-9-CM: 249.90 Chronic bilateral low back pain without sciatica     ICD-10-CM: M54.5, G89.29 ICD-9-CM: 724.2, 338.29 Moderate episode of recurrent major depressive disorder (HCC)     ICD-10-CM: F33.1 ICD-9-CM: 296.32 Essential hypertension     ICD-10-CM: I10 
ICD-9-CM: 401.9 ANAI (acute kidney injury) ([facility]     ICD-10-CM: N17.9 ICD-9-CM: 915. 9 Temporal arteritis (HCC)     ICD-10-CM: M31.6 ICD-9-CM: 446.5 Other chronic pain     ICD-10-CM: G89.29 ICD-9-CM: 338.29 Fibromyalgia     ICD-10-CM: M79.7 ICD-9-CM: 729.1 Vitals BP Pulse Temp Resp Height(growth percentile) Weight(growth percentile) 109/67 (BP 1 Location: Left arm, BP Patient Position: Sitting) 77 96.2 °F (35.7 °C) (Oral) 18 6' 1\" (1.854 m) 298 lb 9.6 oz (135.4 kg) LMP SpO2 BMI OB Status Smoking Status 04/04/1996 (LMP Unknown) 97% 39.4 kg/m2 Hysterectomy Former Smoker BMI and BSA Data Body Mass Index Body Surface Area  
 39.4 kg/m 2 2.64 m 2 Preferred Pharmacy Pharmacy Name Phone [address] [phone] Your Updated Medication List  
  
   
This list is accurate as of 8/9/18 10:06 AM.  Always use your most recent med list.  
  
  
  
  
 acyclovir 400 mg tablet Commonly known as:  ZOVIRAX TAKE 1 TABLET BY MOUTH TWICE DAILY  
  
 albuterol 90 mcg/actuation inhaler Commonly known as:  PROAIR HFA Take 2 Puffs by inhalation every four (4) hours as needed for Wheezing. albuterol-ipratropium 2.5 mg-0.5 mg/3 ml Nebu Commonly known as:  Maylon Reagin INHALE 1 VIAL VIA NEBULIZER EVERY 4 HOURS AS NEEDED  
  
 alcohol swabs Padm Commonly known as:  BD Single Use Swabs Regular Test once daily Blood Glucose Control High&Low Soln Commonly known as:  ACCU-CHEK NIVIA CONTROL SOLN  
As directed Blood-Glucose Meter Misc Commonly known as:  ACCU-CHEK NIVIA PLUS METER Test once daily buPROPion 100 mg tablet Commonly known as:  [facility] Take 1 Tab by mouth two (2) times a day. cyclobenzaprine 10 mg tablet Commonly known as:  FLEXERIL Take 0.5 Tabs by mouth nightly. DULoxetine 30 mg capsule Commonly known as:  CYMBALTA Take 1 Cap by mouth two (2) times a day. fluticasone 50 mcg/actuation nasal spray Commonly known as:  Destiny Belcher INSTILL 2 SPRAYS IN EACH NOSTRIL EVERY DAY  
  
 fluticasone-salmeterol 250-50 mcg/dose diskus inhaler Commonly known as:  ADVAIR Take 1 Puff by inhalation every twelve (12) hours. gabapentin 300 mg capsule Commonly known as:  NEURONTIN  
TAKE 1 CAPSULE BY MOUTH THREE TIMES DAILY gemfibrozil 600 mg tablet Commonly known as:  LOPID Take 1 Tab by mouth two (2) times a day. glucose blood VI test strips strip Commonly known as:  ACCU-CHEK NIVIA PLUS TEST STRP Test once daily HYDROcodone-acetaminophen 5-325 mg per tablet Commonly known as:  [facility] Take 1 Tab by mouth daily as needed for Pain. Lancets Misc Commonly known as:  ACCU-CHEK SOFTCLIX LANCETS Test once daily  
  
 lisinopril-hydroCHLOROthiazide 10-12.5 mg per tablet Commonly known as:  Climmie Sandee Take 1 Tab by mouth daily. metFORMIN 1,000 mg tablet Commonly known as:  GLUCOPHAGE  
500mg in am and 1000 mg pm  
  
 omeprazole 20 mg capsule Commonly known as:  PRILOSEC  
TAKE 1 CAPSULE BY MOUTH EVERY DAY  
  
 potassium chloride SR 10 mEq tablet Commonly known as:  KLOR-CON 10 TK 1 T PO QD  
  
 * predniSONE 10 mg dose pack Commonly known as:  STERAPRED DS See administration instruction per 10mg dose pack * predniSONE 10 mg tablet Commonly known as:  DELTASONE  
4 pills daily X 2d, then 3pills daily X3 day, then 2pills daily X 3days then 1 pill dailly X 3 days then stop  
  
 propranolol 40 mg tablet Commonly known as:  INDERAL Take 1 Tab by mouth two (2) times a day. SITagliptin 100 mg tablet Commonly known as:  Dariel Dubin Take 1 Tab by mouth daily. VITAMIN D3 2,000 unit Tab Generic drug:  cholecalciferol (vitamin D3) Take 1 Tab by mouth daily. * Notice: This list has 2 medication(s) that are the same as other medications prescribed for you. Read the directions carefully, and ask your doctor or other care provider to review them with you. Prescriptions Printed Refills HYDROcodone-acetaminophen (NORCO) 5-325 mg per tablet 0 Sig: Take 1 Tab by mouth daily as needed for Pain. Class: Print Route: Oral  
  
Prescriptions Sent to Pharmacy Refills  
 predniSONE (DELTASONE) 10 mg tablet 0 Si pills daily X 2d, then 3pills daily X3 day, then 2pills daily X 3days then 1 pill dailly X 3 days then stop Class: Normal  
 Pharmacy: [address] [place] [address], [address]  #: [phone] We Performed the Following CRP, HIGH SENSITIVITY [85705 CPT(R)] METABOLIC PANEL, BASIC [36752 CPT(R)] REFERRAL TO PHYSICAL THERAPY [XSE34 Custom] Comments:  
 Aquatic physical therapy. Chronic pain, fibromyalgia Follow-up Instructions Return in about 4 weeks (around 2018) for DM2, pain, sooner if not better in 3 days. Referral Information Referral ID Referred By Referred To  
  
 [id number] Maranda De Oliveira Not Available Visits Status Start Date End Date 1 New Request 18 If your referral has a status of pending review or denied, additional information will be sent to support the outcome of this decision. Introducing [facility]! Em Gtz introduces [employer] patient portal. Now you can access parts of your medical record, email your doctor's office, and request medication refills online. 1. In your internet browser, go to [email or web address] 2. Click on the First Time User? Click Here link in the Sign In box. You will see the New Member Sign Up page. 3. Enter your [employer] Access Code exactly as it appears below. You will not need to use this code after youve completed the sign-up process.  If you do not sign up before the expiration date, you must request a new code. · [employer] Access Code: [email or web address] Expires: 9/3/2018 12:17 PM 
 
4. Enter the last four digits of your Social Security Number (xxxx) and Date of Birth (mm/dd/yyyy) as indicated and click Submit. You will be taken to the next sign-up page. 5. Create a [employer] ID. This will be your [employer] login ID and cannot be changed, so think of one that is secure and easy to remember. 6. Create a [employer] password. You can change your password at any time. 7. Enter your Password Reset Question and Answer. This can be used at a later time if you forget your password. 8. Enter your e-mail address. You will receive e-mail notification when new information is available in [address] 9. Click Sign Up. You can now view and download portions of your medical record. 10. Click the Download Summary menu link to download a portable copy of your medical information. If you have questions, please visit the Frequently Asked Questions section of the [employer] website. Remember, [employer] is NOT to be used for urgent needs. For medical emergencies, dial 911. Now available from your iPhone and Android! Please provide this summary of care documentation to your next provider. Your primary care clinician is listed as Flory Richards. If you have any questions after today's visit, please call [phone].

## 2018-08-09 NOTE — PROGRESS NOTES
Leilani Beck is a 66 y.o. female     has a past medical history of Acute saddle pulmonary embolism without acute cor pulmonale ([facility] (11/3/2017); Arthritis; Asthma; Chronic obstructive pulmonary disease ([facility] Chronic pain; Diabetes mellitus due to underlying condition, controlled, with complication, without long-term current use of insulin ([facility] (3/21/2017); Diabetic polyneuropathy associated with type 2 diabetes mellitus ([facility] (11/1/2017); Essential hypertension (6/5/2018); Fibromyalgia; Gastroesophageal reflux disease without esophagitis (5/13/2016); Mixed hyperlipidemia (6/5/2018); Moderate episode of recurrent major depressive disorder ([facility] (11/1/2017); Noncompliance (8/6/2018); Severe obesity (BMI 35.0-39.9) ([facility] (6/5/2018); and Unspecified sleep apnea. Temporal arteritis vs. Shingles. Last visit 3 days ago I started her on prednisone 10mg taper course. She have done 2 days already, and today is the 3rd day. Currently no lesions or rash on head. But she's is still sensitive to touch on her front right/temporal aread of her head. Sensation of burning, electric. She denies fever, vision changes. It is slightly better. ESR roger, CRP 34, CBC-wbc normal, platelet elevation. We'll continue longer course of prednisone 40mg X 3d, 30mg X3d, 20mg X3d, 10mb X3d. Any worsening pain or vision changes to go to ED. reCheck crp.     kingsley on ckd:  Cr. Baseline 1.1, came up to 1.6, GFR 38 8/2018, but this is acute changes? We'll have pt hydrate, recheck labs. If not improved will lower januvia dose to 50mg. Hx of Dm2.  a1c 6.8% at goal, continue [place] and [place], metformin    HTN: last visit Hypotension: on Lisnipril/hctz 10/12.5 and propranolol 60mg bid. Last visit we lower propranolol to 40mg BID. BP better today    Chronic low back pain.  Denies redflags symptoms. She is taking gabapentin 300mg TID. Tramadol doesn't help much. Have tried lidocaine patch and it didn't help.  Have trien TENS unit it helps while on it. Usually she manages on her own, takes norco seldomly. We'll write for norco #30 this month. She would love to have Pool therapy, we'll do refer.      Anxiety and depression. Stable on Wellbutrin and cymbalta.  She is sleeping through, more motivated, appears cheery, smiling. She denies SI or HI.  Continue course.         Reviewed: active problem list, medication list, allergies, notes from last encounter, lab results    A comprehensive review of systems was negative except for that written in the HPI. Allergies   Allergen Reactions    Allopurinol Hives    [de-identified] A500 [Propoxyphene N-Acetaminophen] Hives    Influenza Virus Vaccine, Specific Other (comments)     Allergy documented in admission data base    Seafood [Shellfish Containing Products] Itching     NEW ALLERGY; REACTION WORSENS AS AMOUNT EATEN INCREASES     Current Outpatient Prescriptions on File Prior to Visit   Medication Sig Dispense Refill    DULoxetine (CYMBALTA) 30 mg capsule Take 1 Cap by mouth two (2) times a day. 180 Cap 0    gabapentin (NEURONTIN) 300 mg capsule TAKE 1 CAPSULE BY MOUTH THREE TIMES DAILY 270 Cap 0    gemfibrozil (LOPID) 600 mg tablet Take 1 Tab by mouth two (2) times a day. 180 Tab 1    propranolol (INDERAL) 40 mg tablet Take 1 Tab by mouth two (2) times a day. (Patient taking differently: 20 mg two (2) times a day. Take 1 Tab by mouth two (2) times a day.) 60 Tab 2    predniSONE (STERAPRED DS) 10 mg dose pack See administration instruction per 10mg dose pack 21 Tab 0    cyclobenzaprine (FLEXERIL) 10 mg tablet Take 0.5 Tabs by mouth nightly. 30 Tab 0    acyclovir (ZOVIRAX) 400 mg tablet TAKE 1 TABLET BY MOUTH TWICE DAILY 180 Tab 1    fluticasone-salmeterol (ADVAIR) 250-50 mcg/dose diskus inhaler Take 1 Puff by inhalation every twelve (12) hours. 180 Each 1    buPROPion (WELLBUTRIN) 100 mg tablet Take 1 Tab by mouth two (2) times a day.  180 Tab 0    omeprazole (PRILOSEC) 20 mg capsule TAKE 1 CAPSULE BY MOUTH EVERY DAY 90 Cap 1    potassium chloride SR (KLOR-CON 10) 10 mEq tablet TK 1 T PO QD 90 Tab 1    Lancets (ACCU-CHEK SOFTCLIX LANCETS) misc Test once daily 100 Each 3    alcohol swabs (BD SINGLE USE SWABS REGULAR) padm Test once daily 100 Pad 3    Blood Glucose Control High&Low (ACCU-CHEK NIVIA CONTROL SOLN) soln As directed 3 Bottle 3    Blood-Glucose Meter (ACCU-CHEK NIVIA PLUS METER) misc Test once daily 1 Each 0    glucose blood VI test strips (ACCU-CHEK NIVIA PLUS TEST STRP) strip Test once daily 100 Strip 3    albuterol (PROAIR HFA) 90 mcg/actuation inhaler Take 2 Puffs by inhalation every four (4) hours as needed for Wheezing. 3 Inhaler 1    lisinopril-hydroCHLOROthiazide (PRINZIDE, ZESTORETIC) 10-12.5 mg per tablet Take 1 Tab by mouth daily. 90 Tab 1    SITagliptin (JANUVIA) 100 mg tablet Take 1 Tab by mouth daily. 90 Tab 1    metFORMIN (GLUCOPHAGE) 1,000 mg tablet 500mg in am and 1000 mg pm 180 Tab 1    fluticasone (FLONASE) 50 mcg/actuation nasal spray INSTILL 2 SPRAYS IN EACH NOSTRIL EVERY DAY 1 Bottle 0    cholecalciferol, vitamin D3, (VITAMIN D3) 2,000 unit tab Take 1 Tab by mouth daily.  albuterol-ipratropium (DUO-NEB) 2.5 mg-0.5 mg/3 ml nebu INHALE 1 VIAL VIA NEBULIZER EVERY 4 HOURS AS NEEDED 1620 mL 5     No current facility-administered medications on file prior to visit.       Patient Active Problem List   Diagnosis Code    Lumbar stenosis M48.061    Asthma J45.909    Primary localized osteoarthrosis, lower leg M17.10    DJD (degenerative joint disease) of knee M17.10    Chronic pain G89.29    Fibromyalgia M79.7    COPD (chronic obstructive pulmonary disease) ([facility]) J44.9    Gastroesophageal reflux disease without esophagitis K21.9    Chronic low back pain with bilateral sciatica M54.41, M54.42, G89.29    Lumbar spondylosis M47.816    Spinal stenosis M48.00    Status post laminectomy Z98.890    Diabetes mellitus due to underlying condition, controlled, with complication, without long-term current use of insulin ([facility]) E08.8    Acute pulmonary embolism ([facility]) I26.99    Moderate episode of recurrent major depressive disorder ([facility] F33.1    Diabetic polyneuropathy associated with type 2 diabetes mellitus ([facility]) E11.42    Acute saddle pulmonary embolism without acute cor pulmonale ([facility]) I26.92    Severe obesity (BMI 35.0-39.9) ([facility]) E66.01    Essential hypertension I10    Mixed hyperlipidemia E78.2    Type 2 diabetes with nephropathy ([facility]) E11.21    Noncompliance Z91.19       Visit Vitals    /67 (BP 1 Location: Left arm, BP Patient Position: Sitting)    Pulse 77    Temp 96.2 °F (35.7 °C) (Oral)    Resp 18    Ht 6' 1\" (1.854 m)    Wt 298 lb 9.6 oz (135.4 kg)    SpO2 97%    BMI 39.4 kg/m2     General appearance: alert, cooperative, no distress, appears stated age  Neurologic: Alert and oriented X 3, normal strength and tone, symmetric. Normal without focal findings. Cranial nerves 2-12 intact. Normal coordination and gait. Mental status: Alert, oriented, thought content appropriate, affect: stable, mood-congruent. Head: Normocephalic, without obvious abnormality, atraumatic   Less sensitive to touch compares to last time of her skin at frontal right temporal area. Skin normal.   Eyes: conjunctivae/corneas clear. PERRL, EOM's intact. Neck: supple, symmetrical, trachea midline, no JVD  Lungs: clear to auscultation bilaterally  Heart: regular rate and rhythm, S1, S2 normal, no murmur, click, rub or gallop  Abdomen: soft, non-tender. Extremities: extremities normal, atraumatic, no cyanosis or edema      Assessment/Plans:    Diagnoses and all orders for this visit:    1. Temporal arteritis ([facility])  -     CRP, HIGH SENSITIVITY  -     predniSONE (DELTASONE) 10 mg tablet; 4 pills daily X 2d, then 3pills daily X3 day, then 2pills daily X 3days then 1 pill dailly X 3 days then stop    2.  Chronic bilateral low back pain without sciatica  - HYDROcodone-acetaminophen (NORCO) 5-325 mg per tablet; Take 1 Tab by mouth daily as needed for Pain.  -     REFERRAL TO PHYSICAL THERAPY    3. Diabetes mellitus due to underlying condition, controlled, with complication, without long-term current use of insulin ([facility])  -     METABOLIC PANEL, BASIC    4. Moderate episode of recurrent major depressive disorder ([facility]    5. Essential hypertension  -     METABOLIC PANEL, BASIC    6. ANAI (acute kidney injury) ([facility]  -     METABOLIC PANEL, BASIC    7. Other chronic pain  -     REFERRAL TO PHYSICAL THERAPY    8. Fibromyalgia  -     REFERRAL TO PHYSICAL THERAPY      Discussed plans, risk/benefits of treatments/observations. Through the use of shared decision making, above plans were agreed upon. Medication compliance advised. Patient verbalized understanding. Follow-up Disposition:  Return in about 4 weeks (around 9/6/2018) for DM2, pain, sooner if not better in 3 days.       Alley Guerrero MD  8/10/2018

## 2018-08-09 NOTE — LETTER
8/9/2018 9:47 AM 
 
Ms. [address] [facility] [address] [id number] Dear Suzanne Espino: Please find your most recent results below. Resulted Orders HEMOGLOBIN A1C W/O EAG Result Value Ref Range Hemoglobin A1c 6.8 (H) 4.8 - 5.6 % Comment:  
            Pre-diabetes: 5.7 - 6.4 Diabetes: >6.4 Glycemic control for adults with diabetes: <7.0 Narrative Performed at:  [address]  [id number] : Juve Salgado MD, Phone:  [phone] METABOLIC PANEL, COMPREHENSIVE Result Value Ref Range Glucose 141 (H) 65 - 99 mg/dL BUN 29 (H) 8 - 27 mg/dL Creatinine 1.65 (H) 0.57 - 1.00 mg/dL GFR est non-AA 33 (L) >59 mL/min/1.73 GFR est AA 38 (L) >59 mL/min/1.73  
 BUN/Creatinine ratio 18 12 - 28 Sodium 136 134 - 144 mmol/L Potassium 4.7 3.5 - 5.2 mmol/L Chloride 96 96 - 106 mmol/L  
 CO2 23 20 - 29 mmol/L Calcium 10.1 8.7 - 10.3 mg/dL Protein, total 8.8 (H) 6.0 - 8.5 g/dL Albumin 4.7 3.6 - 4.8 g/dL GLOBULIN, TOTAL 4.1 1.5 - 4.5 g/dL A-G Ratio 1.1 (L) 1.2 - 2.2 Bilirubin, total 0.2 0.0 - 1.2 mg/dL Alk. phosphatase 70 39 - 117 IU/L  
 AST (SGOT) 14 0 - 40 IU/L  
 ALT (SGPT) 12 0 - 32 IU/L Narrative Performed at:  [address]  [id number] : Juve Salgado MD, Phone:  [phone] LIPID PANEL Result Value Ref Range Cholesterol, total 223 (H) 100 - 199 mg/dL Triglyceride 145 0 - 149 mg/dL HDL Cholesterol 38 (L) >39 mg/dL VLDL, calculated 29 5 - 40 mg/dL LDL, calculated 156 (H) 0 - 99 mg/dL Narrative Performed at:  [address]  [id number] : Juve Salgado MD, Phone:  [phone] MICROALBUMIN, UR, RAND W/ MICROALB/CREAT RATIO Result Value Ref Range Creatinine, urine 253.0 Not Estab. mg/dL Microalbumin, urine 16.5 Not Estab. ug/mL Microalb/Creat ratio (ug/mg creat.) 6.5 0.0 - 30.0 mg/g creat Narrative Performed at:  [address]  [id number] : Iftikhar Prabhakar MD, Phone:  [phone] CBC W/O DIFF Result Value Ref Range WBC 9.1 3.4 - 10.8 x10E3/uL  
 RBC 4.30 3.77 - 5.28 x10E6/uL HGB 12.4 11.1 - 15.9 g/dL HCT 36.7 34.0 - 46.6 % MCV 85 79 - 97 fL  
 MCH 28.8 26.6 - 33.0 pg  
 MCHC 33.8 31.5 - 35.7 g/dL  
 RDW 13.6 12.3 - 15.4 % PLATELET 889 (H) 433 - 379 x10E3/uL Narrative Performed at:  [address]  [id number] : Iftikhar Prabhakar MD, Phone:  [phone] TSH RFX ON ABNORMAL TO FREE T4 Result Value Ref Range TSH 3.400 0.450 - 4.500 uIU/mL Narrative Performed at:  [address]  [id number] : Iftikhar Prabhakar MD, Phone:  [phone] CRP, HIGH SENSITIVITY Result Value Ref Range C-Reactive Protein, Cardiac 34.14 (H) 0.00 - 3.00 mg/L Comment:  
            Relative Risk for Future Cardiovascular Event Low                 <1.00 Average       1.00 - 3.00 High                >3.00 Narrative Performed at:  [address]  [id number] : Iftikhar Prabhakar MD, Phone:  [phone] SED RATE (ESR) Result Value Ref Range Sed rate (ESR) 17 0 - 40 mm/hr Narrative Performed at:  [address]  [id number] : Iftikhar Prabhakar MD, Phone:  [phone] Sincerely, Irma Quintana MD

## 2018-08-19 ENCOUNTER — APPOINTMENT (OUTPATIENT)
Dept: CT IMAGING | Age: 63
End: 2018-08-19
Attending: EMERGENCY MEDICINE
Payer: MEDICARE

## 2018-08-19 ENCOUNTER — HOSPITAL ENCOUNTER (EMERGENCY)
Age: 63
Discharge: HOME | End: 2018-08-19
Attending: EMERGENCY MEDICINE
Payer: MEDICARE

## 2018-08-19 ENCOUNTER — APPOINTMENT (OUTPATIENT)
Dept: GENERAL RADIOLOGY | Age: 63
End: 2018-08-19
Attending: EMERGENCY MEDICINE
Payer: MEDICARE

## 2018-08-19 VITALS
OXYGEN SATURATION: 97 % | SYSTOLIC BLOOD PRESSURE: 158 MMHG | HEIGHT: 72 IN | WEIGHT: 293 LBS | RESPIRATION RATE: 18 BRPM | DIASTOLIC BLOOD PRESSURE: 96 MMHG | HEART RATE: 79 BPM | BODY MASS INDEX: 39.68 KG/M2 | TEMPERATURE: 97.9 F

## 2018-08-19 DIAGNOSIS — S09.90XA INJURY OF HEAD, INITIAL ENCOUNTER: Primary | ICD-10-CM

## 2018-08-19 DIAGNOSIS — S06.0X0A CONCUSSION WITHOUT LOSS OF CONSCIOUSNESS, INITIAL ENCOUNTER: ICD-10-CM

## 2018-08-19 PROCEDURE — 99282 EMERGENCY DEPT VISIT SF MDM: CPT

## 2018-08-19 PROCEDURE — 70450 CT HEAD/BRAIN W/O DYE: CPT

## 2018-08-19 PROCEDURE — 72050 X-RAY EXAM NECK SPINE 4/5VWS: CPT

## 2018-08-19 RX ORDER — BUTALBITAL, ACETAMINOPHEN AND CAFFEINE 50; 325; 40 MG/1; MG/1; MG/1
1 TABLET ORAL
Qty: 15 TAB | Refills: 0 | Status: SHIPPED | OUTPATIENT
Start: 2018-08-19 | End: 2019-07-01 | Stop reason: SDUPTHER

## 2018-08-19 RX ORDER — BUTALBITAL, ACETAMINOPHEN AND CAFFEINE 50; 325; 40 MG/1; MG/1; MG/1
1 TABLET ORAL
Qty: 15 TAB | Refills: 0 | Status: SHIPPED | OUTPATIENT
Start: 2018-08-19 | End: 2018-08-19

## 2018-08-19 NOTE — ED PROVIDER NOTES
EMERGENCY DEPARTMENT HISTORY AND PHYSICAL EXAM      Date: 8/19/2018  Patient Name: Marlene Mancuso    History of Presenting Illness     Chief Complaint   Patient presents with    Fall     3 weeks ago pt fell and hit front left side of head, pt reports falling after changing bp medications. pt denies dizziness or vision changes.  Head Injury       History Provided By: Patient    HPI: Marlene Mancuso, 66 y.o. female with PMHx significant for fibromyalgia, COPD, GERD, asthma, DM, HTN, presents ambulatory to the ED with cc of gradual onset HA, onset 3 weeks ago, after GLF when pt hit head. Pt reports that she was taking blood pressure medication that was lowering her blood pressure. Pt reports due to low blood pressure, she felt lightheaded and fell. Pt reports a bruised left eye due to GLF. Pt reports after she went to PCP, and PCP diagnosed pt with shingles. Pt denies being on any blood thinner. Pt reports currently blood pressure stable. Pt reports nml neck stiffness due to fibromyalgia. Pt denies prior head trauma. She specifically denies any fevers, chills, nausea, vomiting, chest pain, shortness of breath, rash, diarrhea, sweating or weight loss. There are no other complaints, changes, or physical findings at this time. PCP: Tisha Wynn MD    Current Outpatient Prescriptions   Medication Sig Dispense Refill    butalbital-acetaminophen-caffeine (ESGIC) -40 mg per tablet Take 1 Tab by mouth every six (6) hours as needed for Pain. 15 Tab 0    predniSONE (DELTASONE) 10 mg tablet 4 pills daily X 2d, then 3pills daily X3 day, then 2pills daily X 3days then 1 pill dailly X 3 days then stop 26 Tab 0    DULoxetine (CYMBALTA) 30 mg capsule Take 1 Cap by mouth two (2) times a day. 180 Cap 0    gabapentin (NEURONTIN) 300 mg capsule TAKE 1 CAPSULE BY MOUTH THREE TIMES DAILY 270 Cap 0    gemfibrozil (LOPID) 600 mg tablet Take 1 Tab by mouth two (2) times a day.  180 Tab 1    propranolol (INDERAL) 40 mg tablet Take 1 Tab by mouth two (2) times a day. (Patient taking differently: 20 mg two (2) times a day. Take 1 Tab by mouth two (2) times a day.) 60 Tab 2    predniSONE (STERAPRED DS) 10 mg dose pack See administration instruction per 10mg dose pack 21 Tab 0    cyclobenzaprine (FLEXERIL) 10 mg tablet Take 0.5 Tabs by mouth nightly. 30 Tab 0    acyclovir (ZOVIRAX) 400 mg tablet TAKE 1 TABLET BY MOUTH TWICE DAILY 180 Tab 1    fluticasone-salmeterol (ADVAIR) 250-50 mcg/dose diskus inhaler Take 1 Puff by inhalation every twelve (12) hours. 180 Each 1    buPROPion (WELLBUTRIN) 100 mg tablet Take 1 Tab by mouth two (2) times a day. 180 Tab 0    omeprazole (PRILOSEC) 20 mg capsule TAKE 1 CAPSULE BY MOUTH EVERY DAY 90 Cap 1    potassium chloride SR (KLOR-CON 10) 10 mEq tablet TK 1 T PO QD 90 Tab 1    Lancets (ACCU-CHEK SOFTCLIX LANCETS) misc Test once daily 100 Each 3    alcohol swabs (BD SINGLE USE SWABS REGULAR) padm Test once daily 100 Pad 3    Blood Glucose Control High&Low (ACCU-CHEK NIVIA CONTROL SOLN) soln As directed 3 Bottle 3    Blood-Glucose Meter (ACCU-CHEK NIVIA PLUS METER) misc Test once daily 1 Each 0    glucose blood VI test strips (ACCU-CHEK NIVIA PLUS TEST STRP) strip Test once daily 100 Strip 3    albuterol (PROAIR HFA) 90 mcg/actuation inhaler Take 2 Puffs by inhalation every four (4) hours as needed for Wheezing. 3 Inhaler 1    lisinopril-hydroCHLOROthiazide (PRINZIDE, ZESTORETIC) 10-12.5 mg per tablet Take 1 Tab by mouth daily. 90 Tab 1    SITagliptin (JANUVIA) 100 mg tablet Take 1 Tab by mouth daily. 90 Tab 1    metFORMIN (GLUCOPHAGE) 1,000 mg tablet 500mg in am and 1000 mg pm 180 Tab 1    fluticasone (FLONASE) 50 mcg/actuation nasal spray INSTILL 2 SPRAYS IN EACH NOSTRIL EVERY DAY 1 Bottle 0    cholecalciferol, vitamin D3, (VITAMIN D3) 2,000 unit tab Take 1 Tab by mouth daily.       albuterol-ipratropium (DUO-NEB) 2.5 mg-0.5 mg/3 ml nebu INHALE 1 VIAL VIA NEBULIZER EVERY 4 HOURS AS NEEDED 1620 mL 5       Past History     Past Medical History:  Past Medical History:   Diagnosis Date    Acute saddle pulmonary embolism without acute cor pulmonale (HCC) 11/3/2017    Arthritis     Asthma     Chronic obstructive pulmonary disease (HCC)     Chronic pain     back/hip    Diabetes mellitus due to underlying condition, controlled, with complication, without long-term current use of insulin ([address] 3/21/2017    Diabetic polyneuropathy associated with type 2 diabetes mellitus ([address] 2017    Essential hypertension 2018    Fibromyalgia     Gastroesophageal reflux disease without esophagitis 2016    Mixed hyperlipidemia 2018    Moderate episode of recurrent major depressive disorder ([address] 2017    Noncompliance 2018    Severe obesity (BMI 35.0-39.9) ([address] 2018    Unspecified sleep apnea     CAN'T TOLERATE CPAP       Past Surgical History:  Past Surgical History:   Procedure Laterality Date    COLONOSCOPY N/A 2017    COLONOSCOPY performed by Stephanie Vallejo MD at [facility] ENDOSCOPY     [facility]    one ovary removed    HX ORTHOPAEDIC  2012    lumbar fusion    HX ORTHOPAEDIC  2/16/15    RIGHT TOTAL KNEE ARTHROPLASTY    HX ORTHOPAEDIC  6/16/15    LEFT TOTAL KNEE ARTHROPLASTY          Family History:  Family History   Problem Relation Age of Onset    Anesth Problems Mother     Cancer Mother      LUNG CA - SMOKER    Other Mother      CEREBRAL ANEURYSM    Diabetes Mother     Diabetes Father     Other Sister      VARICOSE VEINS THAT HURT AND BLEED    Heart Attack Brother     Other Other      MOTHER'S FATHER'S MOTHER  WITH ANEURYSM       Social History:  Social History   Substance Use Topics    Smoking status: Former Smoker     Packs/day: 1.00     Years: 37.00     Quit date: 2015    Smokeless tobacco: Never Used    Alcohol use Yes      Comment: VERY RARE WINE       Allergies:   Allergies   Allergen Reactions    Allopurinol Hives    Darvocet A500 [Propoxyphene N-Acetaminophen] Hives    Influenza Virus Vaccine, Specific Other (comments)     Allergy documented in admission data base    Seafood [Shellfish Containing Products] Itching     NEW ALLERGY; REACTION WORSENS AS AMOUNT EATEN INCREASES         Review of Systems   Review of Systems   Constitutional: Negative. Negative for chills and fever. HENT: Negative. Negative for congestion and rhinorrhea. Respiratory: Negative. Negative for cough, chest tightness and wheezing. Cardiovascular: Negative. Negative for chest pain and palpitations. Gastrointestinal: Negative. Negative for abdominal pain, constipation, nausea and vomiting. Endocrine: Negative. Genitourinary: Negative. Negative for decreased urine volume, flank pain, hematuria and pelvic pain. Musculoskeletal: Negative. Negative for back pain and neck pain. Skin: Negative. Negative for color change, pallor and rash. Neurological: Positive for headaches (left side). Negative for dizziness, seizures, weakness and numbness. Hematological: Negative. Negative for adenopathy. Psychiatric/Behavioral: Negative. All other systems reviewed and are negative. Physical Exam   Physical Exam   Constitutional: She is oriented to person, place, and time. She appears well-developed and well-nourished. No distress. HENT:   Head: Normocephalic. Head is with contusion. Head is without raccoon's eyes, without Thomas's sign, without abrasion and without laceration. Hair is normal.       Mouth/Throat: No oropharyngeal exudate. Eyes: Conjunctivae are normal. Pupils are equal, round, and reactive to light. Right eye exhibits no discharge. Left eye exhibits no discharge. No scleral icterus. Neck: Neck supple. No JVD present. Muscular tenderness present. No spinous process tenderness present. Decreased range of motion present. No Brudzinski's sign and no Kernig's sign noted. Cardiovascular: Normal rate, regular rhythm, normal heart sounds and intact distal pulses. Exam reveals no gallop and no friction rub. No murmur heard. Pulmonary/Chest: Effort normal and breath sounds normal. No stridor. No respiratory distress. She has no wheezes. She has no rales. She exhibits no tenderness. Abdominal: Soft. Bowel sounds are normal. She exhibits no distension and no mass. There is no tenderness. There is no rebound and no guarding. Neurological: She is alert and oriented to person, place, and time. She has normal strength. She displays normal reflexes. No cranial nerve deficit or sensory deficit. She exhibits normal muscle tone. Coordination normal. GCS eye subscore is 4. GCS verbal subscore is 5. GCS motor subscore is 6. Reflex Scores:       Patellar reflexes are 2+ on the right side and 2+ on the left side. Skin: Skin is warm. No rash noted. She is not diaphoretic. No pallor. Vitals reviewed. Diagnostic Study Results     Labs -   No results found for this or any previous visit (from the past 12 hour(s)). Radiologic Studies -   CT HEAD WO CONT   Final Result      XR SPINE CERV 4 OR 5 V   Final Result        CT Results  (Last 48 hours)               08/19/18 2016  CT HEAD WO CONT Final result    Impression:  IMPRESSION: No acute abnormality identified               Narrative:  EXAM:  CT HEAD WO CONT       INDICATION:   head injury history of falling with head injury       COMPARISON: None. CONTRAST:  None. TECHNIQUE: Unenhanced CT of the head was performed using 5 mm images. Brain and   bone windows were generated. CT dose reduction was achieved through use of a   standardized protocol tailored for this examination and automatic exposure   control for dose modulation. FINDINGS:   The ventricles are within normal limits. There is slight prominence of the   sulci. No hemorrhage mass or acute infarction identified. Bony structures are   intact. CXR Results  (Last 48 hours)    None            Medical Decision Making   I am the first provider for this patient. I reviewed the vital signs, available nursing notes, past medical history, past surgical history, family history and social history. Vital Signs-Reviewed the patient's vital signs. Patient Vitals for the past 12 hrs:   Temp Pulse Resp BP SpO2   08/19/18 1926 97.9 °F (36.6 °C) 79 18 (!) 158/96 97 %       Pulse Oximetry Analysis - 97% on RA    Cardiac Monitor:   Rate: 79 bpm  Rhythm: Normal Sinus Rhythm 1926     Records Reviewed: Nursing Notes and Old Medical Records    Provider Notes (Medical Decision Making):   DDx: Intracranial hemorrhage, subdural hematoma, concussion, cervical fracture,post concussion syndrome  Impression/Plan: Hx of HTN with recent fall due to orthostatic HTN 3 weeks ago. She hit head and since then has had HA. Has no focal neurological deficits here. CT unremarkable, so suspect concussion, will refer to neurology. ED Course:   Initial assessment performed. The patients presenting problems have been discussed, and they are in agreement with the care plan formulated and outlined with them. I have encouraged them to ask questions as they arise throughout their visit. Critical Care Time:   0 minutes    Disposition:  Discharge Note:  8:47 PM  The pt is ready for discharge. The pt's signs, symptoms, diagnosis, and discharge instructions have been discussed and pt has conveyed their understanding. The pt is to follow up as recommended or return to ER should their symptoms worsen. Plan has been discussed and pt is in agreement. PLAN:  1.    Discharge Medication List as of 8/19/2018  8:47 PM      START taking these medications    Details   butalbital-acetaminophen-caffeine (ESGIC) -40 mg per tablet Take 1 Tab by mouth every six (6) hours as needed for Pain., Print, Disp-15 Tab, R-0         CONTINUE these medications which have NOT CHANGED    Details   predniSONE (DELTASONE) 10 mg tablet 4 pills daily X 2d, then 3pills daily X3 day, then 2pills daily X 3days then 1 pill dailly X 3 days then stop, Normal, Disp-26 Tab, R-0      DULoxetine (CYMBALTA) 30 mg capsule Take 1 Cap by mouth two (2) times a day., Normal, Disp-180 Cap, R-0      gabapentin (NEURONTIN) 300 mg capsule TAKE 1 CAPSULE BY MOUTH THREE TIMES DAILY, Normal, Disp-270 Cap, R-0      gemfibrozil (LOPID) 600 mg tablet Take 1 Tab by mouth two (2) times a day., Normal, Disp-180 Tab, R-1      propranolol (INDERAL) 40 mg tablet Take 1 Tab by mouth two (2) times a day., Normal, Disp-60 Tab, R-2      predniSONE (STERAPRED DS) 10 mg dose pack See administration instruction per 10mg dose pack, Print, Disp-21 Tab, R-0      cyclobenzaprine (FLEXERIL) 10 mg tablet Take 0.5 Tabs by mouth nightly., Normal, Disp-30 Tab, R-0      acyclovir (ZOVIRAX) 400 mg tablet TAKE 1 TABLET BY MOUTH TWICE DAILY, Normal, Disp-180 Tab, R-1      fluticasone-salmeterol (ADVAIR) 250-50 mcg/dose diskus inhaler Take 1 Puff by inhalation every twelve (12) hours. , Normal, Disp-180 Each, R-1      buPROPion (WELLBUTRIN) 100 mg tablet Take 1 Tab by mouth two (2) times a day., Normal, Disp-180 Tab, R-0      omeprazole (PRILOSEC) 20 mg capsule TAKE 1 CAPSULE BY MOUTH EVERY DAY, Normal, Disp-90 Cap, R-1      potassium chloride SR (KLOR-CON 10) 10 mEq tablet TK 1 T PO QD, Normal, Disp-90 Tab, R-1      Lancets (ACCU-CHEK SOFTCLIX LANCETS) misc Test once daily, Normal, Disp-100 Each, R-3      alcohol swabs (BD SINGLE USE SWABS REGULAR) padm Test once daily, Normal, Disp-100 Pad, R-3      Blood Glucose Control High&Low (ACCU-CHEK NIVIA CONTROL SOLN) soln As directed, Normal, Disp-3 Bottle, R-3      Blood-Glucose Meter (ACCU-CHEK NIVIA PLUS METER) misc Test once daily, Normal, Disp-1 Each, R-0      glucose blood VI test strips (ACCU-CHEK NIVIA PLUS TEST STRP) strip Test once daily, Normal, Disp-100 Strip, R-3      albuterol (PROAIR HFA) 90 mcg/actuation inhaler Take 2 Puffs by inhalation every four (4) hours as needed for Wheezing., Normal, Disp-3 Inhaler, R-1      lisinopril-hydroCHLOROthiazide (PRINZIDE, ZESTORETIC) 10-12.5 mg per tablet Take 1 Tab by mouth daily. , Normal, Disp-90 Tab, R-1      SITagliptin (JANUVIA) 100 mg tablet Take 1 Tab by mouth daily. , Normal, Disp-90 Tab, R-1      metFORMIN (GLUCOPHAGE) 1,000 mg tablet 500mg in am and 1000 mg pm, Normal**Patient requests 90 days supply**Disp-180 Tab, R-1      fluticasone (FLONASE) 50 mcg/actuation nasal spray INSTILL 2 SPRAYS IN EACH NOSTRIL EVERY DAY, Normal**Patient requests 90 days supply**Disp-1 Bottle, R-0      cholecalciferol, vitamin D3, (VITAMIN D3) 2,000 unit tab Take 1 Tab by mouth daily. , Historical Med      albuterol-ipratropium (DUO-NEB) 2.5 mg-0.5 mg/3 ml nebu INHALE 1 VIAL VIA NEBULIZER EVERY 4 HOURS AS NEEDED, Normal**Patient requests 90 days supply**Disp-1620 mL, R-5         STOP taking these medications       HYDROcodone-acetaminophen (NORCO) 5-325 mg per tablet Comments:   Reason for Stoppin.   Follow-up Information     Follow up With Details Comments Contact Info    Adrian Duarte MD Schedule an appointment as soon as possible for a visit in 1 day  [facility]   [address] Located within [facility]      Warren Boast, MD Schedule an appointment as soon as possible for a visit in 2 days  [address] [place] [address] [address] [place] [address]  [phone]      [facility] EMERGENCY DEPT  If symptoms worsen [address] [place] [address]  [phone]        Return to ED if worse     Diagnosis     Clinical Impression:   1. Injury of head, initial encounter    2. Concussion without loss of consciousness, initial encounter        Attestations: This note is prepared by Vandana Cisse, acting as Scribe for John Vang MD.    John Vang MD: The scribe's documentation has been prepared under my direction and personally reviewed by me in its entirety. I confirm that the note above accurately reflects all work, treatment, procedures, and medical decision making performed by me.

## 2018-08-20 ENCOUNTER — TELEPHONE (OUTPATIENT)
Dept: FAMILY MEDICINE CLINIC | Age: 63
End: 2018-08-20

## 2018-08-20 ENCOUNTER — PATIENT OUTREACH (OUTPATIENT)
Dept: FAMILY MEDICINE CLINIC | Age: 63
End: 2018-08-20

## 2018-08-20 NOTE — TELEPHONE ENCOUNTER
Pt states it is Urgent for her to speak to Dr. Chapin Villa she was told by Dr. Denia Sanchez that she had shingles and later found out she has a head concussion       [phone]

## 2018-08-20 NOTE — PROGRESS NOTES
Patient reports to this writer she was seen in ED on 8/19/18 for headache that has not gone away since fall. Her  diagnosis was a concussion. She would like to speak with PCP. She also reports wanting to change providers. This writer request if/when she changes providers for patient to call office to update chat. 12 PCP advised patient request of wanting to speak with him regarding concussion diagnosis. PCP verbalized understanding.

## 2018-08-20 NOTE — ED NOTES
Discharge instructions given to patient by Rosalina Schrader MD . Pt verbalized understanding of discharge instructions. Pt discharged without difficulty. Pt discharged in stable condition via ambulation, accompanied by self.

## 2018-08-20 NOTE — ED NOTES
Pt arrived ambulatory with cane to room #13 from triage. Pt with c/o of increased falls over the past several weeks. Pt reports that her PCP placed her on propanolol to help with \"shaking\", but never changed or reduced her additional BP medication. Pt reports that she has since reduced her propanolol to 1/2 the dose. Pt resting in position of comfort. Call bell within reach.

## 2018-08-20 NOTE — TELEPHONE ENCOUNTER
Spoke to ms. Jenaro Pop,     She was upset that I saw her 10 days after she had a fall and hit her head, and when exam, she was sensitive to light touch at the skin. At 10 days out I no longer worried about ICH. I had thought she may have temporal arteritis or shingles. She eventually went to ED 3-4 weeks later and had CT head, was negative for acute findings. The ED doctor says she likely had a concussion. I told her there were no different in treatment. She may have concussion.      Chloe Galindo MD  8/20/2018

## 2018-08-20 NOTE — DISCHARGE INSTRUCTIONS
Concussion: Care Instructions  Your Care Instructions    A concussion is a kind of injury to the brain. It happens when the head receives a hard blow. The impact can jar or shake the brain against the skull. This interrupts the brain's normal activities. Although you may have cuts or bruises on your head or face, you may have no other visible signs of a brain injury. In most cases, damage to the brain from a concussion can't be seen in tests such as a CT or MRI scan. For a few weeks, you may have low energy, dizziness, trouble sleeping, a headache, ringing in your ears, or nausea. You may also feel anxious, grumpy, or depressed. You may have problems with memory and concentration. These symptoms are common after a concussion. They should slowly improve over time. Sometimes this takes weeks or even months. Someone who lives with you should know how to care for you. Please share this and all information with a caregiver who will be available to help if needed. Follow-up care is a key part of your treatment and safety. Be sure to make and go to all appointments, and call your doctor if you are having problems. It's also a good idea to know your test results and keep a list of the medicines you take. How can you care for yourself at home? Pain control  · Put ice or a cold pack on the part of your head that hurts for 10 to 20 minutes at a time. Put a thin cloth between the ice and your skin. · Be safe with medicines. Read and follow all instructions on the label. ¨ If the doctor gave you a prescription medicine for pain, take it as prescribed. ¨ If you are not taking a prescription pain medicine, ask your doctor if you can take an over-the-counter medicine. Recovery  · Follow your doctor's instructions. He or she will tell you if you need someone to watch you closely for the next 24 hours or longer. · Rest is the best way to recover from a concussion.  You need to rest your body and your brain:  ¨ Get plenty of sleep at night. And take rest breaks during the day. ¨ Avoid activities that take a lot of physical or mental work. This includes housework, exercise, schoolwork, video games, text messaging, and using the computer. ¨ You may need to change your school or work schedule while you recover. ¨ Return to your normal activities slowly. Do not try to do too much at once. · Do not drink alcohol or use illegal drugs. Alcohol and illegal drugs can slow your recovery. And they can increase your risk of a second brain injury. · Avoid activities that could lead to another concussion. Follow your doctor's instructions for a gradual return to activity and sports. · Ask your doctor when it's okay for you to drive a car, ride a bike, or operate machinery. How should you return to activity? Your return to activity can begin after 1 to 2 days of physical and mental rest. After resting, you can gradually increase your activity as long as it does not cause new symptoms or worsen your symptoms. Doctors and concussion specialists suggest steps to follow for returning to sports after a concussion. Use these steps as a guide. You should slowly progress through the following levels of activity:  1. Limited activity. You can take part in daily activities as long as the activity doesn't increase your symptoms or cause new symptoms. 2. Light aerobic activity. This can include walking, swimming, or other exercise at less than 70% of maximum heart rate. No resistance training is included in this step. 3. Sport-specific exercise. This includes running drills or skating drills (depending on the sport), but no head impact. 4. Noncontact training drills. This includes more complex training drills such as passing. The athlete may also begin light resistance training. 5. Full-contact practice. The athlete can participate in normal training. 6. Return to normal game play.  This is the final step and allows the athlete to join in normal game play. Watch and keep track of your progress. It should take at least 6 days for you to go from light activity to normal game play. Make sure that you can stay at each new level of activity for at least 24 hours without symptoms, or as long as your doctor says, before doing more. If one or more symptoms come back, return to a lower level of activity for at least 24 hours. Don't move on until all symptoms are gone. When should you call for help? Call 911 anytime you think you may need emergency care. For example, call if:    · You have a seizure.     · You passed out (lost consciousness).     · You are confused or can't stay awake.    Call your doctor now or seek immediate medical care if:    · You have new or worse vomiting.     · You feel less alert.     · You have new weakness or numbness in any part of your body.    Watch closely for changes in your health, and be sure to contact your doctor if:    · You do not get better as expected.     · You have new symptoms, such as headaches, trouble concentrating, or changes in mood. Where can you learn more? Go to http://cassandra-monroe.info/. Enter H639 in the search box to learn more about \"Concussion: Care Instructions. \"  Current as of: September 10, 2017  Content Version: 11.7  © 5274-7581 [employer]. Care instructions adapted under license by [employer] (which disclaims liability or warranty for this information). If you have questions about a medical condition or this instruction, always ask your healthcare professional. [place] [address] any warranty or liability for your use of this information. Studentbox Activation    Thank you for requesting access to Student[employer]. Please follow the instructions below to securely access and download your online medical record.  Studentbox allows you to send messages to your doctor, view your test results, renew your prescriptions, schedule appointments, and more.    How Do I Sign Up? 1. In your internet browser, go to [email or web address]  2. Click on the First Time User? Click Here link in the Sign In box. You will be redirect to the New Member Sign Up page. 3. Enter your Empressr Access Code exactly as it appears below. You will not need to use this code after youve completed the sign-up process. If you do not sign up before the expiration date, you must request a new code. Empressr Access Code: [email or web address]  Expires: 9/3/2018 12:17 PM (This is the date your Empressr access code will )    4. Enter the last four digits of your Social Security Number (xxxx) and Date of Birth (mm/dd/yyyy) as indicated and click Submit. You will be taken to the next sign-up page. 5. Create a Empressr ID. This will be your Empressr login ID and cannot be changed, so think of one that is secure and easy to remember. 6. Create a Empressr password. You can change your password at any time. 7. Enter your Password Reset Question and Answer. This can be used at a later time if you forget your password. 8. Enter your e-mail address. You will receive e-mail notification when new information is available in [address] 9. Click Sign Up. You can now view and download portions of your medical record. 10. Click the Download Summary menu link to download a portable copy of your medical information. Additional Information    If you have questions, please visit the Frequently Asked Questions section of the Empressr website at [email or web address] Remember, Empressr is NOT to be used for urgent needs. For medical emergencies, dial 911.

## 2018-09-25 PROBLEM — Z79.01 CHRONIC ANTICOAGULATION: Chronic | Status: ACTIVE | Noted: 2018-09-25

## 2019-02-13 DIAGNOSIS — E11.42 DIABETIC POLYNEUROPATHY ASSOCIATED WITH TYPE 2 DIABETES MELLITUS (HCC): ICD-10-CM

## 2019-02-13 RX ORDER — GABAPENTIN 300 MG/1
CAPSULE ORAL
Qty: 270 CAP | Refills: 0 | Status: SHIPPED | OUTPATIENT
Start: 2019-02-13 | End: 2019-04-11 | Stop reason: SDUPTHER

## 2019-02-13 NOTE — TELEPHONE ENCOUNTER
----- Message from [address] sent at 2/13/2019 11:43 AM EST -----  Regarding: Dr. Landon Piña  Pt is requesting refill on Gabapentin. Pt is our of medication. Pt does not know the name of the pharmacy. Best contact number is [phone].

## 2019-03-18 DIAGNOSIS — I10 ESSENTIAL HYPERTENSION WITH GOAL BLOOD PRESSURE LESS THAN 140/90: ICD-10-CM

## 2019-03-18 NOTE — TELEPHONE ENCOUNTER
Pt is asking for refill on BP medications     Sofía may have faxed request     Best number to reach her is [phone]

## 2019-03-19 RX ORDER — LISINOPRIL AND HYDROCHLOROTHIAZIDE 10; 12.5 MG/1; MG/1
1 TABLET ORAL DAILY
Qty: 30 TAB | Refills: 0 | Status: SHIPPED | OUTPATIENT
Start: 2019-03-19 | End: 2019-04-11 | Stop reason: SDUPTHER

## 2019-04-11 ENCOUNTER — OFFICE VISIT (OUTPATIENT)
Dept: FAMILY MEDICINE CLINIC | Age: 64
End: 2019-04-11

## 2019-04-11 VITALS
BODY MASS INDEX: 39.68 KG/M2 | OXYGEN SATURATION: 98 % | TEMPERATURE: 96.3 F | HEART RATE: 95 BPM | HEIGHT: 72 IN | DIASTOLIC BLOOD PRESSURE: 59 MMHG | SYSTOLIC BLOOD PRESSURE: 122 MMHG | RESPIRATION RATE: 20 BRPM | WEIGHT: 293 LBS

## 2019-04-11 DIAGNOSIS — K21.9 GASTROESOPHAGEAL REFLUX DISEASE WITHOUT ESOPHAGITIS: ICD-10-CM

## 2019-04-11 DIAGNOSIS — Z79.899 ENCOUNTER FOR LONG-TERM (CURRENT) USE OF MEDICATIONS: ICD-10-CM

## 2019-04-11 DIAGNOSIS — Z91.199 NONCOMPLIANCE: Primary | ICD-10-CM

## 2019-04-11 DIAGNOSIS — E11.21 CONTROLLED TYPE 2 DIABETES MELLITUS WITH DIABETIC NEPHROPATHY, WITHOUT LONG-TERM CURRENT USE OF INSULIN (HCC): ICD-10-CM

## 2019-04-11 DIAGNOSIS — I10 ESSENTIAL HYPERTENSION: ICD-10-CM

## 2019-04-11 DIAGNOSIS — E11.42 DIABETIC POLYNEUROPATHY ASSOCIATED WITH TYPE 2 DIABETES MELLITUS (HCC): ICD-10-CM

## 2019-04-11 DIAGNOSIS — E78.2 MIXED HYPERLIPIDEMIA: ICD-10-CM

## 2019-04-11 DIAGNOSIS — J44.9 CHRONIC OBSTRUCTIVE PULMONARY DISEASE, UNSPECIFIED COPD TYPE (HCC): ICD-10-CM

## 2019-04-11 DIAGNOSIS — M79.7 FIBROMYALGIA: ICD-10-CM

## 2019-04-11 DIAGNOSIS — Z91.89 AT RISK FOR DIABETIC FOOT ULCER: ICD-10-CM

## 2019-04-11 DIAGNOSIS — F33.1 MODERATE EPISODE OF RECURRENT MAJOR DEPRESSIVE DISORDER (HCC): ICD-10-CM

## 2019-04-11 DIAGNOSIS — E08.8 DIABETES MELLITUS DUE TO UNDERLYING CONDITION, CONTROLLED, WITH COMPLICATION, WITHOUT LONG-TERM CURRENT USE OF INSULIN (HCC): ICD-10-CM

## 2019-04-11 DIAGNOSIS — J43.8 OTHER EMPHYSEMA (HCC): ICD-10-CM

## 2019-04-11 DIAGNOSIS — G25.0 ESSENTIAL TREMOR: ICD-10-CM

## 2019-04-11 DIAGNOSIS — I10 ESSENTIAL HYPERTENSION WITH GOAL BLOOD PRESSURE LESS THAN 140/90: ICD-10-CM

## 2019-04-11 RX ORDER — IPRATROPIUM BROMIDE AND ALBUTEROL SULFATE 2.5; .5 MG/3ML; MG/3ML
SOLUTION RESPIRATORY (INHALATION)
Qty: 1620 ML | Refills: 5 | Status: SHIPPED | OUTPATIENT
Start: 2019-04-11 | End: 2020-10-15 | Stop reason: SDUPTHER

## 2019-04-11 RX ORDER — LISINOPRIL AND HYDROCHLOROTHIAZIDE 10; 12.5 MG/1; MG/1
1 TABLET ORAL DAILY
Qty: 90 TAB | Refills: 0 | Status: SHIPPED | OUTPATIENT
Start: 2019-04-11 | End: 2019-07-01 | Stop reason: SDUPTHER

## 2019-04-11 RX ORDER — GEMFIBROZIL 600 MG/1
600 TABLET, FILM COATED ORAL 2 TIMES DAILY
Qty: 180 TAB | Refills: 0 | Status: SHIPPED | OUTPATIENT
Start: 2019-04-11 | End: 2019-07-01 | Stop reason: SDUPTHER

## 2019-04-11 RX ORDER — BUPROPION HYDROCHLORIDE 100 MG/1
TABLET ORAL
Qty: 180 TAB | Refills: 0 | Status: SHIPPED | OUTPATIENT
Start: 2019-04-11 | End: 2019-07-01 | Stop reason: SDUPTHER

## 2019-04-11 RX ORDER — LEVOFLOXACIN 500 MG/1
500 TABLET, FILM COATED ORAL DAILY
Qty: 7 TAB | Refills: 0 | Status: SHIPPED | OUTPATIENT
Start: 2019-04-11 | End: 2019-04-11 | Stop reason: SDUPTHER

## 2019-04-11 RX ORDER — DULOXETIN HYDROCHLORIDE 30 MG/1
CAPSULE, DELAYED RELEASE ORAL
Qty: 180 CAP | Refills: 0 | Status: SHIPPED | OUTPATIENT
Start: 2019-04-11 | End: 2019-07-01 | Stop reason: SDUPTHER

## 2019-04-11 RX ORDER — FLUTICASONE PROPIONATE AND SALMETEROL 250; 50 UG/1; UG/1
POWDER RESPIRATORY (INHALATION)
Qty: 180 EACH | Refills: 1 | Status: SHIPPED | OUTPATIENT
Start: 2019-04-11 | End: 2019-07-01 | Stop reason: SDUPTHER

## 2019-04-11 RX ORDER — METFORMIN HYDROCHLORIDE 1000 MG/1
TABLET ORAL
Qty: 180 TAB | Refills: 0 | Status: SHIPPED | OUTPATIENT
Start: 2019-04-11 | End: 2019-07-01 | Stop reason: SDUPTHER

## 2019-04-11 RX ORDER — FLUTICASONE PROPIONATE 50 MCG
SPRAY, SUSPENSION (ML) NASAL
Qty: 3 BOTTLE | Refills: 0 | Status: SHIPPED | OUTPATIENT
Start: 2019-04-11 | End: 2019-07-01 | Stop reason: SDUPTHER

## 2019-04-11 RX ORDER — FLUTICASONE PROPIONATE 50 MCG
SPRAY, SUSPENSION (ML) NASAL
Qty: 3 BOTTLE | Refills: 0 | Status: SHIPPED | OUTPATIENT
Start: 2019-04-11 | End: 2019-04-11 | Stop reason: SDUPTHER

## 2019-04-11 RX ORDER — LEVOFLOXACIN 500 MG/1
500 TABLET, FILM COATED ORAL DAILY
Qty: 7 TAB | Refills: 0 | Status: SHIPPED | OUTPATIENT
Start: 2019-04-11 | End: 2019-04-18

## 2019-04-11 RX ORDER — OMEPRAZOLE 20 MG/1
CAPSULE, DELAYED RELEASE ORAL
Qty: 90 CAP | Refills: 1 | Status: SHIPPED | OUTPATIENT
Start: 2019-04-11 | End: 2019-07-01 | Stop reason: SDUPTHER

## 2019-04-11 RX ORDER — PROPRANOLOL HYDROCHLORIDE 40 MG/1
TABLET ORAL
Qty: 180 TAB | Refills: 0 | Status: SHIPPED | OUTPATIENT
Start: 2019-04-11 | End: 2019-07-01 | Stop reason: SDUPTHER

## 2019-04-11 RX ORDER — ACYCLOVIR 400 MG/1
TABLET ORAL
Qty: 180 TAB | Refills: 0 | Status: SHIPPED | OUTPATIENT
Start: 2019-04-11 | End: 2019-07-01 | Stop reason: SDUPTHER

## 2019-04-11 RX ORDER — POTASSIUM CHLORIDE 750 MG/1
TABLET, FILM COATED, EXTENDED RELEASE ORAL
Qty: 90 TAB | Refills: 1 | Status: SHIPPED | OUTPATIENT
Start: 2019-04-11 | End: 2019-05-29 | Stop reason: SDUPTHER

## 2019-04-11 RX ORDER — GABAPENTIN 300 MG/1
300 CAPSULE ORAL 2 TIMES DAILY
Qty: 180 CAP | Refills: 0 | Status: SHIPPED | OUTPATIENT
Start: 2019-04-11 | End: 2019-04-15 | Stop reason: SDUPTHER

## 2019-04-11 RX ORDER — ALBUTEROL SULFATE 90 UG/1
2 AEROSOL, METERED RESPIRATORY (INHALATION)
Qty: 3 INHALER | Refills: 1 | Status: SHIPPED | OUTPATIENT
Start: 2019-04-11 | End: 2019-07-01 | Stop reason: SDUPTHER

## 2019-04-11 NOTE — PROGRESS NOTES
Chief Complaint   Patient presents with    Diabetes    Sinus Infection    Toe Pain     Check meds & labs    1. Have you been to the ER, urgent care clinic since your last visit? Hospitalized since your last visit? no    2. Have you seen or consulted any other health care providers outside of the [address] [place] [address] since your last visit? Include any pap smears or colon screening.  no

## 2019-04-12 LAB
ALBUMIN SERPL-MCNC: 4.3 G/DL (ref 3.6–4.8)
ALBUMIN/CREAT UR: 15.3 MG/G CREAT (ref 0–30)
ALBUMIN/GLOB SERPL: 1.1 {RATIO} (ref 1.2–2.2)
ALP SERPL-CCNC: 61 IU/L (ref 39–117)
ALT SERPL-CCNC: 10 IU/L (ref 0–32)
AST SERPL-CCNC: 14 IU/L (ref 0–40)
BILIRUB SERPL-MCNC: 0.3 MG/DL (ref 0–1.2)
BUN SERPL-MCNC: 24 MG/DL (ref 8–27)
BUN/CREAT SERPL: 19 (ref 12–28)
CALCIUM SERPL-MCNC: 10.1 MG/DL (ref 8.7–10.3)
CHLORIDE SERPL-SCNC: 92 MMOL/L (ref 96–106)
CHOLEST SERPL-MCNC: 223 MG/DL (ref 100–199)
CO2 SERPL-SCNC: 24 MMOL/L (ref 20–29)
CREAT SERPL-MCNC: 1.26 MG/DL (ref 0.57–1)
CREAT UR-MCNC: 485.7 MG/DL
ERYTHROCYTE [DISTWIDTH] IN BLOOD BY AUTOMATED COUNT: 12.9 % (ref 12.3–15.4)
GLOBULIN SER CALC-MCNC: 4 G/DL (ref 1.5–4.5)
GLUCOSE SERPL-MCNC: 223 MG/DL (ref 65–99)
HBA1C MFR BLD: 7.9 % (ref 4.8–5.6)
HCT VFR BLD AUTO: 34.9 % (ref 34–46.6)
HDLC SERPL-MCNC: 31 MG/DL
HGB BLD-MCNC: 11.5 G/DL (ref 11.1–15.9)
INTERPRETATION: NORMAL
LDLC SERPL CALC-MCNC: 145 MG/DL (ref 0–99)
Lab: NORMAL
MCH RBC QN AUTO: 28 PG (ref 26.6–33)
MCHC RBC AUTO-ENTMCNC: 33 G/DL (ref 31.5–35.7)
MCV RBC AUTO: 85 FL (ref 79–97)
MICROALBUMIN UR-MCNC: 74.2 UG/ML
PLATELET # BLD AUTO: 454 X10E3/UL (ref 150–379)
POTASSIUM SERPL-SCNC: 4 MMOL/L (ref 3.5–5.2)
PROT SERPL-MCNC: 8.3 G/DL (ref 6–8.5)
RBC # BLD AUTO: 4.1 X10E6/UL (ref 3.77–5.28)
SODIUM SERPL-SCNC: 134 MMOL/L (ref 134–144)
TRIGL SERPL-MCNC: 236 MG/DL (ref 0–149)
TSH SERPL DL<=0.005 MIU/L-ACNC: 1.94 UIU/ML (ref 0.45–4.5)
VLDLC SERPL CALC-MCNC: 47 MG/DL (ref 5–40)
WBC # BLD AUTO: 14.3 X10E3/UL (ref 3.4–10.8)

## 2019-04-15 DIAGNOSIS — E11.42 DIABETIC POLYNEUROPATHY ASSOCIATED WITH TYPE 2 DIABETES MELLITUS (HCC): ICD-10-CM

## 2019-04-15 RX ORDER — GABAPENTIN 300 MG/1
300 CAPSULE ORAL 2 TIMES DAILY
Qty: 180 CAP | Refills: 0 | Status: SHIPPED | OUTPATIENT
Start: 2019-04-15 | End: 2019-07-01 | Stop reason: SDUPTHER

## 2019-04-29 ENCOUNTER — OFFICE VISIT (OUTPATIENT)
Dept: FAMILY MEDICINE CLINIC | Age: 64
End: 2019-04-29

## 2019-04-29 VITALS
DIASTOLIC BLOOD PRESSURE: 56 MMHG | SYSTOLIC BLOOD PRESSURE: 106 MMHG | BODY MASS INDEX: 39.68 KG/M2 | HEART RATE: 69 BPM | RESPIRATION RATE: 18 BRPM | TEMPERATURE: 97.1 F | WEIGHT: 293 LBS | HEIGHT: 72 IN | OXYGEN SATURATION: 98 %

## 2019-04-29 DIAGNOSIS — R79.89 ABNORMAL CBC: ICD-10-CM

## 2019-04-29 DIAGNOSIS — Z12.4 SCREENING FOR CERVICAL CANCER: ICD-10-CM

## 2019-04-29 DIAGNOSIS — Z12.39 SCREENING FOR BREAST CANCER: ICD-10-CM

## 2019-04-29 DIAGNOSIS — I10 ESSENTIAL HYPERTENSION WITH GOAL BLOOD PRESSURE LESS THAN 140/90: Primary | ICD-10-CM

## 2019-04-29 DIAGNOSIS — E11.65 UNCONTROLLED TYPE 2 DIABETES MELLITUS WITH HYPERGLYCEMIA (HCC): ICD-10-CM

## 2019-04-29 NOTE — LETTER
4/29/2019 10:00 AM 
 
Ms. [address] [address] [place] [address] [id number] Dear Marlene Mancuso: Please find your most recent results below. Resulted Orders CBC W/O DIFF (Collected: 4/11/2019  1:15 PM) Result Value Ref Range WBC 14.3 (H) 3.4 - 10.8 x10E3/uL  
 RBC 4.10 3.77 - 5.28 x10E6/uL HGB 11.5 11.1 - 15.9 g/dL HCT 34.9 34.0 - 46.6 % MCV 85 79 - 97 fL  
 MCH 28.0 26.6 - 33.0 pg  
 MCHC 33.0 31.5 - 35.7 g/dL  
 RDW 12.9 12.3 - 15.4 % PLATELET 017 (H) 104 - 379 x10E3/uL Narrative Performed at:  [address]  [id number] : Alissa Frye MD, Phone:  [phone] HEMOGLOBIN A1C W/O EAG (Collected: 4/11/2019  1:15 PM) Result Value Ref Range Hemoglobin A1c 7.9 (H) 4.8 - 5.6 % Comment:  
            Prediabetes: 5.7 - 6.4 Diabetes: >6.4 Glycemic control for adults with diabetes: <7.0 Narrative Performed at:  [address]  [id number] : Alissa Frye MD, Phone:  [phone] LIPID PANEL (Collected: 4/11/2019  1:15 PM) Result Value Ref Range Cholesterol, total 223 (H) 100 - 199 mg/dL Triglyceride 236 (H) 0 - 149 mg/dL HDL Cholesterol 31 (L) >39 mg/dL VLDL, calculated 47 (H) 5 - 40 mg/dL LDL, calculated 145 (H) 0 - 99 mg/dL Narrative Performed at:  [address]  [id number] : Alissa Frye MD, Phone:  [phone] METABOLIC PANEL, COMPREHENSIVE (Collected: 4/11/2019  1:15 PM) Result Value Ref Range Glucose 223 (H) 65 - 99 mg/dL BUN 24 8 - 27 mg/dL Creatinine 1.26 (H) 0.57 - 1.00 mg/dL GFR est non-AA 46 (L) >59 mL/min/1.73 GFR est AA 53 (L) >59 mL/min/1.73  
 BUN/Creatinine ratio 19 12 - 28 Sodium 134 134 - 144 mmol/L Potassium 4.0 3.5 - 5.2 mmol/L  Chloride 92 (L) 96 - 106 mmol/L  
 CO2 24 20 - 29 mmol/L  
 Calcium 10.1 8.7 - 10.3 mg/dL Protein, total 8.3 6.0 - 8.5 g/dL Albumin 4.3 3.6 - 4.8 g/dL GLOBULIN, TOTAL 4.0 1.5 - 4.5 g/dL A-G Ratio 1.1 (L) 1.2 - 2.2 Bilirubin, total 0.3 0.0 - 1.2 mg/dL Alk. phosphatase 61 39 - 117 IU/L  
 AST (SGOT) 14 0 - 40 IU/L  
 ALT (SGPT) 10 0 - 32 IU/L Narrative Performed at:  [address]  [id number] : Nehemiah Irving MD, Phone:  [phone] TSH RFX ON ABNORMAL TO FREE T4 (Collected: 4/11/2019  1:15 PM) Result Value Ref Range TSH 1.940 0.450 - 4.500 uIU/mL Narrative Performed at:  [address]  [id number] : Nehemiah Irving MD, Phone:  [phone] MICROALBUMIN, UR, RAND W/ MICROALB/CREAT RATIO (Collected: 4/11/2019  1:15 PM) Result Value Ref Range Creatinine, urine 485.7 Not Estab. mg/dL Microalbumin, urine 74.2 Not Estab. ug/mL Microalb/Creat ratio (ug/mg creat.) 15.3 0.0 - 30.0 mg/g creat Comment:  
                        Normal:                0.0 -  30.0 Albuminuria:          31.0 - 300.0 Clinical albuminuria:       >300.0 Narrative Performed at:  [address]  [id number] : Nehemiah Irving MD, Phone:  [phone] Sincerely, Roula Toussaint MD

## 2019-04-29 NOTE — PROGRESS NOTES
Leilani Beck is a 60 y.o. female     has a past medical history of Acute saddle pulmonary embolism without acute cor pulmonale ([facility] (11/3/2017), Arthritis, Asthma, Chronic obstructive pulmonary disease ([facility] Chronic pain, Diabetes mellitus due to underlying condition, controlled, with complication, without long-term current use of insulin ([facility] (3/21/2017), Diabetic polyneuropathy associated with type 2 diabetes mellitus ([facility] (11/1/2017), Essential hypertension (6/5/2018), Fibromyalgia, Gastroesophageal reflux disease without esophagitis (5/13/2016), Mixed hyperlipidemia (6/5/2018), Moderate episode of recurrent major depressive disorder ([facility] (11/1/2017), Noncompliance (8/6/2018), Severe obesity (BMI 35.0-39.9) (6/5/2018), and Unspecified sleep apnea. Dm2.  a1c 7.9% 04/2019, 6.8% 08/2018 at goal, Non-compliance recently, will refill some of her meds and labs monitoring. continue [place] [facility] [place], metformin    Ckd stage 3:  Cr. Baseline 1.26   Avoid NSAIDs, nephrotoxic drugs, stay hydrated. HTN: BP stable on Lisnipril/hctz 10/12.5 and propranolol 40mg bid. Refill meds     HLD: worse compared to previous labs a year ago. Wasn't compliant with Gemfibrozil, but she restarted it 2 weeks now. Anxiety and depression. Was Stable on Wellbutrin and cymbalta. Have ran out of Wellbutrin and cymbalta. She denies SI or HI.  Continue course. Hx of allergies in the past.   Last smokes 4 years ago. Have ran out of flonase, but she takes OTC allergies pills thinks it's zyrtec. Foot left great toe. Have seen podiatry and f/u there tomorrow. Pt report it's healed, no redness, pain, swelling, exudate. Reviewed: active problem list, medication list, allergies, notes from last encounter, lab results    A comprehensive review of systems was negative except for that written in the HPI.       Allergies   Allergen Reactions    Allopurinol Hives    Darvocet A500 [Propoxyphene N-Acetaminophen] Hives    Influenza Virus Vaccine, Specific Other (comments)     Allergy documented in admission data base    Seafood [Shellfish Containing Products] Itching     NEW ALLERGY; REACTION WORSENS AS AMOUNT EATEN INCREASES     Current Outpatient Medications on File Prior to Visit   Medication Sig Dispense Refill    gabapentin (NEURONTIN) 300 mg capsule Take 1 Cap by mouth two (2) times a day. 180 Cap 0    lisinopril-hydroCHLOROthiazide (PRINZIDE, ZESTORETIC) 10-12.5 mg per tablet Take 1 Tab by mouth daily. 90 Tab 0    gemfibrozil (LOPID) 600 mg tablet Take 1 Tab by mouth two (2) times a day. 180 Tab 0    propranolol (INDERAL) 40 mg tablet Take 1 Tab by mouth two (2) times a day. 180 Tab 0    acyclovir (ZOVIRAX) 400 mg tablet TAKE 1 TABLET BY MOUTH TWICE DAILY 180 Tab 0    fluticasone propion-salmeterol (ADVAIR) 250-50 mcg/dose diskus inhaler Take 1 Puff by inhalation every twelve (12) hours. 180 Each 1    omeprazole (PRILOSEC) 20 mg capsule TAKE 1 CAPSULE BY MOUTH EVERY DAY 90 Cap 1    potassium chloride SR (KLOR-CON 10) 10 mEq tablet TK 1 T PO QD 90 Tab 1    albuterol (PROAIR HFA) 90 mcg/actuation inhaler Take 2 Puffs by inhalation every four (4) hours as needed for Wheezing. 3 Inhaler 1    SITagliptin (JANUVIA) 100 mg tablet Take 1 Tab by mouth daily. 90 Tab 1    metFORMIN (GLUCOPHAGE) 1,000 mg tablet 500mg in am and 1000 mg pm 180 Tab 0    albuterol-ipratropium (DUO-NEB) 2.5 mg-0.5 mg/3 ml nebu INHALE 1 VIAL VIA NEBULIZER EVERY 4 HOURS AS NEEDED 1620 mL 5    DULoxetine (CYMBALTA) 30 mg capsule Take 1 Cap by mouth two (2) times a day. 180 Cap 0    buPROPion (WELLBUTRIN) 100 mg tablet Take 1 Tab by mouth two (2) times a day.  180 Tab 0    fluticasone propionate (FLONASE) 50 mcg/actuation nasal spray INSTILL 2 SPRAYS IN EACH NOSTRIL EVERY DAY 3 Bottle 0    Lancets (ACCU-CHEK SOFTCLIX LANCETS) misc Test once daily 100 Each 3    alcohol swabs (BD SINGLE USE SWABS REGULAR) padm Test once daily 100 Pad 3    Blood Glucose Control High&Low (ACCU-CHEK NIVIA CONTROL SOLN) soln As directed 3 Bottle 3    Blood-Glucose Meter (ACCU-CHEK NIVIA PLUS METER) misc Test once daily 1 Each 0    glucose blood VI test strips (ACCU-CHEK NIVIA PLUS TEST STRP) strip Test once daily 100 Strip 3    cholecalciferol, vitamin D3, (VITAMIN D3) 2,000 unit tab Take 1 Tab by mouth daily.  butalbital-acetaminophen-caffeine (ESGIC) -40 mg per tablet Take 1 Tab by mouth every six (6) hours as needed for Pain. 15 Tab 0     No current facility-administered medications on file prior to visit. Patient Active Problem List   Diagnosis Code    Lumbar stenosis M48.061    Asthma J45.909    Primary localized osteoarthrosis, lower leg M17.10    DJD (degenerative joint disease) of knee M17.10    Chronic pain G89.29    Fibromyalgia M79.7    COPD (chronic obstructive pulmonary disease) ([facility]) J44.9    Gastroesophageal reflux disease without esophagitis K21.9    Chronic low back pain with bilateral sciatica M54.41, M54.42, G89.29    Lumbar spondylosis M47.816    Spinal stenosis M48.00    Status post laminectomy Z98.890    Diabetes mellitus due to underlying condition, controlled, with complication, without long-term current use of insulin ([facility]) E08.8    Acute pulmonary embolism ([facility]) I26.99    Moderate episode of recurrent major depressive disorder ([facility] F33.1    Diabetic polyneuropathy associated with type 2 diabetes mellitus ([facility]) E11.42    Acute saddle pulmonary embolism without acute cor pulmonale ([facility]) I26.92    Severe obesity (BMI 35.0-39. 9) E66.01    Essential hypertension I10    Mixed hyperlipidemia E78.2    Type 2 diabetes with nephropathy ([facility]) E11.21    Noncompliance Z91.19    Chronic anticoagulation Z79.01       Visit Vitals  /56   Pulse 69   Temp 97.1 °F (36.2 °C) (Oral)   Resp 18   Ht 6' 1\" (1.854 m)   Wt 295 lb 9.6 oz (134.1 kg)   SpO2 98%   BMI 39.00 kg/m²     General appearance: alert, cooperative, no distress, appears stated age  Neurologic: Alert and oriented X 3, normal strength and tone, symmetric. Normal without focal findings. Cranial nerves 2-12 intact. Normal coordination and gait. Mental status: Alert, oriented, thought content appropriate, affect: stable, mood-congruent. Head: Normocephalic, without obvious abnormality, atraumatic  Eyes: conjunctivae/corneas clear. PERRL, EOM's intact. Neck: supple, symmetrical, trachea midline, no JVD  Lungs: clear to auscultation bilaterally  Heart: regular rate and rhythm, S1, S2 normal, no murmur, click, rub or gallop  Abdomen: soft, non-tender. Extremities: extremities normal, atraumatic, no cyanosis or edema      Assessment/Plans:    Diagnoses and all orders for this visit:    1. Essential hypertension with goal blood pressure less than 140/90    2. Uncontrolled type 2 diabetes mellitus with hyperglycemia (HCC)  -     HEMOGLOBIN A1C W/O EAG  -     dulaglutide (TRULICITY) 3.11 MQ/0.5 mL sub-q pen; 0.5 mL by SubCUTAneous route every seven (7) days.  -     METABOLIC PANEL, COMPREHENSIVE  -     REFERRAL TO OPHTHALMOLOGY    3. Abnormal CBC  -     CBC W/O DIFF    4. Screening for breast cancer  -     [facility] MAM[facility] BI SCREENING INCL CAD; Future    5. Screening for cervical cancer  -     REFERRAL TO OBSTETRICS AND GYNECOLOGY      Discussed plans, risk/benefits of treatments/observations. Through the use of shared decision making, above plans were agreed upon. Medication compliance advised. Patient verbalized understanding. Follow-up and Dispositions    · Return in about 3 months (around 8/5/2019) for Dm2, HTN, meds, labs.            Rebekah Limon MD  4/29/2019

## 2019-04-29 NOTE — PROGRESS NOTES
Chief Complaint   Patient presents with    Diabetes    Hypertension    Cholesterol Problem    Anxiety     2 week check    1. Have you been to the ER, urgent care clinic since your last visit? Hospitalized since your last visit? no    2. Have you seen or consulted any other health care providers outside of the [address] [place] [address] since your last visit? Include any pap smears or colon screening.  no

## 2019-05-29 DIAGNOSIS — E11.65 UNCONTROLLED TYPE 2 DIABETES MELLITUS WITH HYPERGLYCEMIA (HCC): ICD-10-CM

## 2019-05-29 RX ORDER — POTASSIUM CHLORIDE 750 MG/1
TABLET, FILM COATED, EXTENDED RELEASE ORAL
Qty: 90 TAB | Refills: 1 | Status: SHIPPED | OUTPATIENT
Start: 2019-05-29 | End: 2019-07-01 | Stop reason: SDUPTHER

## 2019-05-29 NOTE — TELEPHONE ENCOUNTER
----- Message from Maryleeclinton Kline sent at 5/29/2019 12:47 PM EDT -----  Regarding: Dr. Leelee Hankins  Pt requesting renewal for a \"Trulicity injection and Potassium tablet\" sent to [address].  Pt states the pharmacy has tried contacting the office for this renewal. Best contact [phone]

## 2019-06-04 NOTE — TELEPHONE ENCOUNTER
----- Message from Jeffrey Lindsay sent at 6/4/2019  2:51 PM EDT -----  Regarding: Dr. July Gresham  Pt is requesting a call with questions regarding an injection \"to lower A1C levels\". Best contact number [phone].

## 2019-06-12 ENCOUNTER — TELEPHONE (OUTPATIENT)
Dept: FAMILY MEDICINE CLINIC | Age: 64
End: 2019-06-12

## 2019-06-12 NOTE — TELEPHONE ENCOUNTER
----- Message from Justin Deal sent at 6/12/2019 12:15 PM EDT -----  Regarding: DR. PELLETIER/ TELEPHONE  Contact: [phone]  Adalgisa from [facility] is requesting to know if the office received the drug therapy alert they faxed over on 5.7.19, 5.15.19, and 5.28.19.  The would like a response of an approval or denial, Mrs. Gavin Salas best contact #[phone]

## 2019-07-01 DIAGNOSIS — K21.9 GASTROESOPHAGEAL REFLUX DISEASE WITHOUT ESOPHAGITIS: ICD-10-CM

## 2019-07-01 DIAGNOSIS — E11.42 DIABETIC POLYNEUROPATHY ASSOCIATED WITH TYPE 2 DIABETES MELLITUS (HCC): ICD-10-CM

## 2019-07-01 DIAGNOSIS — E11.21 CONTROLLED TYPE 2 DIABETES MELLITUS WITH DIABETIC NEPHROPATHY, WITHOUT LONG-TERM CURRENT USE OF INSULIN (HCC): ICD-10-CM

## 2019-07-01 DIAGNOSIS — J43.8 OTHER EMPHYSEMA (HCC): ICD-10-CM

## 2019-07-01 DIAGNOSIS — G25.0 ESSENTIAL TREMOR: ICD-10-CM

## 2019-07-01 DIAGNOSIS — E08.8 DIABETES MELLITUS DUE TO UNDERLYING CONDITION, CONTROLLED, WITH COMPLICATION, WITHOUT LONG-TERM CURRENT USE OF INSULIN (HCC): ICD-10-CM

## 2019-07-01 DIAGNOSIS — I10 ESSENTIAL HYPERTENSION WITH GOAL BLOOD PRESSURE LESS THAN 140/90: ICD-10-CM

## 2019-07-01 DIAGNOSIS — M79.7 FIBROMYALGIA: ICD-10-CM

## 2019-07-01 DIAGNOSIS — F33.1 MODERATE EPISODE OF RECURRENT MAJOR DEPRESSIVE DISORDER (HCC): ICD-10-CM

## 2019-07-01 DIAGNOSIS — E78.2 MIXED HYPERLIPIDEMIA: ICD-10-CM

## 2019-07-01 RX ORDER — POTASSIUM CHLORIDE 750 MG/1
TABLET, FILM COATED, EXTENDED RELEASE ORAL
Qty: 90 TAB | Refills: 1 | Status: SHIPPED | OUTPATIENT
Start: 2019-07-01 | End: 2020-07-29

## 2019-07-01 RX ORDER — METFORMIN HYDROCHLORIDE 1000 MG/1
TABLET ORAL
Qty: 180 TAB | Refills: 0 | Status: SHIPPED | OUTPATIENT
Start: 2019-07-01 | End: 2020-04-09 | Stop reason: SDUPTHER

## 2019-07-01 RX ORDER — PROPRANOLOL HYDROCHLORIDE 40 MG/1
TABLET ORAL
Qty: 180 TAB | Refills: 0 | Status: SHIPPED | OUTPATIENT
Start: 2019-07-01 | End: 2019-10-21 | Stop reason: SDUPTHER

## 2019-07-01 RX ORDER — GABAPENTIN 300 MG/1
300 CAPSULE ORAL 2 TIMES DAILY
Qty: 180 CAP | Refills: 0 | Status: SHIPPED | OUTPATIENT
Start: 2019-07-01 | End: 2019-07-01 | Stop reason: SDUPTHER

## 2019-07-01 RX ORDER — ALBUTEROL SULFATE 90 UG/1
2 AEROSOL, METERED RESPIRATORY (INHALATION)
Qty: 3 INHALER | Refills: 1 | Status: SHIPPED | OUTPATIENT
Start: 2019-07-01 | End: 2020-10-15 | Stop reason: SDUPTHER

## 2019-07-01 RX ORDER — BUPROPION HYDROCHLORIDE 100 MG/1
TABLET ORAL
Qty: 180 TAB | Refills: 0 | Status: SHIPPED | OUTPATIENT
Start: 2019-07-01 | End: 2020-04-09

## 2019-07-01 RX ORDER — LISINOPRIL AND HYDROCHLOROTHIAZIDE 10; 12.5 MG/1; MG/1
1 TABLET ORAL DAILY
Qty: 90 TAB | Refills: 0 | Status: SHIPPED | OUTPATIENT
Start: 2019-07-01 | End: 2019-10-21

## 2019-07-01 RX ORDER — GABAPENTIN 300 MG/1
300 CAPSULE ORAL 2 TIMES DAILY
Qty: 60 CAP | Refills: 0 | Status: SHIPPED | OUTPATIENT
Start: 2019-07-01 | End: 2020-04-09 | Stop reason: SDUPTHER

## 2019-07-01 RX ORDER — FLUTICASONE PROPIONATE AND SALMETEROL 250; 50 UG/1; UG/1
POWDER RESPIRATORY (INHALATION)
Qty: 180 EACH | Refills: 1 | Status: SHIPPED | OUTPATIENT
Start: 2019-07-01 | End: 2020-10-15 | Stop reason: SDUPTHER

## 2019-07-01 RX ORDER — OMEPRAZOLE 20 MG/1
CAPSULE, DELAYED RELEASE ORAL
Qty: 90 CAP | Refills: 1 | Status: SHIPPED | OUTPATIENT
Start: 2019-07-01 | End: 2020-07-29

## 2019-07-01 RX ORDER — DULOXETIN HYDROCHLORIDE 30 MG/1
CAPSULE, DELAYED RELEASE ORAL
Qty: 180 CAP | Refills: 0 | Status: SHIPPED | OUTPATIENT
Start: 2019-07-01 | End: 2019-10-21 | Stop reason: SDUPTHER

## 2019-07-01 RX ORDER — GEMFIBROZIL 600 MG/1
600 TABLET, FILM COATED ORAL 2 TIMES DAILY
Qty: 180 TAB | Refills: 0 | Status: SHIPPED | OUTPATIENT
Start: 2019-07-01 | End: 2020-04-09 | Stop reason: SDUPTHER

## 2019-07-01 RX ORDER — ACYCLOVIR 400 MG/1
TABLET ORAL
Qty: 180 TAB | Refills: 0 | Status: SHIPPED | OUTPATIENT
Start: 2019-07-01 | End: 2020-07-30

## 2019-07-01 RX ORDER — FLUTICASONE PROPIONATE 50 MCG
SPRAY, SUSPENSION (ML) NASAL
Qty: 3 BOTTLE | Refills: 0 | Status: SHIPPED | OUTPATIENT
Start: 2019-07-01 | End: 2020-10-15 | Stop reason: SDUPTHER

## 2019-07-01 RX ORDER — BUTALBITAL, ACETAMINOPHEN AND CAFFEINE 50; 325; 40 MG/1; MG/1; MG/1
1 TABLET ORAL
Qty: 15 TAB | Refills: 0 | Status: SHIPPED | OUTPATIENT
Start: 2019-07-01 | End: 2019-10-20

## 2019-07-01 NOTE — TELEPHONE ENCOUNTER
Pt states she is out of medications and to please contact [facility] [address]     She states there are a whole lot of them and Sofía has contacted us so we should know       Best number to reach her is [phone]

## 2019-10-20 ENCOUNTER — APPOINTMENT (OUTPATIENT)
Dept: GENERAL RADIOLOGY | Age: 64
End: 2019-10-20
Attending: EMERGENCY MEDICINE
Payer: MEDICARE

## 2019-10-20 ENCOUNTER — APPOINTMENT (OUTPATIENT)
Dept: CT IMAGING | Age: 64
End: 2019-10-20
Attending: EMERGENCY MEDICINE
Payer: MEDICARE

## 2019-10-20 ENCOUNTER — HOSPITAL ENCOUNTER (EMERGENCY)
Age: 64
Discharge: HOME | End: 2019-10-20
Attending: EMERGENCY MEDICINE
Payer: MEDICARE

## 2019-10-20 VITALS
OXYGEN SATURATION: 95 % | HEART RATE: 73 BPM | RESPIRATION RATE: 18 BRPM | DIASTOLIC BLOOD PRESSURE: 62 MMHG | TEMPERATURE: 97.4 F | SYSTOLIC BLOOD PRESSURE: 105 MMHG | WEIGHT: 290 LBS | BODY MASS INDEX: 39.28 KG/M2 | HEIGHT: 72 IN

## 2019-10-20 DIAGNOSIS — R55 SYNCOPE AND COLLAPSE: ICD-10-CM

## 2019-10-20 DIAGNOSIS — I95.9 HYPOTENSION, UNSPECIFIED HYPOTENSION TYPE: Primary | ICD-10-CM

## 2019-10-20 DIAGNOSIS — E86.0 SEVERE DEHYDRATION: ICD-10-CM

## 2019-10-20 LAB
ALBUMIN SERPL-MCNC: 3.3 G/DL (ref 3.5–5)
ALBUMIN/GLOB SERPL: 0.7 {RATIO} (ref 1.1–2.2)
ALP SERPL-CCNC: 68 U/L (ref 45–117)
ALT SERPL-CCNC: 10 U/L (ref 12–78)
ANION GAP SERPL CALC-SCNC: 5 MMOL/L (ref 5–15)
ANION GAP SERPL CALC-SCNC: 9 MMOL/L (ref 5–15)
APPEARANCE UR: ABNORMAL
AST SERPL-CCNC: 8 U/L (ref 15–37)
BACTERIA URNS QL MICRO: ABNORMAL /HPF
BASOPHILS # BLD: 0.1 K/UL (ref 0–0.1)
BASOPHILS NFR BLD: 1 % (ref 0–1)
BILIRUB SERPL-MCNC: 0.2 MG/DL (ref 0.2–1)
BILIRUB UR QL CFM: NEGATIVE
BUN SERPL-MCNC: 27 MG/DL (ref 6–20)
BUN SERPL-MCNC: 27 MG/DL (ref 6–20)
BUN/CREAT SERPL: 15 (ref 12–20)
BUN/CREAT SERPL: 16 (ref 12–20)
CALCIUM SERPL-MCNC: 8 MG/DL (ref 8.5–10.1)
CALCIUM SERPL-MCNC: 8.8 MG/DL (ref 8.5–10.1)
CHLORIDE SERPL-SCNC: 105 MMOL/L (ref 97–108)
CHLORIDE SERPL-SCNC: 111 MMOL/L (ref 97–108)
CK SERPL-CCNC: 73 U/L (ref 26–192)
CO2 SERPL-SCNC: 25 MMOL/L (ref 21–32)
CO2 SERPL-SCNC: 26 MMOL/L (ref 21–32)
COLOR UR: ABNORMAL
COMMENT, HOLDF: NORMAL
CREAT SERPL-MCNC: 1.66 MG/DL (ref 0.55–1.02)
CREAT SERPL-MCNC: 1.79 MG/DL (ref 0.55–1.02)
DIFFERENTIAL METHOD BLD: ABNORMAL
EOSINOPHIL # BLD: 0.2 K/UL (ref 0–0.4)
EOSINOPHIL NFR BLD: 2 % (ref 0–7)
EPITH CASTS URNS QL MICRO: ABNORMAL /LPF
ERYTHROCYTE [DISTWIDTH] IN BLOOD BY AUTOMATED COUNT: 12.5 % (ref 11.5–14.5)
GLOBULIN SER CALC-MCNC: 4.9 G/DL (ref 2–4)
GLUCOSE SERPL-MCNC: 125 MG/DL (ref 65–100)
GLUCOSE SERPL-MCNC: 128 MG/DL (ref 65–100)
GLUCOSE UR STRIP.AUTO-MCNC: NEGATIVE MG/DL
HCT VFR BLD AUTO: 36.6 % (ref 35–47)
HGB BLD-MCNC: 11.4 G/DL (ref 11.5–16)
HGB UR QL STRIP: NEGATIVE
IMM GRANULOCYTES # BLD AUTO: 0 K/UL (ref 0–0.04)
IMM GRANULOCYTES NFR BLD AUTO: 0 % (ref 0–0.5)
KETONES UR QL STRIP.AUTO: ABNORMAL MG/DL
LACTATE BLD-SCNC: 1.12 MMOL/L (ref 0.4–2)
LEUKOCYTE ESTERASE UR QL STRIP.AUTO: ABNORMAL
LYMPHOCYTES # BLD: 2.9 K/UL (ref 0.8–3.5)
LYMPHOCYTES NFR BLD: 32 % (ref 12–49)
MCH RBC QN AUTO: 27.6 PG (ref 26–34)
MCHC RBC AUTO-ENTMCNC: 31.1 G/DL (ref 30–36.5)
MCV RBC AUTO: 88.6 FL (ref 80–99)
MONOCYTES # BLD: 0.7 K/UL (ref 0–1)
MONOCYTES NFR BLD: 8 % (ref 5–13)
NEUTS SEG # BLD: 5.1 K/UL (ref 1.8–8)
NEUTS SEG NFR BLD: 57 % (ref 32–75)
NITRITE UR QL STRIP.AUTO: NEGATIVE
NRBC # BLD: 0 K/UL (ref 0–0.01)
NRBC BLD-RTO: 0 PER 100 WBC
PH UR STRIP: 5.5 [PH] (ref 5–8)
PLATELET # BLD AUTO: 385 K/UL (ref 150–400)
PMV BLD AUTO: 9.9 FL (ref 8.9–12.9)
POTASSIUM SERPL-SCNC: 3.9 MMOL/L (ref 3.5–5.1)
POTASSIUM SERPL-SCNC: 4 MMOL/L (ref 3.5–5.1)
PROT SERPL-MCNC: 8.2 G/DL (ref 6.4–8.2)
PROT UR STRIP-MCNC: 30 MG/DL
RBC # BLD AUTO: 4.13 M/UL (ref 3.8–5.2)
RBC #/AREA URNS HPF: ABNORMAL /HPF (ref 0–5)
SAMPLES BEING HELD,HOLD: NORMAL
SODIUM SERPL-SCNC: 140 MMOL/L (ref 136–145)
SODIUM SERPL-SCNC: 141 MMOL/L (ref 136–145)
SP GR UR REFRACTOMETRY: 1.03 (ref 1–1.03)
TROPONIN I SERPL-MCNC: <0.05 NG/ML
UA: UC IF INDICATED,UAUC: ABNORMAL
UROBILINOGEN UR QL STRIP.AUTO: 1 EU/DL (ref 0.2–1)
WBC # BLD AUTO: 9 K/UL (ref 3.6–11)
WBC URNS QL MICRO: ABNORMAL /HPF (ref 0–4)

## 2019-10-20 PROCEDURE — 96374 THER/PROPH/DIAG INJ IV PUSH: CPT

## 2019-10-20 PROCEDURE — 93005 ELECTROCARDIOGRAM TRACING: CPT

## 2019-10-20 PROCEDURE — 36415 COLL VENOUS BLD VENIPUNCTURE: CPT

## 2019-10-20 PROCEDURE — 87186 SC STD MICRODIL/AGAR DIL: CPT

## 2019-10-20 PROCEDURE — 74011250636 HC RX REV CODE- 250/636: Performed by: EMERGENCY MEDICINE

## 2019-10-20 PROCEDURE — 82550 ASSAY OF CK (CPK): CPT

## 2019-10-20 PROCEDURE — 96361 HYDRATE IV INFUSION ADD-ON: CPT

## 2019-10-20 PROCEDURE — 99285 EMERGENCY DEPT VISIT HI MDM: CPT

## 2019-10-20 PROCEDURE — 81001 URINALYSIS AUTO W/SCOPE: CPT

## 2019-10-20 PROCEDURE — 87086 URINE CULTURE/COLONY COUNT: CPT

## 2019-10-20 PROCEDURE — 85025 COMPLETE CBC W/AUTO DIFF WBC: CPT

## 2019-10-20 PROCEDURE — 83605 ASSAY OF LACTIC ACID: CPT

## 2019-10-20 PROCEDURE — 87077 CULTURE AEROBIC IDENTIFY: CPT

## 2019-10-20 PROCEDURE — 80053 COMPREHEN METABOLIC PANEL: CPT

## 2019-10-20 PROCEDURE — 84484 ASSAY OF TROPONIN QUANT: CPT

## 2019-10-20 PROCEDURE — 74011250637 HC RX REV CODE- 250/637: Performed by: EMERGENCY MEDICINE

## 2019-10-20 PROCEDURE — 71045 X-RAY EXAM CHEST 1 VIEW: CPT

## 2019-10-20 RX ORDER — ONDANSETRON 2 MG/ML
4 INJECTION INTRAMUSCULAR; INTRAVENOUS
Status: COMPLETED | OUTPATIENT
Start: 2019-10-20 | End: 2019-10-20

## 2019-10-20 RX ORDER — MIDODRINE HYDROCHLORIDE 5 MG/1
10 TABLET ORAL
Status: COMPLETED | OUTPATIENT
Start: 2019-10-20 | End: 2019-10-20

## 2019-10-20 RX ADMIN — SODIUM CHLORIDE 1000 ML: 900 INJECTION, SOLUTION INTRAVENOUS at 19:45

## 2019-10-20 RX ADMIN — SODIUM CHLORIDE 1000 ML: 900 INJECTION, SOLUTION INTRAVENOUS at 20:24

## 2019-10-20 RX ADMIN — MIDODRINE HYDROCHLORIDE 10 MG: 5 TABLET ORAL at 21:47

## 2019-10-20 RX ADMIN — ONDANSETRON 4 MG: 2 INJECTION INTRAMUSCULAR; INTRAVENOUS at 18:45

## 2019-10-20 RX ADMIN — SODIUM CHLORIDE 1000 ML: 900 INJECTION, SOLUTION INTRAVENOUS at 18:49

## 2019-10-20 NOTE — ED NOTES
Pt tolerated orthostatic blood pressure readings but did report feeling lightheaded with change of position

## 2019-10-20 NOTE — ED NOTES
Pt has received 500 ml out of the 1000 ml bolus and states she is feeling less lightheaded. Was able to stand and pivot to bedside commode. Ice chips provided.  Pt resting in position of comfort

## 2019-10-20 NOTE — ED NOTES
Assumed care of patient. Pt resting in position of comfort. Call bell within reach. Pt presents to ED via ems from a restaurant. Pt reports 3 days ago she started with vertigo like s/s in the mornings. This evening she was at a restaurant with family and friends. Started feeling very hot and had a possible syncopal episode. Pt denies any chest pain before or after episode. Pt noted to be hypotensive by EMS and here in ED. Pt states hx of chronic pain secondary to her fibromyalgia. Pt placed on cardiac monitor. Resting in position of comfort.

## 2019-10-20 NOTE — ED PROVIDER NOTES
EMERGENCY DEPARTMENT HISTORY AND PHYSICAL EXAM      Date: 10/20/2019  Patient Name: Zita Wheeler  Patient Age and Sex: 60 y.o. female     History of Presenting Illness     No chief complaint on file. History Provided By: Patient    HPI: Zita Wheeler is a 60-year-old female with a history of hypertension presenting for presyncope. Patient states she was with her family earlier today and had been drinking a little bit of alcohol, Mariia. While she was with them started to feel diaphoretic, hot and very lightheaded. Stated that she did not feel well and then does not remember very much after that. She does not think she passed out but does not remember what her son was saying to her. Patient denies any chest pain, shortness of breath associated with it. States that when she had surgery several years ago she had low blood pressure, but takes high blood pressure medications every day. Currently states she feels lightheaded but no chest pain or shortness of breath. States she has been eating and drinking well. Currently has 8 out of 10 pain all over due to her fibromyalgia. States that this is baseline. There are no other complaints, changes, or physical findings at this time. PCP: Julio Cesar Aceves MD    No current facility-administered medications on file prior to encounter. Current Outpatient Medications on File Prior to Encounter   Medication Sig Dispense Refill    lisinopril-hydroCHLOROthiazide (PRINZIDE, ZESTORETIC) 10-12.5 mg per tablet Take 1 Tab by mouth daily. 90 Tab 0    DULoxetine (CYMBALTA) 30 mg capsule Take 1 Cap by mouth two (2) times a day. 180 Cap 0    buPROPion (WELLBUTRIN) 100 mg tablet Take 1 Tab by mouth two (2) times a day. 180 Tab 0    SITagliptin (JANUVIA) 100 mg tablet Take 1 Tab by mouth daily. 90 Tab 1    gemfibrozil (LOPID) 600 mg tablet Take 1 Tab by mouth two (2) times a day.  180 Tab 0    albuterol (PROAIR HFA) 90 mcg/actuation inhaler Take 2 Puffs by inhalation every four (4) hours as needed for Wheezing. 3 Inhaler 1    fluticasone propion-salmeterol (ADVAIR/WIXELA) 250-50 mcg/dose diskus inhaler Take 1 Puff by inhalation every twelve (12) hours. 180 Each 1    propranolol (INDERAL) 40 mg tablet Take 1 Tab by mouth two (2) times a day. 180 Tab 0    omeprazole (PRILOSEC) 20 mg capsule TAKE 1 CAPSULE BY MOUTH EVERY DAY 90 Cap 1    acyclovir (ZOVIRAX) 400 mg tablet TAKE 1 TABLET BY MOUTH TWICE DAILY 180 Tab 0    metFORMIN (GLUCOPHAGE) 1,000 mg tablet 500mg in am and 1000 mg pm 180 Tab 0    potassium chloride SR (KLOR-CON 10) 10 mEq tablet TK 1 T PO QD 90 Tab 1    fluticasone propionate (FLONASE) 50 mcg/actuation nasal spray INSTILL 2 SPRAYS IN EACH NOSTRIL EVERY DAY 3 Bottle 0    gabapentin (NEURONTIN) 300 mg capsule Take 1 Cap by mouth two (2) times a day. 60 Cap 0    dulaglutide (TRULICITY) 1.5 GE/3.5 mL sub-q pen 0.5 mL by SubCUTAneous route every seven (7) days. 2 Pen 0    dulaglutide (TRULICITY) 1.5 CI/7.4 mL sub-q pen 0.5 mL by SubCUTAneous route every seven (7) days. 12 Pen 0    albuterol-ipratropium (DUO-NEB) 2.5 mg-0.5 mg/3 ml nebu INHALE 1 VIAL VIA NEBULIZER EVERY 4 HOURS AS NEEDED 1620 mL 5    Lancets (ACCU-CHEK SOFTCLIX LANCETS) misc Test once daily 100 Each 3    alcohol swabs (BD SINGLE USE SWABS REGULAR) padm Test once daily 100 Pad 3    Blood Glucose Control High&Low (ACCU-CHEK NIVIA CONTROL SOLN) soln As directed 3 Bottle 3    Blood-Glucose Meter (ACCU-CHEK NIVIA PLUS METER) misc Test once daily 1 Each 0    glucose blood VI test strips (ACCU-CHEK NIVIA PLUS TEST STRP) strip Test once daily 100 Strip 3    cholecalciferol, vitamin D3, (VITAMIN D3) 2,000 unit tab Take 1 Tab by mouth daily.  [DISCONTINUED] butalbital-acetaminophen-caffeine (ESGIC) -40 mg per tablet Take 1 Tab by mouth every six (6) hours as needed for Pain.  15 Tab 0       Past History     Past Medical History:  Past Medical History:   Diagnosis Date    Acute saddle pulmonary embolism without acute cor pulmonale (HCC) 11/3/2017    Arthritis     Asthma     Chronic obstructive pulmonary disease (HCC)     Chronic pain     back/hip    Diabetes mellitus due to underlying condition, controlled, with complication, without long-term current use of insulin ([facility] [address] 3/21/2017    Diabetic polyneuropathy associated with type 2 diabetes mellitus ([facility] [address] 2017    Essential hypertension 2018    Fibromyalgia     Gastroesophageal reflux disease without esophagitis 2016    Mixed hyperlipidemia 2018    Moderate episode of recurrent major depressive disorder ([facility] [address] 2017    Noncompliance 2018    Severe obesity (BMI 35.0-39.9) 2018    Unspecified sleep apnea     CAN'T TOLERATE CPAP       Past Surgical History:  Past Surgical History:   Procedure Laterality Date    COLONOSCOPY N/A 2017    COLONOSCOPY performed by [facility]lain MD at [facility] ENDOSCOPY     [facility]    one ovary removed    HX ORTHOPAEDIC  2012    lumbar fusion    HX ORTHOPAEDIC  2/16/15    RIGHT TOTAL KNEE ARTHROPLASTY    HX ORTHOPAEDIC  6/16/15    LEFT TOTAL KNEE ARTHROPLASTY          Family History:  Family History   Problem Relation Age of Onset    Anesth Problems Mother     Cancer Mother         LUNG CA - SMOKER    Other Mother         CEREBRAL ANEURYSM    Diabetes Mother     Diabetes Father     Other Sister         VARICOSE VEINS THAT HURT AND BLEED    Heart Attack Brother     Other Other         MOTHER'S FATHER'S MOTHER  WITH ANEURYSM       Social History:  Social History     Tobacco Use    Smoking status: Former Smoker     Packs/day: 1.00     Years: 37.00     Pack years: 37.00     Last attempt to quit: 2015     Years since quitting: 3.8    Smokeless tobacco: Never Used   Substance Use Topics    Alcohol use: Yes     Comment: VERY RARE WINE    Drug use: No       Allergies:   Allergies   Allergen Reactions    Allopurinol Hives    Darvocet A500 [Propoxyphene N-Acetaminophen] Hives    Influenza Virus Vaccine, Specific Other (comments)     Allergy documented in admission data base    Seafood [Shellfish Containing Products] Itching     NEW ALLERGY; REACTION WORSENS AS AMOUNT EATEN INCREASES         Review of Systems   Review of Systems   Constitutional: Positive for diaphoresis and fatigue. Negative for chills and fever. Respiratory: Negative for cough and shortness of breath. Cardiovascular: Negative for chest pain. Gastrointestinal: Negative for abdominal pain, constipation, diarrhea, nausea and vomiting. Neurological: Positive for syncope and light-headedness. Negative for weakness and numbness. All other systems reviewed and are negative. Physical Exam   Physical Exam   Constitutional: She is oriented to person, place, and time. She appears well-developed and well-nourished. HENT:   Head: Normocephalic and atraumatic. No conjunctival pallor, normal mucous membranes   Eyes: Conjunctivae and EOM are normal.   Neck: Normal range of motion. Neck supple. Cardiovascular: Normal rate and regular rhythm. Pulmonary/Chest: Effort normal and breath sounds normal. No respiratory distress. Abdominal: Soft. She exhibits no distension. There is no tenderness. Musculoskeletal: Normal range of motion. Neurological: She is alert and oriented to person, place, and time. Skin: Skin is warm and dry. Psychiatric: She has a normal mood and affect. Nursing note and vitals reviewed.        Diagnostic Study Results     Labs -     Recent Results (from the past 12 hour(s))   EKG, 12 LEAD, INITIAL    Collection Time: 10/20/19  6:22 PM   Result Value Ref Range    Ventricular Rate 73 BPM    Atrial Rate 73 BPM    P-R Interval 188 ms    QRS Duration 86 ms    Q-T Interval 412 ms    QTC Calculation (Bezet) 453 ms    Calculated P Axis 51 degrees    Calculated R Axis -13 degrees    Calculated T Axis 9 degrees Diagnosis       Normal sinus rhythm  Low voltage QRS  When compared with ECG of 27-OCT-2017 10:19,  No significant change was found     CBC WITH AUTOMATED DIFF    Collection Time: 10/20/19  6:30 PM   Result Value Ref Range    WBC 9.0 3.6 - 11.0 K/uL    RBC 4.13 3.80 - 5.20 M/uL    HGB 11.4 (L) 11.5 - 16.0 g/dL    HCT 36.6 35.0 - 47.0 %    MCV 88.6 80.0 - 99.0 FL    MCH 27.6 26.0 - 34.0 PG    MCHC 31.1 30.0 - 36.5 g/dL    RDW 12.5 11.5 - 14.5 %    PLATELET 704 562 - 538 K/uL    MPV 9.9 8.9 - 12.9 FL    NRBC 0.0 0  WBC    ABSOLUTE NRBC 0.00 0.00 - 0.01 K/uL    NEUTROPHILS 57 32 - 75 %    LYMPHOCYTES 32 12 - 49 %    MONOCYTES 8 5 - 13 %    EOSINOPHILS 2 0 - 7 %    BASOPHILS 1 0 - 1 %    IMMATURE GRANULOCYTES 0 0.0 - 0.5 %    ABS. NEUTROPHILS 5.1 1.8 - 8.0 K/UL    ABS. LYMPHOCYTES 2.9 0.8 - 3.5 K/UL    ABS. MONOCYTES 0.7 0.0 - 1.0 K/UL    ABS. EOSINOPHILS 0.2 0.0 - 0.4 K/UL    ABS. BASOPHILS 0.1 0.0 - 0.1 K/UL    ABS. IMM. GRANS. 0.0 0.00 - 0.04 K/UL    DF AUTOMATED     METABOLIC PANEL, COMPREHENSIVE    Collection Time: 10/20/19  6:30 PM   Result Value Ref Range    Sodium 140 136 - 145 mmol/L    Potassium 3.9 3.5 - 5.1 mmol/L    Chloride 105 97 - 108 mmol/L    CO2 26 21 - 32 mmol/L    Anion gap 9 5 - 15 mmol/L    Glucose 128 (H) 65 - 100 mg/dL    BUN 27 (H) 6 - 20 MG/DL    Creatinine 1.79 (H) 0.55 - 1.02 MG/DL    BUN/Creatinine ratio 15 12 - 20      GFR est AA 35 (L) >60 ml/min/1.73m2    GFR est non-AA 29 (L) >60 ml/min/1.73m2    Calcium 8.8 8.5 - 10.1 MG/DL    Bilirubin, total 0.2 0.2 - 1.0 MG/DL    ALT (SGPT) 10 (L) 12 - 78 U/L    AST (SGOT) 8 (L) 15 - 37 U/L    Alk.  phosphatase 68 45 - 117 U/L    Protein, total 8.2 6.4 - 8.2 g/dL    Albumin 3.3 (L) 3.5 - 5.0 g/dL    Globulin 4.9 (H) 2.0 - 4.0 g/dL    A-G Ratio 0.7 (L) 1.1 - 2.2     CK W/ REFLX CKMB    Collection Time: 10/20/19  6:30 PM   Result Value Ref Range    CK 73 26 - 192 U/L   SAMPLES BEING HELD    Collection Time: 10/20/19  6:30 PM   Result Value Ref Range    SAMPLES BEING HELD  1 RED, 1 BLUE     COMMENT        Add-on orders for these samples will be processed based on acceptable specimen integrity and analyte stability, which may vary by analyte.    TROPONIN I    Collection Time: 10/20/19  6:30 PM   Result Value Ref Range    Troponin-I, Qt. <0.05 <0.05 ng/mL   POC LACTIC ACID    Collection Time: 10/20/19  7:18 PM   Result Value Ref Range    Lactic Acid (POC) 1.12 0.40 - 2.00 mmol/L   URINALYSIS W/ REFLEX CULTURE    Collection Time: 10/20/19  7:22 PM   Result Value Ref Range    Color DARK YELLOW      Appearance CLOUDY (A) CLEAR      Specific gravity 1.028 1.003 - 1.030      pH (UA) 5.5 5.0 - 8.0      Protein 30 (A) NEG mg/dL    Glucose NEGATIVE  NEG mg/dL    Ketone TRACE (A) NEG mg/dL    Blood NEGATIVE  NEG      Urobilinogen 1.0 0.2 - 1.0 EU/dL    Nitrites NEGATIVE  NEG      Leukocyte Esterase SMALL (A) NEG      WBC 5-10 0 - 4 /hpf    RBC 0-5 0 - 5 /hpf    Epithelial cells FEW FEW /lpf    Bacteria 1+ (A) NEG /hpf    UA:UC IF INDICATED URINE CULTURE ORDERED (A) CNI     BILIRUBIN, CONFIRM    Collection Time: 10/20/19  7:22 PM   Result Value Ref Range    Bilirubin UA, confirm NEGATIVE  NEG     METABOLIC PANEL, BASIC    Collection Time: 10/20/19  9:47 PM   Result Value Ref Range    Sodium 141 136 - 145 mmol/L    Potassium 4.0 3.5 - 5.1 mmol/L    Chloride 111 (H) 97 - 108 mmol/L    CO2 25 21 - 32 mmol/L    Anion gap 5 5 - 15 mmol/L    Glucose 125 (H) 65 - 100 mg/dL    BUN 27 (H) 6 - 20 MG/DL    Creatinine 1.66 (H) 0.55 - 1.02 MG/DL    BUN/Creatinine ratio 16 12 - 20      GFR est AA 38 (L) >60 ml/min/1.73m2    GFR est non-AA 31 (L) >60 ml/min/1.73m2    Calcium 8.0 (L) 8.5 - 10.1 MG/DL       Radiologic Studies -   XR CHEST PORT   Final Result   IMPRESSION: Normal chest.        CT Results  (Last 48 hours)    None        CXR Results  (Last 48 hours)               10/20/19 1915  XR CHEST PORT Final result    Impression:  IMPRESSION: Normal chest.       Narrative:  EXAM: XR CHEST PORT       INDICATION: syncope       COMPARISON: 2017       FINDINGS: A portable AP radiograph of the chest was obtained at 1842 hours. The   patient is on a cardiac monitor. The lungs are clear. The cardiac and   mediastinal contours and pulmonary vascularity are normal.  The bones and soft   tissues are grossly within normal limits. Medical Decision Making   I am the first provider for this patient. I reviewed the vital signs, available nursing notes, past medical history, past surgical history, family history and social history. Vital Signs-Reviewed the patient's vital signs. Patient Vitals for the past 12 hrs:   Temp Pulse Resp BP SpO2   10/20/19 2230  73  105/62 95 %   10/20/19 2200  74  (!) 86/52 97 %   10/20/19 2147  78  95/47 95 %   10/20/19 2039  76  (!) 70/49 95 %   10/20/19 2015  72  90/56    10/20/19 2002  70  (!) 80/66 99 %   10/20/19 1944  72  (!) 79/66 99 %   10/20/19 1943  76 18 (!) 79/66    10/20/19 1832  78 18 (!) 73/41    10/20/19 1825 97.4 °F (36.3 °C) 72 18 (!) 77/55 97 %       Records Reviewed: Nursing Notes and Old Medical Records    Provider Notes (Medical Decision Making):   Patient presenting with lightheadedness episode and possible syncopal event. Given that she cannot remember what happened after she got lightheaded, probably did have a syncopal event. Differential includes orthostatic hypotension, dehydration, less likely cardiac syncope but does have risk factors. Will get EKG, lab work and give her fluids. EMS reported that she did have hypotension in route with systolics of 71K and 117Z, but 77 systolic here. Thus will give her fluid hydration and see if she responds. ED Course:   Initial assessment performed. The patients presenting problems have been discussed, and they are in agreement with the care plan formulated and outlined with them. I have encouraged them to ask questions as they arise throughout their visit.     ED Course as of Oct 20 2308   Bri Ray Oct 20, 2019   1858 EKG interpreted by me. Shows normal sinus rhythm with a heart rate of 73. No ST elevations or depressions concerning for ischemia. [JS]   1903 In reviewing patient's chart, has a history of PEs in the past but not on any blood thinners right now. Possibly the cause of her hypotension. Her her daughter is at bedside and is concerned that patient drinks a lot of kishor tea and might be contributing to this. [JS]   2022 After 1.5L NS still no improvement in BP. Manual BP showed 80 and then 90. Will give another 1L    [JS]   2148 Patient's blood pressure has significantly improved after 3 L normal saline. In speaking with her about her to use, states that this green tea is made by a friend. Not sure how much caffeine it has in it. Discussed avoiding this tea as well as her blood pressure medications for the next couple days. We are repeating her BMP to make sure her creatinine has improved. She is not complaining any shortness of breath, chest pain and not hypoxic or tachycardic so unlikely to have a PE at this point. I do have a high suspicion that this is all related to hydration. [JS]   9973 Patient's blood pressure has improved. Her creatinine has also improved to closer to her baseline. Patient continues to be chest pain and shortness of breath free. Feels much better than she did before. Wants to go home. We will have her hold her blood pressure medications and follow-up with her primary care doctor.     [JS]      ED Course User Index  [JS] Phillip Rivera MD     Critical Care Time:   CRITICAL CARE NOTE :    6:37 PM    IMPENDING DETERIORATION -Cardiovascular and Renal  ASSOCIATED RISK FACTORS - Hypotension, Shock and Dehydration  MANAGEMENT- Bedside Assessment and Supervision of Care  INTERPRETATION -  Xrays, ECG, Blood Pressure and Cardiac Output Measures   INTERVENTIONS - hemodynamic mngmt  CASE REVIEW - Nursing and Family  TREATMENT RESPONSE -Improved  PERFORMED BY - Self    NOTES   :    I have spent 50 minutes of critical care time involved in lab review, consultations with specialist, family decision- making, bedside attention and documentation. During this entire length of time I was immediately available to the patient . Disposition:    Discharge Note:  The patient has been re-evaluated and is ready for discharge. Reviewed available results with patient. Counseled patient on diagnosis and care plan. Patient has expressed understanding, and all questions have been answered. Patient agrees with plan and agrees to follow up as recommended, or to return to the ED if their symptoms worsen. Discharge instructions have been provided and explained to the patient, along with reasons to return to the ED. Diagnosis     Clinical Impression:   1. Hypotension, unspecified hypotension type    2. Severe dehydration    3. Syncope and collapse        Attestations:  Tanner Singleton M.D. Please note that this dictation was completed with [email or web address], the computer voice recognition software. Quite often unanticipated grammatical, syntax, homophones, and other interpretive errors are inadvertently transcribed by the computer software. Please disregard these errors. Please excuse any errors that have escaped final proofreading. Thank you.

## 2019-10-20 NOTE — ED NOTES
Family members at bedside. Dr Monroe Earl in room to chat with patient. Pt tolerating ice chips.  Warm blanket and pillow provided

## 2019-10-21 ENCOUNTER — OFFICE VISIT (OUTPATIENT)
Dept: FAMILY MEDICINE CLINIC | Age: 64
End: 2019-10-21

## 2019-10-21 VITALS
DIASTOLIC BLOOD PRESSURE: 68 MMHG | HEIGHT: 72 IN | WEIGHT: 287 LBS | TEMPERATURE: 97.9 F | HEART RATE: 70 BPM | OXYGEN SATURATION: 96 % | RESPIRATION RATE: 18 BRPM | BODY MASS INDEX: 38.87 KG/M2 | SYSTOLIC BLOOD PRESSURE: 122 MMHG

## 2019-10-21 DIAGNOSIS — I10 ESSENTIAL HYPERTENSION WITH GOAL BLOOD PRESSURE LESS THAN 140/90: Primary | ICD-10-CM

## 2019-10-21 DIAGNOSIS — E11.42 DIABETIC POLYNEUROPATHY ASSOCIATED WITH TYPE 2 DIABETES MELLITUS (HCC): ICD-10-CM

## 2019-10-21 DIAGNOSIS — E11.65 UNCONTROLLED TYPE 2 DIABETES MELLITUS WITH HYPERGLYCEMIA (HCC): ICD-10-CM

## 2019-10-21 DIAGNOSIS — M79.7 FIBROMYALGIA: ICD-10-CM

## 2019-10-21 DIAGNOSIS — E78.2 MIXED HYPERLIPIDEMIA: ICD-10-CM

## 2019-10-21 DIAGNOSIS — F33.1 MODERATE EPISODE OF RECURRENT MAJOR DEPRESSIVE DISORDER (HCC): ICD-10-CM

## 2019-10-21 DIAGNOSIS — G25.0 ESSENTIAL TREMOR: ICD-10-CM

## 2019-10-21 DIAGNOSIS — Z79.899 ENCOUNTER FOR LONG-TERM (CURRENT) USE OF MEDICATIONS: ICD-10-CM

## 2019-10-21 LAB
ATRIAL RATE: 73 BPM
CALCULATED P AXIS, ECG09: 51 DEGREES
CALCULATED R AXIS, ECG10: -13 DEGREES
CALCULATED T AXIS, ECG11: 9 DEGREES
DIAGNOSIS, 93000: NORMAL
P-R INTERVAL, ECG05: 188 MS
Q-T INTERVAL, ECG07: 412 MS
QRS DURATION, ECG06: 86 MS
QTC CALCULATION (BEZET), ECG08: 453 MS
VENTRICULAR RATE, ECG03: 73 BPM

## 2019-10-21 RX ORDER — LISINOPRIL 10 MG/1
10 TABLET ORAL DAILY
Qty: 30 TAB | Refills: 1 | Status: SHIPPED | OUTPATIENT
Start: 2019-10-21 | End: 2020-04-07 | Stop reason: SDUPTHER

## 2019-10-21 RX ORDER — PROPRANOLOL HYDROCHLORIDE 20 MG/1
TABLET ORAL
Qty: 60 TAB | Refills: 1 | Status: SHIPPED | OUTPATIENT
Start: 2019-10-21 | End: 2020-07-17

## 2019-10-21 RX ORDER — DULOXETIN HYDROCHLORIDE 30 MG/1
CAPSULE, DELAYED RELEASE ORAL
Qty: 180 CAP | Refills: 1 | Status: SHIPPED | OUTPATIENT
Start: 2019-10-21 | End: 2020-04-09 | Stop reason: SDUPTHER

## 2019-10-21 NOTE — PROGRESS NOTES
Janette Zazueta is a 60 y.o. female     Is present with her  today. has a past medical history of Acute saddle pulmonary embolism without acute cor pulmonale (HCC) (11/3/2017), Arthritis, Asthma, Chronic obstructive pulmonary disease ([facility] Chronic pain, Diabetes mellitus due to underlying condition, controlled, with complication, without long-term current use of insulin ([facility] (3/21/2017), Diabetic polyneuropathy associated with type 2 diabetes mellitus ([facility] (11/1/2017), Essential hypertension (6/5/2018), Fibromyalgia, Gastroesophageal reflux disease without esophagitis (5/13/2016), Mixed hyperlipidemia (6/5/2018), Moderate episode of recurrent major depressive disorder ([facility] (11/1/2017), Noncompliance (8/6/2018), Severe obesity (BMI 35.0-39.9) (6/5/2018), and Unspecified sleep apnea. Yesterday went to ED for dehydration, denies dizziness, light headed    Dm2.  a1c 7.9% 04/2019, 6.8% 08/2018 at goal, Non-compliance recently, will refill some of her meds and labs monitoring. continue [facility], metformin    Ckd stage 3:  Cr. Baseline 1.26   Avoid NSAIDs, nephrotoxic drugs, stay hydrated. HTN: BP stable on Lisnipril/hctz 10/12.5 and propranolol 40mg bid. Refill meds     HLD: worse compared to previous labs a year ago. Wasn't compliant with Gemfibrozil, but she restarted it 2 weeks now. Anxiety and depression. Was Stable on Wellbutrin and cymbalta. Have ran out of Wellbutrin and cymbalta. She denies SI or HI.  Continue course. Hx of allergies in the past.   Last smokes 4 years ago. Have ran out of flonase, but she takes OTC allergies pills thinks it's zyrtec. Foot left great toe. Have seen podiatry and f/u there tomorrow. Pt report it's healed, no redness, pain, swelling, exudate.        Reviewed: active problem list, medication list, allergies, notes from last encounter, lab results    A comprehensive review of systems was negative except for that written in the HPI.      Allergies   Allergen Reactions    Allopurinol Hives    Darvocet A500 [Propoxyphene N-Acetaminophen] Hives    Influenza Virus Vaccine, Specific Other (comments)     Allergy documented in admission data base    Seafood [Shellfish Containing Products] Itching     NEW ALLERGY; REACTION WORSENS AS AMOUNT EATEN INCREASES     Current Outpatient Medications on File Prior to Visit   Medication Sig Dispense Refill    buPROPion (WELLBUTRIN) 100 mg tablet Take 1 Tab by mouth two (2) times a day. 180 Tab 0    gemfibrozil (LOPID) 600 mg tablet Take 1 Tab by mouth two (2) times a day. 180 Tab 0    albuterol (PROAIR HFA) 90 mcg/actuation inhaler Take 2 Puffs by inhalation every four (4) hours as needed for Wheezing. 3 Inhaler 1    fluticasone propion-salmeterol (ADVAIR/WIXELA) 250-50 mcg/dose diskus inhaler Take 1 Puff by inhalation every twelve (12) hours. 180 Each 1    omeprazole (PRILOSEC) 20 mg capsule TAKE 1 CAPSULE BY MOUTH EVERY DAY 90 Cap 1    acyclovir (ZOVIRAX) 400 mg tablet TAKE 1 TABLET BY MOUTH TWICE DAILY 180 Tab 0    metFORMIN (GLUCOPHAGE) 1,000 mg tablet 500mg in am and 1000 mg pm 180 Tab 0    potassium chloride SR (KLOR-CON 10) 10 mEq tablet TK 1 T PO QD 90 Tab 1    fluticasone propionate (FLONASE) 50 mcg/actuation nasal spray INSTILL 2 SPRAYS IN EACH NOSTRIL EVERY DAY 3 Bottle 0    gabapentin (NEURONTIN) 300 mg capsule Take 1 Cap by mouth two (2) times a day.  60 Cap 0    albuterol-ipratropium (DUO-NEB) 2.5 mg-0.5 mg/3 ml nebu INHALE 1 VIAL VIA NEBULIZER EVERY 4 HOURS AS NEEDED 1620 mL 5    Lancets (ACCU-CHEK SOFTCLIX LANCETS) misc Test once daily 100 Each 3    alcohol swabs (BD SINGLE USE SWABS REGULAR) padm Test once daily 100 Pad 3    Blood Glucose Control High&Low (ACCU-CHEK NIVIA CONTROL SOLN) soln As directed 3 Bottle 3    Blood-Glucose Meter (ACCU-CHEK NIVIA PLUS METER) misc Test once daily 1 Each 0    glucose blood VI test strips (ACCU-CHEK NIVIA PLUS TEST STRP) strip Test once daily 100 Strip 3    SITagliptin (JANUVIA) 100 mg tablet Take 1 Tab by mouth daily. 90 Tab 1    cholecalciferol, vitamin D3, (VITAMIN D3) 2,000 unit tab Take 1 Tab by mouth daily. Current Facility-Administered Medications on File Prior to Visit   Medication Dose Route Frequency Provider Last Rate Last Dose    [COMPLETED] sodium chloride 0.9 % bolus infusion 1,000 mL  1,000 mL IntraVENous Reymundo Waggoner MD   Stopped at 10/20/19 2003    [COMPLETED] ondansetron [facility] injection 4 mg  4 mg IntraVENous NOW Doc Arguelles MD   4 mg at 10/20/19 1845    [COMPLETED] sodium chloride 0.9 % bolus infusion 1,000 mL  1,000 mL IntraVENous NOW Doc Arguelles MD   Stopped at 10/20/19 2038    [COMPLETED] sodium chloride 0.9 % bolus infusion 1,000 mL  1,000 mL IntraVENous NOW Doc Arguelles MD   Stopped at 10/20/19 2242    [COMPLETED] midodrine (PROAMITINE) tablet 10 mg  10 mg Oral NOW Doc Arguelles MD   10 mg at 10/20/19 2147     Patient Active Problem List   Diagnosis Code    Lumbar stenosis M48.061    Asthma J45.909    Primary localized osteoarthrosis, lower leg M17.10    DJD (degenerative joint disease) of knee M17.10    Chronic pain G89.29    Fibromyalgia M79.7    COPD (chronic obstructive pulmonary disease) ([facility]) J44.9    Gastroesophageal reflux disease without esophagitis K21.9    Chronic low back pain with bilateral sciatica M54.41, M54.42, G89.29    Lumbar spondylosis M47.816    Spinal stenosis M48.00    Status post laminectomy Z98.890    Diabetes mellitus due to underlying condition, controlled, with complication, without long-term current use of insulin ([facility]) E08.8    Acute pulmonary embolism ([facility]) I26.99    Moderate episode of recurrent major depressive disorder ([facility] F33.1    Diabetic polyneuropathy associated with type 2 diabetes mellitus ([facility] E11.42    Acute saddle pulmonary embolism without acute cor pulmonale ([facility]) I26.92    Severe obesity (BMI 35.0-39. 9) E66.01    Essential hypertension I10    Mixed hyperlipidemia E78.2    Type 2 diabetes with nephropathy (HCC) E11.21    Noncompliance Z91.19    Chronic anticoagulation Z79.01       Visit Vitals  /68   Pulse 70   Temp 97.9 °F (36.6 °C) (Oral)   Resp 18   Ht 6' 1\" (1.854 m)   Wt 287 lb (130.2 kg)   SpO2 96%   BMI 37.87 kg/m²     General appearance: alert, cooperative, no distress, appears stated age  Neurologic: Alert and oriented X 3, normal strength and tone, symmetric. Normal without focal findings. Cranial nerves 2-12 intact. Normal coordination and gait. Mental status: Alert, oriented, thought content appropriate, affect: stable, mood-congruent. Head: Normocephalic, without obvious abnormality, atraumatic  Eyes: conjunctivae/corneas clear. PERRL, EOM's intact. Neck: supple, symmetrical, trachea midline, no JVD  Lungs: clear to auscultation bilaterally  Heart: regular rate and rhythm, S1, S2 normal, no murmur, click, rub or gallop      Assessment/Plans:    Diagnoses and all orders for this visit:    1. Essential hypertension with goal blood pressure less than 140/90  -     lisinopril (PRINIVIL, ZESTRIL) 10 mg tablet; Take 1 Tab by mouth daily.  -     METABOLIC PANEL, BASIC  -     TSH 3RD GENERATION    2. Uncontrolled type 2 diabetes mellitus with hyperglycemia (HCC)  -     propranolol (INDERAL) 20 mg tablet; Take 1 Tab by mouth two (2) times a day. -     METABOLIC PANEL, BASIC  -     HEMOGLOBIN A1C W/O EAG  -     TSH 3RD GENERATION  -     LIPID PANEL  -     MICROALBUMIN, UR, RAND W/ MICROALB/CREAT RATIO  -     dulaglutide (TRULICITY) 1.5 YB/8.2 mL sub-q pen; 0.5 mL by SubCUTAneous route every seven (7) days. 3. Diabetic polyneuropathy associated with type 2 diabetes mellitus ([address]    4. Mixed hyperlipidemia  -     LIPID PANEL    5. Essential tremor  -     propranolol (INDERAL) 20 mg tablet; Take 1 Tab by mouth two (2) times a day. 6. Fibromyalgia  -     DULoxetine (CYMBALTA) 30 mg capsule;  Take 1 Cap by mouth two (2) times a day. 7. Moderate episode of recurrent major depressive disorder (HCC)  -     DULoxetine (CYMBALTA) 30 mg capsule; Take 1 Cap by mouth two (2) times a day. 8. Encounter for long-term (current) use of medications  -     METABOLIC PANEL, BASIC  -     HEMOGLOBIN A1C W/O EAG  -     TSH 3RD GENERATION  -     LIPID PANEL  -     MICROALBUMIN, UR, RAND W/ MICROALB/CREAT RATIO      Discussed plans, risk/benefits of treatments/observations. Through the use of shared decision making, above plans were agreed upon. Medication compliance advised. Patient verbalized understanding. Follow-up and Dispositions    · Return in about 3 weeks (around 11/11/2019) for kidneys, HTN, DM2, walker.          Casey Lincoln MD  10/21/2019

## 2019-10-21 NOTE — PROGRESS NOTES
Chief Complaint   Patient presents with    Hypotension     seen in ER testerday, dehydration       1. Have you been to the ER, urgent care clinic since your last visit? Hospitalized since your last visit? yes    2. Have you seen or consulted any other health care providers outside of the [address] [place] [address] since your last visit? Include any pap smears or colon screening.  no

## 2019-10-21 NOTE — ED NOTES
Pt resting in in position of comfort. Reports she is feeling much better. Manual blood pressures taken by this RN and another RN and both times, the blood pressure readings have been consistent with the automatic blood pressure readings. The pt does have the correct cuff size present. Pt reports she does not drink a lot of water during the day and mainly drinks kishor tea as her main source of hydration.

## 2019-10-21 NOTE — DISCHARGE INSTRUCTIONS
Patient Education   Please hold your blood pressure medications until you follow-up with your primary care doctor. Low Blood Pressure: Care Instructions  Your Care Instructions    Blood pressure is a measurement of the force of the blood against the walls of the blood vessels during and after each beat of the heart. Low blood pressure is also called hypotension. It means that your blood pressure is much lower than normal. Some people, especially young, slim women, may have slightly low blood pressure without symptoms. But in many people, low blood pressure can cause symptoms such as feeling dizzy or lightheaded. When your blood pressure is too low, your heart, brain, and other organs do not get enough blood. Low blood pressure can be caused by many things, including heart problems and some medicines. Diabetes that is not under control can cause your blood pressure to drop. And so can a severe allergic reaction or infection. Another cause is dehydration, which is when your body loses too much fluid. Treatment for low blood pressure depends on the cause. Follow-up care is a key part of your treatment and safety. Be sure to make and go to all appointments, and call your doctor if you are having problems. It's also a good idea to know your test results and keep a list of the medicines you take. How can you care for yourself at home? · Drink plenty of fluids, enough so that your urine is light yellow or clear like water. If you have kidney, heart, or liver disease and have to limit fluids, talk with your doctor before you increase the amount of fluids you drink. · Be safe with medicines. Call your doctor if you think you are having a problem with your medicine. You will get more details on the specific medicines your doctor prescribes. · Stand up or get out of bed very slowly to allow your body to adjust.  · Get plenty of rest.  · Do not smoke. Smoking increases your risk of heart attack.  If you need help quitting, talk to your doctor about stop-smoking programs and medicines. These can increase your chances of quitting for good. · Limit alcohol to 2 drinks a day for men and 1 drink a day for women. Alcohol may interfere with your medicine. In addition, alcohol can make your low blood pressure worse by causing your body to lose water. When should you call for help? Call 911 anytime you think you may need emergency care. For example, call if:    · You passed out (lost consciousness).    Call your doctor now or seek immediate medical care if:    · You are dizzy or lightheaded, or you feel like you may faint.    Watch closely for changes in your health, and be sure to contact your doctor if you have any problems. Where can you learn more? Go to http://cassandra-monroe.info/. Enter C304 in the search box to learn more about \"Low Blood Pressure: Care Instructions. \"  Current as of: April 9, 2019  Content Version: 12.2  © 0132-7062 [employer]. Care instructions adapted under license by [employer] (which disclaims liability or warranty for this information). If you have questions about a medical condition or this instruction, always ask your healthcare professional. [place] [address] any warranty or liability for your use of this information. Patient Education        Dehydration: Care Instructions  Your Care Instructions  Dehydration happens when your body loses too much fluid. This might happen when you do not drink enough water or you lose large amounts of fluids from your body because of diarrhea, vomiting, or sweating. Severe dehydration can be life-threatening. Water and minerals called electrolytes help put your body fluids back in balance. Learn the early signs of fluid loss, and drink more fluids to prevent dehydration. Follow-up care is a key part of your treatment and safety.  Be sure to make and go to all appointments, and call your doctor if you are having problems. It's also a good idea to know your test results and keep a list of the medicines you take. How can you care for yourself at home? · To prevent dehydration, drink plenty of fluids, enough so that your urine is light yellow or clear like water. Choose water and other caffeine-free clear liquids until you feel better. If you have kidney, heart, or liver disease and have to limit fluids, talk with your doctor before you increase the amount of fluids you drink. · If you do not feel like eating or drinking, try taking small sips of water, sports drinks, or other rehydration drinks. · Get plenty of rest.  To prevent dehydration  · Add more fluids to your diet and daily routine, unless your doctor has told you not to. · During hot weather, drink more fluids. Drink even more fluids if you exercise a lot. Stay away from drinks with alcohol or caffeine. · Watch for the symptoms of dehydration. These include:  ? A dry, sticky mouth. ? Dark yellow urine, and not much of it. ? Dry and sunken eyes. ? Feeling very tired. · Learn what problems can lead to dehydration. These include:  ? Diarrhea, fever, and vomiting. ? Any illness with a fever, such as pneumonia or the flu. ? Activities that cause heavy sweating, such as endurance races and heavy outdoor work in hot or humid weather. ? Alcohol or drug use or problems caused by quitting their use (withdrawal). ? Certain medicines, such as cold and allergy pills (antihistamines), diet pills (diuretics), and laxatives. ? Certain diseases, such as diabetes, cancer, and heart or kidney disease. When should you call for help? Call 911 anytime you think you may need emergency care.  For example, call if:    · You passed out (lost consciousness).    Call your doctor now or seek immediate medical care if:    · You are confused and cannot think clearly.     · You are dizzy or lightheaded, or you feel like you may faint.     · You have signs of needing more fluids. You have sunken eyes and a dry mouth, and you pass only a little dark urine.     · You cannot keep fluids down.    Watch closely for changes in your health, and be sure to contact your doctor if:    · You are not making tears.     · Your skin is very dry and sags slowly back into place after you pinch it.     · Your mouth and eyes are very dry. Where can you learn more? Go to http://cassandra-monroe.info/. Enter P783 in the search box to learn more about \"Dehydration: Care Instructions. \"  Current as of: June 26, 2019  Content Version: 12.2  © 2964-2598 [employer]. Care instructions adapted under license by [employer] (which disclaims liability or warranty for this information). If you have questions about a medical condition or this instruction, always ask your healthcare professional. [address] any warranty or liability for your use of this information.

## 2019-10-21 NOTE — PROGRESS NOTES
Chief Complaint   Patient presents with    Hypotension     seen in ER testerday, dehydration       1. Have you been to the ER, urgent care clinic since your last visit? Hospitalized since your last visit? yes    2. Have you seen or consulted any other health care providers outside of the [address] [place] [address] since your last visit? Include any pap smears or colon screening.  no

## 2019-10-21 NOTE — ED NOTES
Discharge instructions reviewed with pt by provider. pt verbalized understanding of discharge instructions. Copy of discharge paperwork given. pt signed the discharge paperwork to acknowledge that the paperwork was received. Signed sheet placed in scan box. Patient condition stable, respiratory status within normal limits, neuro status intact.

## 2019-10-21 NOTE — ED NOTES
Pt reports she is no longer feeling dizzy/lightheaded. Updated on results and plan of care by Dr Sri Evans.      Pt medicated per md orders

## 2019-10-22 RX ORDER — CEPHALEXIN 500 MG/1
500 CAPSULE ORAL 2 TIMES DAILY
Qty: 14 CAP | Refills: 0 | Status: SHIPPED | OUTPATIENT
Start: 2019-10-22 | End: 2019-10-29

## 2019-10-23 ENCOUNTER — TELEPHONE (OUTPATIENT)
Dept: FAMILY MEDICINE CLINIC | Age: 64
End: 2019-10-23

## 2019-10-23 LAB
BACTERIA SPEC CULT: ABNORMAL
CC UR VC: ABNORMAL
SERVICE CMNT-IMP: ABNORMAL

## 2019-10-23 NOTE — PROGRESS NOTES
Contacted patient, reviewed urine culture results.   Keflex efiled with Yvette at [facility] at her request.

## 2020-04-06 DIAGNOSIS — I10 ESSENTIAL HYPERTENSION WITH GOAL BLOOD PRESSURE LESS THAN 140/90: ICD-10-CM

## 2020-04-06 NOTE — TELEPHONE ENCOUNTER
----- Message from Mei Skelton sent at 4/6/2020  3:39 PM EDT -----  Regarding: Dr. Carbajal/ telephone  Contact: [phone]  Caller's first and last name: n/a   Reason for call: Blood work and medication concerns  Callback required yes/no and why: yes  Best contact number(s): [phone]  Details to clarify the request:  Advised she needs a refill on her medication and needs blood work done. Would like to know if bloodwork forms can be faxed to the lab for her to get the required bloodwork done.

## 2020-04-07 NOTE — TELEPHONE ENCOUNTER
Spoke with patient and she states she is out of BP medication. Refill request forwarded to Dr Kaia Jose and transferred to [facility] to schedule virtual appointment.

## 2020-04-09 ENCOUNTER — VIRTUAL VISIT (OUTPATIENT)
Dept: FAMILY MEDICINE CLINIC | Age: 65
End: 2020-04-09

## 2020-04-09 DIAGNOSIS — M79.7 FIBROMYALGIA: ICD-10-CM

## 2020-04-09 DIAGNOSIS — M48.061 SPINAL STENOSIS OF LUMBAR REGION, UNSPECIFIED WHETHER NEUROGENIC CLAUDICATION PRESENT: ICD-10-CM

## 2020-04-09 DIAGNOSIS — E78.2 MIXED HYPERLIPIDEMIA: ICD-10-CM

## 2020-04-09 DIAGNOSIS — G89.4 CHRONIC PAIN SYNDROME: ICD-10-CM

## 2020-04-09 DIAGNOSIS — I10 ESSENTIAL HYPERTENSION WITH GOAL BLOOD PRESSURE LESS THAN 140/90: ICD-10-CM

## 2020-04-09 DIAGNOSIS — J44.9 CHRONIC OBSTRUCTIVE PULMONARY DISEASE, UNSPECIFIED COPD TYPE (HCC): ICD-10-CM

## 2020-04-09 DIAGNOSIS — Z79.899 ENCOUNTER FOR LONG-TERM (CURRENT) USE OF MEDICATIONS: ICD-10-CM

## 2020-04-09 DIAGNOSIS — Z98.890 STATUS POST LAMINECTOMY: ICD-10-CM

## 2020-04-09 DIAGNOSIS — Z91.199 NONCOMPLIANCE: ICD-10-CM

## 2020-04-09 DIAGNOSIS — L65.9 ALOPECIA: ICD-10-CM

## 2020-04-09 DIAGNOSIS — E11.65 UNCONTROLLED TYPE 2 DIABETES MELLITUS WITH HYPERGLYCEMIA (HCC): Primary | ICD-10-CM

## 2020-04-09 DIAGNOSIS — E11.42 DIABETIC POLYNEUROPATHY ASSOCIATED WITH TYPE 2 DIABETES MELLITUS (HCC): ICD-10-CM

## 2020-04-09 DIAGNOSIS — F33.1 MODERATE EPISODE OF RECURRENT MAJOR DEPRESSIVE DISORDER (HCC): ICD-10-CM

## 2020-04-09 DIAGNOSIS — J45.909 UNCOMPLICATED ASTHMA, UNSPECIFIED ASTHMA SEVERITY, UNSPECIFIED WHETHER PERSISTENT: ICD-10-CM

## 2020-04-09 RX ORDER — METFORMIN HYDROCHLORIDE 1000 MG/1
TABLET ORAL
Qty: 180 TAB | Refills: 0 | Status: SHIPPED | OUTPATIENT
Start: 2020-04-09 | End: 2020-06-25

## 2020-04-09 RX ORDER — DULAGLUTIDE 1.5 MG/.5ML
1.5 INJECTION, SOLUTION SUBCUTANEOUS
Qty: 12 SYRINGE | Refills: 0 | Status: SHIPPED | OUTPATIENT
Start: 2020-04-09 | End: 2020-06-22

## 2020-04-09 RX ORDER — DULOXETIN HYDROCHLORIDE 30 MG/1
CAPSULE, DELAYED RELEASE ORAL
Qty: 180 CAP | Refills: 1 | Status: SHIPPED | OUTPATIENT
Start: 2020-04-09 | End: 2020-11-20 | Stop reason: SDUPTHER

## 2020-04-09 RX ORDER — BUPROPION HYDROCHLORIDE 100 MG/1
TABLET ORAL
Qty: 180 TAB | Refills: 0 | Status: CANCELLED | OUTPATIENT
Start: 2020-04-09

## 2020-04-09 RX ORDER — GABAPENTIN 300 MG/1
300 CAPSULE ORAL 2 TIMES DAILY
Qty: 180 CAP | Refills: 0 | Status: SHIPPED | OUTPATIENT
Start: 2020-04-09 | End: 2020-07-17 | Stop reason: SDUPTHER

## 2020-04-09 RX ORDER — LISINOPRIL 10 MG/1
10 TABLET ORAL DAILY
Qty: 30 TAB | Refills: 0 | Status: SHIPPED | OUTPATIENT
Start: 2020-04-09 | End: 2020-04-09 | Stop reason: SDUPTHER

## 2020-04-09 RX ORDER — GEMFIBROZIL 600 MG/1
600 TABLET, FILM COATED ORAL 2 TIMES DAILY
Qty: 180 TAB | Refills: 0 | Status: SHIPPED | OUTPATIENT
Start: 2020-04-09 | End: 2020-06-25

## 2020-04-09 RX ORDER — LISINOPRIL 10 MG/1
10 TABLET ORAL DAILY
Qty: 90 TAB | Refills: 0 | Status: SHIPPED | OUTPATIENT
Start: 2020-04-09 | End: 2020-06-25

## 2020-04-09 NOTE — PROGRESS NOTES
Consent: Teo Schaffer, who was seen by synchronous (real-time) audio-video technology, and/or her healthcare decision maker, is aware that this patient-initiated, Telehealth encounter on 4/9/2020 is a billable service, with coverage as determined by her insurance carrier. She is aware that she may receive a bill and has provided verbal consent to proceed: Yes. I last saw her 6 months ago. She was to f/u in 3 weeks. She no showed to our last apt. Chronically noncompliant. She didn't do my labs in 10/2019. Last labs she did for me was 1 year ago 04/2019. I will not refill any more meds if she doesn't get them done. has a past medical history of Acute saddle pulmonary embolism without acute cor pulmonale (HCC) (11/3/2017), Arthritis, Asthma, Chronic obstructive pulmonary disease ([facility] Chronic pain, Diabetes mellitus due to underlying condition, controlled, with complication, without long-term current use of insulin ([facility] (3/21/2017), Diabetic polyneuropathy associated with type 2 diabetes mellitus ([facility] (11/1/2017), Essential hypertension (6/5/2018), Fibromyalgia, Gastroesophageal reflux disease without esophagitis (5/13/2016), Mixed hyperlipidemia (6/5/2018), Moderate episode of recurrent major depressive disorder ([facility] (11/1/2017), Noncompliance (8/6/2018), Severe obesity (BMI 35.0-39.9) (6/5/2018), and Unspecified sleep apnea. D/c januvia and wellbutrin    Dm2.  a1c 7.9% 04/2019, 6.8% 08/2018 at goal.  Non-compliance  I advised her we needs her to get her labs done. D/c januvia  continue metformin  Add trulicity and jardiance     Ckd stage 3:  Cr. Baseline 1.26              Avoid NSAIDs, nephrotoxic drugs, stay hydrated.      HTN: BP were stable on Lisnipril/hctz 10/12.5 and propranolol 40mg bid. Refill meds     HLD: worse compared to previous labs a year ago. Wasn't compliant with Gemfibrozil, but she restarted it 2 weeks now. Anxiety and depression.  Was Stable on Wellbutrin and cymbalta. Have ran out of Wellbutrin and cymbalta. She denies SI or HI.  Continue course.      Hx of allergies in the past.   Last smokes 4 years ago. Have ran out of flonase, but she takes OTC allergies pills thinks it's zyrtec. Reviewed: active problem list, medication list, allergies, notes from last encounter, lab results    A comprehensive review of systems was negative except for that written in the HPI. Assessment & Plan:   Diagnoses and all orders for this visit:    1. Uncontrolled type 2 diabetes mellitus with hyperglycemia (HCC)  -     metFORMIN (GLUCOPHAGE) 1,000 mg tablet; 500mg in am and 1000 mg pm  -     dulaglutide (Trulicity) 1.5 JF/8.0 mL sub-q pen; 0.5 mL by SubCUTAneous route every seven (7) days. -     empagliflozin (Jardiance) 25 mg tablet; Take 1 Tab by mouth daily.  -     HEMOGLOBIN A1C W/O EAG  -     METABOLIC PANEL, COMPREHENSIVE  -     LIPID PANEL  -     MICROALBUMIN, UR, RAND W/ MICROALB/CREAT RATIO  -     CBC W/O DIFF  -     TSH 3RD GENERATION    2. Diabetic polyneuropathy associated with type 2 diabetes mellitus (HCC)  -     gabapentin (NEURONTIN) 300 mg capsule; Take 1 Cap by mouth two (2) times a day. 3. Fibromyalgia  -     DULoxetine (CYMBALTA) 30 mg capsule; Take 1 Cap by mouth two (2) times a day. -     REFERRAL TO PAIN CLINIC    4. Essential hypertension with goal blood pressure less than 140/90  -     lisinopriL (PRINIVIL, ZESTRIL) 10 mg tablet; Take 1 Tab by mouth daily.  -     HEMOGLOBIN A1C W/O EAG  -     METABOLIC PANEL, COMPREHENSIVE  -     LIPID PANEL  -     MICROALBUMIN, UR, RAND W/ MICROALB/CREAT RATIO  -     CBC W/O DIFF  -     TSH 3RD GENERATION    5. Moderate episode of recurrent major depressive disorder (HCC)  -     DULoxetine (CYMBALTA) 30 mg capsule; Take 1 Cap by mouth two (2) times a day. 6. Mixed hyperlipidemia  -     gemfibroziL (LOPID) 600 mg tablet; Take 1 Tab by mouth two (2) times a day.   -     HEMOGLOBIN A1C W/O EAG  - METABOLIC PANEL, COMPREHENSIVE  -     LIPID PANEL  -     MICROALBUMIN, UR, RAND W/ MICROALB/CREAT RATIO  -     CBC W/O DIFF  -     TSH 3RD GENERATION    7. Alopecia    8. Chronic pain syndrome  -     REFERRAL TO PAIN CLINIC    9. Chronic obstructive pulmonary disease, unspecified COPD type ([facility]  -     PULMONARY FUNCTION TEST; Future    10. Uncomplicated asthma, unspecified asthma severity, unspecified whether persistent  -     PULMONARY FUNCTION TEST; Future    11. Status post laminectomy  -     REFERRAL TO PAIN CLINIC    12. Spinal stenosis of lumbar region, unspecified whether neurogenic claudication present  -     REFERRAL TO PAIN CLINIC    13. Noncompliance    14. Encounter for long-term (current) use of medications  -     HEMOGLOBIN A1C W/O EAG  -     METABOLIC PANEL, COMPREHENSIVE  -     LIPID PANEL  -     MICROALBUMIN, UR, RAND W/ MICROALB/CREAT RATIO  -     CBC W/O DIFF  -     TSH 3RD GENERATION      Follow-up and Dispositions    · Return in about 3 months (around 7/9/2020) for Dm2, HTN, HLD, chronic pain, meds, sooner if labs abnormal.       I spent at least 40 minutes with this established patient, and >50% of the time was spent counseling and/or coordinating care regarding concerns documented above  [id number]  Subjective:   Ludmila Egan is a 63 y.o. female who was seen for Hypertension (medication refill)      Prior to Admission medications    Medication Sig Start Date End Date Taking? Authorizing Provider   metFORMIN (GLUCOPHAGE) 1,000 mg tablet 500mg in am and 1000 mg pm 4/9/20  Yes Roberto Carbajal MD   lisinopriL (PRINIVIL, ZESTRIL) 10 mg tablet Take 1 Tab by mouth daily. 4/9/20  Yes Amada Lopez MD   DULoxetine (CYMBALTA) 30 mg capsule Take 1 Cap by mouth two (2) times a day. 4/9/20  Yes Amada Lopez MD   gabapentin (NEURONTIN) 300 mg capsule Take 1 Cap by mouth two (2) times a day. 4/9/20  Yes Amada Lopez MD   gemfibroziL (LOPID) 600 mg tablet Take 1 Tab by mouth two (2) times a day.  4/9/20  Yes José Cloud MD   dulaglutide (Trulicity) 1.5 IE/0.3 mL sub-q pen 0.5 mL by SubCUTAneous route every seven (7) days. 4/9/20  Yes José Cloud MD   empagliflozin (Jardiance) 25 mg tablet Take 1 Tab by mouth daily. 4/9/20  Yes José Cloud MD   albuterol (PROAIR HFA) 90 mcg/actuation inhaler Take 2 Puffs by inhalation every four (4) hours as needed for Wheezing. 7/1/19  Yes José Cloud MD   fluticasone propion-salmeterol (ADVAIR/WIXELA) 250-50 mcg/dose diskus inhaler Take 1 Puff by inhalation every twelve (12) hours. 7/1/19  Yes Jarett Carbajal MD   potassium chloride SR (KLOR-CON 10) 10 mEq tablet TK 1 T PO QD 7/1/19  Yes Jarett Carbajal MD   fluticasone propionate (FLONASE) 50 mcg/actuation nasal spray INSTILL 2 SPRAYS IN EACH NOSTRIL EVERY DAY 7/1/19  Yes José Cloud MD   albuterol-ipratropium (DUO-NEB) 2.5 mg-0.5 mg/3 ml nebu INHALE 1 VIAL VIA NEBULIZER EVERY 4 HOURS AS NEEDED 4/11/19  Yes José Cloud MD   Lancets (ACCU-CHEK SOFTCLIX LANCETS) misc Test once daily 8/6/18  Yes José Cloud MD   alcohol swabs (BD SINGLE USE SWABS REGULAR) padm Test once daily 8/6/18  Yes Jarett Carbajal MD   Blood Glucose Control High&Low (ACCU-CHEK NIVIA CONTROL SOLN) soln As directed 8/6/18  Yes José Cloud MD   Blood-Glucose Meter (ACCU-CHEK NIVIA PLUS METER) misc Test once daily 8/6/18  Yes Jarett Carbajal MD   glucose blood VI test strips (ACCU-CHEK NIVIA PLUS TEST STRP) strip Test once daily 8/6/18  Yes Jarett Carbajal MD   propranolol (INDERAL) 20 mg tablet Take 1 Tab by mouth two (2) times a day. 10/21/19   José Cloud MD   omeprazole (PRILOSEC) 20 mg capsule TAKE 1 CAPSULE BY MOUTH EVERY DAY 7/1/19   José Cloud MD   acyclovir (ZOVIRAX) 400 mg tablet TAKE 1 TABLET BY MOUTH TWICE DAILY 7/1/19   José Cloud MD   cholecalciferol, vitamin D3, (VITAMIN D3) 2,000 unit tab Take 1 Tab by mouth daily.     Provider, Historical     Allergies   Allergen Reactions    Allopurinol Hives    Darvocet A500 [Propoxyphene N-Acetaminophen] Hives    Influenza Virus Vaccine, Specific Other (comments)     Allergy documented in admission data base    Seafood [Shellfish Containing Products] Itching     NEW ALLERGY; REACTION WORSENS AS AMOUNT EATEN INCREASES       Patient Active Problem List    Diagnosis Date Noted    Chronic anticoagulation 09/25/2018    [facility] 08/06/2018    Noncompliance 08/06/2018    Severe obesity (BMI 35.0-39.9) 06/05/2018    Essential hypertension 06/05/2018    Mixed hyperlipidemia 06/05/2018    Acute saddle pulmonary embolism without acute cor pulmonale (HCC) 11/03/2017    Moderate episode of recurrent major depressive disorder ([facility] 11/01/2017    Diabetic polyneuropathy associated with type 2 diabetes mellitus ([facility] 11/01/2017    Acute pulmonary embolism ([facility] 10/27/2017    Diabetes mellitus due to underlying condition, controlled, with complication, without long-term current use of insulin ([facility] 03/21/2017    Status post laminectomy 09/19/2016    Spinal stenosis 07/26/2016    Chronic low back pain with bilateral sciatica 05/25/2016    Lumbar spondylosis 05/25/2016    Gastroesophageal reflux disease without esophagitis 05/13/2016    COPD (chronic obstructive pulmonary disease) ([facility] 10/15/2015    Fibromyalgia 09/08/2015    Chronic pain 08/21/2015    Primary localized osteoarthrosis, lower leg 02/16/2015    DJD (degenerative joint disease) of knee 02/16/2015    Asthma 08/08/2014    Lumbar stenosis 06/16/2011     Current Outpatient Medications   Medication Sig Dispense Refill    metFORMIN (GLUCOPHAGE) 1,000 mg tablet 500mg in am and 1000 mg pm 180 Tab 0    lisinopriL (PRINIVIL, ZESTRIL) 10 mg tablet Take 1 Tab by mouth daily. 90 Tab 0    DULoxetine (CYMBALTA) 30 mg capsule Take 1 Cap by mouth two (2) times a day. 180 Cap 1    gabapentin (NEURONTIN) 300 mg capsule Take 1 Cap by mouth two (2) times a day.  180 Cap 0    gemfibroziL (LOPID) 600 mg tablet Take 1 Tab by mouth two (2) times a day. 180 Tab 0    dulaglutide (Trulicity) 1.5 VC/7.7 mL sub-q pen 0.5 mL by SubCUTAneous route every seven (7) days. 12 Syringe 0    empagliflozin (Jardiance) 25 mg tablet Take 1 Tab by mouth daily. 90 Tab 0    albuterol (PROAIR HFA) 90 mcg/actuation inhaler Take 2 Puffs by inhalation every four (4) hours as needed for Wheezing. 3 Inhaler 1    fluticasone propion-salmeterol (ADVAIR/WIXELA) 250-50 mcg/dose diskus inhaler Take 1 Puff by inhalation every twelve (12) hours. 180 Each 1    potassium chloride SR (KLOR-CON 10) 10 mEq tablet TK 1 T PO QD 90 Tab 1    fluticasone propionate (FLONASE) 50 mcg/actuation nasal spray INSTILL 2 SPRAYS IN EACH NOSTRIL EVERY DAY 3 Bottle 0    albuterol-ipratropium (DUO-NEB) 2.5 mg-0.5 mg/3 ml nebu INHALE 1 VIAL VIA NEBULIZER EVERY 4 HOURS AS NEEDED 1620 mL 5    Lancets (ACCU-CHEK SOFTCLIX LANCETS) misc Test once daily 100 Each 3    alcohol swabs (BD SINGLE USE SWABS REGULAR) padm Test once daily 100 Pad 3    Blood Glucose Control High&Low (ACCU-CHEK NIVIA CONTROL SOLN) soln As directed 3 Bottle 3    Blood-Glucose Meter (ACCU-CHEK NIVIA PLUS METER) misc Test once daily 1 Each 0    glucose blood VI test strips (ACCU-CHEK NIVIA PLUS TEST STRP) strip Test once daily 100 Strip 3    propranolol (INDERAL) 20 mg tablet Take 1 Tab by mouth two (2) times a day. 60 Tab 1    omeprazole (PRILOSEC) 20 mg capsule TAKE 1 CAPSULE BY MOUTH EVERY DAY 90 Cap 1    acyclovir (ZOVIRAX) 400 mg tablet TAKE 1 TABLET BY MOUTH TWICE DAILY 180 Tab 0    cholecalciferol, vitamin D3, (VITAMIN D3) 2,000 unit tab Take 1 Tab by mouth daily.        Allergies   Allergen Reactions    Allopurinol Hives    Darvocet A500 [Propoxyphene N-Acetaminophen] Hives    Influenza Virus Vaccine, Specific Other (comments)     Allergy documented in admission data base    Seafood [Shellfish Containing Products] Itching     NEW ALLERGY; REACTION WORSENS AS AMOUNT EATEN INCREASES Past Medical History:   Diagnosis Date    Acute saddle pulmonary embolism without acute cor pulmonale (HCC) 11/3/2017    Arthritis     Asthma     Chronic obstructive pulmonary disease (HCC)     Chronic pain     back/hip    Diabetes mellitus due to underlying condition, controlled, with complication, without long-term current use of insulin ([facility] 3/21/2017    Diabetic polyneuropathy associated with type 2 diabetes mellitus ([facility] 11/1/2017    Essential hypertension 6/5/2018    Fibromyalgia     Gastroesophageal reflux disease without esophagitis 5/13/2016    Mixed hyperlipidemia 6/5/2018    Moderate episode of recurrent major depressive disorder ([facility] 11/1/2017    Noncompliance 8/6/2018    Severe obesity (BMI 35.0-39.9) 6/5/2018    Unspecified sleep apnea     CAN'T TOLERATE CPAP     Past Surgical History:   Procedure Laterality Date    COLONOSCOPY N/A 7/12/2017    COLONOSCOPY performed by Christiane Macdonald MD at [facility] ENDOSCOPY    HX [address] [place] [address]    one ovary removed    HX ORTHOPAEDIC  06/2012    lumbar fusion    HX ORTHOPAEDIC  2/16/15    RIGHT TOTAL KNEE ARTHROPLASTY    HX ORTHOPAEDIC  6/16/15    LEFT TOTAL KNEE ARTHROPLASTY          Objective:   Vital Signs: (As obtained by patient/caregiver at home)  Visit Vitals  LMP 04/04/1996 (LMP Unknown)        [INSTRUCTIONS:  \"[x]\" Indicates a positive item  \"[]\" Indicates a negative item  -- DELETE ALL ITEMS NOT EXAMINED]    Constitutional: [x] Appears well-developed and well-nourished [x] No apparent distress      [] Abnormal -     Mental status: [x] Alert and awake  [x] Oriented to person/place/time [x] Able to follow commands    [] Abnormal -     Eyes:   EOM    [x]  Normal    [] Abnormal -   Sclera  [x]  Normal    [] Abnormal -          Discharge [x]  None visible   [] Abnormal -     HENT: [x] Normocephalic, atraumatic  [] Abnormal -   [] Mouth/Throat: Mucous membranes are moist    External Ears [] Normal  [] Abnormal -    Neck: [x] No visualized mass [] Abnormal -     Pulmonary/Chest: [x] Respiratory effort normal   [x] No visualized signs of difficulty breathing or respiratory distress        [] Abnormal -      Musculoskeletal:   [x] Normal gait with no signs of ataxia         [x] Normal range of motion of neck        [] Abnormal -     Neurological:        [x] No Facial Asymmetry (Cranial nerve 7 motor function) (limited exam due to video visit)          [x] No gaze palsy        [] Abnormal -          Skin:        [] No significant exanthematous lesions or discoloration noted on facial skin         [] Abnormal -            Psychiatric:       [x] Normal Affect [] Abnormal -        [x] No Hallucinations    Other pertinent observable physical exam findings:-      We discussed the expected course, resolution and complications of the diagnosis(es) in detail. Medication risks, benefits, costs, interactions, and alternatives were discussed as indicated. I advised her to contact the office if her condition worsens, changes or fails to improve as anticipated. She expressed understanding with the diagnosis(es) and plan. Osiel Vanegas is a 63 y.o. female being evaluated by a video visit encounter for concerns as above. A caregiver was present when appropriate. Due to this being a TeleHealth encounter (During [facility] public [facility] emergency), evaluation of the following organ systems was limited: Vitals/Constitutional/EENT/Resp/CV/GI//MS/Neuro/Skin/Heme-Lymph-Imm. Pursuant to the emergency declaration under the [address] [place] [address], [facility] waiver authority and the [employer] General Act, this Virtual  Visit was conducted, with patient's (and/or legal guardian's) consent, to reduce the patient's risk of exposure to COVID-19 and provide necessary medical care.      Services were provided through a video synchronous discussion virtually to substitute for in-person clinic visit. Patient and provider were located at their individual homes.       Jose Cortez MD

## 2020-04-09 NOTE — TELEPHONE ENCOUNTER
Pt had virtual apt today and didn't show   Last saw her > 6 months ago. 1 month meds refil until I see her for an apt (at this time I mean virtual). pls call to make an apt today is still available.      Thanks    Adina Flannery MD  4/9/2020

## 2020-04-09 NOTE — TELEPHONE ENCOUNTER
Verified patient with two type of identifiers. Spoke with pt and she states had technical issues and was trying to reach office. Currently pending PSR check in.

## 2020-06-20 DIAGNOSIS — E11.65 UNCONTROLLED TYPE 2 DIABETES MELLITUS WITH HYPERGLYCEMIA (HCC): ICD-10-CM

## 2020-06-22 RX ORDER — DULAGLUTIDE 1.5 MG/.5ML
INJECTION, SOLUTION SUBCUTANEOUS
Qty: 12 EACH | Refills: 0 | Status: SHIPPED | OUTPATIENT
Start: 2020-06-22 | End: 2020-09-02

## 2020-06-25 ENCOUNTER — HOSPITAL ENCOUNTER (EMERGENCY)
Age: 65
Discharge: HOME | End: 2020-06-25
Attending: EMERGENCY MEDICINE
Payer: MEDICARE

## 2020-06-25 ENCOUNTER — APPOINTMENT (OUTPATIENT)
Dept: GENERAL RADIOLOGY | Age: 65
End: 2020-06-25
Attending: EMERGENCY MEDICINE
Payer: MEDICARE

## 2020-06-25 VITALS
WEIGHT: 274.69 LBS | RESPIRATION RATE: 16 BRPM | TEMPERATURE: 97.5 F | BODY MASS INDEX: 37.21 KG/M2 | HEIGHT: 72 IN | HEART RATE: 103 BPM | OXYGEN SATURATION: 97 % | DIASTOLIC BLOOD PRESSURE: 65 MMHG | SYSTOLIC BLOOD PRESSURE: 104 MMHG

## 2020-06-25 DIAGNOSIS — E11.65 UNCONTROLLED TYPE 2 DIABETES MELLITUS WITH HYPERGLYCEMIA (HCC): ICD-10-CM

## 2020-06-25 DIAGNOSIS — E78.2 MIXED HYPERLIPIDEMIA: ICD-10-CM

## 2020-06-25 DIAGNOSIS — I10 ESSENTIAL HYPERTENSION WITH GOAL BLOOD PRESSURE LESS THAN 140/90: ICD-10-CM

## 2020-06-25 DIAGNOSIS — S22.31XA CLOSED FRACTURE OF ONE RIB OF RIGHT SIDE, INITIAL ENCOUNTER: Primary | ICD-10-CM
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
## 2020-06-25 LAB
ATRIAL RATE: 99 BPM
CALCULATED P AXIS, ECG09: 38 DEGREES
CALCULATED R AXIS, ECG10: -37 DEGREES
CALCULATED T AXIS, ECG11: 28 DEGREES
DIAGNOSIS, 93000: NORMAL
P-R INTERVAL, ECG05: 164 MS
Q-T INTERVAL, ECG07: 356 MS
QRS DURATION, ECG06: 88 MS
QTC CALCULATION (BEZET), ECG08: 456 MS
VENTRICULAR RATE, ECG03: 99 BPM

## 2020-06-25 PROCEDURE — 93005 ELECTROCARDIOGRAM TRACING: CPT

## 2020-06-25 PROCEDURE — 74011250637 HC RX REV CODE- 250/637: Performed by: EMERGENCY MEDICINE

## 2020-06-25 PROCEDURE — 74011000250 HC RX REV CODE- 250: Performed by: EMERGENCY MEDICINE

## 2020-06-25 PROCEDURE — 71101 X-RAY EXAM UNILAT RIBS/CHEST: CPT

## 2020-06-25 PROCEDURE — 99284 EMERGENCY DEPT VISIT MOD MDM: CPT

## 2020-06-25 RX ORDER — HYDROCODONE BITARTRATE AND ACETAMINOPHEN 5; 325 MG/1; MG/1
1 TABLET ORAL
Qty: 12 TAB | Refills: 0 | Status: SHIPPED | OUTPATIENT
Start: 2020-06-25 | End: 2020-06-28

## 2020-06-25 RX ORDER — LIDOCAINE 4 G/100G
1 PATCH TOPICAL EVERY 24 HOURS
Qty: 10 PATCH | Refills: 0 | Status: SHIPPED | OUTPATIENT
Start: 2020-06-25 | End: 2020-07-17 | Stop reason: SDUPTHER

## 2020-06-25 RX ORDER — GEMFIBROZIL 600 MG/1
TABLET, FILM COATED ORAL
Qty: 180 TAB | Refills: 0 | Status: SHIPPED | OUTPATIENT
Start: 2020-06-25 | End: 2020-07-23 | Stop reason: ALTCHOICE

## 2020-06-25 RX ORDER — LISINOPRIL 10 MG/1
TABLET ORAL
Qty: 90 TAB | Refills: 0 | Status: SHIPPED | OUTPATIENT
Start: 2020-06-25 | End: 2020-09-09

## 2020-06-25 RX ORDER — EMPAGLIFLOZIN 25 MG/1
TABLET, FILM COATED ORAL
Qty: 90 TAB | Refills: 0 | Status: SHIPPED | OUTPATIENT
Start: 2020-06-25 | End: 2020-07-17

## 2020-06-25 RX ORDER — HYDROCODONE BITARTRATE AND ACETAMINOPHEN 7.5; 325 MG/1; MG/1
1 TABLET ORAL ONCE
Status: COMPLETED | OUTPATIENT
Start: 2020-06-25 | End: 2020-06-25

## 2020-06-25 RX ORDER — METFORMIN HYDROCHLORIDE 1000 MG/1
TABLET ORAL
Qty: 135 TAB | Refills: 0 | Status: SHIPPED | OUTPATIENT
Start: 2020-06-25 | End: 2020-09-09

## 2020-06-25 RX ORDER — LIDOCAINE 4 G/100G
1 PATCH TOPICAL
Status: DISCONTINUED | OUTPATIENT
Start: 2020-06-25 | End: 2020-06-26 | Stop reason: HOSPADM

## 2020-06-25 RX ADMIN — HYDROCODONE BITARTRATE AND ACETAMINOPHEN 1 TABLET: 7.5; 325 TABLET ORAL at 19:56

## 2020-06-25 NOTE — ED PROVIDER NOTES
EMERGENCY DEPARTMENT HISTORY AND PHYSICAL EXAM      Date: 6/25/2020  Patient Name: Yury Chicas    History of Presenting Illness     Chief Complaint   Patient presents with    Rib Pain     pt had a syncopal episode (that she attributes to orthostasis) on sunday. She feels like she may have fractured a rib in the right anterior chest.  pain is worsening daily and is worse with movement       History Provided By: Patient    HPI: Yury Chicas, 63 y.o. female  presents to the ED with cc of right rib pain. Patient states she had a syncopal episode and fell 5 days ago. She states she believes she had a syncopal episode because she accidentally took additional doses of a medication that would lower her blood pressure. She accidentally missed took some for ibuprofen. After her fall she has been having pain in the right ribs under her right breast since then. Pain is worse with palpation and movement. Is worse with deep breaths. She denies shortness of breath. She has had no cough or hemoptysis. No fevers or chills.     Past History     Past Medical History:  Past Medical History:   Diagnosis Date    Acute saddle pulmonary embolism without acute cor pulmonale (HCC) 11/3/2017    Arthritis     Asthma     Chronic obstructive pulmonary disease (HCC)     Chronic pain     back/hip    Diabetes mellitus due to underlying condition, controlled, with complication, without long-term current use of insulin ([address] 3/21/2017    Diabetic polyneuropathy associated with type 2 diabetes mellitus ([address] 11/1/2017    Essential hypertension 6/5/2018    Fibromyalgia     Gastroesophageal reflux disease without esophagitis 5/13/2016    Mixed hyperlipidemia 6/5/2018    Moderate episode of recurrent major depressive disorder ([address] 11/1/2017    Noncompliance 8/6/2018    Severe obesity (BMI 35.0-39.9) 6/5/2018    Unspecified sleep apnea     CAN'T TOLERATE CPAP       Past Surgical History:  Past Surgical History:   Procedure Laterality Date    COLONOSCOPY N/A 7/12/2017    COLONOSCOPY performed by [facility]i Loza [facility] at [facility]     [facility]    one ovary removed    HX ORTHOPAEDIC  06/2012    lumbar fusion    HX ORTHOPAEDIC  2/16/15    RIGHT TOTAL KNEE ARTHROPLASTY    HX ORTHOPAEDIC  6/16/15    LEFT TOTAL KNEE ARTHROPLASTY          Medications:  No current facility-administered medications on file prior to encounter. Current Outpatient Medications on File Prior to Encounter   Medication Sig Dispense Refill    metFORMIN (GLUCOPHAGE) 1,000 mg tablet TAKE 1/2 TABLET IN THE MORNING  AND TAKE 1 TABLET IN THE EVENING 135 Tab 0    lisinopriL (PRINIVIL, ZESTRIL) 10 mg tablet TAKE 1 TABLET EVERY DAY 90 Tab 0    gemfibroziL (LOPID) 600 mg tablet TAKE 1 TABLET TWICE DAILY 180 Tab 0    Jardiance 25 mg tablet TAKE 1 TABLET EVERY DAY 90 Tab 0    Trulicity 1.5 RK/2.3 mL sub-q pen INJECT 1.5MG (1 PEN) SUBCUTANEOUSLY EVERY 7 DAYS 12 Each 0    DULoxetine (CYMBALTA) 30 mg capsule Take 1 Cap by mouth two (2) times a day. 180 Cap 1    gabapentin (NEURONTIN) 300 mg capsule Take 1 Cap by mouth two (2) times a day. 180 Cap 0    [DISCONTINUED] metFORMIN (GLUCOPHAGE) 1,000 mg tablet 500mg in am and 1000 mg pm 180 Tab 0    [DISCONTINUED] lisinopriL (PRINIVIL, ZESTRIL) 10 mg tablet Take 1 Tab by mouth daily. 90 Tab 0    [DISCONTINUED] gemfibroziL (LOPID) 600 mg tablet Take 1 Tab by mouth two (2) times a day. 180 Tab 0    [DISCONTINUED] empagliflozin (Jardiance) 25 mg tablet Take 1 Tab by mouth daily. 90 Tab 0    propranolol (INDERAL) 20 mg tablet Take 1 Tab by mouth two (2) times a day. 60 Tab 1    albuterol (PROAIR HFA) 90 mcg/actuation inhaler Take 2 Puffs by inhalation every four (4) hours as needed for Wheezing. 3 Inhaler 1    fluticasone propion-salmeterol (ADVAIR/WIXELA) 250-50 mcg/dose diskus inhaler Take 1 Puff by inhalation every twelve (12) hours.  180 Each 1    omeprazole (PRILOSEC) 20 mg capsule TAKE 1 CAPSULE BY MOUTH EVERY DAY 90 Cap 1    acyclovir (ZOVIRAX) 400 mg tablet TAKE 1 TABLET BY MOUTH TWICE DAILY 180 Tab 0    potassium chloride SR (KLOR-CON 10) 10 mEq tablet TK 1 T PO QD 90 Tab 1    fluticasone propionate (FLONASE) 50 mcg/actuation nasal spray INSTILL 2 SPRAYS IN EACH NOSTRIL EVERY DAY 3 Bottle 0    albuterol-ipratropium (DUO-NEB) 2.5 mg-0.5 mg/3 ml nebu INHALE 1 VIAL VIA NEBULIZER EVERY 4 HOURS AS NEEDED 1620 mL 5    Lancets (ACCU-CHEK SOFTCLIX LANCETS) misc Test once daily 100 Each 3    alcohol swabs (BD SINGLE USE SWABS REGULAR) padm Test once daily 100 Pad 3    Blood Glucose Control High&Low (ACCU-CHEK NIVIA CONTROL SOLN) soln As directed 3 Bottle 3    Blood-Glucose Meter (ACCU-CHEK NIVIA PLUS METER) misc Test once daily 1 Each 0    glucose blood VI test strips (ACCU-CHEK NIVIA PLUS TEST STRP) strip Test once daily 100 Strip 3    cholecalciferol, vitamin D3, (VITAMIN D3) 2,000 unit tab Take 1 Tab by mouth daily. Family History:  Family History   Problem Relation Age of Onset    Anesth Problems Mother     Cancer Mother         LUNG CA - SMOKER    Other Mother         CEREBRAL ANEURYSM    Diabetes Mother     Diabetes Father     Other Sister         VARICOSE VEINS THAT HURT AND BLEED    Heart Attack Brother     Other Other         MOTHER'S FATHER'S MOTHER  WITH ANEURYSM       Social History:  Social History     Tobacco Use    Smoking status: Former Smoker     Packs/day: 1.00     Years: 37.00     Pack years: 37.00     Last attempt to quit: 2015     Years since quittin.5    Smokeless tobacco: Never Used   Substance Use Topics    Alcohol use: Yes     Comment: VERY RARE WINE    Drug use: No       Allergies:   Allergies   Allergen Reactions    Allopurinol Hives    Darvocet A500 [Propoxyphene N-Acetaminophen] Hives    Influenza Virus Vaccine, Specific Other (comments)     Allergy documented in admission data base    Seafood [Shellfish Containing Products] Itching     NEW ALLERGY; REACTION WORSENS AS AMOUNT EATEN INCREASES       All the above components of the past  history are auto-populated from the electronic record. They have been reviewed and the patient has been interviewed for any pertinent past history that pertains to the patient's chief complaint and reason for visit. Not all pre-populated components may be accurate at the time this note was generated. Review of Systems   Review of Systems   Constitutional: Negative for chills and fever. HENT: Negative for congestion, ear pain, rhinorrhea, sore throat and trouble swallowing. Eyes: Negative for visual disturbance. Respiratory: Negative for cough, chest tightness and shortness of breath. Cardiovascular: Positive for chest pain. Negative for palpitations. Gastrointestinal: Negative for abdominal pain, blood in stool, constipation, diarrhea, nausea and vomiting. Genitourinary: Negative for decreased urine volume, difficulty urinating, dysuria and frequency. Musculoskeletal: Negative for back pain and neck pain. Skin: Negative for color change and rash. Neurological: Positive for syncope. Negative for dizziness, weakness, light-headedness and headaches. Physical Exam   Physical Exam  Vitals signs and nursing note reviewed. Constitutional:       General: She is not in acute distress. Appearance: She is well-developed. She is not ill-appearing. Eyes:      Conjunctiva/sclera: Conjunctivae normal.   Neck:      Musculoskeletal: Neck supple. Cardiovascular:      Rate and Rhythm: Normal rate and regular rhythm. Pulmonary:      Effort: Pulmonary effort is normal. No accessory muscle usage or respiratory distress. Breath sounds: Normal breath sounds. Chest:      Chest wall: Tenderness present. Abdominal:      General: There is no distension. Palpations: Abdomen is soft. Tenderness: There is no abdominal tenderness.    Lymphadenopathy: Cervical: No cervical adenopathy. Skin:     General: Skin is warm and dry. Neurological:      Mental Status: She is alert and oriented to person, place, and time. Cranial Nerves: No cranial nerve deficit. Sensory: No sensory deficit. Diagnostic Study Results     Labs -     Recent Results (from the past 24 hour(s))   EKG, 12 LEAD, INITIAL    Collection Time: 06/25/20  5:45 PM   Result Value Ref Range    Ventricular Rate 99 BPM    Atrial Rate 99 BPM    P-R Interval 164 ms    QRS Duration 88 ms    Q-T Interval 356 ms    QTC Calculation (Bezet) 456 ms    Calculated P Axis 38 degrees    Calculated R Axis -37 degrees    Calculated T Axis 28 degrees    Diagnosis       ** Poor data quality, interpretation may be adversely affected  Normal sinus rhythm  Left axis deviation  Pulmonary disease pattern  When compared with ECG of 20-OCT-2019 18:22,  No significant change was found         Radiologic Studies -   XR RIBS RT W PA CXR MIN 3 V   Final Result   IMPRESSION:  No rib fracture identified. CT Results  (Last 48 hours)    None        CXR Results  (Last 48 hours)    None            Medical Decision Making     I reviewed the vital signs, available nursing notes, past medical history, past surgical history, family history and social history. Vital Signs-Reviewed the patient's vital signs. Patient Vitals for the past 24 hrs:   Temp Pulse Resp BP SpO2   06/25/20 1843     97 %   06/25/20 1740 97.5 °F (36.4 °C) (!) 103 16 (!) 141/93 100 %         Records Reviewed: Nursing notes for today's visit have been reviewed. I have also reviewed most recent medical records pertinent to today's complaints, if available in our medical record system. I have also reviewed all labs and imaging results from previous results in comparison to results obtained today. If an EKG was obtained today, it has been compared to previous EKGs, if available.   If arriving via EMS, the EMS report has been reviewed if made available to us within the patient's time in the emergency department. Provider Notes (Medical Decision Making):   Patient presents with rib pain on the right side after a fall about 5 days ago. X-ray does not show any rib fracture but she may have just a cracked rib or hairline fracture as she is tender on her rib with palpation. There is no pneumothorax or hemothorax. No infiltrate. I do not believe her syncope needs to be worked up as she is quite sure she took an overtly medicines that will decrease her blood pressure thinking they were ibuprofen. The event was also 5 days ago. An EKG was obtained which was unremarkable and I do not feel any more is to be done. I will write her for 3 days of hydrocodone and recommend lidocaine patches. ED Course:   Initial assessment performed. The patients presenting problems have been discussed, and they are in agreement with the care plan formulated and outlined with them. I have encouraged them to ask questions as they arise throughout their visit.          Orders Placed This Encounter    XR RIBS RT W PA CXR MIN 3 V    EKG NOTEWRITER(ASAP ONLY)    EKG, 12 LEAD, INITIAL    HYDROcodone-acetaminophen (NORCO) 7.5-325 mg per tablet 1 Tab    lidocaine 4 % patch 1 Patch    HYDROcodone-acetaminophen (Lorcet, HYDROcodone,) 5-325 mg per tablet    lidocaine 4 % patch       EKG  Date/Time: 6/25/2020 5:23 PM  Performed by: Tawanda Wilkins MD  Authorized by: Tawanda Wilkins MD     ECG reviewed by ED Physician in the absence of a cardiologist: yes    Rate:     ECG rate:  82    ECG rate assessment: normal    Rhythm:     Rhythm: sinus rhythm    Ectopy:     Ectopy: none    QRS:     QRS axis:  Normal    QRS intervals:  Normal  Conduction:     Conduction: normal    ST segments:     ST segments:  Normal  T waves:     T waves: normal            Critical Care Time:   0    Disposition:  Discharge    The patient's emergency department evaluation is now complete. I have reviewed all labs, imaging, and pertinent information. I have discussed all results with the patient and/or family. Based on our evaluation today I do believe that the patient is safe to be discharged home. The patient has been provided with at home instructions that are pertinent to their complaint today, although these may not be specific to the exact diagnosis. I have reviewed the patient's home medications and attempted to reconcile if not already done so by pharmacy or nursing staff. I have discussed all new prescriptions with the patient. The patient has been encouraged to follow-up with primary care doctor and/or specialist, and these have been discussed with the patient. The patient has been advised that they may return to the emergency department if they have any worsening symptoms and or new symptoms that are of concern to them. Verbal discharge instructions may have also been provided to the patient that may not be specifically contained in the written discharge instructions. The patient has been given opportunity to ask questions prior to discharge. PLAN:  1. Current Discharge Medication List      START taking these medications    Details   HYDROcodone-acetaminophen (Lorcet, HYDROcodone,) 5-325 mg per tablet Take 1 Tab by mouth every six (6) hours as needed for Pain for up to 3 days. Max Daily Amount: 4 Tabs. Qty: 12 Tab, Refills: 0    Associated Diagnoses: Closed fracture of one rib of right side, initial encounter      lidocaine 4 % patch 1 Patch by TransDERmal route every twenty-four (24) hours. Qty: 10 Patch, Refills: 0           2. Follow-up Information     Follow up With Specialties Details Why Contact Info    Rajan Grimes MD [facility] Schedule an appointment as soon as possible for a visit in 1 week  [address] [place] [address]  [phone]          Return to ED if worse     Diagnosis     Clinical Impression:   1. Closed fracture of one rib of right side, initial encounter    2. Fall, initial encounter            This note will not be viewable in [address]

## 2020-06-25 NOTE — ED NOTES
Bedside and Verbal shift change report given to Marina Goff RN (oncoming nurse) by Jamshid Carson RN (offgoing nurse). Report included the following information SBAR, ED Summary, Intake/Output, MAR and Recent Results.

## 2020-06-25 NOTE — ED NOTES
Pt arrives from home reporting rightrib cage pain. Pt [place] cage. Pt reprots she thinks she may have inadvertently taken a medication twice and she passed out with falling.

## 2020-06-26 ENCOUNTER — PATIENT OUTREACH (OUTPATIENT)
Dept: CARDIOLOGY CLINIC | Age: 65
End: 2020-06-26

## 2020-06-26 NOTE — DISCHARGE INSTRUCTIONS
Thank you for visiting our emergency department today. We all do hope that we were able to assist you in your emergent needs today. Please read over your discharge instructions as these contain pertinent information to help you in the healing process. These instructions include a list of prescriptions you were given today. Follow-up information is also noted on your discharge papers. There are attached instructions and information pertaining to the reason why you were seen in the emergency department today. These discharge instructions may not be for exactly why you were here, but may be the closest available instructions that we have. These include important advice for things that you can do at home to feel better, and reasons to return to the emergency department. The evaluation and treatment you received in the emergency department is not always definitive care. If follow-up with your primary care doctor or specialist was recommended, it is important that you make these appointments for follow-up care. You may need further testing, procedures, and/or medications to help you feel better. Further tests may be required that are not available in the emergency department. Failure to make these follow-up appointments may jeopardize your health. The emergency department is here for emergent stabilization and evaluation of life and limb threatening illness and/or injuries. Further care through a specialist or primary care doctor may be required to assist in your healing and complete your treatment and/or evaluation. We may not always be able to make a diagnosis in the emergency department, or things may change that will alter your diagnosis. Our primary goal is to ensure that nothing serious is occurring and that you are stable to continue your treatment and evaluation at home as an outpatient.   Of course, if things change, and you feel worse, you are always encouraged to return to the emergency department for re-evaluation. Lab Results Today:  Recent Results (from the past 8 hour(s))   EKG, 12 LEAD, INITIAL    Collection Time: 06/25/20  5:45 PM   Result Value Ref Range    Ventricular Rate 99 BPM    Atrial Rate 99 BPM    P-R Interval 164 ms    QRS Duration 88 ms    Q-T Interval 356 ms    QTC Calculation (Bezet) 456 ms    Calculated P Axis 38 degrees    Calculated R Axis -37 degrees    Calculated T Axis 28 degrees    Diagnosis       ** Poor data quality, interpretation may be adversely affected  Normal sinus rhythm  Left axis deviation  Pulmonary disease pattern  When compared with ECG of 20-OCT-2019 18:22,  No significant change was found          Radiology Results Today:  Xr Ribs Rt W Pa Cxr Min 3 V    Result Date: 6/25/2020  IMPRESSION:  No rib fracture identified.

## 2020-06-27 ENCOUNTER — PATIENT OUTREACH (OUTPATIENT)
Dept: CARDIOLOGY CLINIC | Age: 65
End: 2020-06-27

## 2020-07-01 ENCOUNTER — TELEPHONE (OUTPATIENT)
Dept: FAMILY MEDICINE CLINIC | Age: 65
End: 2020-07-01

## 2020-07-01 NOTE — TELEPHONE ENCOUNTER
----- Message from Isabel Gisela sent at 7/1/2020 11:40 AM EDT -----  Regarding: Dr. Christensen Fox: [phone]  Caller's first and last name and relationship (if not the patient): N/A  Best contact number(s): [phone]  What are the symptoms: Pt has a fractured rib and is experiencing trouble breathing  Transfer successful - yes/no (include outcome): No, no answer on the backline  Transfer declined - yes/no (include reason): N/A  Was caller advised to seek appropriate level of care - yes/no: Yes  Details to clarify the request: Pt was seen in the ER on 6/25/20 and diagnosed with a fractured rib and is requesting to schedule an in-office f/up. Pt also advised she needs to have blood work done and would like to complete it all at once.

## 2020-07-01 NOTE — TELEPHONE ENCOUNTER
----- Message from Loyd Shah sent at 2020 11:43 AM EDT -----  Regarding: MD Carbajal/ telephone  Patient's first and last name: Otto Duarte  : 1955  ID numbers: [id number]    Caller's first and last name: pt  Reason for call: lab work  Callback required yes/no and why: yes   Best contact number(s): [phone]  Details to clarify the request: Pt would like to schedule upcoming lab work in reference to medications. Pt would like a have bloodwork completed before appointment.

## 2020-07-02 ENCOUNTER — VIRTUAL VISIT (OUTPATIENT)
Dept: FAMILY MEDICINE CLINIC | Age: 65
End: 2020-07-02

## 2020-07-02 DIAGNOSIS — I10 ESSENTIAL HYPERTENSION WITH GOAL BLOOD PRESSURE LESS THAN 140/90: ICD-10-CM

## 2020-07-02 DIAGNOSIS — R07.81 RIB PAIN ON RIGHT SIDE: Primary | ICD-10-CM
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
## 2020-07-02 RX ORDER — PROPRANOLOL HYDROCHLORIDE 20 MG/1
TABLET ORAL
Qty: 180 TAB | Refills: 1 | Status: CANCELLED | OUTPATIENT
Start: 2020-07-02

## 2020-07-02 RX ORDER — ACETAMINOPHEN 500 MG
TABLET ORAL
Qty: 1 KIT | Refills: 0 | Status: SHIPPED | OUTPATIENT
Start: 2020-07-02 | End: 2020-07-17 | Stop reason: ALTCHOICE

## 2020-07-02 RX ORDER — CHOLECALCIFEROL (VITAMIN D3) 125 MCG
1 CAPSULE ORAL DAILY
Qty: 90 TAB | Refills: 0 | Status: CANCELLED | OUTPATIENT
Start: 2020-07-02

## 2020-07-02 RX ORDER — HYDROCODONE BITARTRATE AND ACETAMINOPHEN 5; 325 MG/1; MG/1
1 TABLET ORAL
Qty: 30 TAB | Refills: 0 | Status: SHIPPED | OUTPATIENT
Start: 2020-07-02 | End: 2020-07-05

## 2020-07-02 NOTE — PROGRESS NOTES
Consent: Barb Syed, who was seen by synchronous (real-time) audio-video technology, and/or her healthcare decision maker, is aware that this patient-initiated, Telehealth encounter on 7/2/2020 is a billable service, with coverage as determined by her insurance carrier. She is aware that she may receive a bill and has provided verbal consent to proceed: Yes. Barb Syed is a 63 y.o. female    Patient's last menstrual period was 04/04/1996 (lmp unknown). has a past medical history of Acute saddle pulmonary embolism without acute cor pulmonale (HCC) (11/3/2017), Arthritis, Asthma, Chronic obstructive pulmonary disease ([facility] Chronic pain, Diabetes mellitus due to underlying condition, controlled, with complication, without long-term current use of insulin ([facility] (3/21/2017), Diabetic polyneuropathy associated with type 2 diabetes mellitus ([facility] (11/1/2017), Essential hypertension (6/5/2018), Fibromyalgia, Gastroesophageal reflux disease without esophagitis (5/13/2016), Mixed hyperlipidemia (6/5/2018), Moderate episode of recurrent major depressive disorder ([facility] (11/1/2017), Noncompliance (8/6/2018), Severe obesity (BMI 35.0-39.9) (6/5/2018), and Unspecified sleep apnea. Sunday 6/21/2020 was having a family gardering. She thinks she may have took wrong medication. She was doing a lot of cooking. Feels light headed. She lay down, but had to go to bathroom. Then everything was blacking out, she fell, doesn't think she was out for long. They called ambulance, they came, she report vitals was good so she didn't want to go to ED. Still having rib pain on right rib under breast.    X-ray showed No fracture identified. But she's having point tenderness on palpation at site and with large breath or if movement of body. She's been out of propranolol    No more fall or dizziness since. Denies palpitation.      B/c she may have duplicate one of her meds, we'll reconsider to investigate her fall next visit. We discussed rib fracture. Treat for pain. To check BP daily and her BG and f/u 1 week to review all these information also to do labs I Ordered 04/2020      Reviewed: active problem list, medication list, allergies, notes from last encounter, lab results    A comprehensive review of systems was negative except for that written in the HPI. Assessment & Plan:   Diagnoses and all orders for this visit:    1. Rib pain on right side  -     HYDROcodone-acetaminophen (NORCO) 5-325 mg per tablet; Take 1 Tab by mouth daily as needed for Pain for up to 3 days. 2. Fall, sequela    3. Essential hypertension with goal blood pressure less than 140/90  -     Blood Pressure Test Kit-Large kit; Check BP daily        Follow-up and Dispositions    · Return in about 8 days (around 7/10/2020) for DM2, HTN, HLD, meds. .         I spent at least 25 minutes with this established patient, and >50% of the time was spent counseling and/or coordinating care regarding fall, rib fracture, pain, BP  712  Subjective:   Sherryle Gentle is a 63 y.o. female who was seen for Rib Pain (seen in ER)      Prior to Admission medications    Medication Sig Start Date End Date Taking? Authorizing Provider   HYDROcodone-acetaminophen (NORCO) 5-325 mg per tablet Take 1 Tab by mouth daily as needed for Pain for up to 3 days. 7/2/20 7/5/20 Yes Carrie Tinoco MD   Blood Pressure Test Kit-Large kit Check BP daily 7/2/20  Yes Carrie Tinoco MD   metFORMIN (GLUCOPHAGE) 1,000 mg tablet TAKE 1/2 TABLET IN THE MORNING  AND TAKE 1 TABLET IN THE EVENING 6/25/20  Yes Jodie Carbajal MD   lisinopriL (PRINIVIL, ZESTRIL) 10 mg tablet TAKE 1 TABLET EVERY DAY 6/25/20  Yes Carrie Tinoco MD   gemfibroziL (LOPID) 600 mg tablet TAKE 1 TABLET TWICE DAILY 6/25/20  Yes Carrie Tinoco MD   Jardiance 25 mg tablet TAKE 1 TABLET EVERY DAY 6/25/20  Yes Carrie Tinoco MD   lidocaine 4 % patch 1 Patch by TransDERmal route every twenty-four (24) hours.  6/25/20  Yes Cralos Romero Som MERCHANT MD   Trulicity 1.5 AH/7.5 mL sub-q pen INJECT 1.5MG (1 PEN) SUBCUTANEOUSLY EVERY 7 DAYS 6/22/20  Yes Sandhya Carbajal MD   DULoxetine (CYMBALTA) 30 mg capsule Take 1 Cap by mouth two (2) times a day. 4/9/20  Yes Rebel Caruso MD   gabapentin (NEURONTIN) 300 mg capsule Take 1 Cap by mouth two (2) times a day. 4/9/20  Yes Rebel Caruso MD   propranolol (INDERAL) 20 mg tablet Take 1 Tab by mouth two (2) times a day. 10/21/19  Yes Rebel Caruso MD   albuterol (PROAIR HFA) 90 mcg/actuation inhaler Take 2 Puffs by inhalation every four (4) hours as needed for Wheezing. 7/1/19  Yes Rbeel Caruso MD   fluticasone propion-salmeterol (ADVAIR/WIXELA) 250-50 mcg/dose diskus inhaler Take 1 Puff by inhalation every twelve (12) hours.  7/1/19  Yes Rebel Caruso MD   omeprazole (PRILOSEC) 20 mg capsule TAKE 1 CAPSULE BY MOUTH EVERY DAY 7/1/19  Yes Sandhya Carbajal MD   acyclovir (ZOVIRAX) 400 mg tablet TAKE 1 TABLET BY MOUTH TWICE DAILY 7/1/19  Yes Sandhya Carbajal MD   potassium chloride SR (KLOR-CON 10) 10 mEq tablet TK 1 T PO QD 7/1/19  Yes Sandhya Carbajal MD   fluticasone propionate (FLONASE) 50 mcg/actuation nasal spray INSTILL 2 SPRAYS IN EACH NOSTRIL EVERY DAY 7/1/19  Yes Rebel Caruso MD   albuterol-ipratropium (DUO-NEB) 2.5 mg-0.5 mg/3 ml nebu INHALE 1 VIAL VIA NEBULIZER EVERY 4 HOURS AS NEEDED 4/11/19  Yes Rebel Caruso MD   Lancets (ACCU-CHEK SOFTCLIX LANCETS) misc Test once daily 8/6/18  Yes Rebel Caruso MD   alcohol swabs (BD SINGLE USE SWABS REGULAR) padm Test once daily 8/6/18  Yes Sandhya Carbajal MD   Blood Glucose Control High&Low (ACCU-CHEK NIVIA CONTROL SOLN) soln As directed 8/6/18  Yes Rebel Caruso MD   Blood-Glucose Meter (ACCU-CHEK NIVIA PLUS METER) misc Test once daily 8/6/18  Yes Sandhya Carbajal MD   glucose blood VI test strips (ACCU-CHEK NIVIA PLUS TEST STRP) strip Test once daily 8/6/18  Yes Sandhya Carbajal MD   cholecalciferol, vitamin D3, (VITAMIN D3) 2,000 unit tab Take 1 Tab by mouth daily.    Yes Provider, Historical     Allergies   Allergen Reactions    Allopurinol Hives    Darvocet A500 [Propoxyphene N-Acetaminophen] Hives    Influenza Virus Vaccine, Specific Other (comments)     Allergy documented in admission data base    Seafood [Shellfish Containing Products] Itching     NEW ALLERGY; REACTION WORSENS AS AMOUNT EATEN INCREASES       Past Medical History:   Diagnosis Date    Acute saddle pulmonary embolism without acute cor pulmonale (HCC) 11/3/2017    Arthritis     Asthma     Chronic obstructive pulmonary disease (HCC)     Chronic pain     back/hip    Diabetes mellitus due to underlying condition, controlled, with complication, without long-term current use of insulin ([facility] [address] 3/21/2017    Diabetic polyneuropathy associated with type 2 diabetes mellitus ([address] 11/1/2017    Essential hypertension 6/5/2018    Fibromyalgia     Gastroesophageal reflux disease without esophagitis 5/13/2016    Mixed hyperlipidemia 6/5/2018    Moderate episode of recurrent major depressive disorder ([address] 11/1/2017    Noncompliance 8/6/2018    Severe obesity (BMI 35.0-39.9) 6/5/2018    Unspecified sleep apnea     CAN'T TOLERATE CPAP     Past Surgical History:   Procedure Laterality Date    COLONOSCOPY N/A 7/12/2017    COL[facility] Marcelina [facility]ier MD at [facility] END[facility]COPY     [facility]    one ovary removed    HX ORTHOPAEDIC  06/2012    lumbar fusion    HX ORTHOPAEDIC  2/16/15    RIGHT TOTAL KNEE ARTHROPLASTY    HX ORTHOPAEDIC  6/16/15    LEFT TOTAL KNEE ARTHROPLASTY            Objective:   Vital Signs: (As obtained by patient/caregiver at home)  Visit Vitals  LMP 04/04/1996 (LMP Unknown)        Constitutional: [x] Appears well-developed and well-nourished [x] No apparent distress      [] Abnormal -     Mental status: [x] Alert and awake  [x] Oriented to person/place/time [x] Able to follow commands    [] Abnormal -     Eyes:   EOM    [x]  Normal [] Abnormal -   Sclera  [x]  Normal    [] Abnormal -          Discharge [x]  None visible   [] Abnormal -     HENT: [x] Normocephalic, atraumatic  [] Abnormal -   [] Mouth/Throat: Mucous membranes are moist    External Ears [x] Normal  [] Abnormal -    Neck: [x] No visualized mass [] Abnormal -     Pulmonary/Chest: [x] Respiratory effort normal   [x] No visualized signs of difficulty breathing or respiratory distress        [] Abnormal -      Musculoskeletal:   [x] Normal gait with no signs of ataxia         [x] Normal range of motion of neck        [x] Abnormal - pain with moving of core body. Neurological:        [x] No Facial Asymmetry (Cranial nerve 7 motor function) (limited exam due to video visit)          [x] No gaze palsy        [] Abnormal -          Skin:        [x] No significant exanthematous lesions or discoloration noted on facial skin         [] Abnormal -            Psychiatric:       [x] Normal Affect [] Abnormal -        [x] No Hallucinations      We discussed the expected course, resolution and complications of the diagnosis(es) in detail. Medication risks, benefits, costs, interactions, and alternatives were discussed as indicated. I advised her to contact the office if her condition worsens, changes or fails to improve as anticipated. She expressed understanding with the diagnosis(es) and plan. Cosme Pompa is a 63 y.o. female being evaluated by a video visit encounter for concerns as above. A caregiver was present when appropriate. Due to this being a TeleHealth encounter (During [facility]- public health emergency), evaluation of the following organ systems was limited: Vitals/Constitutional/EENT/Resp/CV/GI//MS/Neuro/Skin/Heme-Lymph-Imm.   Pursuant to the emergency declaration under the [facility] waiver authority and the [employer] Act, this Virtual  Visit was conducted, with patient's (and/or legal guardian's) consent, to reduce the patient's risk of exposure to COVID-19 and provide necessary medical care. Services were provided through a video synchronous discussion virtually to substitute for in-person clinic visit. Patient and provider were located at their individual homes.         Junie Crespo MD

## 2020-07-02 NOTE — PROGRESS NOTES
Chief Complaint   Patient presents with    Rib Pain     seen in ER       1. Have you been to the ER, urgent care clinic since your last visit? Hospitalized since your last visit? Yes ER    2. Have you seen or consulted any other health care providers outside of the [address] [place] [address] since your last visit? Include any pap smears or colon screening.  no

## 2020-07-14 LAB
ALBUMIN SERPL-MCNC: 4.1 G/DL (ref 3.8–4.8)
ALBUMIN/CREAT UR: 5 MG/G CREAT (ref 0–29)
ALBUMIN/GLOB SERPL: 1 {RATIO} (ref 1.2–2.2)
ALP SERPL-CCNC: 82 IU/L (ref 39–117)
ALT SERPL-CCNC: 7 IU/L (ref 0–32)
AST SERPL-CCNC: 11 IU/L (ref 0–40)
BILIRUB SERPL-MCNC: 0.2 MG/DL (ref 0–1.2)
BUN SERPL-MCNC: 31 MG/DL (ref 8–27)
BUN/CREAT SERPL: 25 (ref 12–28)
CALCIUM SERPL-MCNC: 10.1 MG/DL (ref 8.7–10.3)
CHLORIDE SERPL-SCNC: 98 MMOL/L (ref 96–106)
CHOLEST SERPL-MCNC: 241 MG/DL (ref 100–199)
CO2 SERPL-SCNC: 21 MMOL/L (ref 20–29)
CREAT SERPL-MCNC: 1.26 MG/DL (ref 0.57–1)
CREAT UR-MCNC: 134.3 MG/DL
ERYTHROCYTE [DISTWIDTH] IN BLOOD BY AUTOMATED COUNT: 12.2 % (ref 11.7–15.4)
GLOBULIN SER CALC-MCNC: 4 G/DL (ref 1.5–4.5)
GLUCOSE SERPL-MCNC: 105 MG/DL (ref 65–99)
HBA1C MFR BLD: 5.7 % (ref 4.8–5.6)
HCT VFR BLD AUTO: 39.2 % (ref 34–46.6)
HDLC SERPL-MCNC: 36 MG/DL
HGB BLD-MCNC: 12.2 G/DL (ref 11.1–15.9)
INTERPRETATION: NORMAL
LDLC SERPL CALC-MCNC: 179 MG/DL (ref 0–99)
Lab: NORMAL
MCH RBC QN AUTO: 26.8 PG (ref 26.6–33)
MCHC RBC AUTO-ENTMCNC: 31.1 G/DL (ref 31.5–35.7)
MCV RBC AUTO: 86 FL (ref 79–97)
MICROALBUMIN UR-MCNC: 6.4 UG/ML
PLATELET # BLD AUTO: 350 X10E3/UL (ref 150–450)
POTASSIUM SERPL-SCNC: 4.1 MMOL/L (ref 3.5–5.2)
PROT SERPL-MCNC: 8.1 G/DL (ref 6–8.5)
RBC # BLD AUTO: 4.56 X10E6/UL (ref 3.77–5.28)
SODIUM SERPL-SCNC: 134 MMOL/L (ref 134–144)
TRIGL SERPL-MCNC: 129 MG/DL (ref 0–149)
TSH SERPL DL<=0.005 MIU/L-ACNC: 2.48 UIU/ML (ref 0.45–4.5)
VLDLC SERPL CALC-MCNC: 26 MG/DL (ref 5–40)
WBC # BLD AUTO: 9 X10E3/UL (ref 3.4–10.8)

## 2020-07-17 ENCOUNTER — VIRTUAL VISIT (OUTPATIENT)
Dept: FAMILY MEDICINE CLINIC | Age: 65
End: 2020-07-17

## 2020-07-17 DIAGNOSIS — E55.9 VITAMIN D DEFICIENCY: ICD-10-CM

## 2020-07-17 DIAGNOSIS — E11.42 DIABETIC POLYNEUROPATHY ASSOCIATED WITH TYPE 2 DIABETES MELLITUS (HCC): ICD-10-CM

## 2020-07-17 DIAGNOSIS — F99 INSOMNIA DUE TO OTHER MENTAL DISORDER: ICD-10-CM

## 2020-07-17 DIAGNOSIS — F33.1 MODERATE EPISODE OF RECURRENT MAJOR DEPRESSIVE DISORDER (HCC): Primary | ICD-10-CM

## 2020-07-17 DIAGNOSIS — E11.65 UNCONTROLLED TYPE 2 DIABETES MELLITUS WITH HYPERGLYCEMIA (HCC): ICD-10-CM

## 2020-07-17 DIAGNOSIS — M79.7 FIBROMYALGIA: ICD-10-CM

## 2020-07-17 DIAGNOSIS — E78.2 MIXED HYPERLIPIDEMIA: ICD-10-CM

## 2020-07-17 DIAGNOSIS — F51.05 INSOMNIA DUE TO OTHER MENTAL DISORDER: ICD-10-CM

## 2020-07-17 DIAGNOSIS — I10 ESSENTIAL HYPERTENSION WITH GOAL BLOOD PRESSURE LESS THAN 140/90: ICD-10-CM

## 2020-07-17 RX ORDER — BLOOD-GLUCOSE METER
EACH MISCELLANEOUS
Qty: 1 EACH | Refills: 0 | Status: SHIPPED | OUTPATIENT
Start: 2020-07-17 | End: 2020-12-10 | Stop reason: ALTCHOICE

## 2020-07-17 RX ORDER — LIDOCAINE 4 G/100G
1 PATCH TOPICAL EVERY 24 HOURS
Qty: 90 PATCH | Refills: 0 | Status: SHIPPED | OUTPATIENT
Start: 2020-07-17

## 2020-07-17 RX ORDER — GABAPENTIN 300 MG/1
300 CAPSULE ORAL 2 TIMES DAILY
Qty: 180 CAP | Refills: 0 | Status: SHIPPED | OUTPATIENT
Start: 2020-07-17 | End: 2020-11-11 | Stop reason: SDUPTHER

## 2020-07-17 RX ORDER — CHOLECALCIFEROL (VITAMIN D3) 125 MCG
1 CAPSULE ORAL DAILY
Qty: 90 TAB | Refills: 1 | Status: CANCELLED | OUTPATIENT
Start: 2020-07-17

## 2020-07-17 RX ORDER — TRAZODONE HYDROCHLORIDE 50 MG/1
50 TABLET ORAL
Qty: 30 TAB | Refills: 1 | Status: SHIPPED | OUTPATIENT
Start: 2020-07-17

## 2020-07-17 RX ORDER — ERGOCALCIFEROL 1.25 MG/1
50000 CAPSULE ORAL
Qty: 12 CAP | Refills: 1 | Status: SHIPPED | OUTPATIENT
Start: 2020-07-17 | End: 2020-10-15 | Stop reason: SDUPTHER

## 2020-07-17 NOTE — PROGRESS NOTES
Consent: Jaylin Serrano, who was seen by synchronous (real-time) audio-video technology, and/or her healthcare decision maker, is aware that this patient-initiated, Telehealth encounter on 7/17/2020 is a billable service, with coverage as determined by her insurance carrier. She is aware that she may receive a bill and has provided verbal consent to proceed: Yes. Jaylin Serrano is a 63 y.o. female    Patient's last menstrual period was 04/04/1996 (lmp unknown). has a past medical history of Acute saddle pulmonary embolism without acute cor pulmonale (HCC) (11/3/2017), Arthritis, Asthma, Chronic obstructive pulmonary disease ([facility] [address] Chronic pain, Diabetes mellitus due to underlying condition, controlled, with complication, without long-term current use of insulin ([facility] (3/21/2017), Diabetic polyneuropathy associated with type 2 diabetes mellitus ([facility] (11/1/2017), Essential hypertension (6/5/2018), Fibromyalgia, Gastroesophageal reflux disease without esophagitis (5/13/2016), Mixed hyperlipidemia (6/5/2018), Moderate episode of recurrent major depressive disorder ([facility] [address] (11/1/2017), Noncompliance (8/6/2018), Severe obesity (BMI 35.0-39.9) (6/5/2018), and Unspecified sleep apnea. She have been eating healthier and have lsot about 13lbs in past 8 months. Dm2.  a1c 5.7% 7/2020, 7.9% 04/2019, 6.8% 08/2018 at goal.  Non-compliance  continue metformin  3 months ago we added trulicity and jardiance   We'll d/c jardiance    Anxiety and depression. Recently more stress, not sleeping well. Fatigue generalized. Denies anhedonia  She denies SI or HI.  Continue course. Current meds duloxetin  Start trazodone    Ckd stage 3:  Cr.  Baseline 1.26              Avoid NSAIDs, nephrotoxic drugs, stay hydrated.      HTN: BP were stable on Lisnipril/hctz 10/12.5   She haven't been taking propranolol an dher BP at goal  D/c propranolol  Refill meds     HLD: worse compared to previous labs a year ago.               Wasn't compliant with Gemfibrozil, but she restarted it 2 weeks now. Hx of allergies in the past.   Last smokes 4 years ago. Have ran out of flonase, but she takes OTC allergies pills thinks it's zyrtec.        Reviewed: active problem list, medication list, allergies, notes from last encounter, lab results    A comprehensive review of systems was negative except for that written in the HPI. Assessment & Plan:   Diagnoses and all orders for this visit:    1. Moderate episode of recurrent major depressive disorder (HCC)  -     traZODone (DESYREL) 50 mg tablet; Take 1 Tab by mouth nightly. 2. Uncontrolled type 2 diabetes mellitus with hyperglycemia (HCC)  -     Blood-Glucose Meter (Accu-Chek Amy Plus Meter) misc; Test once daily    3. Essential hypertension with goal blood pressure less than 140/90    4. Mixed hyperlipidemia    5. Fibromyalgia  -     lidocaine 4 % patch; 1 Patch by TransDERmal route every twenty-four (24) hours. -     gabapentin (NEURONTIN) 300 mg capsule; Take 1 Cap by mouth two (2) times a day. 6. Diabetic polyneuropathy associated with type 2 diabetes mellitus (HCC)  -     lidocaine 4 % patch; 1 Patch by TransDERmal route every twenty-four (24) hours. -     gabapentin (NEURONTIN) 300 mg capsule; Take 1 Cap by mouth two (2) times a day. 7. Insomnia due to other mental disorder  -     traZODone (DESYREL) 50 mg tablet; Take 1 Tab by mouth nightly. 8. Vitamin D deficiency  -     ergocalciferol (ERGOCALCIFEROL) 1,250 mcg (50,000 unit) capsule; Take 1 Cap by mouth every seven (7) days. Follow-up and Dispositions    · Return in about 3 weeks (around 8/7/2020) for fatigue, depression. I spent at least 25 minutes with this established patient, and >50% of the time was spent counseling and/or coordinating care regarding fall, rib fracture, pain, BP  712  Subjective:   Vivi Linder is a 63 y.o. female who was seen for Diabetes;  Hypertension; and Cholesterol Problem      Prior to Admission medications    Medication Sig Start Date End Date Taking? Authorizing Provider   lidocaine 4 % patch 1 Patch by TransDERmal route every twenty-four (24) hours. 7/17/20  Yes Macy Lloyd MD   Blood-Glucose Meter (Accu-Chek Amy Plus Meter) misc Test once daily 7/17/20  Yes Macy Lloyd MD   traZODone (DESYREL) 50 mg tablet Take 1 Tab by mouth nightly. 7/17/20  Yes Macy Lloyd MD   ergocalciferol (ERGOCALCIFEROL) 1,250 mcg (50,000 unit) capsule Take 1 Cap by mouth every seven (7) days. 7/17/20  Yes Macy Lloyd MD   gabapentin (NEURONTIN) 300 mg capsule Take 1 Cap by mouth two (2) times a day. 7/17/20  Yes Macy Lloyd MD   metFORMIN (GLUCOPHAGE) 1,000 mg tablet TAKE 1/2 TABLET IN THE MORNING  AND TAKE 1 TABLET IN THE EVENING 6/25/20  Yes Sujatha Carbajal MD   lisinopriL (PRINIVIL, ZESTRIL) 10 mg tablet TAKE 1 TABLET EVERY DAY 6/25/20  Yes Sujatha Carbajal MD   gemfibroziL (LOPID) 600 mg tablet TAKE 1 TABLET TWICE DAILY 6/25/20  Yes Sujatha Carbajal MD   Trulicity 1.5 KH/7.1 mL sub-q pen INJECT 1.5MG (1 PEN) SUBCUTANEOUSLY EVERY 7 DAYS 6/22/20  Yes Macy Lloyd MD   DULoxetine (CYMBALTA) 30 mg capsule Take 1 Cap by mouth two (2) times a day. 4/9/20  Yes Macy Lloyd MD   albuterol (PROAIR HFA) 90 mcg/actuation inhaler Take 2 Puffs by inhalation every four (4) hours as needed for Wheezing. 7/1/19  Yes Macy Lloyd MD   fluticasone propion-salmeterol (ADVAIR/WIXELA) 250-50 mcg/dose diskus inhaler Take 1 Puff by inhalation every twelve (12) hours.  7/1/19  Yes Macy Lloyd MD   omeprazole (PRILOSEC) 20 mg capsule TAKE 1 CAPSULE BY MOUTH EVERY DAY 7/1/19  Yes Sujatha Carbajal MD   acyclovir (ZOVIRAX) 400 mg tablet TAKE 1 TABLET BY MOUTH TWICE DAILY 7/1/19  Yes Sujatha Carbajal MD   potassium chloride SR (KLOR-CON 10) 10 mEq tablet TK 1 T PO QD 7/1/19  Yes Sujatha Carbajal MD   fluticasone propionate (FLONASE) 50 mcg/actuation nasal spray INSTILL 2 SPRAYS IN EACH NOSTRIL EVERY DAY 7/1/19  Yes Julio Cesar Aceves MD   albuterol-ipratropium (DUO-NEB) 2.5 mg-0.5 mg/3 ml nebu INHALE 1 VIAL VIA NEBULIZER EVERY 4 HOURS AS NEEDED 4/11/19   Julio Cesar Aceves MD   Lancets (ACCU-CHEK SOFTCLIX LANCETS) misc Test once daily 8/6/18   Julio Cesar Aceves MD   glucose blood VI test strips (ACCU-CHEK NIVIA PLUS TEST STRP) strip Test once daily 8/6/18   Julio Cesar Aceves MD     Allergies   Allergen Reactions    Allopurinol Hives    Darvocet A500 [Propoxyphene N-Acetaminophen] Hives    Influenza Virus Vaccine, Specific Other (comments)     Allergy documented in admission data base    Seafood [Shellfish Containing Products] Itching     NEW ALLERGY; REACTION WORSENS AS AMOUNT EATEN INCREASES       Past Medical History:   Diagnosis Date    Acute saddle pulmonary embolism without acute cor pulmonale (HCC) 11/3/2017    Arthritis     Asthma     Chronic obstructive pulmonary disease (HCC)     Chronic pain     back/hip    Diabetes mellitus due to underlying condition, controlled, with complication, without long-term current use of insulin ([address] 3/21/2017    Diabetic polyneuropathy associated with type 2 diabetes mellitus ([address] 11/1/2017    Essential hypertension 6/5/2018    Fibromyalgia     Gastroesophageal reflux disease without esophagitis 5/13/2016    Mixed hyperlipidemia 6/5/2018    Moderate episode of recurrent major depressive disorder ([address] 11/1/2017    Noncompliance 8/6/2018    Severe obesity (BMI 35.0-39.9) 6/5/2018    Unspecified sleep apnea     CAN'T TOLERATE CPAP     Past Surgical History:   Procedure Laterality Date    COLONOSCOPY N/A 7/12/2017    COLONOSCOPY performed by Frederic Belcher MD at [facility] ENDOSCOPY     [facility]    one ovary removed    HX ORTHOPAEDIC  06/2012    lumbar fusion    HX ORTHOPAEDIC  2/16/15    RIGHT TOTAL KNEE ARTHROPLASTY    HX ORTHOPAEDIC  6/16/15    LEFT TOTAL KNEE ARTHROPLASTY            Objective:   Vital Signs: (As obtained by patient/caregiver at home)  Visit Vitals  LMP 04/04/1996 (LMP Unknown)        Constitutional: [x] Appears well-developed and well-nourished [x] No apparent distress      [] Abnormal -     Mental status: [x] Alert and awake  [x] Oriented to person/place/time [x] Able to follow commands    [] Abnormal -     Eyes:   EOM    [x]  Normal    [] Abnormal -   Sclera  [x]  Normal    [] Abnormal -          Discharge [x]  None visible   [] Abnormal -     HENT: [x] Normocephalic, atraumatic  [] Abnormal -   [] Mouth/Throat: Mucous membranes are moist    External Ears [x] Normal  [] Abnormal -    Neck: [x] No visualized mass [] Abnormal -     Pulmonary/Chest: [x] Respiratory effort normal   [x] No visualized signs of difficulty breathing or respiratory distress        [] Abnormal -      Musculoskeletal:   [x] Normal gait with no signs of ataxia         [x] Normal range of motion of neck        [x] Abnormal - pain with moving of core body. Neurological:        [x] No Facial Asymmetry (Cranial nerve 7 motor function) (limited exam due to video visit)          [x] No gaze palsy        [] Abnormal -          Skin:        [x] No significant exanthematous lesions or discoloration noted on facial skin         [] Abnormal -            Psychiatric:       [x] Normal Affect [] Abnormal -        [x] No Hallucinations      We discussed the expected course, resolution and complications of the diagnosis(es) in detail. Medication risks, benefits, costs, interactions, and alternatives were discussed as indicated. I advised her to contact the office if her condition worsens, changes or fails to improve as anticipated. She expressed understanding with the diagnosis(es) and plan. Renate Wang is a 63 y.o. female being evaluated by a video visit encounter for concerns as above. A caregiver was present when appropriate.  Due to this being a TeleHealth encounter (During [facility] public health emergency), evaluation of the following organ systems was limited: Vitals/Constitutional/EENT/Resp/CV/GI//MS/Neuro/Skin/Heme-Lymph-Imm. Pursuant to the emergency declaration under the [facility] waiver authority and the [employer] Act, this Virtual  Visit was conducted, with patient's (and/or legal guardian's) consent, to reduce the patient's risk of exposure to COVID-19 and provide necessary medical care. Services were provided through a video synchronous discussion virtually to substitute for in-person clinic visit. Patient and provider were located at their individual homes.         Jessica Encinas MD

## 2020-07-17 NOTE — PROGRESS NOTES
Chief Complaint   Patient presents with    Diabetes    Hypertension    Cholesterol Problem     3 mo check    1. Have you been to the ER, urgent care clinic since your last visit? Hospitalized since your last visit? no    2. Have you seen or consulted any other health care providers outside of the [address] [place] [address] since your last visit? Include any pap smears or colon screening.  no

## 2020-07-23 ENCOUNTER — TELEPHONE (OUTPATIENT)
Dept: PHARMACY | Age: 65
End: 2020-07-23

## 2020-07-23 DIAGNOSIS — E11.65 UNCONTROLLED TYPE 2 DIABETES MELLITUS WITH HYPERGLYCEMIA (HCC): Primary | ICD-10-CM

## 2020-07-23 DIAGNOSIS — E78.2 MIXED HYPERLIPIDEMIA: ICD-10-CM

## 2020-07-23 RX ORDER — ROSUVASTATIN CALCIUM 20 MG/1
20 TABLET, COATED ORAL
Qty: 30 TAB | Refills: 2 | Status: SHIPPED | OUTPATIENT
Start: 2020-07-23 | End: 2020-11-20 | Stop reason: SDUPTHER

## 2020-07-23 NOTE — TELEPHONE ENCOUNTER
Lacey Ley MD - would patient benefit from trial of another statin, such as rosuvastatin 5mg daily, to replace gemfibrozil?  (prev simvastatin use >5 years ago which was stopped d/t muscle aches; noted patient had been non-adherent to gemfibrozil with most recent lipid panel, however maybe trial of less lipophilic statin, such as rosuvastatin, may not aggravate her fibromyalgia/muscle aches?)    I can contact patient/family to discuss any changes in therapy. Thank you,  Joshua Martins, TeresoD, [facility]  Direct: [phone]  Department, toll free: [phone], option 7      ==============================================================  CLINICAL PHARMACY: STATIN REVIEW    SUBJECTIVE:   Identified as DM care gap for [employer]: statin therapy. Per [employer] medication report: Currently compliant: Diabetes    OBJECTIVE:  Allergies   Allergen Reactions    Allopurinol Hives    Darvocet A500 [Propoxyphene N-Acetaminophen] Hives    Influenza Virus Vaccine, Specific Other (comments)     Allergy documented in admission data base    Seafood [Shellfish Containing Products] Itching     NEW ALLERGY; REACTION WORSENS AS AMOUNT EATEN INCREASES       Current Outpatient Medications Include:   Medication Sig Dispense Refill    gemfibroziL (LOPID) 600 mg tablet TAKE 1 TABLET TWICE DAILY 180 Tab 0       Labs:  Results for Felipa Quinones (MRN [id number]) as of 7/23/2020 08:16   Ref.  Range 8/6/2018 12:23 4/11/2019 13:15 10/20/2019 18:30 10/20/2019 21:47 7/13/2020 09:32   ALT Latest Ref Range: 0 - 32 IU/L 12 10 10 (L)  7   AST Latest Ref Range: 0 - 40 IU/L 14 14 8 (L)  11   Triglyceride Latest Ref Range: 0 - 149 mg/dL 145 236 (H)   129   Cholesterol, total Latest Ref Range: 100 - 199 mg/dL 223 (H) 223 (H)   241 (H)   HDL Cholesterol Latest Ref Range: >39 mg/dL 38 (L) 31 (L)   36 (L)   VLDL, calculated Latest Ref Range: 5 - 40 mg/dL 29 47 (H)   26   LDL, calculated Latest Ref Range: 0 - 99 mg/dL 156 (H) 145 (H)   179 (H)   Hemoglobin A1c, (calculated) Latest Ref Range: 4.8 - 5.6 % 6.8 (H) 7.9 (H)   5.7 (H)       10-year ASCVD risk: 15.1%  BLACK OR     BP Readings from Last 1 Encounters:   20 104/65       Social History     Tobacco Use    Smoking status: Former Smoker     Packs/day: 1.00     Years: 37.00     Pack years: 37.00     Last attempt to quit: 2015     Years since quittin.6    Smokeless tobacco: Never Used   Substance Use Topics    Alcohol use: Yes     Comment: VERY RARE WINE         ASSESSMENT:  Hyperlipidemia Goal: Patient has a 10-yr ASCVD risk of 15% with DM, obesity, HTN and is likely a candidate for at least moderate-intensity statin therapy. · Noted past use of simvastatin and per 9/8/15 PCP OV note: \"D/C statin given her muscle aches. Change to lopid. \"  · Per 20 virtual PCP visit: \"Wasn't compliant with Gemfibrozil, but she restarted it 2 weeks now\"  · Most recent  and TGs 129, currently taking gemfibrozil 600mg BID; patient with fibromyalgia      PLAN:  Consider trial of another statin, such as rosuvastatin 5mg daily (or even every other day and increase as tolerated) to replace gemfibrozil  Labs and follow-up per provider discretion

## 2020-07-23 NOTE — LETTER
Madiha [address] [facility], [address] Phone: [phone], option 7 José Triplett [address] [place] [address] [facility] [address]  
 
 
 
 
07/27/20 Dear José Triplett, We tried to reach you recently regarding your medications, but were unable to reach you on the telephone. Our team of pharmacists works with your primary care provider Jaswinder Camargo MD) to make sure patients are on appropriate medications for their chronic conditions. Patients with diabetes are at an increased risk of heart attack and stroke in the future, but taking a medication called a statin can help lower that risk. Your primary care provider has already given approval for you to start rosuvastatin 20mg daily - a prescription was sent to your [address]. When you start this medication, please STOP your gemfibrozil. Please give us a call back so we can further discuss any questions you may have. Thank you, Indigo Martins, PharmD, [address] Direct: [phone] Department, toll free: [phone], option 7

## 2020-07-23 NOTE — TELEPHONE ENCOUNTER
Noted rosuvastatin 20mg, #30, 2 refills sent to [address] [place] [address], thank you! Left message asking patient to return call.  (new rosuva rx, dc gemfibrozil?)    Ovi Martins, PharmD, [facility]  Direct: [phone]  Department, toll free: [phone], option 7

## 2020-07-27 NOTE — TELEPHONE ENCOUNTER
Arnold Dasilva MD  You 4 days ago       Yes of course.  Takes place of gemfibrozil.      =========================================================   Attempting again to reach patient. Left another message and will send letter. Mark Martins PharmD, [facility]  Direct: [phone]  Department, toll free: [phone], option 7      =========================================================   For Pharmacy Admin Tracking Only    PHSO: PHSO Patient?: Yes  Total # of Interventions Recommended: Count: 2  - Discontinued Medication #: 1 Discontinue Reason(s):  Interaction  - New Order #: 1 New Medication Order Reason(s): Needs Additional Medication Therapy  - Maintenance Safety Lab Monitoring #: 1  Recommended intervention potential cost savings: 1  Total Interventions Accepted: 2  Time Spent (min): 20

## 2020-07-28 ENCOUNTER — DOCUMENTATION ONLY (OUTPATIENT)
Dept: FAMILY MEDICINE CLINIC | Age: 65
End: 2020-07-28

## 2020-07-29 DIAGNOSIS — K21.9 GASTROESOPHAGEAL REFLUX DISEASE WITHOUT ESOPHAGITIS: ICD-10-CM

## 2020-07-29 RX ORDER — OMEPRAZOLE 20 MG/1
CAPSULE, DELAYED RELEASE ORAL
Qty: 90 CAP | Refills: 1 | Status: SHIPPED | OUTPATIENT
Start: 2020-07-29 | End: 2020-11-20 | Stop reason: SDUPTHER

## 2020-07-29 RX ORDER — POTASSIUM CHLORIDE 750 MG/1
TABLET, FILM COATED, EXTENDED RELEASE ORAL
Qty: 90 TAB | Refills: 1 | Status: SHIPPED | OUTPATIENT
Start: 2020-07-29 | End: 2020-11-20 | Stop reason: SDUPTHER

## 2020-07-30 RX ORDER — ACYCLOVIR 400 MG/1
TABLET ORAL
Qty: 180 TAB | Refills: 0 | Status: SHIPPED | OUTPATIENT
Start: 2020-07-30 | End: 2020-11-20 | Stop reason: SDUPTHER

## 2020-07-31 RX ORDER — LANCETS
EACH MISCELLANEOUS
Qty: 100 EACH | Refills: 1 | Status: SHIPPED | OUTPATIENT
Start: 2020-07-31

## 2020-07-31 RX ORDER — BLOOD SUGAR DIAGNOSTIC
STRIP MISCELLANEOUS
Qty: 100 STRIP | Refills: 1 | Status: SHIPPED | OUTPATIENT
Start: 2020-07-31

## 2020-09-02 DIAGNOSIS — E11.65 UNCONTROLLED TYPE 2 DIABETES MELLITUS WITH HYPERGLYCEMIA (HCC): ICD-10-CM

## 2020-09-02 RX ORDER — DULAGLUTIDE 1.5 MG/.5ML
INJECTION, SOLUTION SUBCUTANEOUS
Qty: 12 EACH | Refills: 0 | Status: SHIPPED | OUTPATIENT
Start: 2020-09-02 | End: 2021-01-18 | Stop reason: SDUPTHER

## 2020-09-09 DIAGNOSIS — E78.2 MIXED HYPERLIPIDEMIA: ICD-10-CM

## 2020-09-09 DIAGNOSIS — I10 ESSENTIAL HYPERTENSION WITH GOAL BLOOD PRESSURE LESS THAN 140/90: ICD-10-CM

## 2020-09-09 DIAGNOSIS — E11.65 UNCONTROLLED TYPE 2 DIABETES MELLITUS WITH HYPERGLYCEMIA (HCC): ICD-10-CM

## 2020-09-09 RX ORDER — GEMFIBROZIL 600 MG/1
TABLET, FILM COATED ORAL
Qty: 180 TAB | Refills: 0 | Status: SHIPPED | OUTPATIENT
Start: 2020-09-09 | End: 2021-06-30

## 2020-09-09 RX ORDER — METFORMIN HYDROCHLORIDE 1000 MG/1
TABLET ORAL
Qty: 135 TAB | Refills: 0 | Status: SHIPPED | OUTPATIENT
Start: 2020-09-09 | End: 2020-11-20 | Stop reason: SDUPTHER

## 2020-09-09 RX ORDER — LISINOPRIL 10 MG/1
TABLET ORAL
Qty: 90 TAB | Refills: 0 | Status: SHIPPED | OUTPATIENT
Start: 2020-09-09 | End: 2021-06-30 | Stop reason: SDUPTHER

## 2020-09-14 ENCOUNTER — TELEPHONE (OUTPATIENT)
Dept: FAMILY MEDICINE CLINIC | Age: 65
End: 2020-09-14

## 2020-09-14 NOTE — TELEPHONE ENCOUNTER
----- Message from [employer] sent at 9/14/2020  3:01 PM EDT -----  Regarding: Dr. Deuce Fischer first and last name: pt  Reason for call:  Insurance  Callback required yes/no and why: Yes, answer  Best contact number(s): [phone]  Details to clarify the request: Needs list of all insurances that Dr. Janet Chung accepts, sent a message last week but did not hear back, needs information BEFORE 9/18

## 2020-09-23 ENCOUNTER — TELEPHONE (OUTPATIENT)
Dept: PHARMACY | Age: 65
End: 2020-09-23

## 2020-09-23 NOTE — TELEPHONE ENCOUNTER
Kenia Armstrong, can you please outreach to pharmacy and/or patient to determine which medication refills she needs? - Appears she's likely due for PCP appt (July appt recommended f/u in 3 weeks) - not sure if PCP will want this scheduled prior to refilling other medications? - Appears several rx's were reordered for her in the past couple weeks, including gemfibrozil, which she should have stopped when she started rosuvastatin?

## 2020-09-23 NOTE — TELEPHONE ENCOUNTER
CLINICAL PHARMACY: STATIN THERAPY REVIEW    Identified care gap per [facility] statin use in persons with diabetes and adherence     Per [employer] [facility]:    Medication: Rosuvastatin 20mg   Recent fill dates: 2020  Day supply: 90  Refills remainin  Directions: 1 tab po qpm  Insurance billed: [employer]  Prescribing Provider: Jose Alvarez MD      No patient outreach planned at this time. Patient was sent medication and should have 30 days remaining. There are no refills left on medication any of her medications. Please reach out to MD to review medication profile and determine if they want to continue therapy and have new prescriptions sent over to mail order to ensure there is no delay in continuing therapy if appropriate. Yoselyn Keyes, [address] [address], [facility]  Department, toll free: [phone], option 7    CLINICAL PHARMACY CONSULT: MED RECONCILIATION/REVIEW ADDENDUM    For Pharmacy Admin Tracking Only    PHSO: PHSO Patient?: Yes  Total # of Interventions Recommended: Count: 1  - New Order #: 1 New Medication Order Reason(s):  Adherence  - Maintenance Safety Lab Monitoring #: 1  Total Interventions Accepted: 1  Time Spent (min): 100 [address] [place] [address]

## 2020-09-25 NOTE — TELEPHONE ENCOUNTER
Confirmed fills with pharmacy. Based on notes patient has an upcoming appointment. Appears MD will make determination at that time.

## 2020-10-15 DIAGNOSIS — K21.9 GASTROESOPHAGEAL REFLUX DISEASE WITHOUT ESOPHAGITIS: ICD-10-CM

## 2020-10-15 DIAGNOSIS — E11.42 DIABETIC POLYNEUROPATHY ASSOCIATED WITH TYPE 2 DIABETES MELLITUS (HCC): ICD-10-CM

## 2020-10-15 DIAGNOSIS — F99 INSOMNIA DUE TO OTHER MENTAL DISORDER: ICD-10-CM

## 2020-10-15 DIAGNOSIS — J44.9 CHRONIC OBSTRUCTIVE PULMONARY DISEASE, UNSPECIFIED COPD TYPE (HCC): ICD-10-CM

## 2020-10-15 DIAGNOSIS — F51.05 INSOMNIA DUE TO OTHER MENTAL DISORDER: ICD-10-CM

## 2020-10-15 DIAGNOSIS — F33.1 MODERATE EPISODE OF RECURRENT MAJOR DEPRESSIVE DISORDER (HCC): ICD-10-CM

## 2020-10-15 DIAGNOSIS — J43.8 OTHER EMPHYSEMA (HCC): ICD-10-CM

## 2020-10-15 DIAGNOSIS — I10 ESSENTIAL HYPERTENSION WITH GOAL BLOOD PRESSURE LESS THAN 140/90: ICD-10-CM

## 2020-10-15 DIAGNOSIS — E11.65 UNCONTROLLED TYPE 2 DIABETES MELLITUS WITH HYPERGLYCEMIA (HCC): ICD-10-CM

## 2020-10-15 DIAGNOSIS — E55.9 VITAMIN D DEFICIENCY: ICD-10-CM

## 2020-10-15 DIAGNOSIS — E78.2 MIXED HYPERLIPIDEMIA: ICD-10-CM

## 2020-10-15 DIAGNOSIS — M79.7 FIBROMYALGIA: ICD-10-CM

## 2020-10-15 NOTE — TELEPHONE ENCOUNTER
----- Message from Simi Wood sent at 10/15/2020  2:39 PM EDT -----  Regarding: Dr. Soto Every  General Message/Vendor Calls    Caller's first and last name: n/a      Reason for call: pt says she has a new insurance [employer] and [employer] plus for [place].        Callback required yes/no and why: yes      Best contact number(s): [phone]      Details to clarify the request: Requesting all rx to be sent to a new pharmacy, [facility] mail order, [phone]; fax: [address] [place] [address]

## 2020-10-15 NOTE — TELEPHONE ENCOUNTER
Request for advair, flonase, duo-neb, proair, vitamin D. Last office visit 7/17/20, next office visit 10/22/20.  Refill pending for provider approval.

## 2020-10-16 RX ORDER — ERGOCALCIFEROL 1.25 MG/1
50000 CAPSULE ORAL
Qty: 12 CAP | Refills: 1 | Status: SHIPPED | OUTPATIENT
Start: 2020-10-16 | End: 2021-11-10 | Stop reason: SDUPTHER

## 2020-10-16 RX ORDER — FLUTICASONE PROPIONATE AND SALMETEROL 250; 50 UG/1; UG/1
POWDER RESPIRATORY (INHALATION)
Qty: 180 EACH | Refills: 1 | Status: SHIPPED | OUTPATIENT
Start: 2020-10-16 | End: 2021-02-08

## 2020-10-16 RX ORDER — IPRATROPIUM BROMIDE AND ALBUTEROL SULFATE 2.5; .5 MG/3ML; MG/3ML
SOLUTION RESPIRATORY (INHALATION)
Qty: 1620 ML | Refills: 5 | Status: SHIPPED | OUTPATIENT
Start: 2020-10-16 | End: 2021-08-31 | Stop reason: SDUPTHER

## 2020-10-16 RX ORDER — ALBUTEROL SULFATE 90 UG/1
2 AEROSOL, METERED RESPIRATORY (INHALATION)
Qty: 3 INHALER | Refills: 1 | Status: SHIPPED | OUTPATIENT
Start: 2020-10-16 | End: 2021-03-10

## 2020-10-16 RX ORDER — FLUTICASONE PROPIONATE 50 MCG
SPRAY, SUSPENSION (ML) NASAL
Qty: 3 BOTTLE | Refills: 0 | Status: SHIPPED | OUTPATIENT
Start: 2020-10-16 | End: 2021-03-10

## 2020-10-22 ENCOUNTER — TELEPHONE (OUTPATIENT)
Dept: FAMILY MEDICINE CLINIC | Age: 65
End: 2020-10-22

## 2020-10-22 ENCOUNTER — OFFICE VISIT (OUTPATIENT)
Dept: FAMILY MEDICINE CLINIC | Age: 65
End: 2020-10-22
Payer: MEDICARE

## 2020-10-22 ENCOUNTER — HOSPITAL ENCOUNTER (OUTPATIENT)
Dept: GENERAL RADIOLOGY | Age: 65
Discharge: HOME | End: 2020-10-22
Payer: MEDICARE

## 2020-10-22 VITALS
WEIGHT: 276.4 LBS | SYSTOLIC BLOOD PRESSURE: 124 MMHG | HEART RATE: 77 BPM | RESPIRATION RATE: 18 BRPM | BODY MASS INDEX: 37.44 KG/M2 | HEIGHT: 72 IN | OXYGEN SATURATION: 95 % | DIASTOLIC BLOOD PRESSURE: 78 MMHG | TEMPERATURE: 97.7 F

## 2020-10-22 DIAGNOSIS — L97.522 FOOT ULCER, LEFT, WITH FAT LAYER EXPOSED (HCC): ICD-10-CM

## 2020-10-22 DIAGNOSIS — E13.621 FOOT ULCER DUE TO SECONDARY DM (HCC): ICD-10-CM

## 2020-10-22 DIAGNOSIS — L97.509 FOOT ULCER DUE TO SECONDARY DM (HCC): ICD-10-CM

## 2020-10-22 DIAGNOSIS — E13.621 FOOT ULCER DUE TO SECONDARY DM (HCC): Primary | ICD-10-CM

## 2020-10-22 DIAGNOSIS — L97.509 FOOT ULCER DUE TO SECONDARY DM (HCC): Primary | ICD-10-CM

## 2020-10-22 PROCEDURE — 3044F HG A1C LEVEL LT 7.0%: CPT | Performed by: FAMILY MEDICINE

## 2020-10-22 PROCEDURE — G9717 DOC PT DX DEP/BP F/U NT REQ: HCPCS | Performed by: FAMILY MEDICINE

## 2020-10-22 PROCEDURE — 73630 X-RAY EXAM OF FOOT: CPT

## 2020-10-22 PROCEDURE — 99214 OFFICE O/P EST MOD 30 MIN: CPT | Performed by: FAMILY MEDICINE

## 2020-10-22 PROCEDURE — G8752 SYS BP LESS 140: HCPCS | Performed by: FAMILY MEDICINE

## 2020-10-22 PROCEDURE — G8427 DOCREV CUR MEDS BY ELIG CLIN: HCPCS | Performed by: FAMILY MEDICINE

## 2020-10-22 PROCEDURE — G8754 DIAS BP LESS 90: HCPCS | Performed by: FAMILY MEDICINE

## 2020-10-22 PROCEDURE — 3017F COLORECTAL CA SCREEN DOC REV: CPT | Performed by: FAMILY MEDICINE

## 2020-10-22 PROCEDURE — G8417 CALC BMI ABV UP PARAM F/U: HCPCS | Performed by: FAMILY MEDICINE

## 2020-10-22 PROCEDURE — 2022F DILAT RTA XM EVC RTNOPTHY: CPT | Performed by: FAMILY MEDICINE

## 2020-10-22 RX ORDER — CEPHALEXIN 500 MG/1
500 CAPSULE ORAL 2 TIMES DAILY
Qty: 20 CAP | Refills: 0 | Status: SHIPPED | OUTPATIENT
Start: 2020-10-22 | End: 2020-11-01

## 2020-10-22 NOTE — PROGRESS NOTES
Chief Complaint   Patient presents with    Shoulder Pain     right    Toe Pain     left great toe sore spot looks like a hole & draining, Podiatrist gave antibiotic       1. Have you been to the ER, urgent care clinic since your last visit? Hospitalized since your last visit? no    2. Have you seen or consulted any other health care providers outside of the [address] [place] [address] since your last visit? Include any pap smears or colon screening.  no

## 2020-10-22 NOTE — TELEPHONE ENCOUNTER
[employer] [facility] [address]     Returning call to Bridget Peralta   Call dropped before transfer    She was calling from [phone]

## 2020-10-22 NOTE — PROGRESS NOTES
Carito Michaud is a 63 y.o. female     has a past medical history of Acute saddle pulmonary embolism without acute cor pulmonale ([facility] (11/3/2017), Arthritis, Asthma, Chronic obstructive pulmonary disease ([facility] Chronic pain, Diabetes mellitus due to underlying condition, controlled, with complication, without long-term current use of insulin ([facility] (3/21/2017), Diabetic polyneuropathy associated with type 2 diabetes mellitus ([facility] (11/1/2017), Essential hypertension (6/5/2018), Fibromyalgia, Gastroesophageal reflux disease without esophagitis (5/13/2016), Mixed hyperlipidemia (6/5/2018), Moderate episode of recurrent major depressive disorder ([facility] (11/1/2017), Noncompliance (8/6/2018), Severe obesity (BMI 35.0-39.9) (6/5/2018), and Unspecified sleep apnea. She have been eating healthier and have lsot about 13lbs in past 8 months.      Left great toe, started last year was a callous and saw podiatry. She was using a shaving stone to shave down the callous and about a month ago. It started with a smaller hole and now progressive. Appeared swollen but she report it always looks like that. Pain burning when touches something. Black area around the opening. There's drainage with odor. She found old abx and took it \"started with an L and take It once daily for 7 days, it helped with the odor but still draining. Denies fever, chills. Dm2.  a1c 5.7% 7/2020, 7.9% 04/2019, 6.8% 08/2018 at goal.  Non-compliance  continue metformin  3 months ago we added trulicity  Last visit we d/c jardiance        Reviewed: active problem list, medication list, allergies, notes from last encounter, lab results    A comprehensive review of systems was negative except for that written in the HPI.       Allergies   Allergen Reactions    Allopurinol Hives    Darvocet A500 [Propoxyphene N-Acetaminophen] Hives    Influenza Virus Vaccine, Specific Other (comments)     Allergy documented in admission data base    Seafood [Shellfish Containing Products] Itching     NEW ALLERGY; REACTION WORSENS AS AMOUNT EATEN INCREASES     Current Outpatient Medications on File Prior to Visit   Medication Sig Dispense Refill    ergocalciferol (ERGOCALCIFEROL) 1,250 mcg (50,000 unit) capsule Take 1 Cap by mouth every seven (7) days. 12 Cap 1    fluticasone propion-salmeteroL (ADVAIR/WIXELA) 250-50 mcg/dose diskus inhaler Take 1 Puff by inhalation every twelve (12) hours. 180 Each 1    fluticasone propionate (FLONASE) 50 mcg/actuation nasal spray INSTILL 2 SPRAYS IN EACH NOSTRIL EVERY DAY 3 Bottle 0    albuterol-ipratropium (DUO-NEB) 2.5 mg-0.5 mg/3 ml nebu INHALE 1 VIAL VIA NEBULIZER EVERY 4 HOURS AS NEEDED 1620 mL 5    albuterol (ProAir HFA) 90 mcg/actuation inhaler Take 2 Puffs by inhalation every four (4) hours as needed for Wheezing. 3 Inhaler 1    lisinopriL (PRINIVIL, ZESTRIL) 10 mg tablet TAKE 1 TABLET EVERY DAY 90 Tab 0    metFORMIN (GLUCOPHAGE) 1,000 mg tablet TAKE 1/2 TABLET IN THE MORNING  AND TAKE 1 TABLET IN THE EVENING 131 Tab 0    Trulicity 1.5 VY/9.9 mL sub-q pen INJECT 1.5MG (1 PEN) SUBCUTANEOUSLY EVERY 7 DAYS 12 Each 0    glucose blood VI test strips (Accu-Chek Amy Plus test strp) strip Test once daily 100 Strip 1    lancets (Accu-Chek Softclix Lancets) misc Test once daily 100 Each 1    acyclovir (ZOVIRAX) 400 mg tablet TAKE 1 TABLET TWICE DAILY 180 Tab 0    omeprazole (PRILOSEC) 20 mg capsule TAKE 1 CAPSULE EVERY DAY 90 Cap 1    potassium chloride SR (KLOR-CON 10) 10 mEq tablet TAKE 1 TABLET EVERY DAY 90 Tab 1    rosuvastatin (CRESTOR) 20 mg tablet Take 1 Tab by mouth nightly. 30 Tab 2    Blood-Glucose Meter (Accu-Chek Amy Plus Meter) misc Test once daily 1 Each 0    traZODone (DESYREL) 50 mg tablet Take 1 Tab by mouth nightly. 30 Tab 1    gabapentin (NEURONTIN) 300 mg capsule Take 1 Cap by mouth two (2) times a day. 180 Cap 0    DULoxetine (CYMBALTA) 30 mg capsule Take 1 Cap by mouth two (2) times a day.  [address] [place] [address] Cap 1    gemfibroziL (LOPID) 600 mg tablet TAKE 1 TABLET TWICE DAILY 180 Tab 0    lidocaine 4 % patch 1 Patch by TransDERmal route every twenty-four (24) hours. 90 Patch 0     No current facility-administered medications on file prior to visit. Patient Active Problem List   Diagnosis Code    Lumbar stenosis M48.061    Asthma J45.909    Primary localized osteoarthrosis, lower leg M17.10    DJD (degenerative joint disease) of knee M17.10    Chronic pain G89.29    Fibromyalgia M79.7    COPD (chronic obstructive pulmonary disease) ([facility]) J44.9    Gastroesophageal reflux disease without esophagitis K21.9    Chronic low back pain with bilateral sciatica M54.41, M54.42, G89.29    Lumbar spondylosis M47.816    Spinal stenosis M48.00    Status post laminectomy Z98.890    Diabetes mellitus due to underlying condition, controlled, with complication, without long-term current use of insulin ([facility]) E08.8    Acute pulmonary embolism ([facility]) I26.99    Moderate episode of recurrent major depressive disorder ([facility] F33.1    Diabetic polyneuropathy associated with type 2 diabetes mellitus ([facility]) E11.42    Acute saddle pulmonary embolism without acute cor pulmonale ([facility]) I26.92    Severe obesity (BMI 35.0-39. 9) E66.01    Essential hypertension I10    Mixed hyperlipidemia E78.2    Type 2 diabetes with nephropathy ([facility]) E11.21    Noncompliance Z91.19    Chronic anticoagulation Z79.01       Visit Vitals  /78   Pulse 77   Temp 97.7 °F (36.5 °C) (Temporal)   Resp 18   Ht 6' 1\" (1.854 m)   Wt 276 lb 6.4 oz (125.4 kg)   SpO2 95%   BMI 36.47 kg/m²     General appearance: alert, cooperative, no distress, appears stated age  Neurologic: Alert and oriented X 3, normal strength and tone, symmetric. Normal without focal findings. Cranial nerves 2-12 intact. Normal coordination and gait. Mental status: Alert, oriented, thought content appropriate, affect: stable, mood-congruent.     Head: Normocephalic, without obvious abnormality, atraumatic  Eyes: conjunctivae/corneas clear. PERRL, EOM's intact. Neck: supple, symmetrical, trachea midline, no JVD  Lungs: clear to auscultation bilaterally  Heart: regular rate and rhythm, S1, S2 normal, no murmur, click, rub or gallop  Abdomen: soft, non-tender. Extremities: extremities normal, atraumatic, no cyanosis or edema      Assessment/Plans:    Diagnoses and all orders for this visit:    1. Foot ulcer due to secondary DM (HCC)  -     HEMOGLOBIN A1C W/O EAG  -     CBC W/O DIFF  -     SED RATE (ESR)  -     REFERRAL TO PODIATRY  -     cephALEXin (KEFLEX) 500 mg capsule; Take 1 Cap by mouth two (2) times a day for 10 days. -     XR FOOT LT MIN 3 V; Future    2. Foot ulcer, left, with fat layer exposed ([address]  -     HEMOGLOBIN A1C W/O EAG  -     CBC W/O DIFF  -     SED RATE (ESR)  -     REFERRAL TO PODIATRY  -     cephALEXin (KEFLEX) 500 mg capsule; Take 1 Cap by mouth two (2) times a day for 10 days. -     XR FOOT LT MIN 3 V; Future      Discussed plans, risk/benefits of treatments/observations. Through the use of shared decision making, above plans were agreed upon. Medication compliance advised. Patient verbalized understanding. Follow-up and Dispositions    · Return in about 3 weeks (around 11/12/2020) for medicare annual wellness, sooner as needed.          Karen Wilson MD  10/22/2020

## 2020-10-23 LAB
ERYTHROCYTE [DISTWIDTH] IN BLOOD BY AUTOMATED COUNT: 13 % (ref 11.7–15.4)
ERYTHROCYTE [SEDIMENTATION RATE] IN BLOOD BY WESTERGREN METHOD: 74 MM/HR (ref 0–40)
HBA1C MFR BLD: 5.7 % (ref 4.8–5.6)
HCT VFR BLD AUTO: 38.9 % (ref 34–46.6)
HGB BLD-MCNC: 12.6 G/DL (ref 11.1–15.9)
MCH RBC QN AUTO: 26.1 PG (ref 26.6–33)
MCHC RBC AUTO-ENTMCNC: 32.4 G/DL (ref 31.5–35.7)
MCV RBC AUTO: 81 FL (ref 79–97)
PLATELET # BLD AUTO: 257 X10E3/UL (ref 150–450)
RBC # BLD AUTO: 4.82 X10E6/UL (ref 3.77–5.28)
WBC # BLD AUTO: 7.5 X10E3/UL (ref 3.4–10.8)

## 2020-11-11 ENCOUNTER — TRANSCRIBE ORDER (OUTPATIENT)
Dept: INTERNAL MEDICINE CLINIC | Age: 65
End: 2020-11-11

## 2020-11-11 DIAGNOSIS — M79.7 FIBROMYALGIA: ICD-10-CM

## 2020-11-11 DIAGNOSIS — E11.42 DIABETIC POLYNEUROPATHY ASSOCIATED WITH TYPE 2 DIABETES MELLITUS (HCC): ICD-10-CM

## 2020-11-11 NOTE — TELEPHONE ENCOUNTER
----- Message from [address] sent at 11/11/2020  1:07 PM EST -----  Regarding: Melani Ramirez MD  Medication Refill    Caller (if not patient):      Relationship of caller (if not patient):      Best contact number(s):  [phone]      Name of medication and dosage if known: gabapentin (NEURONTIN) 300 mg capsule      Is patient out of this medication (yes/no): Yes      Pharmacy name:   [id number] listed in chart? (yes/no):  Pharmacy phone number:      Details to clarify the request: pt is requesting temp fill to be submitted to [address], [address] [place] [address]

## 2020-11-12 RX ORDER — GABAPENTIN 300 MG/1
300 CAPSULE ORAL 2 TIMES DAILY
Qty: 180 CAP | Refills: 0 | Status: SHIPPED | OUTPATIENT
Start: 2020-11-12 | End: 2021-02-22 | Stop reason: SDUPTHER

## 2020-11-13 PROBLEM — I26.99 ACUTE PULMONARY EMBOLISM (HCC): Status: RESOLVED | Noted: 2017-10-27 | Resolved: 2020-11-13

## 2020-11-13 PROBLEM — I26.92 ACUTE SADDLE PULMONARY EMBOLISM WITHOUT ACUTE COR PULMONALE (HCC): Status: RESOLVED | Noted: 2017-11-03 | Resolved: 2020-11-13

## 2020-11-19 DIAGNOSIS — E11.65 UNCONTROLLED TYPE 2 DIABETES MELLITUS WITH HYPERGLYCEMIA (HCC): ICD-10-CM

## 2020-11-19 DIAGNOSIS — M79.7 FIBROMYALGIA: ICD-10-CM

## 2020-11-19 DIAGNOSIS — E78.2 MIXED HYPERLIPIDEMIA: ICD-10-CM

## 2020-11-19 DIAGNOSIS — K21.9 GASTROESOPHAGEAL REFLUX DISEASE WITHOUT ESOPHAGITIS: ICD-10-CM

## 2020-11-19 DIAGNOSIS — I10 ESSENTIAL HYPERTENSION WITH GOAL BLOOD PRESSURE LESS THAN 140/90: ICD-10-CM

## 2020-11-19 DIAGNOSIS — F33.1 MODERATE EPISODE OF RECURRENT MAJOR DEPRESSIVE DISORDER (HCC): ICD-10-CM

## 2020-11-19 NOTE — TELEPHONE ENCOUNTER
Patient states that she wants all of her medication go to pharmacy on file she can be reached @ [phone]

## 2020-11-20 RX ORDER — OMEPRAZOLE 20 MG/1
CAPSULE, DELAYED RELEASE ORAL
Qty: 90 CAP | Refills: 1 | Status: SHIPPED | OUTPATIENT
Start: 2020-11-20 | End: 2021-03-10

## 2020-11-20 RX ORDER — POTASSIUM CHLORIDE 750 MG/1
TABLET, FILM COATED, EXTENDED RELEASE ORAL
Qty: 90 TAB | Refills: 1 | Status: SHIPPED | OUTPATIENT
Start: 2020-11-20 | End: 2021-03-10

## 2020-11-20 RX ORDER — ACYCLOVIR 400 MG/1
TABLET ORAL
Qty: 180 TAB | Refills: 0 | Status: SHIPPED | OUTPATIENT
Start: 2020-11-20 | End: 2021-03-10

## 2020-11-20 RX ORDER — ROSUVASTATIN CALCIUM 20 MG/1
20 TABLET, COATED ORAL
Qty: 90 TAB | Refills: 1 | Status: SHIPPED | OUTPATIENT
Start: 2020-11-20 | End: 2021-03-10

## 2020-11-20 RX ORDER — METFORMIN HYDROCHLORIDE 1000 MG/1
TABLET ORAL
Qty: 135 TAB | Refills: 0 | Status: SHIPPED | OUTPATIENT
Start: 2020-11-20 | End: 2021-01-22

## 2020-11-20 RX ORDER — DULOXETIN HYDROCHLORIDE 30 MG/1
CAPSULE, DELAYED RELEASE ORAL
Qty: 180 CAP | Refills: 1 | Status: SHIPPED | OUTPATIENT
Start: 2020-11-20 | End: 2021-03-10

## 2020-12-10 ENCOUNTER — OFFICE VISIT (OUTPATIENT)
Dept: FAMILY MEDICINE CLINIC | Age: 65
End: 2020-12-10
Payer: MEDICARE

## 2020-12-10 VITALS
DIASTOLIC BLOOD PRESSURE: 83 MMHG | SYSTOLIC BLOOD PRESSURE: 142 MMHG | WEIGHT: 274.6 LBS | RESPIRATION RATE: 18 BRPM | HEIGHT: 72 IN | OXYGEN SATURATION: 96 % | BODY MASS INDEX: 37.19 KG/M2 | HEART RATE: 84 BPM | TEMPERATURE: 97.5 F

## 2020-12-10 DIAGNOSIS — Z01.818 PREOP EXAMINATION: Primary | ICD-10-CM

## 2020-12-10 DIAGNOSIS — M25.511 CHRONIC RIGHT SHOULDER PAIN: ICD-10-CM

## 2020-12-10 DIAGNOSIS — G89.29 CHRONIC RIGHT SHOULDER PAIN: ICD-10-CM

## 2020-12-10 PROCEDURE — G8427 DOCREV CUR MEDS BY ELIG CLIN: HCPCS | Performed by: FAMILY MEDICINE

## 2020-12-10 PROCEDURE — 99214 OFFICE O/P EST MOD 30 MIN: CPT | Performed by: FAMILY MEDICINE

## 2020-12-10 PROCEDURE — 3017F COLORECTAL CA SCREEN DOC REV: CPT | Performed by: FAMILY MEDICINE

## 2020-12-10 PROCEDURE — G8417 CALC BMI ABV UP PARAM F/U: HCPCS | Performed by: FAMILY MEDICINE

## 2020-12-10 PROCEDURE — G9717 DOC PT DX DEP/BP F/U NT REQ: HCPCS | Performed by: FAMILY MEDICINE

## 2020-12-10 PROCEDURE — G8754 DIAS BP LESS 90: HCPCS | Performed by: FAMILY MEDICINE

## 2020-12-10 PROCEDURE — G8753 SYS BP > OR = 140: HCPCS | Performed by: FAMILY MEDICINE

## 2020-12-10 RX ORDER — DICLOFENAC SODIUM 30 MG/G
GEL TOPICAL
COMMUNITY
Start: 2020-12-08

## 2020-12-10 NOTE — PROGRESS NOTES
Preoperative Evaluation    Date of Exam: 12/10/2020     She forgot the preop form. Will bring back for us. Parveen Escamilla is a 63 y.o. female (:1955) who presents for preoperative evaluation. Procedure/Surgery: Hallux Cheilectomy with capular release left and debridement  Date of Procedure/Surgery: 2020  Surgeon: Dr. David Orozco: Micah Motta. Primary Physician: Lul Fields MD  Latex Allergy: no    Multiple surgery in the past under anesthesia without complication. Able to walk over 1 flight of stairs without KELLER. Metabolic Equivalent >4      Medical History:     Past Medical History:   Diagnosis Date    Acute pulmonary embolism ([address] 10/27/2017    Acute saddle pulmonary embolism without acute cor pulmonale (HCC) 11/3/2017    Arthritis     Asthma     Chronic obstructive pulmonary disease (HCC)     Chronic pain     back/hip    Diabetes mellitus due to underlying condition, controlled, with complication, without long-term current use of insulin ([address] 3/21/2017    Diabetic polyneuropathy associated with type 2 diabetes mellitus ([address] 2017    Essential hypertension 2018    Fibromyalgia     Gastroesophageal reflux disease without esophagitis 2016    Mixed hyperlipidemia 2018    Moderate episode of recurrent major depressive disorder ([address] 2017    Noncompliance 2018    Severe obesity (BMI 35.0-39.9) 2018    Unspecified sleep apnea     CAN'T TOLERATE CPAP     Allergies:      Allergies   Allergen Reactions    Allopurinol Hives    Darvocet A500 [Propoxyphene N-Acetaminophen] Hives    Influenza Virus Vaccine, Specific Other (comments)     Allergy documented in admission data base    Seafood [Shellfish Containing Products] Itching     NEW ALLERGY; REACTION WORSENS AS AMOUNT EATEN INCREASES      Medications:     Current Outpatient Medications   Medication Sig    diclofenac (SOLARAZE) 3 % topical gel APPLY EXTERNALLY TO THE AFFECTED AREA EVERY DAY    DULoxetine (CYMBALTA) 30 mg capsule Take 1 Cap by mouth two (2) times a day.  rosuvastatin (CRESTOR) 20 mg tablet Take 1 Tab by mouth nightly.  omeprazole (PRILOSEC) 20 mg capsule TAKE 1 CAPSULE EVERY DAY    acyclovir (ZOVIRAX) 400 mg tablet TAKE 1 TABLET TWICE DAILY    metFORMIN (GLUCOPHAGE) 1,000 mg tablet TAKE 1/2 TABLET IN THE MORNING  AND TAKE 1 TABLET IN THE EVENING    potassium chloride SR (KLOR-CON 10) 10 mEq tablet TAKE 1 TABLET EVERY DAY    gabapentin (NEURONTIN) 300 mg capsule Take 1 Cap by mouth two (2) times a day.  ergocalciferol (ERGOCALCIFEROL) 1,250 mcg (50,000 unit) capsule Take 1 Cap by mouth every seven (7) days.  fluticasone propion-salmeteroL (ADVAIR/WIXELA) 250-50 mcg/dose diskus inhaler Take 1 Puff by inhalation every twelve (12) hours.  fluticasone propionate (FLONASE) 50 mcg/actuation nasal spray INSTILL 2 SPRAYS IN EACH NOSTRIL EVERY DAY    albuterol-ipratropium (DUO-NEB) 2.5 mg-0.5 mg/3 ml nebu INHALE 1 VIAL VIA NEBULIZER EVERY 4 HOURS AS NEEDED    albuterol (ProAir HFA) 90 mcg/actuation inhaler Take 2 Puffs by inhalation every four (4) hours as needed for Wheezing.  lisinopriL (PRINIVIL, ZESTRIL) 10 mg tablet TAKE 1 TABLET EVERY DAY    Trulicity 1.5 IO/5.0 mL sub-q pen INJECT 1.5MG (1 PEN) SUBCUTANEOUSLY EVERY 7 DAYS    glucose blood VI test strips (Accu-Chek Amy Plus test strp) strip Test once daily    lancets (Accu-Chek Softclix Lancets) misc Test once daily    traZODone (DESYREL) 50 mg tablet Take 1 Tab by mouth nightly.  gemfibroziL (LOPID) 600 mg tablet TAKE 1 TABLET TWICE DAILY    lidocaine 4 % patch 1 Patch by TransDERmal route every twenty-four (24) hours. No current facility-administered medications for this visit.       Surgical History:     Past Surgical History:   Procedure Laterality Date    COLONOSCOPY N/A 7/12/2017    COLONOSCOPY performed by Estelle Jason MD at [facility] ENDOSCOPY  HX  SECTION  1979    HX HYSTERECTOMY      one ovary removed    HX ORTHOPAEDIC  2012    lumbar fusion    HX ORTHOPAEDIC  2/16/15    RIGHT TOTAL KNEE ARTHROPLASTY    HX ORTHOPAEDIC  6/16/15    LEFT TOTAL KNEE ARTHROPLASTY        Social History:     Social History     Socioeconomic History    Marital status: SINGLE     Spouse name: Not on file    Number of children: Not on file    Years of education: Not on file    Highest education level: Not on file   Tobacco Use    Smoking status: Former Smoker     Packs/day: 1.00     Years: 37.00     Pack years: 37.00     Quit date: 2015     Years since quittin.0    Smokeless tobacco: Never Used   Substance and Sexual Activity    Alcohol use: Yes     Comment: VERY RARE WINE    Drug use: No    Sexual activity: Not Currently     Partners: Male       Recent use of: No recent use of aspirin (ASA), NSAIDS or steroids    Tetanus up to date: last tetanus booster within 10 years      Anesthesia Complications: None  History of abnormal bleeding : None  History of Blood Transfusions: no  Health Care Directive or Living Will: no    REVIEW OF SYSTEMS:  A comprehensive review of systems was negative except for that written in the HPI. EXAM:   Visit Vitals  BP (!) 142/83   Pulse 84   Temp 97.5 °F (36.4 °C) (Temporal)   Resp 18   Ht 6' 1\" (1.854 m)   Wt 274 lb 9.6 oz (124.6 kg)   LMP 1996 (LMP Unknown)   SpO2 96%   BMI 36.23 kg/m²     General appearance: alert, cooperative, no distress, appears stated age. Morbidly obese. For long distances she uses assistive device for mobility. She's using the hospital push chair currently. Neurologic: Alert and oriented X 3, normal strength and tone, symmetric. Normal without focal findings. Cranial nerves 2-12 intact. Normal coordination and gait. Mental status: Alert, oriented, thought content appropriate, affect: stable, mood-congruent.     Head: Normocephalic, without obvious abnormality, atraumatic  Eyes: conjunctivae/corneas clear. PERRL, EOM's intact. Neck: supple, symmetrical, trachea midline, no JVD  Lungs: clear to auscultation bilaterally  Heart: regular rate and rhythm, S1, S2 normal, no murmur, click, rub or gallop    DIAGNOSTICS:   1. EKG: Normal sinus rhythm   Low voltage QRS   Nonspecific T wave abnormality   Abnormal ECG   When compared with ECG of 25-JUN-2020 17:45,   Nonspecific T wave abnormality now evident in Anterolateral leads   Confirmed by Radha Koroma ([id number]) on 12/17/2020 5:05:46 PM     2. CXR: Frontal and lateral views of the chest obtained, comparison October 20, 2019. The  heart size is normal. There is no acute infiltrate. IMPRESSION: Negative. 3. Labs:      Results for orders placed or performed in visit on 12/10/20   CBC W/O DIFF   Result Value Ref Range    WBC 9.1 3.4 - 10.8 x10E3/uL    RBC 4.61 3.77 - 5.28 x10E6/uL    HGB 12.4 11.1 - 15.9 g/dL    HCT 38.1 34.0 - 46.6 %    MCV 83 79 - 97 fL    MCH 26.9 26.6 - 33.0 pg    MCHC 32.5 31.5 - 35.7 g/dL    RDW 13.5 11.7 - 15.4 %    PLATELET 731 844 - 922 [id number]/YB   METABOLIC PANEL, COMPREHENSIVE   Result Value Ref Range    Glucose 91 65 - 99 mg/dL    BUN 10 8 - 27 mg/dL    Creatinine 0.87 0.57 - 1.00 mg/dL    GFR est non-AA 71 >59 mL/min/1.73    GFR est AA 82 >59 mL/min/1.73    BUN/Creatinine ratio 11 (L) 12 - 28    Sodium 140 134 - 144 mmol/L    Potassium 4.0 3.5 - 5.2 mmol/L    Chloride 99 96 - 106 mmol/L    CO2 27 20 - 29 mmol/L    Calcium 9.6 8.7 - 10.3 mg/dL    Protein, total 7.8 6.0 - 8.5 g/dL    Albumin 3.9 3.8 - 4.8 g/dL    GLOBULIN, TOTAL 3.9 1.5 - 4.5 g/dL    A-G Ratio 1.0 (L) 1.2 - 2.2    Bilirubin, total 0.3 0.0 - 1.2 mg/dL    Alk.  phosphatase 92 39 - 117 IU/L    AST (SGOT) 18 0 - 40 IU/L    ALT (SGPT) 11 0 - 32 IU/L   PROTHROMBIN TIME + INR   Result Value Ref Range    INR 1.0 0.9 - 1.2    Prothrombin time 10.7 9.1 - 12.0 sec       IMPRESSION:   Preop Risk: morbid obesity, BECKY, asthma, COPD, diabetes, hx of DVT, HTN, and age. Changes in EKG  Given above risk factors. I do not feel comfortable clearing her for surgery. Will Refer to Cardiology for Preop Clearance. She also wanted a steroid injection to her shoulder today. I told her I am unable to do it today with this preop. That I can do it at another visit. She was upset. \"so it's all about the money\". I attempted to Explain the time restriction. Diagnoses and all orders for this visit:    1. Preop examination  -     CBC W/O DIFF  -     METABOLIC PANEL, COMPREHENSIVE  -     PROTHROMBIN TIME + INR  -     XR CHEST PA LAT; Future  -     EKG, 12 LEAD, INITIAL; Future  -     REFERRAL TO CARDIOLOGY    2. Chronic right shoulder pain  -     REFERRAL TO ORTHOPEDICS        Follow-up and Dispositions    · Return in about 2 months (around 2/10/2021).            Asha Novak MD  12/18/2020

## 2020-12-10 NOTE — PROGRESS NOTES
Chief Complaint   Patient presents with    Pre-op Exam       1. Have you been to the ER, urgent care clinic since your last visit? Hospitalized since your last visit? no    2. Have you seen or consulted any other health care providers outside of the [address] [place] [address] since your last visit? Include any pap smears or colon screening.  yes

## 2020-12-15 ENCOUNTER — HOSPITAL ENCOUNTER (OUTPATIENT)
Dept: GENERAL RADIOLOGY | Age: 65
Discharge: HOME | End: 2020-12-15
Payer: MEDICARE

## 2020-12-15 DIAGNOSIS — Z01.818 PREOP EXAMINATION: ICD-10-CM

## 2020-12-15 PROCEDURE — 71046 X-RAY EXAM CHEST 2 VIEWS: CPT

## 2020-12-16 LAB
ALBUMIN SERPL-MCNC: 3.9 G/DL (ref 3.8–4.8)
ALBUMIN/GLOB SERPL: 1 {RATIO} (ref 1.2–2.2)
ALP SERPL-CCNC: 92 IU/L (ref 39–117)
ALT SERPL-CCNC: 11 IU/L (ref 0–32)
AST SERPL-CCNC: 18 IU/L (ref 0–40)
BILIRUB SERPL-MCNC: 0.3 MG/DL (ref 0–1.2)
BUN SERPL-MCNC: 10 MG/DL (ref 8–27)
BUN/CREAT SERPL: 11 (ref 12–28)
CALCIUM SERPL-MCNC: 9.6 MG/DL (ref 8.7–10.3)
CHLORIDE SERPL-SCNC: 99 MMOL/L (ref 96–106)
CO2 SERPL-SCNC: 27 MMOL/L (ref 20–29)
CREAT SERPL-MCNC: 0.87 MG/DL (ref 0.57–1)
ERYTHROCYTE [DISTWIDTH] IN BLOOD BY AUTOMATED COUNT: 13.5 % (ref 11.7–15.4)
GLOBULIN SER CALC-MCNC: 3.9 G/DL (ref 1.5–4.5)
GLUCOSE SERPL-MCNC: 91 MG/DL (ref 65–99)
HCT VFR BLD AUTO: 38.1 % (ref 34–46.6)
HGB BLD-MCNC: 12.4 G/DL (ref 11.1–15.9)
INR PPP: 1 (ref 0.9–1.2)
MCH RBC QN AUTO: 26.9 PG (ref 26.6–33)
MCHC RBC AUTO-ENTMCNC: 32.5 G/DL (ref 31.5–35.7)
MCV RBC AUTO: 83 FL (ref 79–97)
PLATELET # BLD AUTO: 266 X10E3/UL (ref 150–450)
POTASSIUM SERPL-SCNC: 4 MMOL/L (ref 3.5–5.2)
PROT SERPL-MCNC: 7.8 G/DL (ref 6–8.5)
PROTHROMBIN TIME: 10.7 SEC (ref 9.1–12)
RBC # BLD AUTO: 4.61 X10E6/UL (ref 3.77–5.28)
SODIUM SERPL-SCNC: 140 MMOL/L (ref 134–144)
WBC # BLD AUTO: 9.1 X10E3/UL (ref 3.4–10.8)

## 2020-12-17 ENCOUNTER — TELEPHONE (OUTPATIENT)
Dept: FAMILY MEDICINE CLINIC | Age: 65
End: 2020-12-17

## 2020-12-17 ENCOUNTER — HOSPITAL ENCOUNTER (OUTPATIENT)
Dept: NON INVASIVE DIAGNOSTICS | Age: 65
Discharge: HOME | End: 2020-12-17
Payer: MEDICARE

## 2020-12-17 LAB
ATRIAL RATE: 83 BPM
CALCULATED P AXIS, ECG09: 72 DEGREES
CALCULATED R AXIS, ECG10: 28 DEGREES
CALCULATED T AXIS, ECG11: 19 DEGREES
DIAGNOSIS, 93000: NORMAL
P-R INTERVAL, ECG05: 172 MS
Q-T INTERVAL, ECG07: 398 MS
QRS DURATION, ECG06: 80 MS
QTC CALCULATION (BEZET), ECG08: 467 MS
VENTRICULAR RATE, ECG03: 83 BPM

## 2020-12-17 PROCEDURE — 93005 ELECTROCARDIOGRAM TRACING: CPT

## 2020-12-17 NOTE — TELEPHONE ENCOUNTER
Patient called about EKG. Dr. Kiarra Babin would like a copy of EKG and other paperwork. Please call [phone].

## 2020-12-31 ENCOUNTER — DOCUMENTATION ONLY (OUTPATIENT)
Dept: FAMILY MEDICINE CLINIC | Age: 65
End: 2020-12-31

## 2020-12-31 NOTE — PROGRESS NOTES
Preoperative Evaluation     Date of Exam: 12/10/2020      She forgot the preop form. Will bring back for us.      Tanika Stephens is a 63 y.o. female (:1955) who presents for preoperative evaluation. Procedure/Surgery: Hallux Cheilectomy with capular release left and debridement  Date of Procedure/Surgery: 2020  Surgeon: Dr. Reno Bishop: Clora Meckel. Primary Physician: Kirstie Jj MD  Latex Allergy: no     Multiple surgery in the past under anesthesia without complication. Able to walk over 1 flight of stairs without KLELER. Metabolic Equivalent >4        Medical History:          Past Medical History:   Diagnosis Date    Acute pulmonary embolism ([address] 10/27/2017    Acute saddle pulmonary embolism without acute cor pulmonale (HCC) 11/3/2017    Arthritis      Asthma      Chronic obstructive pulmonary disease (HCC)      Chronic pain       back/hip    Diabetes mellitus due to underlying condition, controlled, with complication, without long-term current use of insulin ([address] 3/21/2017    Diabetic polyneuropathy associated with type 2 diabetes mellitus ([address] 2017    Essential hypertension 2018    Fibromyalgia      Gastroesophageal reflux disease without esophagitis 2016    Mixed hyperlipidemia 2018    Moderate episode of recurrent major depressive disorder ([address] 2017    Noncompliance 2018    Severe obesity (BMI 35.0-39.9) 2018    Unspecified sleep apnea       CAN'T TOLERATE CPAP      Allergies:            Allergies   Allergen Reactions    Allopurinol Hives    Darvocet A500 [Propoxyphene N-Acetaminophen] Hives    Influenza Virus Vaccine, Specific Other (comments)       Allergy documented in admission data base    Seafood [Shellfish Containing Products] Itching       NEW ALLERGY; REACTION WORSENS AS AMOUNT EATEN INCREASES       Medications:          Current Outpatient Medications   Medication Sig    diclofenac (SOLARAZE) 3 % topical gel APPLY EXTERNALLY TO THE AFFECTED AREA EVERY DAY    DULoxetine (CYMBALTA) 30 mg capsule Take 1 Cap by mouth two (2) times a day.  rosuvastatin (CRESTOR) 20 mg tablet Take 1 Tab by mouth nightly.  omeprazole (PRILOSEC) 20 mg capsule TAKE 1 CAPSULE EVERY DAY    acyclovir (ZOVIRAX) 400 mg tablet TAKE 1 TABLET TWICE DAILY    metFORMIN (GLUCOPHAGE) 1,000 mg tablet TAKE 1/2 TABLET IN THE MORNING  AND TAKE 1 TABLET IN THE EVENING    potassium chloride SR (KLOR-CON 10) 10 mEq tablet TAKE 1 TABLET EVERY DAY    gabapentin (NEURONTIN) 300 mg capsule Take 1 Cap by mouth two (2) times a day.  ergocalciferol (ERGOCALCIFEROL) 1,250 mcg (50,000 unit) capsule Take 1 Cap by mouth every seven (7) days.  fluticasone propion-salmeteroL (ADVAIR/WIXELA) 250-50 mcg/dose diskus inhaler Take 1 Puff by inhalation every twelve (12) hours.  fluticasone propionate (FLONASE) 50 mcg/actuation nasal spray INSTILL 2 SPRAYS IN EACH NOSTRIL EVERY DAY    albuterol-ipratropium (DUO-NEB) 2.5 mg-0.5 mg/3 ml nebu INHALE 1 VIAL VIA NEBULIZER EVERY 4 HOURS AS NEEDED    albuterol (ProAir HFA) 90 mcg/actuation inhaler Take 2 Puffs by inhalation every four (4) hours as needed for Wheezing.  lisinopriL (PRINIVIL, ZESTRIL) 10 mg tablet TAKE 1 TABLET EVERY DAY    Trulicity 1.5 DI/5.2 mL sub-q pen INJECT 1.5MG (1 PEN) SUBCUTANEOUSLY EVERY 7 DAYS    glucose blood VI test strips (Accu-Chek Amy Plus test strp) strip Test once daily    lancets (Accu-Chek Softclix Lancets) misc Test once daily    traZODone (DESYREL) 50 mg tablet Take 1 Tab by mouth nightly.     gemfibroziL (LOPID) 600 mg tablet TAKE 1 TABLET TWICE DAILY    lidocaine 4 % patch 1 Patch by TransDERmal route every twenty-four (24) hours.      No current facility-administered medications for this visit.       Surgical History:           Past Surgical History:   Procedure Laterality Date    COLONOSCOPY N/A 7/12/2017     COL[facility] performed by [facility]yl [facility]heta [facility] at [facility] ENDOSCOPY    [address]X [address]    HX HYSTERECTOMY        one ovary removed    HX ORTHOPAEDIC   2012     lumbar fusion    HX ORTHOPAEDIC   2/16/15     RIGHT TOTAL KNEE ARTHROPLASTY    HX ORTHOPAEDIC   6/16/15     LEFT TOTAL KNEE ARTHROPLASTY         Social History:     Social History               Socioeconomic History    Marital status: SINGLE       Spouse name: Not on file    Number of children: Not on file    Years of education: Not on file    Highest education level: Not on file   Tobacco Use    Smoking status: Former Smoker       Packs/day: 1.00       Years: 37.00       Pack years: 37.00       Quit date: 2015       Years since quittin.0    Smokeless tobacco: Never Used   Substance and Sexual Activity    Alcohol use: Yes       Comment: VERY RARE WINE    Drug use: No    Sexual activity: Not Currently       Partners: Male            Recent use of: No recent use of aspirin (ASA), NSAIDS or steroids     Tetanus up to date: last tetanus booster within 10 years        Anesthesia Complications: None  History of abnormal bleeding : None  History of Blood Transfusions: no  Health Care Directive or Living Will: no     REVIEW OF SYSTEMS:  A comprehensive review of systems was negative except for that written in the HPI.     EXAM:   Visit Vitals  BP (!) 142/83   Pulse 84   Temp 97.5 °F (36.4 °C) (Temporal)   Resp 18   Ht 6' 1\" (1.854 m)   Wt 274 lb 9.6 oz (124.6 kg)   LMP 1996 (LMP Unknown)   SpO2 96%   BMI 36.23 kg/m²      General appearance: alert, cooperative, no distress, appears stated age. Morbidly obese. For long distances she uses assistive device for mobility. She's using the hospital push chair currently. Neurologic: Alert and oriented X 3, normal strength and tone, symmetric. Normal without focal findings. Cranial nerves 2-12 intact. Normal coordination and gait.   Mental status: Alert, oriented, thought content appropriate, affect: stable, mood-congruent. Head: Normocephalic, without obvious abnormality, atraumatic  Eyes: conjunctivae/corneas clear. PERRL, EOM's intact. Neck: supple, symmetrical, trachea midline, no JVD  Lungs: clear to auscultation bilaterally  Heart: regular rate and rhythm, S1, S2 normal, no murmur, click, rub or gallop     DIAGNOSTICS:   1. EKG: Normal sinus rhythm   Low voltage QRS   Nonspecific T wave abnormality   Abnormal ECG   When compared with ECG of 25-JUN-2020 17:45,   Nonspecific T wave abnormality now evident in Anterolateral leads   Confirmed by Yun Martinez ([id number]) on 12/17/2020 5:05:46 PM      2. CXR: Frontal and lateral views of the chest obtained, comparison October 20, 2019. The  heart size is normal. There is no acute infiltrate. IMPRESSION: Negative. 3. Labs:                       Results for orders placed or performed in visit on 12/10/20   CBC W/O DIFF   Result Value Ref Range     WBC 9.1 3.4 - 10.8 x10E3/uL     RBC 4.61 3.77 - 5.28 x10E6/uL     HGB 12.4 11.1 - 15.9 g/dL     HCT 38.1 34.0 - 46.6 %     MCV 83 79 - 97 fL     MCH 26.9 26.6 - 33.0 pg     MCHC 32.5 31.5 - 35.7 g/dL     RDW 13.5 11.7 - 15.4 %     PLATELET 129 031 - 719 [id number]   METABOLIC PANEL, COMPREHENSIVE   Result Value Ref Range     Glucose 91 65 - 99 mg/dL     BUN 10 8 - 27 mg/dL     Creatinine 0.87 0.57 - 1.00 mg/dL     GFR est non-AA 71 >59 mL/min/1.73     GFR est AA 82 >59 mL/min/1.73     BUN/Creatinine ratio 11 (L) 12 - 28     Sodium 140 134 - 144 mmol/L     Potassium 4.0 3.5 - 5.2 mmol/L     Chloride 99 96 - 106 mmol/L     CO2 27 20 - 29 mmol/L     Calcium 9.6 8.7 - 10.3 mg/dL     Protein, total 7.8 6.0 - 8.5 g/dL     Albumin 3.9 3.8 - 4.8 g/dL     GLOBULIN, TOTAL 3.9 1.5 - 4.5 g/dL     A-G Ratio 1.0 (L) 1.2 - 2.2     Bilirubin, total 0.3 0.0 - 1.2 mg/dL     Alk.  phosphatase 92 39 - 117 IU/L     AST (SGOT) 18 0 - 40 IU/L     ALT (SGPT) 11 0 - 32 IU/L   PROTHROMBIN TIME + INR   Result Value Ref Range     INR 1.0 0.9 - 1.2     Prothrombin time 10.7 9.1 - 12.0 sec         IMPRESSION:   Preop Risk: morbid obesity, BECKY, asthma, COPD, diabetes, hx of DVT, HTN, and age. I have referred and Ms. Michael Her have seen cardiology Dr. Dinh Bolaños and a Myocardial Perfusion study/Lexiscan was done and per cardiology \"She is at acceptable risk for the procedure for her foot\" surgery.      Acceptable risk for procedural foot surgery.             Jules Rothman MD  12/18/2020

## 2021-01-18 DIAGNOSIS — E11.65 UNCONTROLLED TYPE 2 DIABETES MELLITUS WITH HYPERGLYCEMIA (HCC): ICD-10-CM

## 2021-01-19 RX ORDER — DULAGLUTIDE 1.5 MG/.5ML
INJECTION, SOLUTION SUBCUTANEOUS
Qty: 12 EACH | Refills: 0 | Status: SHIPPED | OUTPATIENT
Start: 2021-01-19 | End: 2021-03-10

## 2021-01-22 DIAGNOSIS — E11.65 UNCONTROLLED TYPE 2 DIABETES MELLITUS WITH HYPERGLYCEMIA (HCC): ICD-10-CM

## 2021-01-22 RX ORDER — METFORMIN HYDROCHLORIDE 1000 MG/1
TABLET ORAL
Qty: 135 TAB | Refills: 0 | Status: SHIPPED | OUTPATIENT
Start: 2021-01-22 | End: 2021-03-10

## 2021-02-07 DIAGNOSIS — J43.8 OTHER EMPHYSEMA (HCC): ICD-10-CM

## 2021-02-08 RX ORDER — FLUTICASONE PROPIONATE AND SALMETEROL 250; 50 UG/1; UG/1
POWDER RESPIRATORY (INHALATION)
Qty: 60 EACH | Refills: 0 | Status: SHIPPED | OUTPATIENT
Start: 2021-02-08 | End: 2021-11-10 | Stop reason: SDUPTHER

## 2021-02-22 DIAGNOSIS — M79.7 FIBROMYALGIA: ICD-10-CM

## 2021-02-22 DIAGNOSIS — E11.42 DIABETIC POLYNEUROPATHY ASSOCIATED WITH TYPE 2 DIABETES MELLITUS (HCC): ICD-10-CM

## 2021-02-22 NOTE — TELEPHONE ENCOUNTER
----- Message from Dung Jovel sent at 2/22/2021  3:10 PM EST -----  Regarding: Dr. Joseph Perez  Medication Refill    Caller (if not patient):n/a      Relationship of caller (if not patient):n/a      Best contact number(s): [phone]      Name of medication and dosage if known: \"gabapentin 300mg\"      Is patient out of this medication (yes/no): yes      [facility] name: [facility]  [address] [place] [address] listed in chart? (yes/no): n/a  Pharmacy phone number: [phone]      Details to clarify the request: n/a      Dung Jovel

## 2021-02-24 RX ORDER — GABAPENTIN 300 MG/1
300 CAPSULE ORAL 2 TIMES DAILY
Qty: 60 CAP | Refills: 0 | Status: SHIPPED | OUTPATIENT
Start: 2021-02-24 | End: 2021-06-30 | Stop reason: ALTCHOICE

## 2021-03-03 ENCOUNTER — HOSPITAL ENCOUNTER (EMERGENCY)
Age: 66
Discharge: HOME | End: 2021-03-03
Attending: EMERGENCY MEDICINE
Payer: MEDICARE

## 2021-03-03 ENCOUNTER — APPOINTMENT (OUTPATIENT)
Dept: GENERAL RADIOLOGY | Age: 66
End: 2021-03-03
Attending: EMERGENCY MEDICINE
Payer: MEDICARE

## 2021-03-03 VITALS
DIASTOLIC BLOOD PRESSURE: 95 MMHG | WEIGHT: 286.16 LBS | HEART RATE: 73 BPM | OXYGEN SATURATION: 97 % | TEMPERATURE: 98.4 F | BODY MASS INDEX: 37.75 KG/M2 | RESPIRATION RATE: 19 BRPM | SYSTOLIC BLOOD PRESSURE: 154 MMHG

## 2021-03-03 DIAGNOSIS — H81.13 BENIGN PAROXYSMAL POSITIONAL VERTIGO DUE TO BILATERAL VESTIBULAR DISORDER: ICD-10-CM

## 2021-03-03 DIAGNOSIS — R42 LIGHTHEADEDNESS: Primary | ICD-10-CM

## 2021-03-03 LAB
ALBUMIN SERPL-MCNC: 3.6 G/DL (ref 3.5–5)
ALBUMIN/GLOB SERPL: 0.7 {RATIO} (ref 1.1–2.2)
ALP SERPL-CCNC: 82 U/L (ref 45–117)
ALT SERPL-CCNC: 25 U/L (ref 12–78)
ANION GAP SERPL CALC-SCNC: 6 MMOL/L (ref 5–15)
APPEARANCE UR: CLEAR
AST SERPL-CCNC: 21 U/L (ref 15–37)
ATRIAL RATE: 80 BPM
BACTERIA URNS QL MICRO: ABNORMAL /HPF
BASOPHILS # BLD: 0 K/UL (ref 0–0.1)
BASOPHILS NFR BLD: 1 % (ref 0–1)
BILIRUB SERPL-MCNC: 0.3 MG/DL (ref 0.2–1)
BILIRUB UR QL: NEGATIVE
BUN SERPL-MCNC: 15 MG/DL (ref 6–20)
BUN/CREAT SERPL: 17 (ref 12–20)
CALCIUM SERPL-MCNC: 9.3 MG/DL (ref 8.5–10.1)
CALCULATED P AXIS, ECG09: 42 DEGREES
CALCULATED R AXIS, ECG10: -5 DEGREES
CALCULATED T AXIS, ECG11: 47 DEGREES
CHLORIDE SERPL-SCNC: 103 MMOL/L (ref 97–108)
CO2 SERPL-SCNC: 27 MMOL/L (ref 21–32)
COLOR UR: ABNORMAL
CREAT SERPL-MCNC: 0.86 MG/DL (ref 0.55–1.02)
DIAGNOSIS, 93000: NORMAL
DIFFERENTIAL METHOD BLD: NORMAL
EOSINOPHIL # BLD: 0.3 K/UL (ref 0–0.4)
EOSINOPHIL NFR BLD: 4 % (ref 0–7)
EPITH CASTS URNS QL MICRO: ABNORMAL /LPF
ERYTHROCYTE [DISTWIDTH] IN BLOOD BY AUTOMATED COUNT: 12.7 % (ref 11.5–14.5)
GLOBULIN SER CALC-MCNC: 5.5 G/DL (ref 2–4)
GLUCOSE SERPL-MCNC: 119 MG/DL (ref 65–100)
GLUCOSE UR STRIP.AUTO-MCNC: NEGATIVE MG/DL
HCT VFR BLD AUTO: 38.8 % (ref 35–47)
HGB BLD-MCNC: 12.3 G/DL (ref 11.5–16)
HGB UR QL STRIP: NEGATIVE
IMM GRANULOCYTES # BLD AUTO: 0 K/UL (ref 0–0.04)
IMM GRANULOCYTES NFR BLD AUTO: 0 % (ref 0–0.5)
KETONES UR QL STRIP.AUTO: NEGATIVE MG/DL
LEUKOCYTE ESTERASE UR QL STRIP.AUTO: NEGATIVE
LYMPHOCYTES # BLD: 3.2 K/UL (ref 0.8–3.5)
LYMPHOCYTES NFR BLD: 38 % (ref 12–49)
MCH RBC QN AUTO: 27.2 PG (ref 26–34)
MCHC RBC AUTO-ENTMCNC: 31.7 G/DL (ref 30–36.5)
MCV RBC AUTO: 85.7 FL (ref 80–99)
MONOCYTES # BLD: 0.9 K/UL (ref 0–1)
MONOCYTES NFR BLD: 10 % (ref 5–13)
NEUTS SEG # BLD: 4 K/UL (ref 1.8–8)
NEUTS SEG NFR BLD: 47 % (ref 32–75)
NITRITE UR QL STRIP.AUTO: NEGATIVE
NRBC # BLD: 0 K/UL (ref 0–0.01)
NRBC BLD-RTO: 0 PER 100 WBC
P-R INTERVAL, ECG05: 160 MS
PH UR STRIP: 5.5 [PH] (ref 5–8)
PLATELET # BLD AUTO: 334 K/UL (ref 150–400)
PMV BLD AUTO: 10.2 FL (ref 8.9–12.9)
POTASSIUM SERPL-SCNC: 4 MMOL/L (ref 3.5–5.1)
PROT SERPL-MCNC: 9.1 G/DL (ref 6.4–8.2)
PROT UR STRIP-MCNC: NEGATIVE MG/DL
Q-T INTERVAL, ECG07: 384 MS
QRS DURATION, ECG06: 82 MS
QTC CALCULATION (BEZET), ECG08: 442 MS
RBC # BLD AUTO: 4.53 M/UL (ref 3.8–5.2)
RBC #/AREA URNS HPF: ABNORMAL /HPF (ref 0–5)
SARS-COV-2, COV2: NORMAL
SODIUM SERPL-SCNC: 136 MMOL/L (ref 136–145)
SP GR UR REFRACTOMETRY: 1.02 (ref 1–1.03)
TROPONIN I SERPL-MCNC: <0.05 NG/ML
UA: UC IF INDICATED,UAUC: ABNORMAL
UROBILINOGEN UR QL STRIP.AUTO: 0.2 EU/DL (ref 0.2–1)
VENTRICULAR RATE, ECG03: 80 BPM
WBC # BLD AUTO: 8.5 K/UL (ref 3.6–11)
WBC URNS QL MICRO: ABNORMAL /HPF (ref 0–4)

## 2021-03-03 PROCEDURE — 80053 COMPREHEN METABOLIC PANEL: CPT

## 2021-03-03 PROCEDURE — 99284 EMERGENCY DEPT VISIT MOD MDM: CPT

## 2021-03-03 PROCEDURE — 93005 ELECTROCARDIOGRAM TRACING: CPT

## 2021-03-03 PROCEDURE — 85025 COMPLETE CBC W/AUTO DIFF WBC: CPT

## 2021-03-03 PROCEDURE — 74011250636 HC RX REV CODE- 250/636: Performed by: EMERGENCY MEDICINE

## 2021-03-03 PROCEDURE — 96374 THER/PROPH/DIAG INJ IV PUSH: CPT

## 2021-03-03 PROCEDURE — 84484 ASSAY OF TROPONIN QUANT: CPT

## 2021-03-03 PROCEDURE — 36415 COLL VENOUS BLD VENIPUNCTURE: CPT

## 2021-03-03 PROCEDURE — 96361 HYDRATE IV INFUSION ADD-ON: CPT

## 2021-03-03 PROCEDURE — 81001 URINALYSIS AUTO W/SCOPE: CPT

## 2021-03-03 PROCEDURE — U0005 INFEC AGEN DETEC AMPLI PROBE: HCPCS

## 2021-03-03 PROCEDURE — 71045 X-RAY EXAM CHEST 1 VIEW: CPT

## 2021-03-03 RX ORDER — MECLIZINE HCL 12.5 MG 12.5 MG/1
12.5 TABLET ORAL
Qty: 20 TAB | Refills: 0 | Status: SHIPPED | OUTPATIENT
Start: 2021-03-03 | End: 2021-03-13

## 2021-03-03 RX ORDER — ONDANSETRON 4 MG/1
4 TABLET, ORALLY DISINTEGRATING ORAL
Qty: 20 TAB | Refills: 0 | Status: SHIPPED | OUTPATIENT
Start: 2021-03-03

## 2021-03-03 RX ORDER — MECLIZINE HCL 12.5 MG 12.5 MG/1
25 TABLET ORAL
Status: COMPLETED | OUTPATIENT
Start: 2021-03-03 | End: 2021-03-03

## 2021-03-03 RX ORDER — ONDANSETRON 2 MG/ML
4 INJECTION INTRAMUSCULAR; INTRAVENOUS
Status: COMPLETED | OUTPATIENT
Start: 2021-03-03 | End: 2021-03-03

## 2021-03-03 RX ADMIN — MECLIZINE 25 MG: 12.5 TABLET ORAL at 13:35

## 2021-03-03 RX ADMIN — SODIUM CHLORIDE 1000 ML: 9 INJECTION, SOLUTION INTRAVENOUS at 13:35

## 2021-03-03 RX ADMIN — ONDANSETRON 4 MG: 2 INJECTION INTRAMUSCULAR; INTRAVENOUS at 13:35

## 2021-03-03 NOTE — DISCHARGE INSTRUCTIONS
You were evaluated in the emergency department for lightheadedness and dizziness. Your examination was reassuring as was your work-up including blood work, urinalysis, chest xray, and EKG. It will be important for you to follow-up with your primary care physician in 2-3 days. If you develop worsening symptoms such as chest pain, shortness of breath, or feeling you may pass out, please return to the emergency department immediately.

## 2021-03-03 NOTE — ED PROVIDER NOTES
EMERGENCY DEPARTMENT HISTORY AND PHYSICAL EXAM      Date: 3/3/2021  Patient Name: Sherley Abbasi    History of Presenting Illness     Chief Complaint   Patient presents with   • Dizziness     reports dizziness with movement, reports it began a few days ago with nasal drainage denies cough. also reports nausea without vomiting. last time she had dizziness like this she reports it was a UTI       History Provided By: Patient    HPI: Sherley Abbasi, 65 y.o. female with history of COPD, GERD, fibromyalgia, type 2 diabetes, hypertension, hyperlipidemia presents to the ED with cc of lightheadedness.  Patient also endorses dizziness which is improved at rest and worsened when she turns her head from side to side, she does have history of vertigo and states it feels similar, she just ran out of her vertigo medications.  Lightheadedness is more present when she stands.  This has been present for the past 2 to 3 days.  She denies any fevers, chills, chest pain, shortness of breath, vomiting, diarrhea, abdominal pain.  She states her urine \"smells strong\" and states that she typically gets lightheaded when she has a urinary tract infection.  Otherwise she has been feeling well and able to complete her ADLs and tolerate normal p.o. intake.  She denies any recent changes to her medications.    There are no other complaints, changes, or physical findings at this time.    PCP: Alison Carbajal MD    No current facility-administered medications on file prior to encounter.      Current Outpatient Medications on File Prior to Encounter   Medication Sig Dispense Refill   • gabapentin (NEURONTIN) 300 mg capsule Take 1 Cap by mouth two (2) times a day. 60 Cap 0   • Wixela Inhub 250-50 mcg/dose diskus inhaler USE 1 INHALATION BY MOUTH  EVERY 12 HOURS 60 Each 0   • metFORMIN (GLUCOPHAGE) 1,000 mg tablet TAKE ONE-HALF TABLET BY  MOUTH IN THE MORNING AND  TAKE 1 TABLET BY MOUTH IN  THE EVENING 135 Tab 0   • dulaglutide (Trulicity) 1.5 mg/0.5  mL sub-q pen INJECT 1.5MG (1 PEN) SUBCUTANEOUSLY EVERY 7 DAYS 12 Each 0    diclofenac (SOLARAZE) 3 % topical gel APPLY EXTERNALLY TO THE AFFECTED AREA EVERY DAY      DULoxetine (CYMBALTA) 30 mg capsule Take 1 Cap by mouth two (2) times a day. 180 Cap 1    rosuvastatin (CRESTOR) 20 mg tablet Take 1 Tab by mouth nightly. 90 Tab 1    omeprazole (PRILOSEC) 20 mg capsule TAKE 1 CAPSULE EVERY DAY 90 Cap 1    acyclovir (ZOVIRAX) 400 mg tablet TAKE 1 TABLET TWICE DAILY 180 Tab 0    potassium chloride SR (KLOR-CON 10) 10 mEq tablet TAKE 1 TABLET EVERY DAY 90 Tab 1    ergocalciferol (ERGOCALCIFEROL) 1,250 mcg (50,000 unit) capsule Take 1 Cap by mouth every seven (7) days. 12 Cap 1    fluticasone propionate (FLONASE) 50 mcg/actuation nasal spray INSTILL 2 SPRAYS IN EACH NOSTRIL EVERY DAY 3 Bottle 0    albuterol-ipratropium (DUO-NEB) 2.5 mg-0.5 mg/3 ml nebu INHALE 1 VIAL VIA NEBULIZER EVERY 4 HOURS AS NEEDED 1620 mL 5    albuterol (ProAir HFA) 90 mcg/actuation inhaler Take 2 Puffs by inhalation every four (4) hours as needed for Wheezing. 3 Inhaler 1    gemfibroziL (LOPID) 600 mg tablet TAKE 1 TABLET TWICE DAILY 180 Tab 0    lisinopriL (PRINIVIL, ZESTRIL) 10 mg tablet TAKE 1 TABLET EVERY DAY 90 Tab 0    glucose blood VI test strips (Accu-Chek Amy Plus test strp) strip Test once daily 100 Strip 1    lancets (Accu-Chek Softclix Lancets) misc Test once daily 100 Each 1    lidocaine 4 % patch 1 Patch by TransDERmal route every twenty-four (24) hours. 90 Patch 0    traZODone (DESYREL) 50 mg tablet Take 1 Tab by mouth nightly.  30 Tab 1       Past History     Past Medical History:  Past Medical History:   Diagnosis Date    Acute pulmonary embolism ([facility] [address] 10/27/2017    Acute saddle pulmonary embolism without acute cor pulmonale (HCC) 11/3/2017    Arthritis     Asthma     Chronic obstructive pulmonary disease (HCC)     Chronic pain     back/hip    Diabetes mellitus due to underlying condition, controlled, with complication, without long-term current use of insulin ([facility] 3/21/2017    Diabetic polyneuropathy associated with type 2 diabetes mellitus ([facility] [address] 2017    Essential hypertension 2018    Fibromyalgia     Gastroesophageal reflux disease without esophagitis 2016    Mixed hyperlipidemia 2018    Moderate episode of recurrent major depressive disorder ([facility] [address] 2017    Noncompliance 2018    Severe obesity (BMI 35.0-39.9) 2018    Unspecified sleep apnea     CAN'T TOLERATE CPAP       Past Surgical History:  Past Surgical History:   Procedure Laterality Date    COLONOSCOPY N/A 2017    COLONOSCOPY performed [facility] at [facility]COPY     [facility]    one ovary removed    HX ORTHOPAEDIC  2012    lumbar fusion    HX ORTHOPAEDIC  2/16/15    RIGHT TOTAL KNEE ARTHROPLASTY    HX ORTHOPAEDIC  6/16/15    LEFT TOTAL KNEE ARTHROPLASTY          Family History:  Family History   Problem Relation Age of Onset    Anesth Problems Mother     Cancer Mother         LUNG CA - SMOKER    Other Mother         CEREBRAL ANEURYSM    Diabetes Mother     Diabetes Father     Other Sister         VARICOSE VEINS THAT HURT AND BLEED    Heart Attack Brother     Other Other         MOTHER'S FATHER'S MOTHER  WITH ANEURYSM       Social History:  Social History     Tobacco Use    Smoking status: Former Smoker     Packs/day: 1.00     Years: 37.00     Pack years: 37.00     Quit date: 2015     Years since quittin.2    Smokeless tobacco: Never Used   Substance Use Topics    Alcohol use: Yes     Comment: VERY RARE WINE    Drug use: No       Allergies:   Allergies   Allergen Reactions    Allopurinol Hives    Darvocet A500 [Propoxyphene N-Acetaminophen] Hives    Influenza Virus Vaccine, Specific Other (comments)     Allergy documented in admission data base    Seafood [Shellfish Containing Products] Itching     NEW ALLERGY; REACTION WORSENS AS AMOUNT EATEN INCREASES         Review of Systems   Review of Systems   Constitutional: Negative for activity change, appetite change, chills, fatigue and fever. Respiratory: Negative for cough and shortness of breath. Cardiovascular: Negative for chest pain and leg swelling. Gastrointestinal: Positive for nausea. Negative for abdominal pain and vomiting. Genitourinary: Negative for dysuria, frequency and urgency. Musculoskeletal: Negative for back pain and gait problem. Skin: Negative for color change and rash. Neurological: Positive for dizziness and light-headedness. Negative for syncope, weakness, numbness and headaches. Hematological: Does not bruise/bleed easily. All other systems reviewed and are negative. Physical Exam   Physical Exam  Vitals signs and nursing note reviewed. Constitutional:       General: She is not in acute distress. Appearance: Normal appearance. She is not ill-appearing or toxic-appearing. HENT:      Head: Normocephalic and atraumatic. Nose: Nose normal.      Mouth/Throat:      Mouth: Mucous membranes are moist.   Eyes:      Extraocular Movements: Extraocular movements intact. Pupils: Pupils are equal, round, and reactive to light. Neck:      Musculoskeletal: Normal range of motion and neck supple. Cardiovascular:      Rate and Rhythm: Normal rate and regular rhythm. Heart sounds: No murmur. Pulmonary:      Effort: Pulmonary effort is normal. No respiratory distress. Breath sounds: Normal breath sounds. No wheezing. Abdominal:      General: There is no distension. Palpations: Abdomen is soft. Tenderness: There is no abdominal tenderness. There is no guarding or rebound. Musculoskeletal: Normal range of motion. General: No swelling or tenderness. Right lower leg: No edema. Left lower leg: No edema. Skin:     General: Skin is warm and dry. Coloration: Skin is not pale.       Findings: No erythema. Neurological:      General: No focal deficit present. Mental Status: She is alert and oriented to person, place, and time. Diagnostic Study Results     Labs -     Labs Reviewed   METABOLIC PANEL, COMPREHENSIVE - Abnormal; Notable for the following components:       Result Value    Glucose 119 (*)     Protein, total 9.1 (*)     Globulin 5.5 (*)     A-G Ratio 0.7 (*)     All other components within normal limits   URINALYSIS W/ REFLEX CULTURE - Abnormal; Notable for the following components:    Bacteria 1+ (*)     All other components within normal limits   CBC WITH AUTOMATED DIFF   TROPONIN I   SARS-COV-2   SARS-COV-2       Radiologic Studies -   XR CHEST PORT   Final Result   Negative and no change. CT Results  (Last 48 hours)    None        CXR Results  (Last 48 hours)               03/03/21 1229  XR CHEST PORT Final result    Impression:  Negative and no change. Narrative:  Clinical indication: Dizziness. Portable AP upright view of the chest obtained, comparison December 15, 2020. The  heart size is normal. No acute infiltrate. Medical Decision Making   I am the first provider for this patient. I reviewed the vital signs, available nursing notes, past medical history, past surgical history, family history and social history. Vital Signs-Reviewed the patient's vital signs. Visit Vitals  BP (!) 154/95   Pulse 73   Temp 98.4 °F (36.9 °C)   Resp 19   Wt 129.8 kg (286 lb 2.5 oz)   LMP 04/04/1996 (LMP Unknown)   SpO2 97%   BMI 37.75 kg/m²       EKG interpretation:   EKG per my interpretation normal sinus rhythm, rate 80 bpm, normal axis, no acute ischemic changes, no interval changes. Records Reviewed: Nursing Notes    Provider Notes (Medical Decision Making):   72-year-old female here with lightheadedness, dizziness. On examination appears clinically well and nontoxic. She is afebrile and vital signs stable.   She has no focal neurologic deficits. Dizziness appears more consistent with her history of BPPV, I have very low suspicion for acute posterior CVA as she has no symptoms on rest and has been ambulating without difficulty. Regarding lightheadedness will evaluate with chest x-ray, cardiac monitor, blood work, urinalysis, treat with IV fluids and reassess. On reexamination patient is feeling improved. Urinalysis and chest x-ray unremarkable for signs of infection. There is no significant electrolyte abnormality, sign of dehydration, hemoglobin stable, troponin negative. Patient treated with meclizine, IV fluids, Zofran and feels better. Encourage close follow-up with PCP. Patient given strict return ED precautions. All questions answered and she agrees with plan as well. ED Course:   Initial assessment performed. The patients presenting problems have been discussed, and they are in agreement with the care plan formulated and outlined with them. I have encouraged them to ask questions as they arise throughout their visit. Cardiac Monitoring: The cardiac monitor revealed the following rhythm as interpreted by me: Normal Sinus Rhythm     The cardiac monitor was ordered secondary to the patient's reported complaint of dizziness and lightheadedness and to monitor the patient for dysrhythmia. Martha Bernardo MD      Discharge Note:  The patient has been re-evaluated and is ready for discharge. Reviewed available results with patient. Counseled patient on diagnosis and care plan. Patient has expressed understanding, and all questions have been answered. Patient agrees with plan and agrees to follow up as recommended, or to return to the ED if their symptoms worsen. Discharge instructions have been provided and explained to the patient, along with reasons to return to the ED. Disposition:  Discharge      DISCHARGE PLAN:  1.    Discharge Medication List as of 3/3/2021  3:00 PM      START taking these medications    Details ondansetron (Zofran ODT) 4 mg disintegrating tablet 1 Tab by SubLINGual route every eight (8) hours as needed for Nausea or Vomiting., Normal, Disp-20 Tab, R-0      meclizine (ANTIVERT) 12.5 mg tablet Take 1 Tab by mouth three (3) times daily as needed for Dizziness for up to 10 days. , Normal, Disp-20 Tab, R-0         CONTINUE these medications which have NOT CHANGED    Details   gabapentin (NEURONTIN) 300 mg capsule Take 1 Cap by mouth two (2) times a day., Normal, Disp-60 Cap, R-0      Wixela Inhub 250-50 mcg/dose diskus inhaler USE 1 INHALATION BY MOUTH  EVERY 12 HOURS, Normal, Disp-60 Each, R-0Pt to find another doctor. Last refill.      metFORMIN (GLUCOPHAGE) 1,000 mg tablet TAKE ONE-HALF TABLET BY  MOUTH IN THE MORNING AND  TAKE 1 TABLET BY MOUTH IN  THE EVENING, Normal, Disp-135 Tab, R-0Refuse 1 year. She was to find another doctor. dulaglutide (Trulicity) 1.5 NB/1.8 mL sub-q pen INJECT 1.5MG (1 PEN) SUBCUTANEOUSLY EVERY 7 DAYS, Normal, Disp-12 Each, R-0      diclofenac (SOLARAZE) 3 % topical gel APPLY EXTERNALLY TO THE AFFECTED AREA EVERY DAY, Historical Med      DULoxetine (CYMBALTA) 30 mg capsule Take 1 Cap by mouth two (2) times a day., Normal, Disp-180 Cap,R-1      rosuvastatin (CRESTOR) 20 mg tablet Take 1 Tab by mouth nightly., Normal, Disp-90 Tab,R-1      omeprazole (PRILOSEC) 20 mg capsule TAKE 1 CAPSULE EVERY DAY, Normal, Disp-90 Cap,R-1      acyclovir (ZOVIRAX) 400 mg tablet TAKE 1 TABLET TWICE DAILY, Normal, Disp-180 Tab,R-0      potassium chloride SR (KLOR-CON 10) 10 mEq tablet TAKE 1 TABLET EVERY DAY, Normal, Disp-90 Tab,R-1      ergocalciferol (ERGOCALCIFEROL) 1,250 mcg (50,000 unit) capsule Take 1 Cap by mouth every seven (7) days. , Normal, Disp-12 Cap,R-1      fluticasone propionate (FLONASE) 50 mcg/actuation nasal spray INSTILL 2 SPRAYS IN EACH NOSTRIL EVERY DAY, Normal, Disp-3 Bottle,R-0**Patient requests 90 days supply**      albuterol-ipratropium (DUO-NEB) 2.5 mg-0.5 mg/3 ml nebu INHALE 1 VIAL VIA NEBULIZER EVERY 4 HOURS AS NEEDED, Normal, Disp-1620 mL,R-5**Patient requests 90 days supply**      albuterol (ProAir HFA) 90 mcg/actuation inhaler Take 2 Puffs by inhalation every four (4) hours as needed for Wheezing., Normal, Disp-3 Inhaler,R-1      gemfibroziL (LOPID) 600 mg tablet TAKE 1 TABLET TWICE DAILY, Normal, Disp-180 Tab,R-0      lisinopriL (PRINIVIL, ZESTRIL) 10 mg tablet TAKE 1 TABLET EVERY DAY, Normal, Disp-90 Tab,R-0      glucose blood VI test strips (Accu-Chek Amy Plus test strp) strip Test once daily, Normal, Disp-100 Strip,R-1      lancets (Accu-Chek Softclix Lancets) misc Test once daily, Normal, Disp-100 Each,R-1      lidocaine 4 % patch 1 Patch by TransDERmal route every twenty-four (24) hours. , Normal, Disp-90 Patch,R-0      traZODone (DESYREL) 50 mg tablet Take 1 Tab by mouth nightly., Normal, Disp-30 Tab,R-1           2. Follow-up Information     Follow up With Specialties Details Why Contact Info    Randy Lucero MD [facility] Medicine Schedule an appointment as soon as possible for a visit   [address] [place] [address] [facility]  [phone]          3. Return to ED if worse     Diagnosis     Clinical Impression:   1. Lightheadedness    2. Benign paroxysmal positional vertigo due to bilateral vestibular disorder        Attestations:  I am the first and primary provider of record for this patient's ED encounter. I personally performed the services described above in this documentation. Kamlesh Rider MD    Please note that this dictation was completed with [employer], the [employer] voice recognition software. Quite often unanticipated grammatical, syntax, homophones, and other interpretive errors are inadvertently transcribed by the computer software. Please disregard these errors. Please excuse any errors that have escaped final proofreading. Thank you.

## 2021-03-04 ENCOUNTER — PATIENT OUTREACH (OUTPATIENT)
Dept: CASE MANAGEMENT | Age: 66
End: 2021-03-04

## 2021-03-04 LAB
SARS-COV-2, XPLCVT: NOT DETECTED
SOURCE, COVRS: NORMAL

## 2021-03-04 NOTE — PROGRESS NOTES
03/04/21 Attempted patient outreach for COVID follow up call per protocol. Unable to contact patient, not active on MyChart. Resolving CESILIA episode.  ALEX

## 2021-04-14 DIAGNOSIS — E11.42 DIABETIC POLYNEUROPATHY ASSOCIATED WITH TYPE 2 DIABETES MELLITUS (HCC): ICD-10-CM

## 2021-04-14 DIAGNOSIS — M79.7 FIBROMYALGIA: ICD-10-CM

## 2021-04-14 RX ORDER — GABAPENTIN 300 MG/1
300 CAPSULE ORAL 2 TIMES DAILY
Qty: 60 CAP | Refills: 0 | OUTPATIENT
Start: 2021-04-14

## 2021-04-14 NOTE — TELEPHONE ENCOUNTER
Last visit:12/10/20  Next visit: not scheduled  Previous refill 2/24/21(60+0R)    Requested Prescriptions     Pending Prescriptions Disp Refills    gabapentin (NEURONTIN) 300 mg capsule 60 Cap 0     Sig: Take 1 Cap by mouth two (2) times a day. Please review  before prescribing new script for this medication.      Thanks,  Corky Wilde

## 2021-05-17 ENCOUNTER — TRANSCRIBE ORDER (OUTPATIENT)
Dept: INTERNAL MEDICINE CLINIC | Age: 66
End: 2021-05-17

## 2021-06-07 ENCOUNTER — TELEPHONE (OUTPATIENT)
Dept: FAMILY MEDICINE CLINIC | Age: 66
End: 2021-06-07

## 2021-06-07 NOTE — TELEPHONE ENCOUNTER
----- Message from Mahad Rosario sent at 6/7/2021  3:49 PM EDT -----  Regarding: Solomon Stockton/Telephone  General Message/Vendor Calls    Caller's first and last name:N/A      Reason for call:  Labs      Callback required yes/no and why:Yes      Best contact number(s):[phone]      Details to clarify the request:Patient need lab order before appointment on 6/16/2021      Mahad Rosario

## 2021-06-10 DIAGNOSIS — E78.2 MIXED HYPERLIPIDEMIA: ICD-10-CM

## 2021-06-10 DIAGNOSIS — I10 ESSENTIAL HYPERTENSION WITH GOAL BLOOD PRESSURE LESS THAN 140/90: ICD-10-CM

## 2021-06-10 DIAGNOSIS — Z79.899 LONG-TERM USE OF HIGH-RISK MEDICATION: ICD-10-CM

## 2021-06-10 DIAGNOSIS — E11.65 UNCONTROLLED TYPE 2 DIABETES MELLITUS WITH HYPERGLYCEMIA (HCC): Primary | ICD-10-CM

## 2021-06-11 NOTE — TELEPHONE ENCOUNTER
MD Tiana Barker LPN  Caller: Unspecified (4 days ago,  4:10 PM)  Labs ordered           Patient notified.

## 2021-06-25 ENCOUNTER — TELEPHONE (OUTPATIENT)
Dept: FAMILY MEDICINE CLINIC | Age: 66
End: 2021-06-25

## 2021-06-25 DIAGNOSIS — E11.65 UNCONTROLLED TYPE 2 DIABETES MELLITUS WITH HYPERGLYCEMIA (HCC): ICD-10-CM

## 2021-06-25 RX ORDER — DULAGLUTIDE 1.5 MG/.5ML
INJECTION, SOLUTION SUBCUTANEOUS
Qty: 4 SYRINGE | Refills: 0 | Status: CANCELLED | OUTPATIENT
Start: 2021-06-25

## 2021-06-25 NOTE — TELEPHONE ENCOUNTER
Last visit:12/10/20  Next visit: 6/30/21  Previous refill 5/17/21 4 syringe+0R)    Requested Prescriptions     Pending Prescriptions Disp Refills    dulaglutide (Trulicity) 1.6 YO/3.9 mL sub-q pen 4 Syringe 0     Sig: INJECT THE CONTENTS OF 1  PEN SUBCUTANEOUSLY EVERY 7  DAYS

## 2021-06-30 ENCOUNTER — OFFICE VISIT (OUTPATIENT)
Dept: FAMILY MEDICINE CLINIC | Age: 66
End: 2021-06-30
Payer: MEDICARE

## 2021-06-30 VITALS
RESPIRATION RATE: 18 BRPM | SYSTOLIC BLOOD PRESSURE: 135 MMHG | OXYGEN SATURATION: 95 % | BODY MASS INDEX: 39.68 KG/M2 | DIASTOLIC BLOOD PRESSURE: 79 MMHG | HEIGHT: 72 IN | HEART RATE: 82 BPM | WEIGHT: 293 LBS

## 2021-06-30 DIAGNOSIS — I10 ESSENTIAL HYPERTENSION WITH GOAL BLOOD PRESSURE LESS THAN 140/90: Primary | ICD-10-CM

## 2021-06-30 DIAGNOSIS — F33.1 MODERATE EPISODE OF RECURRENT MAJOR DEPRESSIVE DISORDER (HCC): ICD-10-CM

## 2021-06-30 DIAGNOSIS — J45.20 INTERMITTENT ASTHMA WITHOUT COMPLICATION, UNSPECIFIED ASTHMA SEVERITY: ICD-10-CM

## 2021-06-30 DIAGNOSIS — R25.1 TREMOR OF BOTH HANDS: ICD-10-CM

## 2021-06-30 DIAGNOSIS — B00.9 HERPES: ICD-10-CM

## 2021-06-30 DIAGNOSIS — M79.7 FIBROMYALGIA: ICD-10-CM

## 2021-06-30 DIAGNOSIS — E11.42 DIABETIC POLYNEUROPATHY ASSOCIATED WITH TYPE 2 DIABETES MELLITUS (HCC): ICD-10-CM

## 2021-06-30 DIAGNOSIS — K21.9 GASTROESOPHAGEAL REFLUX DISEASE WITHOUT ESOPHAGITIS: ICD-10-CM

## 2021-06-30 DIAGNOSIS — M48.061 SPINAL STENOSIS OF LUMBAR REGION, UNSPECIFIED WHETHER NEUROGENIC CLAUDICATION PRESENT: ICD-10-CM

## 2021-06-30 DIAGNOSIS — E78.2 MIXED HYPERLIPIDEMIA: ICD-10-CM

## 2021-06-30 DIAGNOSIS — E66.01 SEVERE OBESITY (BMI 35.0-35.9 WITH COMORBIDITY) (HCC): ICD-10-CM

## 2021-06-30 PROCEDURE — G9717 DOC PT DX DEP/BP F/U NT REQ: HCPCS | Performed by: FAMILY MEDICINE

## 2021-06-30 PROCEDURE — G8752 SYS BP LESS 140: HCPCS | Performed by: FAMILY MEDICINE

## 2021-06-30 PROCEDURE — G8417 CALC BMI ABV UP PARAM F/U: HCPCS | Performed by: FAMILY MEDICINE

## 2021-06-30 PROCEDURE — 3046F HEMOGLOBIN A1C LEVEL >9.0%: CPT | Performed by: FAMILY MEDICINE

## 2021-06-30 PROCEDURE — 99215 OFFICE O/P EST HI 40 MIN: CPT | Performed by: FAMILY MEDICINE

## 2021-06-30 PROCEDURE — G8427 DOCREV CUR MEDS BY ELIG CLIN: HCPCS | Performed by: FAMILY MEDICINE

## 2021-06-30 PROCEDURE — 3017F COLORECTAL CA SCREEN DOC REV: CPT | Performed by: FAMILY MEDICINE

## 2021-06-30 PROCEDURE — 2022F DILAT RTA XM EVC RTNOPTHY: CPT | Performed by: FAMILY MEDICINE

## 2021-06-30 PROCEDURE — G8754 DIAS BP LESS 90: HCPCS | Performed by: FAMILY MEDICINE

## 2021-06-30 RX ORDER — LISINOPRIL 20 MG/1
20 TABLET ORAL DAILY
Qty: 90 TABLET | Refills: 1 | Status: SHIPPED | OUTPATIENT
Start: 2021-06-30 | End: 2021-09-30

## 2021-06-30 RX ORDER — ACETAMINOPHEN AND CODEINE PHOSPHATE 300; 30 MG/1; MG/1
1 TABLET ORAL
Qty: 10 TABLET | Refills: 0 | Status: SHIPPED | OUTPATIENT
Start: 2021-06-30 | End: 2021-07-05

## 2021-06-30 RX ORDER — ACYCLOVIR 400 MG/1
TABLET ORAL
Qty: 180 TABLET | Refills: 0 | Status: SHIPPED | OUTPATIENT
Start: 2021-06-30 | End: 2021-11-10 | Stop reason: SDUPTHER

## 2021-06-30 RX ORDER — FLUTICASONE PROPIONATE 50 MCG
SPRAY, SUSPENSION (ML) NASAL
Qty: 16 G | Refills: 5 | Status: SHIPPED | OUTPATIENT
Start: 2021-06-30 | End: 2021-11-10 | Stop reason: SDUPTHER

## 2021-06-30 RX ORDER — DULAGLUTIDE 1.5 MG/.5ML
INJECTION, SOLUTION SUBCUTANEOUS
Qty: 4 SYRINGE | Refills: 0 | Status: SHIPPED | OUTPATIENT
Start: 2021-06-30 | End: 2021-07-26

## 2021-06-30 RX ORDER — METFORMIN HYDROCHLORIDE 1000 MG/1
1000 TABLET ORAL 2 TIMES DAILY WITH MEALS
Qty: 180 TABLET | Refills: 1 | Status: SHIPPED | OUTPATIENT
Start: 2021-06-30 | End: 2021-09-30

## 2021-06-30 RX ORDER — ALBUTEROL SULFATE 90 UG/1
AEROSOL, METERED RESPIRATORY (INHALATION)
Qty: 1 G | Refills: 3 | Status: SHIPPED | OUTPATIENT
Start: 2021-06-30 | End: 2021-11-10 | Stop reason: SDUPTHER

## 2021-06-30 RX ORDER — ROSUVASTATIN CALCIUM 20 MG/1
TABLET, COATED ORAL
Qty: 90 TABLET | Refills: 1 | Status: SHIPPED | OUTPATIENT
Start: 2021-06-30 | End: 2021-11-10 | Stop reason: SDUPTHER

## 2021-06-30 RX ORDER — OMEPRAZOLE 20 MG/1
CAPSULE, DELAYED RELEASE ORAL
Qty: 90 CAPSULE | Refills: 1 | Status: SHIPPED | OUTPATIENT
Start: 2021-06-30 | End: 2021-11-10 | Stop reason: SDUPTHER

## 2021-06-30 RX ORDER — DULOXETIN HYDROCHLORIDE 30 MG/1
CAPSULE, DELAYED RELEASE ORAL
Qty: 180 CAPSULE | Refills: 1 | Status: SHIPPED | OUTPATIENT
Start: 2021-06-30 | End: 2021-11-10 | Stop reason: SDUPTHER

## 2021-06-30 RX ORDER — PROPRANOLOL HYDROCHLORIDE 20 MG/1
20 TABLET ORAL
Qty: 60 TABLET | Refills: 5 | Status: SHIPPED | OUTPATIENT
Start: 2021-06-30 | End: 2021-11-10 | Stop reason: SDUPTHER

## 2021-06-30 NOTE — PROGRESS NOTES
Johanny Landers is a 61 y.o. female    Last saw her >6 months ago for Preop. For her chronic medical issues >8-11 months. She did her labs two days ago, result not yet back. has a past medical history of Acute pulmonary embolism ([facility] (10/27/2017), Acute saddle pulmonary embolism without acute cor pulmonale ([facility] (11/3/2017), Arthritis, Asthma, Chronic obstructive pulmonary disease ([facility] Chronic pain, Diabetes mellitus due to underlying condition, controlled, with complication, without long-term current use of insulin ([facility] (3/21/2017), Diabetic polyneuropathy associated with type 2 diabetes mellitus ([facility] (11/1/2017), Essential hypertension (6/5/2018), Fibromyalgia, Gastroesophageal reflux disease without esophagitis (5/13/2016), Mixed hyperlipidemia (6/5/2018), Moderate episode of recurrent major depressive disorder ([facility] (11/1/2017), Noncompliance (8/6/2018), Severe obesity (BMI 35.0-39.9) (6/5/2018), and Unspecified sleep apnea. Dm2.  a1c 5.7% 7/2020, 7.9% 04/2019, 6.8% 08/2018 at goal.  Current meds: metformin and trulicity  Highest 844, average 130. Denies Hypoglycemia    Essential tremors bilat hands when holding things. Previously on propranolol. She would like to restart it PRN    Fibromyalgia  Chronic low back pain   Gabapentin not helping she haven't taken it in months. Ok to D/C   4th of July. \"a lot of function\". Cooking, etc.  Would like some thing for pain. Will do tylenol codeine     Anxiety and depression. Have been doing well. She denies SI or HI.  Continue course. Current meds duloxetin 30  Trazodone PRN     Ckd stage 3:  Cr. Baseline 1.26              Avoid NSAIDs, nephrotoxic drugs, stay hydrated.      HTN: BP have been high at home and here today  were stable on Lisnipril/hctz 10/12.5    Cardio d/c HCTZ previously   Increase LIsinopril to 20mg     HLD: taking both crestor and Gemfibrozil     Hx of allergies in the past.   Last smokes 4 years ago.    Have ran out of flonase, but she takes OTC allergies pills thinks it's zyrtec.     [employer] online solicited her and requested back brace, elbow brace, mobility knee and shoulder immobilizer. The form also stated that it has to be <6months of documentation, which I do not have. Have given her form for specialist to fill out. She says only asked for back and shoulder  Ref to orthopedic as well. Reviewed: active problem list, medication list, allergies, notes from last encounter, lab results    A comprehensive review of systems was negative except for that written in the HPI. Allergies   Allergen Reactions    Allopurinol Hives    Darvocet A500 [Propoxyphene N-Acetaminophen] Hives    Influenza Virus Vaccine, Specific Other (comments)     Allergy documented in admission data base    Seafood [Shellfish Containing Products] Itching     NEW ALLERGY; REACTION WORSENS AS AMOUNT EATEN INCREASES     Current Outpatient Medications on File Prior to Visit   Medication Sig Dispense Refill    potassium chloride SR (KLOR-CON 10) 10 mEq tablet TAKE 1 TABLET BY MOUTH  DAILY 30 Tab 0    ondansetron (Zofran ODT) 4 mg disintegrating tablet 1 Tab by SubLINGual route every eight (8) hours as needed for Nausea or Vomiting. 20 Tab 0    Wixela Inhub 250-50 mcg/dose diskus inhaler USE 1 INHALATION BY MOUTH  EVERY 12 HOURS 60 Each 0    ergocalciferol (ERGOCALCIFEROL) 1,250 mcg (50,000 unit) capsule Take 1 Cap by mouth every seven (7) days.  12 Cap 1    albuterol-ipratropium (DUO-NEB) 2.5 mg-0.5 mg/3 ml nebu INHALE 1 VIAL VIA NEBULIZER EVERY 4 HOURS AS NEEDED 1620 mL 5    glucose blood VI test strips (Accu-Chek Amy Plus test strp) strip Test once daily 100 Strip 1    lancets (Accu-Chek Softclix Lancets) misc Test once daily 100 Each 1    diclofenac (SOLARAZE) 3 % topical gel APPLY EXTERNALLY TO THE AFFECTED AREA EVERY DAY (Patient not taking: Reported on 6/30/2021)      lidocaine 4 % patch 1 Patch by TransDERmal route every twenty-four (24) hours. (Patient not taking: Reported on 6/30/2021) 90 Patch 0    traZODone (DESYREL) 50 mg tablet Take 1 Tab by mouth nightly. (Patient not taking: Reported on 6/30/2021) 30 Tab 1     No current facility-administered medications on file prior to visit. Patient Active Problem List   Diagnosis Code    Lumbar stenosis M48.061    Asthma J45.909    Primary localized osteoarthrosis, lower leg M17.10    DJD (degenerative joint disease) of knee M17.10    Chronic pain G89.29    Fibromyalgia M79.7    COPD (chronic obstructive pulmonary disease) ([facility]) J44.9    Gastroesophageal reflux disease without esophagitis K21.9    Chronic low back pain with bilateral sciatica M54.41, M54.42, G89.29    Lumbar spondylosis M47.816    Spinal stenosis M48.00    Status post laminectomy Z98.890    Diabetes mellitus due to underlying condition, controlled, with complication, without long-term current use of insulin ([facility]) E08.8    Moderate episode of recurrent major depressive disorder ([facility] F33.1    Diabetic polyneuropathy associated with type 2 diabetes mellitus ([facility]) E11.42    Severe obesity (BMI 35.0-39. 9) E66.01    Essential hypertension I10    Mixed hyperlipidemia E78.2    Type 2 diabetes with nephropathy ([facility]) E11.21    Noncompliance Z91.19    Chronic anticoagulation Z79.01       Visit Vitals  /79   Pulse 82   Resp 18   Ht 6' 1\" (1.854 m)   Wt 296 lb 12.8 oz (134.6 kg)   SpO2 95%   BMI 39.16 kg/m²     General appearance: alert, cooperative, no distress, appears stated age  Neurologic: Alert and oriented X 3, normal strength and tone, symmetric. Normal without focal findings. Cranial nerves 2-12 intact. Normal coordination and gait. Mental status: Alert, oriented, thought content appropriate, affect: stable, mood-congruent. Head: Normocephalic, without obvious abnormality, atraumatic  Eyes: conjunctivae/corneas clear. PERRL, EOM's intact.    Neck: supple, symmetrical, trachea midline, no JVD  Lungs: clear to auscultation bilaterally  Heart: regular rate and rhythm, S1, S2 normal, no murmur, click, rub or gallop  Abdomen: soft, non-tender. Extremities: extremities normal, atraumatic, no cyanosis or edema      Assessment/Plans:    Diagnoses and all orders for this visit:    1. Essential hypertension with goal blood pressure less than 140/90  -     lisinopriL (PRINIVIL, ZESTRIL) 20 mg tablet; Take 1 Tablet by mouth daily. 2. Fibromyalgia  -     acetaminophen-codeine (TYLENOL #3) 300-30 mg per tablet; Take 1 Tablet by mouth two (2) times daily as needed for Pain for up to 5 days. Max Daily Amount: 2 Tablets.  -     REFERRAL TO PAIN MANAGEMENT  -     DULoxetine (CYMBALTA) 30 mg capsule; TAKE 1 CAPSULE BY MOUTH  TWICE DAILY  -     REFERRAL TO ORTHOPEDICS    3. Spinal stenosis of lumbar region, unspecified whether neurogenic claudication present  -     acetaminophen-codeine (TYLENOL #3) 300-30 mg per tablet; Take 1 Tablet by mouth two (2) times daily as needed for Pain for up to 5 days. Max Daily Amount: 2 Tablets.  -     REFERRAL TO PAIN MANAGEMENT  -     REFERRAL TO ORTHOPEDICS    4. Diabetic polyneuropathy associated with type 2 diabetes mellitus (HCC)  -     lisinopriL (PRINIVIL, ZESTRIL) 20 mg tablet; Take 1 Tablet by mouth daily. -     dulaglutide (Trulicity) 1.5 ZQ/2.4 mL sub-q pen; INJECT THE CONTENTS OF 1  PEN SUBCUTANEOUSLY EVERY 7  DAYS  -     metFORMIN (GLUCOPHAGE) 1,000 mg tablet; Take 1 Tablet by mouth two (2) times daily (with meals). -     rosuvastatin (CRESTOR) 20 mg tablet; TAKE 1 TABLET BY MOUTH AT  NIGHT    5. Moderate episode of recurrent major depressive disorder (HCC)  -     DULoxetine (CYMBALTA) 30 mg capsule; TAKE 1 CAPSULE BY MOUTH  TWICE DAILY    6. Tremor of both hands  -     propranoloL (INDERAL) 20 mg tablet; Take 1 Tablet by mouth two (2) times daily as needed (tremors).     7. Mixed hyperlipidemia  -     rosuvastatin (CRESTOR) 20 mg tablet; TAKE 1 TABLET BY MOUTH AT NIGHT    8. Gastroesophageal reflux disease without esophagitis  -     omeprazole (PRILOSEC) 20 mg capsule; TAKE 1 CAPSULE BY MOUTH  DAILY    9. Intermittent asthma without complication, unspecified asthma severity  -     fluticasone propionate (FLONASE) 50 mcg/actuation nasal spray; USE 2 SPRAYS IN BOTH  NOSTRILS DAILY  -     albuterol (PROVENTIL HFA, VENTOLIN HFA, PROAIR HFA) 90 mcg/actuation inhaler; INHALE 2 INHALATIONS BY  MOUTH EVERY 4 HOURS AS  NEEDED FOR WHEEZING    10. Herpes  -     acyclovir (ZOVIRAX) 400 mg tablet; TAKE 1 TABLET BY MOUTH  TWICE DAILY    11. Severe obesity (BMI 35.0-35.9 with comorbidity) ([facility]      Discussed plans, risk/benefits of treatments/observations. Through the use of shared decision making, above plans were agreed upon. Medication compliance advised. Patient verbalized understanding. Follow-up and Dispositions    · Return in about 4 months (around 10/30/2021) for Chronic medical conditions, medicare.    Follow-up and Disposition History          Graciela Macias MD  6/30/2021

## 2021-06-30 NOTE — PROGRESS NOTES
Chief Complaint   Patient presents with    Diabetes    Hypertension    Cholesterol Problem     Check meds    1. Have you been to the ER, urgent care clinic since your last visit? Hospitalized since your last visit? No     2. Have you seen or consulted any other health care providers outside of the [address] [address] since your last visit? Include any pap smears or colon screening.  No

## 2021-07-02 LAB
ALBUMIN SERPL-MCNC: 4 G/DL (ref 3.8–4.8)
ALBUMIN/CREAT UR: 31 MG/G CREAT (ref 0–29)
ALBUMIN/GLOB SERPL: 1.1 {RATIO} (ref 1.2–2.2)
ALP SERPL-CCNC: 71 IU/L (ref 48–121)
ALPRAZ UR QL: NEGATIVE
ALT SERPL-CCNC: 14 IU/L (ref 0–32)
AMPHETAMINES UR QL SCN: NEGATIVE NG/ML
AST SERPL-CCNC: 19 IU/L (ref 0–40)
BARBITURATES UR QL SCN: NEGATIVE NG/ML
BENZODIAZ UR QL: NEGATIVE NG/ML
BILIRUB SERPL-MCNC: 0.2 MG/DL (ref 0–1.2)
BUN SERPL-MCNC: 16 MG/DL (ref 8–27)
BUN/CREAT SERPL: 20 (ref 12–28)
BZE UR QL SCN: NEGATIVE NG/ML
CALCIUM SERPL-MCNC: 9.5 MG/DL (ref 8.7–10.3)
CANNABINOIDS UR QL SCN: NEGATIVE NG/ML
CHLORIDE SERPL-SCNC: 100 MMOL/L (ref 96–106)
CHOLEST SERPL-MCNC: 146 MG/DL (ref 100–199)
CLONAZEPAM UR QL: NEGATIVE
CO2 SERPL-SCNC: 26 MMOL/L (ref 20–29)
CREAT SERPL-MCNC: 0.8 MG/DL (ref 0.57–1)
CREAT UR-MCNC: 105.4 MG/DL
CREAT UR-MCNC: 114.9 MG/DL (ref 20–300)
EST. AVERAGE GLUCOSE BLD GHB EST-MCNC: 140 MG/DL
FLURAZEPAM UR QL: NEGATIVE
GLOBULIN SER CALC-MCNC: 3.5 G/DL (ref 1.5–4.5)
GLUCOSE SERPL-MCNC: 110 MG/DL (ref 65–99)
HBA1C MFR BLD: 6.5 % (ref 4.8–5.6)
HDLC SERPL-MCNC: 38 MG/DL
LDLC SERPL CALC-MCNC: 89 MG/DL (ref 0–99)
LORAZEPAM UR QL: NEGATIVE
METHADONE UR QL SCN: NEGATIVE NG/ML
MICROALBUMIN UR-MCNC: 32.8 UG/ML
MIDAZOLAM UR QL CFM: NEGATIVE
NORDIAZEPAM UR QL: NEGATIVE
OPIATES UR QL SCN: NEGATIVE NG/ML
OXAZEPAM UR QL: NEGATIVE
OXYCODONE+OXYMORPHONE UR QL SCN: NEGATIVE NG/ML
PCP UR QL: NEGATIVE NG/ML
PH UR: 6.6 [PH] (ref 4.5–8.9)
PLEASE NOTE:, 733157: NORMAL
POTASSIUM SERPL-SCNC: 4.5 MMOL/L (ref 3.5–5.2)
PROPOXYPH UR QL SCN: NEGATIVE NG/ML
PROT SERPL-MCNC: 7.5 G/DL (ref 6–8.5)
SODIUM SERPL-SCNC: 138 MMOL/L (ref 134–144)
SP GR UR: 1.02
TEMAZEPAM UR QL CFM: NEGATIVE
TRIAZOLAM UR QL: NEGATIVE
TRIGL SERPL-MCNC: 101 MG/DL (ref 0–149)
TSH SERPL DL<=0.005 MIU/L-ACNC: 1.63 UIU/ML (ref 0.45–4.5)
VLDLC SERPL CALC-MCNC: 19 MG/DL (ref 5–40)

## 2021-07-07 NOTE — TELEPHONE ENCOUNTER
----- Message from April RADHA Silva sent at 7/7/2021 11:28 AM EDT -----  Regarding: Marco Kahn MD/telephone  General Message/Vendor Calls    Caller's first and last name:      Reason for call:      Callback required yes/no and why:      Best contact number(s): [phone]      Details to clarify the request: Patient stated Dr. Marlyn Pinto has refilled all of her medications except for her potassium and she need it ASAP.          April [address] [facility]

## 2021-07-08 RX ORDER — POTASSIUM CHLORIDE 750 MG/1
TABLET, FILM COATED, EXTENDED RELEASE ORAL
Qty: 30 TABLET | Refills: 2 | Status: SHIPPED | OUTPATIENT
Start: 2021-07-08 | End: 2021-10-04

## 2021-07-14 ENCOUNTER — TELEPHONE (OUTPATIENT)
Dept: FAMILY MEDICINE CLINIC | Age: 66
End: 2021-07-14

## 2021-07-14 NOTE — TELEPHONE ENCOUNTER
Message  Received: Today  MD Casandra Villatoro LPN  Caller: Unspecified (Today,  3:19 PM)  Labs printed, pls send home or call pt.      Jim Arroyo MD   7/14/2021     Spoke with patient, letter mailed

## 2021-07-14 NOTE — TELEPHONE ENCOUNTER
----- Message from Shelby Denson sent at 7/14/2021  3:12 PM EDT -----  Regarding: Dr. Carbajal/ Telephone  General Message/Vendor Calls    Caller's first and last name:self      Reason for call:Pt advised checking on status of lab results. Callback required yes/no and why:yes, to clarify       Best contact number(s):[phone]      Details to clarify the request:Pt advised it has been two weeks now and no one has contacted her about the results of the labs.        Shelby Denson

## 2021-08-27 ENCOUNTER — TELEPHONE (OUTPATIENT)
Dept: FAMILY MEDICINE CLINIC | Age: 66
End: 2021-08-27

## 2021-08-27 NOTE — TELEPHONE ENCOUNTER
----- Message from [employer] sent at 8/27/2021  9:26 AM EDT -----  Regarding: Dr. Niles Watts  General Message/Vendor Calls    Caller's first and last name: Ej Masterson      Reason for call: callback request      Callback required yes/no and why: yes/pt request      Best contact number(s): [phone]      Details to clarify the request: pt is calling to check the status of her request for a new rx for the nebulizer machine/requests a callback pertaining to this/pt not sure if rx can be filled at her regular  [employer] [facility] or needs to be sent to a dme supplier      [employer] [facility]

## 2021-08-31 DIAGNOSIS — J44.9 CHRONIC OBSTRUCTIVE PULMONARY DISEASE, UNSPECIFIED COPD TYPE (HCC): ICD-10-CM

## 2021-08-31 RX ORDER — IPRATROPIUM BROMIDE AND ALBUTEROL SULFATE 2.5; .5 MG/3ML; MG/3ML
3 SOLUTION RESPIRATORY (INHALATION)
Qty: 270 ML | Refills: 1 | Status: SHIPPED | OUTPATIENT
Start: 2021-08-31 | End: 2021-10-04

## 2021-08-31 NOTE — TELEPHONE ENCOUNTER
----- Message from Tesha Acuna sent at 8/31/2021 10:54 AM EDT -----  Regarding: Dr. Hussein Rowland    Medication Refill    Caller (if not patient): n/a      Relationship of caller (if not patient): n/a      Best contact number(s): [phone]      Name of medication and dosage if known: Nebulizer machine, albuterol-ipratropium (DUO-NEB) 2.5 mg-0.5 mg/3 ml nebu       Is patient out of this medication (yes/no): Yes       Pharmacy name: [facility] listed in chart? (yes/no): Yes  Pharmacy phone number: [phone]      Details to clarify the request: Pt has requested this twice and would like a call when script has been sent to [place]. Pt is completely out of solution and needs a new machine.        Tesha Acuna

## 2021-08-31 NOTE — TELEPHONE ENCOUNTER
Patient called, because they can send rx:albuterol-ipratropium (DUO-NEB) 2.5 mg-0.5 mg/3 ml nebu [[id number]] to [facility] ([phone]).

## 2021-09-01 RX ORDER — NEBULIZER AND COMPRESSOR
1 EACH MISCELLANEOUS
Qty: 1 EACH | Refills: 0 | Status: SHIPPED | OUTPATIENT
Start: 2021-09-01 | End: 2021-11-10 | Stop reason: ALTCHOICE

## 2021-09-30 DIAGNOSIS — I10 ESSENTIAL HYPERTENSION WITH GOAL BLOOD PRESSURE LESS THAN 140/90: ICD-10-CM

## 2021-09-30 DIAGNOSIS — E11.42 DIABETIC POLYNEUROPATHY ASSOCIATED WITH TYPE 2 DIABETES MELLITUS (HCC): ICD-10-CM

## 2021-09-30 RX ORDER — METFORMIN HYDROCHLORIDE 1000 MG/1
TABLET ORAL
Qty: 180 TABLET | Refills: 0 | Status: SHIPPED | OUTPATIENT
Start: 2021-09-30 | End: 2021-11-10 | Stop reason: SDUPTHER

## 2021-09-30 RX ORDER — LISINOPRIL 20 MG/1
20 TABLET ORAL DAILY
Qty: 90 TABLET | Refills: 0 | Status: SHIPPED | OUTPATIENT
Start: 2021-09-30 | End: 2021-11-10 | Stop reason: SDUPTHER

## 2021-10-02 DIAGNOSIS — J44.9 CHRONIC OBSTRUCTIVE PULMONARY DISEASE, UNSPECIFIED COPD TYPE (HCC): ICD-10-CM

## 2021-10-04 RX ORDER — POTASSIUM CHLORIDE 750 MG/1
TABLET, FILM COATED, EXTENDED RELEASE ORAL
Qty: 30 TABLET | Refills: 2 | Status: SHIPPED | OUTPATIENT
Start: 2021-10-04 | End: 2021-11-10 | Stop reason: SDUPTHER

## 2021-10-04 RX ORDER — IPRATROPIUM BROMIDE AND ALBUTEROL SULFATE 2.5; .5 MG/3ML; MG/3ML
3 SOLUTION RESPIRATORY (INHALATION)
Qty: 60 ML | Refills: 0 | Status: SHIPPED | OUTPATIENT
Start: 2021-10-04

## 2021-11-10 ENCOUNTER — OFFICE VISIT (OUTPATIENT)
Dept: FAMILY MEDICINE CLINIC | Age: 66
End: 2021-11-10
Payer: MEDICARE

## 2021-11-10 VITALS
WEIGHT: 293 LBS | TEMPERATURE: 97.1 F | BODY MASS INDEX: 39.68 KG/M2 | HEIGHT: 72 IN | DIASTOLIC BLOOD PRESSURE: 83 MMHG | RESPIRATION RATE: 18 BRPM | SYSTOLIC BLOOD PRESSURE: 144 MMHG | HEART RATE: 83 BPM | OXYGEN SATURATION: 95 %

## 2021-11-10 DIAGNOSIS — F33.1 MODERATE EPISODE OF RECURRENT MAJOR DEPRESSIVE DISORDER (HCC): ICD-10-CM

## 2021-11-10 DIAGNOSIS — G89.29 CHRONIC LEFT SHOULDER PAIN: ICD-10-CM

## 2021-11-10 DIAGNOSIS — B00.9 HERPES: ICD-10-CM

## 2021-11-10 DIAGNOSIS — J44.9 CHRONIC OBSTRUCTIVE PULMONARY DISEASE, UNSPECIFIED COPD TYPE (HCC): ICD-10-CM

## 2021-11-10 DIAGNOSIS — E11.42 DIABETIC POLYNEUROPATHY ASSOCIATED WITH TYPE 2 DIABETES MELLITUS (HCC): ICD-10-CM

## 2021-11-10 DIAGNOSIS — R25.1 TREMOR OF BOTH HANDS: ICD-10-CM

## 2021-11-10 DIAGNOSIS — M54.50 CHRONIC BILATERAL LOW BACK PAIN, UNSPECIFIED WHETHER SCIATICA PRESENT: ICD-10-CM

## 2021-11-10 DIAGNOSIS — E55.9 VITAMIN D DEFICIENCY: ICD-10-CM

## 2021-11-10 DIAGNOSIS — E78.2 MIXED HYPERLIPIDEMIA: ICD-10-CM

## 2021-11-10 DIAGNOSIS — J43.8 OTHER EMPHYSEMA (HCC): ICD-10-CM

## 2021-11-10 DIAGNOSIS — J45.20 INTERMITTENT ASTHMA WITHOUT COMPLICATION, UNSPECIFIED ASTHMA SEVERITY: ICD-10-CM

## 2021-11-10 DIAGNOSIS — G89.29 CHRONIC BILATERAL LOW BACK PAIN, UNSPECIFIED WHETHER SCIATICA PRESENT: ICD-10-CM

## 2021-11-10 DIAGNOSIS — I10 ESSENTIAL HYPERTENSION WITH GOAL BLOOD PRESSURE LESS THAN 140/90: ICD-10-CM

## 2021-11-10 DIAGNOSIS — Z79.899 ENCOUNTER FOR LONG-TERM (CURRENT) USE OF MEDICATIONS: ICD-10-CM

## 2021-11-10 DIAGNOSIS — M79.7 FIBROMYALGIA: ICD-10-CM

## 2021-11-10 DIAGNOSIS — Z00.00 MEDICARE ANNUAL WELLNESS VISIT, SUBSEQUENT: Primary | ICD-10-CM

## 2021-11-10 DIAGNOSIS — E11.42 CONTROLLED TYPE 2 DIABETES MELLITUS WITH DIABETIC POLYNEUROPATHY, WITHOUT LONG-TERM CURRENT USE OF INSULIN (HCC): ICD-10-CM

## 2021-11-10 DIAGNOSIS — M25.512 CHRONIC LEFT SHOULDER PAIN: ICD-10-CM

## 2021-11-10 DIAGNOSIS — E87.6 HYPOKALEMIA: ICD-10-CM

## 2021-11-10 DIAGNOSIS — K21.9 GASTROESOPHAGEAL REFLUX DISEASE WITHOUT ESOPHAGITIS: ICD-10-CM

## 2021-11-10 DIAGNOSIS — M48.061 SPINAL STENOSIS OF LUMBAR REGION, UNSPECIFIED WHETHER NEUROGENIC CLAUDICATION PRESENT: ICD-10-CM

## 2021-11-10 DIAGNOSIS — Z12.31 ENCOUNTER FOR SCREENING MAMMOGRAM FOR MALIGNANT NEOPLASM OF BREAST: ICD-10-CM

## 2021-11-10 PROCEDURE — G8427 DOCREV CUR MEDS BY ELIG CLIN: HCPCS | Performed by: FAMILY MEDICINE

## 2021-11-10 PROCEDURE — G8417 CALC BMI ABV UP PARAM F/U: HCPCS | Performed by: FAMILY MEDICINE

## 2021-11-10 PROCEDURE — 2022F DILAT RTA XM EVC RTNOPTHY: CPT | Performed by: FAMILY MEDICINE

## 2021-11-10 PROCEDURE — G8754 DIAS BP LESS 90: HCPCS | Performed by: FAMILY MEDICINE

## 2021-11-10 PROCEDURE — G9899 SCRN MAM PERF RSLTS DOC: HCPCS | Performed by: FAMILY MEDICINE

## 2021-11-10 PROCEDURE — G0439 PPPS, SUBSEQ VISIT: HCPCS | Performed by: FAMILY MEDICINE

## 2021-11-10 PROCEDURE — 99214 OFFICE O/P EST MOD 30 MIN: CPT | Performed by: FAMILY MEDICINE

## 2021-11-10 PROCEDURE — 3017F COLORECTAL CA SCREEN DOC REV: CPT | Performed by: FAMILY MEDICINE

## 2021-11-10 PROCEDURE — G8753 SYS BP > OR = 140: HCPCS | Performed by: FAMILY MEDICINE

## 2021-11-10 PROCEDURE — G9717 DOC PT DX DEP/BP F/U NT REQ: HCPCS | Performed by: FAMILY MEDICINE

## 2021-11-10 PROCEDURE — 3044F HG A1C LEVEL LT 7.0%: CPT | Performed by: FAMILY MEDICINE

## 2021-11-10 RX ORDER — TIZANIDINE 4 MG/1
4 TABLET ORAL
Qty: 15 TABLET | Refills: 1 | Status: SHIPPED | OUTPATIENT
Start: 2021-11-10

## 2021-11-10 RX ORDER — ALBUTEROL SULFATE 90 UG/1
AEROSOL, METERED RESPIRATORY (INHALATION)
Qty: 1 G | Refills: 3 | Status: SHIPPED | OUTPATIENT
Start: 2021-11-10

## 2021-11-10 RX ORDER — PROPRANOLOL HYDROCHLORIDE 20 MG/1
20 TABLET ORAL
Qty: 60 TABLET | Refills: 5 | Status: SHIPPED | OUTPATIENT
Start: 2021-11-10 | End: 2022-05-11

## 2021-11-10 RX ORDER — LISINOPRIL 20 MG/1
20 TABLET ORAL DAILY
Qty: 90 TABLET | Refills: 0 | Status: SHIPPED | OUTPATIENT
Start: 2021-11-10 | End: 2022-04-22

## 2021-11-10 RX ORDER — LANOLIN ALCOHOL/MO/W.PET/CERES
1000 CREAM (GRAM) TOPICAL DAILY
COMMUNITY

## 2021-11-10 RX ORDER — NEBULIZER AND COMPRESSOR
EACH MISCELLANEOUS
Qty: 1 EACH | Refills: 0 | Status: SHIPPED | OUTPATIENT
Start: 2021-11-10

## 2021-11-10 RX ORDER — DULAGLUTIDE 1.5 MG/.5ML
INJECTION, SOLUTION SUBCUTANEOUS
Qty: 2 ML | Refills: 5 | Status: SHIPPED | OUTPATIENT
Start: 2021-11-10

## 2021-11-10 RX ORDER — FLUTICASONE PROPIONATE AND SALMETEROL 250; 50 UG/1; UG/1
POWDER RESPIRATORY (INHALATION)
Qty: 60 EACH | Refills: 2 | Status: SHIPPED | OUTPATIENT
Start: 2021-11-10 | End: 2022-02-23

## 2021-11-10 RX ORDER — DULOXETIN HYDROCHLORIDE 30 MG/1
CAPSULE, DELAYED RELEASE ORAL
Qty: 180 CAPSULE | Refills: 1 | Status: SHIPPED | OUTPATIENT
Start: 2021-11-10

## 2021-11-10 RX ORDER — ROSUVASTATIN CALCIUM 20 MG/1
TABLET, COATED ORAL
Qty: 90 TABLET | Refills: 1 | Status: SHIPPED | OUTPATIENT
Start: 2021-11-10

## 2021-11-10 RX ORDER — GABAPENTIN 300 MG/1
300 CAPSULE ORAL
Qty: 30 CAPSULE | Refills: 2 | Status: SHIPPED | OUTPATIENT
Start: 2021-11-10

## 2021-11-10 RX ORDER — METFORMIN HYDROCHLORIDE 1000 MG/1
1000 TABLET ORAL 2 TIMES DAILY WITH MEALS
Qty: 180 TABLET | Refills: 1 | Status: SHIPPED | OUTPATIENT
Start: 2021-11-10

## 2021-11-10 RX ORDER — OMEPRAZOLE 20 MG/1
CAPSULE, DELAYED RELEASE ORAL
Qty: 90 CAPSULE | Refills: 1 | Status: SHIPPED | OUTPATIENT
Start: 2021-11-10

## 2021-11-10 RX ORDER — ERGOCALCIFEROL 1.25 MG/1
50000 CAPSULE ORAL
Qty: 12 CAPSULE | Refills: 1 | Status: SHIPPED | OUTPATIENT
Start: 2021-11-10

## 2021-11-10 RX ORDER — POTASSIUM CHLORIDE 750 MG/1
10 TABLET, FILM COATED, EXTENDED RELEASE ORAL DAILY
Qty: 30 TABLET | Refills: 2 | Status: SHIPPED | OUTPATIENT
Start: 2021-11-10 | End: 2022-01-25

## 2021-11-10 RX ORDER — ACYCLOVIR 400 MG/1
TABLET ORAL
Qty: 180 TABLET | Refills: 0 | Status: SHIPPED | OUTPATIENT
Start: 2021-11-10 | End: 2022-05-11

## 2021-11-10 RX ORDER — CLONIDINE HYDROCHLORIDE 0.1 MG/1
0.1 TABLET ORAL 2 TIMES DAILY
Qty: 60 TABLET | Refills: 2 | Status: SHIPPED | OUTPATIENT
Start: 2021-11-10 | End: 2022-01-17

## 2021-11-10 RX ORDER — FLUTICASONE PROPIONATE 50 MCG
SPRAY, SUSPENSION (ML) NASAL
Qty: 16 G | Refills: 5 | Status: SHIPPED | OUTPATIENT
Start: 2021-11-10

## 2021-11-10 NOTE — PROGRESS NOTES
Chief Complaint   Patient presents with    Follow Up Chronic Condition     Check meds    1. Have you been to the ER, urgent care clinic since your last visit? Hospitalized since your last visit? No     2. Have you seen or consulted any other health care providers outside of the [address] [place] [address] since your last visit? Include any pap smears or colon screening.  No

## 2021-11-10 NOTE — PROGRESS NOTES
This is a Jessica Exam (AWV)     I have reviewed the patient's medical history in detail and updated the computerized patient record. Marisa Tomas is a 61 y.o. female    Last saw her 5 months ago    Vaccinated for [place]       has a past medical history of Acute pulmonary embolism ([facility] (10/27/2017), Acute saddle pulmonary embolism without acute cor pulmonale ([facility] (11/3/2017), Arthritis, Asthma, Chronic obstructive pulmonary disease ([facility] Chronic pain, Diabetes mellitus due to underlying condition, controlled, with complication, without long-term current use of insulin ([facility] (3/21/2017), Diabetic polyneuropathy associated with type 2 diabetes mellitus ([facility] (11/1/2017), Essential hypertension (6/5/2018), Fibromyalgia, Gastroesophageal reflux disease without esophagitis (5/13/2016), Mixed hyperlipidemia (6/5/2018), Moderate episode of recurrent major depressive disorder ([facility] (11/1/2017), Noncompliance (8/6/2018), Severe obesity (BMI 35.0-39.9) (6/5/2018), and Unspecified sleep apnea. HTN: BP have been consistently high  Lisinopril and propranolol  Add losartan 25mg     Dm2.  a1c 6.5% 06/2021, 5.7% 7/2020, 7.9% 04/2019, 6.8% 08/2018 at goal.  Current meds: metformin and trulicity  Denies Hypoglycemia    Essential tremors bilat hands when holding things. Previously on propranolol. She would like to restart it PRN    Fibromyalgia  Chronic low back pain   Gabapentin not helping she haven't taken it in months. Ok to D/C    Diabetic neuropathy bilat LE. Notice it more and worse at night without Gabapentin, she would like to restart it. Gabapentin 300mg QHS    Anxiety and depression. Have been doing well. She denies SI or HI.  Continue course. Current meds duloxetin 30  Trazodone PRN     Ckd stage 3:  Cr. Baseline 1.26              Avoid NSAIDs, nephrotoxic drugs, stay hydrated.      HLD: taking both crestor and Gemfibrozil     Hx of allergies in the past.   Last smokes 4 years ago. Have ran out of flonase, but she takes OTC allergies pills thinks it's zyrtec.       History     Past Medical History:   Diagnosis Date    Acute pulmonary embolism ([address] 10/27/2017    Acute saddle pulmonary embolism without acute cor pulmonale (HCC) 11/3/2017    Arthritis     Asthma     Chronic obstructive pulmonary disease (HCC)     Chronic pain     back/hip    Diabetes mellitus due to underlying condition, controlled, with complication, without long-term current use of insulin ([address] 3/21/2017    Diabetic polyneuropathy associated with type 2 diabetes mellitus ([address] 11/1/2017    Essential hypertension 6/5/2018    Fibromyalgia     Gastroesophageal reflux disease without esophagitis 5/13/2016    Mixed hyperlipidemia 6/5/2018    Moderate episode of recurrent major depressive disorder ([facility] 11/1/2017    Noncompliance 8/6/2018    Severe obesity (BMI 35.0-39.9) 6/5/2018    Unspecified sleep apnea     CAN'T TOLERATE CPAP      Past Surgical History:   Procedure Laterality Date    COLONOSCOPY N/A 7/12/2017    COLONOSCOPY performed [facility] Saleem Mckinnon MD [facility] ENDOSCOPY     [facility]    one ovary removed    HX ORTHOPAEDIC  06/2012    lumbar fusion    HX ORTHOPAEDIC  2/16/15    RIGHT TOTAL KNEE ARTHROPLASTY    HX ORTHOPAEDIC  6/16/15    LEFT TOTAL KNEE ARTHROPLASTY        Current Outpatient Medications   Medication Sig Dispense Refill    cyanocobalamin 1,000 mcg tablet Take 1,000 mcg by mouth daily.  ergocalciferol (ERGOCALCIFEROL) 1,250 mcg (50,000 unit) capsule Take 1 Capsule by mouth every seven (7) days.  12 Capsule 1    fluticasone propion-salmeteroL (Wixela Inhub) 250-50 mcg/dose diskus inhaler USE 1 INHALATION BY MOUTH  EVERY 12 HOURS 60 Each 2    albuterol (PROVENTIL HFA, VENTOLIN HFA, PROAIR HFA) 90 mcg/actuation inhaler INHALE 2 INHALATIONS BY  MOUTH EVERY 4 HOURS AS  NEEDED FOR WHEEZING 1 g 3    DULoxetine (CYMBALTA) 30 mg capsule TAKE 1 CAPSULE BY MOUTH  TWICE DAILY 180 Capsule 1    omeprazole (PRILOSEC) 20 mg capsule TAKE 1 CAPSULE BY MOUTH  DAILY 90 Capsule 1    acyclovir (ZOVIRAX) 400 mg tablet TAKE 1 TABLET BY MOUTH  TWICE DAILY 180 Tablet 0    rosuvastatin (CRESTOR) 20 mg tablet TAKE 1 TABLET BY MOUTH AT  NIGHT 90 Tablet 1    fluticasone propionate (FLONASE) 50 mcg/actuation nasal spray USE 2 SPRAYS IN BOTH  NOSTRILS DAILY 16 g 5    propranoloL (INDERAL) 20 mg tablet Take 1 Tablet by mouth two (2) times daily as needed (tremors). 60 Tablet 5    dulaglutide (Trulicity) 1.5 KY/8.3 mL sub-q pen INJECT THE CONTENTS OF 1 PEN UNDER THE SKIN EVERY 7 DAYS 2 mL 5    potassium chloride SR (KLOR-CON 10) 10 mEq tablet Take 1 Tablet by mouth daily. 30 Tablet 2    metFORMIN (GLUCOPHAGE) 1,000 mg tablet Take 1 Tablet by mouth two (2) times daily (with meals). 180 Tablet 1    lisinopriL (PRINIVIL, ZESTRIL) 20 mg tablet Take 1 Tablet by mouth daily. 90 Tablet 0    gabapentin (NEURONTIN) 300 mg capsule Take 1 Capsule by mouth nightly. Max Daily Amount: 300 mg. 30 Capsule 2    cloNIDine HCL (CATAPRES) 0.1 mg tablet Take 1 Tablet by mouth two (2) times a day. 60 Tablet 2    tiZANidine (ZANAFLEX) 4 mg tablet Take 1 Tablet by mouth nightly as needed for Muscle Spasm(s). 15 Tablet 1    Nebulizer & Compressor machine Use Q4hrs prn SOB 1 Each 0    Nebulizer Accessories kit Use Q4hrs prn SOB 1 Kit 0    albuterol-ipratropium (DUO-NEB) 2.5 mg-0.5 mg/3 ml nebu 3 mL by Nebulization route every six (6) hours as needed for Shortness of Breath. 60 mL 0    ondansetron (Zofran ODT) 4 mg disintegrating tablet 1 Tab by SubLINGual route every eight (8) hours as needed for Nausea or Vomiting. 20 Tab 0    glucose blood VI test strips (Accu-Chek Amy Plus test strp) strip Test once daily 100 Strip 1    lancets (Accu-Chek Softclix Lancets) misc Test once daily 100 Each 1    lidocaine 4 % patch 1 Patch by TransDERmal route every twenty-four (24) hours.  90 Patch 0  diclofenac (SOLARAZE) 3 % topical gel APPLY EXTERNALLY TO THE AFFECTED AREA EVERY DAY (Patient not taking: Reported on 2021)      traZODone (DESYREL) 50 mg tablet Take 1 Tab by mouth nightly.  (Patient not taking: Reported on 11/10/2021) 30 Tab 1     Allergies   Allergen Reactions    Allopurinol Hives    Darvocet A500 [Propoxyphene N-Acetaminophen] Hives    Influenza Virus Vaccine, Specific Other (comments)     Allergy documented in admission data base    Seafood [Shellfish Containing Products] Itching     NEW ALLERGY; REACTION WORSENS AS AMOUNT EATEN INCREASES     Family History   Problem Relation Age of Onset    Anesth Problems Mother     Cancer Mother         LUNG CA - SMOKER    Other Mother         CEREBRAL ANEURYSM    Diabetes Mother     Diabetes Father     Other Sister         VARICOSE VEINS THAT HURT AND BLEED    Heart Attack Brother     Other Other         MOTHER'S FATHER'S MOTHER  WITH ANEURYSM     Social History     Tobacco Use    Smoking status: Former Smoker     Packs/day: 1.00     Years: 37.00     Pack years: 37.00     Quit date: 2015     Years since quittin.9    Smokeless tobacco: Never Used   Substance Use Topics    Alcohol use: Yes     Comment: VERY RARE WINE     Patient Active Problem List   Diagnosis Code    Lumbar stenosis M48.061    Asthma J45.909    Primary localized osteoarthrosis, lower leg M17.10    DJD (degenerative joint disease) of knee M17.10    Chronic pain G89.29    Fibromyalgia M79.7    COPD (chronic obstructive pulmonary disease) ([facility] J44.9    Gastroesophageal reflux disease without esophagitis K21.9    Chronic low back pain with bilateral sciatica M54.41, M54.42, G89.29    Lumbar spondylosis M47.816    Spinal stenosis M48.00    Status post laminectomy Z98.890    Diabetes mellitus due to underlying condition, controlled, with complication, without long-term current use of insulin ([facility]) E08.8    Moderate episode of recurrent major depressive disorder ([facility] F33.1    Diabetic polyneuropathy associated with type 2 diabetes mellitus ([facility] E11.42    Severe obesity (BMI 35.0-39. 9) E66.01    Essential hypertension I10    Mixed hyperlipidemia E78.2    Type 2 diabetes with nephropathy ([facility] E11.21    Noncompliance Z91.19    Chronic anticoagulation Z79.01         Depression Risk Factor Screening:   No flowsheet data found. Alcohol Risk Factor Screening:   Do you average more than 1 drink per night or more than 7 drinks a week:  No    On any one occasion in the past three months have you have had more than 3 drinks containing alcohol:  No    Functional Ability and Level of Safety:     Hearing Loss   Hearing is good. Activities of Daily Living   Self-care. Requires assistance with: no ADLs    Fall Risk   No flowsheet data found. Abuse Screen   Patient is not abused    Review of Systems   A comprehensive review of systems was negative except for that written in the HPI. Physical Examination     Evaluation of Cognitive Function:  Mood/affect:  neutral, happy  Appearance: age appropriate, casually dressed and morbidly obese  Family member/caregiver input: none present    Visit Vitals  BP (!) 144/83   Pulse 83   Temp 97.1 °F (36.2 °C) (Temporal)   Resp 18   Ht 6' 1\" (1.854 m)   Wt 297 lb (134.7 kg)   SpO2 95%   BMI 39.18 kg/m²     General appearance: alert, cooperative, no distress, appears stated age, morbidly obese  Neurologic: Alert and oriented X 3, normal strength and tone, symmetric. Normal without focal findings. Cranial nerves 2-12 intact. Baseline walks with a walking stick  Mental status: Alert, oriented, thought content appropriate, affect: stable, mood-congruent. Head: Normocephalic, without obvious abnormality, atraumatic  Eyes: conjunctivae/corneas clear. PERRL, EOM's intact.    Neck: supple, symmetrical, trachea midline, no JVD  Lungs: clear to auscultation bilaterally  Heart: regular rate and rhythm, S1, S2 normal, no murmur, click, rub or gallop  Abdomen: soft, non-tender. Extremities: extremities normal, atraumatic, no cyanosis or edema    Patient Care Team:  Jules Frias MD as PCP - General (Family Medicine)  Jules Frias MD as PCP - [facility] Empaneled Provider    Advice/Referrals/Counseling   Education and counseling provided:  Are appropriate based on today's review and evaluation  End-of-Life planning (with patient's consent)  Pneumococcal Vaccine  Influenza Vaccine  Screening Mammography  Screening Pap and pelvic (covered once every 2 years)  Colorectal cancer screening tests  Bone mass measurement (DEXA)  Diabetes outpatient self-management training services       Assessment/Plan     Diagnoses and all orders for this visit:    1. Medicare annual wellness visit, subsequent    2. Diabetic polyneuropathy associated with type 2 diabetes mellitus (HCC)  -     gabapentin (NEURONTIN) 300 mg capsule; Take 1 Capsule by mouth nightly. Max Daily Amount: 300 mg.    3. Spinal stenosis of lumbar region, unspecified whether neurogenic claudication present  -     gabapentin (NEURONTIN) 300 mg capsule; Take 1 Capsule by mouth nightly. Max Daily Amount: 300 mg.    4. Fibromyalgia  -     METABOLIC PANEL, COMPREHENSIVE; Future  -     DULoxetine (CYMBALTA) 30 mg capsule; TAKE 1 CAPSULE BY MOUTH  TWICE DAILY    5. Moderate episode of recurrent major depressive disorder (HCC)  -     METABOLIC PANEL, COMPREHENSIVE; Future  -     DULoxetine (CYMBALTA) 30 mg capsule; TAKE 1 CAPSULE BY MOUTH  TWICE DAILY    6. Mixed hyperlipidemia  -     METABOLIC PANEL, COMPREHENSIVE; Future  -     rosuvastatin (CRESTOR) 20 mg tablet; TAKE 1 TABLET BY MOUTH AT  NIGHT    7. Chronic obstructive pulmonary disease, unspecified COPD type ([facility]  -     Nebulizer & Compressor machine; Use Q4hrs prn SOB  -     Nebulizer Accessories kit; Use Q4hrs prn SOB    8.  Intermittent asthma without complication, unspecified asthma severity  -     albuterol (PROVENTIL HFA, VENTOLIN HFA, PROAIR HFA) 90 mcg/actuation inhaler; INHALE 2 INHALATIONS BY  MOUTH EVERY 4 HOURS AS  NEEDED FOR WHEEZING  -     fluticasone propionate (FLONASE) 50 mcg/actuation nasal spray; USE 2 SPRAYS IN BOTH  NOSTRILS DAILY  -     Nebulizer & Compressor machine; Use Q4hrs prn SOB  -     Nebulizer Accessories kit; Use Q4hrs prn SOB    9. Vitamin D deficiency  -     ergocalciferol (ERGOCALCIFEROL) 1,250 mcg (50,000 unit) capsule; Take 1 Capsule by mouth every seven (7) days. 10. Other emphysema (HCC)  -     fluticasone propion-salmeteroL (Wixela Inhub) 250-50 mcg/dose diskus inhaler; USE 1 INHALATION BY MOUTH  EVERY 12 HOURS    11. Gastroesophageal reflux disease without esophagitis  -     omeprazole (PRILOSEC) 20 mg capsule; TAKE 1 CAPSULE BY MOUTH  DAILY    12. Herpes  -     acyclovir (ZOVIRAX) 400 mg tablet; TAKE 1 TABLET BY MOUTH  TWICE DAILY    13. Tremor of both hands  -     propranoloL (INDERAL) 20 mg tablet; Take 1 Tablet by mouth two (2) times daily as needed (tremors). 14. Essential hypertension with goal blood pressure less than 140/90  -     lisinopriL (PRINIVIL, ZESTRIL) 20 mg tablet; Take 1 Tablet by mouth daily. -     cloNIDine HCL (CATAPRES) 0.1 mg tablet; Take 1 Tablet by mouth two (2) times a day. 15. Chronic left shoulder pain  -     REFERRAL TO ORTHOPEDICS    16. Controlled type 2 diabetes mellitus with diabetic polyneuropathy, without long-term current use of insulin ([facility])  -     METABOLIC PANEL, COMPREHENSIVE; Future  -     HEMOGLOBIN A1C WITH EAG; Future  -     rosuvastatin (CRESTOR) 20 mg tablet; TAKE 1 TABLET BY MOUTH AT  NIGHT  -     dulaglutide (Trulicity) 1.5 HL/7.2 mL sub-q pen; INJECT THE CONTENTS OF 1 PEN UNDER THE SKIN EVERY 7 DAYS  -     metFORMIN (GLUCOPHAGE) 1,000 mg tablet; Take 1 Tablet by mouth two (2) times daily (with meals). -     lisinopriL (PRINIVIL, ZESTRIL) 20 mg tablet; Take 1 Tablet by mouth daily.     17. Chronic bilateral low back pain, unspecified whether sciatica present  -     tiZANidine (ZANAFLEX) 4 mg tablet; Take 1 Tablet by mouth nightly as needed for Muscle Spasm(s). 18. Hypokalemia  -     potassium chloride SR (KLOR-CON 10) 10 mEq tablet; Take 1 Tablet by mouth daily. 23. Encounter for screening mammogram for malignant neoplasm of breast  -     ENEDINA MAMMO BI SCREENING INCL CAD; Future    20. Encounter for long-term (current) use of medications  -     METABOLIC PANEL, COMPREHENSIVE; Future  -     HEMOGLOBIN A1C WITH EAG; Future    . Follow-up and Dispositions    · Return in about 2 months (around 1/10/2022) for HTN, DM2, meds, labs. Dierdre Brunner, MD  11/10/2021.

## 2022-01-17 DIAGNOSIS — I10 ESSENTIAL HYPERTENSION WITH GOAL BLOOD PRESSURE LESS THAN 140/90: ICD-10-CM

## 2022-01-17 RX ORDER — CLONIDINE HYDROCHLORIDE 0.1 MG/1
TABLET ORAL
Qty: 60 TABLET | Refills: 2 | Status: SHIPPED | OUTPATIENT
Start: 2022-01-17

## 2022-01-25 DIAGNOSIS — E87.6 HYPOKALEMIA: ICD-10-CM

## 2022-01-25 RX ORDER — POTASSIUM CHLORIDE 750 MG/1
10 TABLET, FILM COATED, EXTENDED RELEASE ORAL DAILY
Qty: 30 TABLET | Refills: 2 | Status: SHIPPED | OUTPATIENT
Start: 2022-01-25

## 2022-02-22 DIAGNOSIS — J43.8 OTHER EMPHYSEMA (HCC): ICD-10-CM

## 2022-02-28 RX ORDER — FLUTICASONE PROPIONATE AND SALMETEROL 250; 50 UG/1; UG/1
POWDER RESPIRATORY (INHALATION)
Qty: 60 EACH | Refills: 0 | Status: SHIPPED | OUTPATIENT
Start: 2022-02-28

## 2022-03-19 PROBLEM — F33.1 MODERATE EPISODE OF RECURRENT MAJOR DEPRESSIVE DISORDER (HCC): Status: ACTIVE | Noted: 2017-11-01

## 2022-03-19 PROBLEM — I10 ESSENTIAL HYPERTENSION: Status: ACTIVE | Noted: 2018-06-05

## 2022-03-19 PROBLEM — E11.42 DIABETIC POLYNEUROPATHY ASSOCIATED WITH TYPE 2 DIABETES MELLITUS (HCC): Status: ACTIVE | Noted: 2017-11-01

## 2022-03-19 PROBLEM — Z79.01 CHRONIC ANTICOAGULATION: Status: ACTIVE | Noted: 2018-09-25

## 2022-03-19 PROBLEM — E11.21 TYPE 2 DIABETES WITH NEPHROPATHY (HCC): Status: ACTIVE | Noted: 2018-08-06

## 2022-03-19 PROBLEM — E78.2 MIXED HYPERLIPIDEMIA: Status: ACTIVE | Noted: 2018-06-05

## 2022-03-20 PROBLEM — E08.8: Status: ACTIVE | Noted: 2017-03-21

## 2022-03-20 PROBLEM — Z91.199 NONCOMPLIANCE: Status: ACTIVE | Noted: 2018-08-06

## 2022-04-22 DIAGNOSIS — I10 ESSENTIAL HYPERTENSION WITH GOAL BLOOD PRESSURE LESS THAN 140/90: ICD-10-CM

## 2022-04-22 DIAGNOSIS — E11.42 CONTROLLED TYPE 2 DIABETES MELLITUS WITH DIABETIC POLYNEUROPATHY, WITHOUT LONG-TERM CURRENT USE OF INSULIN (HCC): ICD-10-CM

## 2022-04-22 RX ORDER — LISINOPRIL 20 MG/1
20 TABLET ORAL DAILY
Qty: 90 TABLET | Refills: 0 | Status: SHIPPED | OUTPATIENT
Start: 2022-04-22

## 2023-04-06 NOTE — TELEPHONE ENCOUNTER
For Pharmacy Admin Tracking Only    Program: Medication Refill  CPA in place:   Recommendation Provided To:    Intervention Detail: New Rx: 3, reason: Patient Preference  Intervention Accepted By:   Sid Strong Closed?:   Time Spent (min): 5

## 2023-06-06 RX ORDER — ALBUTEROL SULFATE 90 UG/1
AEROSOL, METERED RESPIRATORY (INHALATION)
Qty: 8.5 G | OUTPATIENT
Start: 2023-06-06

## 2023-06-06 NOTE — TELEPHONE ENCOUNTER
No longer a patient here      For Pharmacy Admin Tracking Only    Program: Medication Refill  CPA in place:    Recommendation Provided To:    Intervention Detail: New Rx: 1, reason: Patient Preference  Intervention Accepted By:   Kellee Ma?:    Time Spent (min): 5

## 2023-08-07 ENCOUNTER — TRANSCRIBE ORDERS (OUTPATIENT)
Facility: HOSPITAL | Age: 68
End: 2023-08-07

## 2023-08-07 DIAGNOSIS — R74.01 NONSPECIFIC ELEVATION OF LEVELS OF TRANSAMINASE OR LACTIC ACID DEHYDROGENASE (LDH): Primary | ICD-10-CM

## 2023-08-07 DIAGNOSIS — R74.02 NONSPECIFIC ELEVATION OF LEVELS OF TRANSAMINASE OR LACTIC ACID DEHYDROGENASE (LDH): Primary | ICD-10-CM

## 2023-08-23 ENCOUNTER — HOSPITAL ENCOUNTER (OUTPATIENT)
Facility: HOSPITAL | Age: 68
Discharge: HOME | End: 2023-08-26
Payer: MEDICARE

## 2023-08-23 DIAGNOSIS — R74.01 NONSPECIFIC ELEVATION OF LEVELS OF TRANSAMINASE OR LACTIC ACID DEHYDROGENASE (LDH): ICD-10-CM

## 2023-08-23 DIAGNOSIS — R74.02 NONSPECIFIC ELEVATION OF LEVELS OF TRANSAMINASE OR LACTIC ACID DEHYDROGENASE (LDH): ICD-10-CM

## 2023-08-23 PROCEDURE — 76705 ECHO EXAM OF ABDOMEN: CPT

## 2023-09-27 ENCOUNTER — APPOINTMENT (OUTPATIENT)
Facility: HOSPITAL | Age: 68
DRG: 556 | End: 2023-09-27
Payer: MEDICARE

## 2023-09-27 ENCOUNTER — HOSPITAL ENCOUNTER (INPATIENT)
Facility: HOSPITAL | Age: 68
LOS: 6 days | Discharge: NURSING, REHAB OR LONG-TERM CARE | DRG: 556 | End: 2023-10-05
Attending: EMERGENCY MEDICINE | Admitting: HOSPITALIST
Payer: MEDICARE

## 2023-09-27 DIAGNOSIS — R27.0 ATAXIA: Primary | ICD-10-CM

## 2023-09-27 DIAGNOSIS — N28.9 ACUTE RENAL INSUFFICIENCY: ICD-10-CM

## 2023-09-27 DIAGNOSIS — I95.9 HYPOTENSION, UNSPECIFIED HYPOTENSION TYPE: ICD-10-CM

## 2023-09-27 LAB
ALBUMIN SERPL-MCNC: 2.2 G/DL (ref 3.5–5)
ALBUMIN/GLOB SERPL: 0.4 (ref 1.1–2.2)
ALP SERPL-CCNC: 62 U/L (ref 45–117)
ALT SERPL-CCNC: 64 U/L (ref 12–78)
ANION GAP SERPL CALC-SCNC: 3 MMOL/L (ref 5–15)
ANION GAP SERPL CALC-SCNC: 3 MMOL/L (ref 5–15)
AST SERPL-CCNC: 129 U/L (ref 15–37)
BASOPHILS # BLD: 0 K/UL (ref 0–0.1)
BASOPHILS NFR BLD: 0 % (ref 0–1)
BILIRUB SERPL-MCNC: 0.4 MG/DL (ref 0.2–1)
BUN SERPL-MCNC: 30 MG/DL (ref 6–20)
BUN SERPL-MCNC: 32 MG/DL (ref 6–20)
BUN/CREAT SERPL: 22 (ref 12–20)
BUN/CREAT SERPL: 31 (ref 12–20)
CALCIUM SERPL-MCNC: 7.8 MG/DL (ref 8.5–10.1)
CALCIUM SERPL-MCNC: 8.5 MG/DL (ref 8.5–10.1)
CHLORIDE SERPL-SCNC: 103 MMOL/L (ref 97–108)
CHLORIDE SERPL-SCNC: 108 MMOL/L (ref 97–108)
CK SERPL-CCNC: 2315 U/L (ref 26–192)
CO2 SERPL-SCNC: 25 MMOL/L (ref 21–32)
CO2 SERPL-SCNC: 25 MMOL/L (ref 21–32)
COMMENT:: NORMAL
COMMENT:: NORMAL
CREAT SERPL-MCNC: 0.98 MG/DL (ref 0.55–1.02)
CREAT SERPL-MCNC: 1.45 MG/DL (ref 0.55–1.02)
CRP SERPL-MCNC: 4.19 MG/DL (ref 0–0.6)
DIFFERENTIAL METHOD BLD: ABNORMAL
EKG ATRIAL RATE: 77 BPM
EKG DIAGNOSIS: NORMAL
EKG P AXIS: 31 DEGREES
EKG P-R INTERVAL: 184 MS
EKG Q-T INTERVAL: 390 MS
EKG QRS DURATION: 86 MS
EKG QTC CALCULATION (BAZETT): 441 MS
EKG R AXIS: -33 DEGREES
EKG T AXIS: 62 DEGREES
EKG VENTRICULAR RATE: 77 BPM
EOSINOPHIL # BLD: 0.5 K/UL (ref 0–0.4)
EOSINOPHIL NFR BLD: 5 % (ref 0–7)
ERYTHROCYTE [DISTWIDTH] IN BLOOD BY AUTOMATED COUNT: 14.9 % (ref 11.5–14.5)
ERYTHROCYTE [SEDIMENTATION RATE] IN BLOOD: 46 MM/HR (ref 0–30)
GLOBULIN SER CALC-MCNC: 5 G/DL (ref 2–4)
GLUCOSE BLD STRIP.AUTO-MCNC: 105 MG/DL (ref 65–117)
GLUCOSE BLD STRIP.AUTO-MCNC: 135 MG/DL (ref 65–117)
GLUCOSE SERPL-MCNC: 127 MG/DL (ref 65–100)
GLUCOSE SERPL-MCNC: 153 MG/DL (ref 65–100)
HCT VFR BLD AUTO: 36.7 % (ref 35–47)
HGB BLD-MCNC: 11 G/DL (ref 11.5–16)
IMM GRANULOCYTES # BLD AUTO: 0 K/UL
IMM GRANULOCYTES NFR BLD AUTO: 0 %
LACTATE SERPL-SCNC: 1.5 MMOL/L (ref 0.4–2)
LYMPHOCYTES # BLD: 2.3 K/UL (ref 0.8–3.5)
LYMPHOCYTES NFR BLD: 25 % (ref 12–49)
MCH RBC QN AUTO: 26.8 PG (ref 26–34)
MCHC RBC AUTO-ENTMCNC: 30 G/DL (ref 30–36.5)
MCV RBC AUTO: 89.3 FL (ref 80–99)
MONOCYTES # BLD: 0.7 K/UL (ref 0–1)
MONOCYTES NFR BLD: 7 % (ref 5–13)
NEUTS SEG # BLD: 5.8 K/UL (ref 1.8–8)
NEUTS SEG NFR BLD: 63 % (ref 32–75)
NRBC # BLD: 0 K/UL (ref 0–0.01)
NRBC BLD-RTO: 0 PER 100 WBC
PLATELET # BLD AUTO: 394 K/UL (ref 150–400)
PMV BLD AUTO: 9.5 FL (ref 8.9–12.9)
POTASSIUM SERPL-SCNC: 4.8 MMOL/L (ref 3.5–5.1)
POTASSIUM SERPL-SCNC: 5 MMOL/L (ref 3.5–5.1)
PROT SERPL-MCNC: 7.2 G/DL (ref 6.4–8.2)
RBC # BLD AUTO: 4.11 M/UL (ref 3.8–5.2)
RBC MORPH BLD: ABNORMAL
SERVICE CMNT-IMP: ABNORMAL
SERVICE CMNT-IMP: NORMAL
SODIUM SERPL-SCNC: 131 MMOL/L (ref 136–145)
SODIUM SERPL-SCNC: 136 MMOL/L (ref 136–145)
SPECIMEN HOLD: NORMAL
TROPONIN I SERPL HS-MCNC: 75 NG/L (ref 0–51)
WBC # BLD AUTO: 9.3 K/UL (ref 3.6–11)

## 2023-09-27 PROCEDURE — 80053 COMPREHEN METABOLIC PANEL: CPT

## 2023-09-27 PROCEDURE — 82550 ASSAY OF CK (CPK): CPT

## 2023-09-27 PROCEDURE — 93005 ELECTROCARDIOGRAM TRACING: CPT | Performed by: EMERGENCY MEDICINE

## 2023-09-27 PROCEDURE — 71045 X-RAY EXAM CHEST 1 VIEW: CPT

## 2023-09-27 PROCEDURE — 85025 COMPLETE CBC W/AUTO DIFF WBC: CPT

## 2023-09-27 PROCEDURE — 99285 EMERGENCY DEPT VISIT HI MDM: CPT

## 2023-09-27 PROCEDURE — 83036 HEMOGLOBIN GLYCOSYLATED A1C: CPT

## 2023-09-27 PROCEDURE — 36415 COLL VENOUS BLD VENIPUNCTURE: CPT

## 2023-09-27 PROCEDURE — P9045 ALBUMIN (HUMAN), 5%, 250 ML: HCPCS | Performed by: NURSE PRACTITIONER

## 2023-09-27 PROCEDURE — 82962 GLUCOSE BLOOD TEST: CPT

## 2023-09-27 PROCEDURE — 96360 HYDRATION IV INFUSION INIT: CPT

## 2023-09-27 PROCEDURE — G0378 HOSPITAL OBSERVATION PER HR: HCPCS

## 2023-09-27 PROCEDURE — 83605 ASSAY OF LACTIC ACID: CPT

## 2023-09-27 PROCEDURE — 81001 URINALYSIS AUTO W/SCOPE: CPT

## 2023-09-27 PROCEDURE — 2580000003 HC RX 258: Performed by: HOSPITALIST

## 2023-09-27 PROCEDURE — 85652 RBC SED RATE AUTOMATED: CPT

## 2023-09-27 PROCEDURE — 6370000000 HC RX 637 (ALT 250 FOR IP): Performed by: HOSPITALIST

## 2023-09-27 PROCEDURE — 70450 CT HEAD/BRAIN W/O DYE: CPT

## 2023-09-27 PROCEDURE — 6360000002 HC RX W HCPCS: Performed by: HOSPITALIST

## 2023-09-27 PROCEDURE — 96361 HYDRATE IV INFUSION ADD-ON: CPT

## 2023-09-27 PROCEDURE — 84100 ASSAY OF PHOSPHORUS: CPT

## 2023-09-27 PROCEDURE — 82533 TOTAL CORTISOL: CPT

## 2023-09-27 PROCEDURE — 84145 PROCALCITONIN (PCT): CPT

## 2023-09-27 PROCEDURE — 2580000003 HC RX 258: Performed by: EMERGENCY MEDICINE

## 2023-09-27 PROCEDURE — 84443 ASSAY THYROID STIM HORMONE: CPT

## 2023-09-27 PROCEDURE — 86140 C-REACTIVE PROTEIN: CPT

## 2023-09-27 PROCEDURE — 6360000002 HC RX W HCPCS: Performed by: NURSE PRACTITIONER

## 2023-09-27 PROCEDURE — 94640 AIRWAY INHALATION TREATMENT: CPT

## 2023-09-27 PROCEDURE — 84484 ASSAY OF TROPONIN QUANT: CPT

## 2023-09-27 PROCEDURE — 87040 BLOOD CULTURE FOR BACTERIA: CPT

## 2023-09-27 RX ORDER — IPRATROPIUM BROMIDE AND ALBUTEROL SULFATE 2.5; .5 MG/3ML; MG/3ML
1 SOLUTION RESPIRATORY (INHALATION) EVERY 6 HOURS PRN
Status: DISCONTINUED | OUTPATIENT
Start: 2023-09-27 | End: 2023-10-05 | Stop reason: HOSPADM

## 2023-09-27 RX ORDER — CLONIDINE HYDROCHLORIDE 0.1 MG/1
0.1 TABLET ORAL 2 TIMES DAILY
Status: DISCONTINUED | OUTPATIENT
Start: 2023-09-27 | End: 2023-10-01

## 2023-09-27 RX ORDER — SODIUM CHLORIDE 0.9 % (FLUSH) 0.9 %
5-40 SYRINGE (ML) INJECTION EVERY 12 HOURS SCHEDULED
Status: DISCONTINUED | OUTPATIENT
Start: 2023-09-27 | End: 2023-10-05 | Stop reason: HOSPADM

## 2023-09-27 RX ORDER — ONDANSETRON 2 MG/ML
4 INJECTION INTRAMUSCULAR; INTRAVENOUS EVERY 6 HOURS PRN
Status: DISCONTINUED | OUTPATIENT
Start: 2023-09-27 | End: 2023-09-27

## 2023-09-27 RX ORDER — POLYETHYLENE GLYCOL 3350 17 G/17G
17 POWDER, FOR SOLUTION ORAL DAILY PRN
Status: DISCONTINUED | OUTPATIENT
Start: 2023-09-27 | End: 2023-10-05 | Stop reason: HOSPADM

## 2023-09-27 RX ORDER — SODIUM CHLORIDE 9 MG/ML
INJECTION, SOLUTION INTRAVENOUS PRN
Status: DISCONTINUED | OUTPATIENT
Start: 2023-09-27 | End: 2023-09-27 | Stop reason: SDUPTHER

## 2023-09-27 RX ORDER — ERGOCALCIFEROL 1.25 MG/1
50000 CAPSULE ORAL WEEKLY
Status: DISCONTINUED | OUTPATIENT
Start: 2023-09-28 | End: 2023-10-05 | Stop reason: HOSPADM

## 2023-09-27 RX ORDER — POLYETHYLENE GLYCOL 3350 17 G/17G
17 POWDER, FOR SOLUTION ORAL DAILY PRN
Status: DISCONTINUED | OUTPATIENT
Start: 2023-09-27 | End: 2023-09-27 | Stop reason: SDUPTHER

## 2023-09-27 RX ORDER — ONDANSETRON 4 MG/1
4 TABLET, ORALLY DISINTEGRATING ORAL EVERY 8 HOURS PRN
Status: DISCONTINUED | OUTPATIENT
Start: 2023-09-27 | End: 2023-10-05 | Stop reason: HOSPADM

## 2023-09-27 RX ORDER — PANTOPRAZOLE SODIUM 40 MG/1
40 TABLET, DELAYED RELEASE ORAL
Status: DISCONTINUED | OUTPATIENT
Start: 2023-09-28 | End: 2023-10-05 | Stop reason: HOSPADM

## 2023-09-27 RX ORDER — SODIUM CHLORIDE 0.9 % (FLUSH) 0.9 %
5-40 SYRINGE (ML) INJECTION PRN
Status: DISCONTINUED | OUTPATIENT
Start: 2023-09-27 | End: 2023-10-05 | Stop reason: HOSPADM

## 2023-09-27 RX ORDER — INSULIN LISPRO 100 [IU]/ML
0-16 INJECTION, SOLUTION INTRAVENOUS; SUBCUTANEOUS
Status: DISCONTINUED | OUTPATIENT
Start: 2023-09-27 | End: 2023-10-05 | Stop reason: HOSPADM

## 2023-09-27 RX ORDER — SODIUM CHLORIDE 9 MG/ML
INJECTION, SOLUTION INTRAVENOUS PRN
Status: DISCONTINUED | OUTPATIENT
Start: 2023-09-27 | End: 2023-10-05 | Stop reason: HOSPADM

## 2023-09-27 RX ORDER — ONDANSETRON 2 MG/ML
4 INJECTION INTRAMUSCULAR; INTRAVENOUS EVERY 6 HOURS PRN
Status: DISCONTINUED | OUTPATIENT
Start: 2023-09-27 | End: 2023-10-05 | Stop reason: HOSPADM

## 2023-09-27 RX ORDER — DULOXETIN HYDROCHLORIDE 30 MG/1
60 CAPSULE, DELAYED RELEASE ORAL DAILY
Status: DISCONTINUED | OUTPATIENT
Start: 2023-09-28 | End: 2023-10-01

## 2023-09-27 RX ORDER — ALBUMIN, HUMAN INJ 5% 5 %
12.5 SOLUTION INTRAVENOUS ONCE
Status: COMPLETED | OUTPATIENT
Start: 2023-09-27 | End: 2023-09-28

## 2023-09-27 RX ORDER — PREGABALIN 75 MG/1
150 CAPSULE ORAL 2 TIMES DAILY
COMMUNITY

## 2023-09-27 RX ORDER — TRAZODONE HYDROCHLORIDE 50 MG/1
50 TABLET ORAL NIGHTLY
Status: DISCONTINUED | OUTPATIENT
Start: 2023-09-27 | End: 2023-10-05 | Stop reason: HOSPADM

## 2023-09-27 RX ORDER — POTASSIUM CHLORIDE 750 MG/1
10 TABLET, FILM COATED, EXTENDED RELEASE ORAL DAILY
Status: DISCONTINUED | OUTPATIENT
Start: 2023-09-27 | End: 2023-10-05 | Stop reason: HOSPADM

## 2023-09-27 RX ORDER — 0.9 % SODIUM CHLORIDE 0.9 %
1000 INTRAVENOUS SOLUTION INTRAVENOUS ONCE
Status: COMPLETED | OUTPATIENT
Start: 2023-09-27 | End: 2023-09-27

## 2023-09-27 RX ORDER — SODIUM CHLORIDE 450 MG/100ML
INJECTION, SOLUTION INTRAVENOUS CONTINUOUS
Status: DISCONTINUED | OUTPATIENT
Start: 2023-09-27 | End: 2023-09-28

## 2023-09-27 RX ORDER — LIDOCAINE 4 G/G
1 PATCH TOPICAL EVERY 24 HOURS
Status: DISCONTINUED | OUTPATIENT
Start: 2023-09-27 | End: 2023-10-05 | Stop reason: HOSPADM

## 2023-09-27 RX ORDER — TIZANIDINE 4 MG/1
4 TABLET ORAL NIGHTLY
Status: DISCONTINUED | OUTPATIENT
Start: 2023-09-27 | End: 2023-10-05 | Stop reason: HOSPADM

## 2023-09-27 RX ORDER — PROPRANOLOL HYDROCHLORIDE 10 MG/1
40 TABLET ORAL DAILY
Status: DISCONTINUED | OUTPATIENT
Start: 2023-09-27 | End: 2023-10-01

## 2023-09-27 RX ORDER — ONDANSETRON 4 MG/1
4 TABLET, ORALLY DISINTEGRATING ORAL EVERY 8 HOURS PRN
Status: DISCONTINUED | OUTPATIENT
Start: 2023-09-27 | End: 2023-09-27

## 2023-09-27 RX ORDER — ARFORMOTEROL TARTRATE 15 UG/2ML
15 SOLUTION RESPIRATORY (INHALATION)
Status: DISCONTINUED | OUTPATIENT
Start: 2023-09-27 | End: 2023-10-05 | Stop reason: HOSPADM

## 2023-09-27 RX ORDER — ACETAMINOPHEN 650 MG/1
650 SUPPOSITORY RECTAL EVERY 6 HOURS PRN
Status: DISCONTINUED | OUTPATIENT
Start: 2023-09-27 | End: 2023-10-05 | Stop reason: HOSPADM

## 2023-09-27 RX ORDER — DULOXETIN HYDROCHLORIDE 60 MG/1
60 CAPSULE, DELAYED RELEASE ORAL DAILY
COMMUNITY

## 2023-09-27 RX ORDER — BUDESONIDE 0.25 MG/2ML
0.25 INHALANT ORAL
Status: DISCONTINUED | OUTPATIENT
Start: 2023-09-27 | End: 2023-10-05 | Stop reason: HOSPADM

## 2023-09-27 RX ORDER — FLUTICASONE PROPIONATE 50 MCG
2 SPRAY, SUSPENSION (ML) NASAL DAILY
Status: DISCONTINUED | OUTPATIENT
Start: 2023-09-27 | End: 2023-10-05 | Stop reason: HOSPADM

## 2023-09-27 RX ORDER — ENOXAPARIN SODIUM 100 MG/ML
30 INJECTION SUBCUTANEOUS 2 TIMES DAILY
Status: DISCONTINUED | OUTPATIENT
Start: 2023-09-27 | End: 2023-10-05 | Stop reason: HOSPADM

## 2023-09-27 RX ORDER — INSULIN LISPRO 100 [IU]/ML
0-4 INJECTION, SOLUTION INTRAVENOUS; SUBCUTANEOUS NIGHTLY
Status: DISCONTINUED | OUTPATIENT
Start: 2023-09-27 | End: 2023-10-05 | Stop reason: HOSPADM

## 2023-09-27 RX ORDER — ALBUTEROL SULFATE 2.5 MG/3ML
2.5 SOLUTION RESPIRATORY (INHALATION) EVERY 4 HOURS PRN
Status: DISCONTINUED | OUTPATIENT
Start: 2023-09-27 | End: 2023-10-05 | Stop reason: HOSPADM

## 2023-09-27 RX ORDER — GABAPENTIN 300 MG/1
300 CAPSULE ORAL NIGHTLY
Status: DISCONTINUED | OUTPATIENT
Start: 2023-09-27 | End: 2023-10-01

## 2023-09-27 RX ORDER — ROSUVASTATIN CALCIUM 10 MG/1
20 TABLET, COATED ORAL NIGHTLY
Status: DISCONTINUED | OUTPATIENT
Start: 2023-09-27 | End: 2023-09-29

## 2023-09-27 RX ORDER — ASPIRIN 300 MG/1
300 SUPPOSITORY RECTAL DAILY
Status: DISCONTINUED | OUTPATIENT
Start: 2023-09-28 | End: 2023-10-05 | Stop reason: HOSPADM

## 2023-09-27 RX ORDER — CHOLECALCIFEROL (VITAMIN D3) 125 MCG
1000 CAPSULE ORAL DAILY
Status: DISCONTINUED | OUTPATIENT
Start: 2023-09-27 | End: 2023-10-05 | Stop reason: HOSPADM

## 2023-09-27 RX ORDER — ASPIRIN 81 MG/1
81 TABLET, CHEWABLE ORAL DAILY
Status: DISCONTINUED | OUTPATIENT
Start: 2023-09-28 | End: 2023-10-05 | Stop reason: HOSPADM

## 2023-09-27 RX ORDER — ACETAMINOPHEN 325 MG/1
650 TABLET ORAL EVERY 6 HOURS PRN
Status: DISCONTINUED | OUTPATIENT
Start: 2023-09-27 | End: 2023-10-05 | Stop reason: HOSPADM

## 2023-09-27 RX ADMIN — SODIUM CHLORIDE 1000 ML: 9 INJECTION, SOLUTION INTRAVENOUS at 14:05

## 2023-09-27 RX ADMIN — SODIUM CHLORIDE 1000 ML: 9 INJECTION, SOLUTION INTRAVENOUS at 12:44

## 2023-09-27 RX ADMIN — TIZANIDINE 4 MG: 4 TABLET ORAL at 20:05

## 2023-09-27 RX ADMIN — BUDESONIDE 250 MCG: 0.25 INHALANT RESPIRATORY (INHALATION) at 20:51

## 2023-09-27 RX ADMIN — SODIUM CHLORIDE 1000 ML: 9 INJECTION, SOLUTION INTRAVENOUS at 12:38

## 2023-09-27 RX ADMIN — TRAZODONE HYDROCHLORIDE 50 MG: 50 TABLET ORAL at 23:43

## 2023-09-27 RX ADMIN — SODIUM CHLORIDE: 4.5 INJECTION, SOLUTION INTRAVENOUS at 17:07

## 2023-09-27 RX ADMIN — ALBUMIN (HUMAN) 12.5 G: 12.5 INJECTION, SOLUTION INTRAVENOUS at 23:43

## 2023-09-27 RX ADMIN — POTASSIUM CHLORIDE 10 MEQ: 750 TABLET, EXTENDED RELEASE ORAL at 20:03

## 2023-09-27 RX ADMIN — ARFORMOTEROL TARTRATE 15 MCG: 15 SOLUTION RESPIRATORY (INHALATION) at 20:51

## 2023-09-27 RX ADMIN — ROSUVASTATIN 20 MG: 10 TABLET, FILM COATED ORAL at 20:04

## 2023-09-27 ASSESSMENT — PAIN DESCRIPTION - LOCATION
LOCATION: GENERALIZED
LOCATION: GENERALIZED

## 2023-09-27 ASSESSMENT — PAIN DESCRIPTION - ONSET: ONSET: ON-GOING

## 2023-09-27 ASSESSMENT — PAIN DESCRIPTION - FREQUENCY: FREQUENCY: CONTINUOUS

## 2023-09-27 ASSESSMENT — PAIN - FUNCTIONAL ASSESSMENT
PAIN_FUNCTIONAL_ASSESSMENT: 0-10
PAIN_FUNCTIONAL_ASSESSMENT: ACTIVITIES ARE NOT PREVENTED

## 2023-09-27 ASSESSMENT — PAIN SCALES - GENERAL
PAINLEVEL_OUTOF10: 0
PAINLEVEL_OUTOF10: 10
PAINLEVEL_OUTOF10: 8

## 2023-09-27 ASSESSMENT — PAIN DESCRIPTION - PAIN TYPE: TYPE: CHRONIC PAIN

## 2023-09-27 ASSESSMENT — PAIN DESCRIPTION - DESCRIPTORS: DESCRIPTORS: ACHING

## 2023-09-27 NOTE — ED NOTES
TRANSFER - OUT REPORT:    Verbal report given to Mary Gutierres RN on Agata Valerio  being transferred to  for routine progression of patient care       Report consisted of patient's Situation, Background, Assessment and   Recommendations(SBAR). Information from the following report(s) ED SBAR, MAR, Recent Results, and Med Rec Status was reviewed with the receiving nurse. [place] Fall Assessment:    Presents to emergency department  because of falls (Syncope, seizure, or loss of consciousness): No  Age > 70: No  Altered Mental Status, Intoxication with alcohol or substance confusion (Disorientation, impaired judgment, poor safety awaremess, or inability to follow instructions): No  Impaired Mobility: Ambulates or transfers with assistive devices or assistance; Unable to ambulate or transer.: Yes  Nursing Judgement: Yes          Lines:   Peripheral IV 09/27/23 Left; Anterior Forearm (Active)   Site Assessment Clean, dry & intact 09/27/23 1237   Phlebitis Assessment No symptoms 09/27/23 1237   Infiltration Assessment 0 09/27/23 1237        Opportunity for questions and clarification was provided.       Patient transported with:  Monitor and Registered Nurse      '     Mary Gutierres RN  09/27/23 [phone]

## 2023-09-27 NOTE — ED PROVIDER NOTES
[facility] EMERGENCY DEP  EMERGENCY DEPARTMENT ENCOUNTER      Pt Name: Jessica Bland  MRN: [id number]  [address] [place] 1955  Date of evaluation: 9/27/2023  Provider: Ugo Burgess MD    [address]       Chief Complaint   Patient presents with    Generalized Body Aches    Hypotension         HISTORY OF PRESENT ILLNESS   (Location/Symptom, Timing/Onset, Context/Setting, Quality, Duration, Modifying Factors, Severity)  Note limiting factors. Ms. Aranza Tracy is a 73yo female who presents to the ER with complaints of body aches. She reports that she has had body aches for last 2 months. She is seen her primary care doctor about this and has been referred to PT. However, 4 days ago she began to have difficulty \"controlling her legs. \"When she tries to walk her legs in fact they were great. She complains of achiness in her joints in her arms and legs. She was recently started on Lyrica 4 days ago for these symptoms. Review of External Medical Records:     Nursing Notes were reviewed. REVIEW OF SYSTEMS    (2-9 systems for level 4, 10 or more for level 5)     Review of Systems   Musculoskeletal:         Body aches     Neurological:         Difficulty walking         Except as noted above the remainder of the review of systems was reviewed and negative.        PAST MEDICAL HISTORY     Past Medical History:   Diagnosis Date    Acute pulmonary embolism ([address]) 10/27/2017    Acute saddle pulmonary embolism without acute cor pulmonale ([address]) 11/3/2017    Arthritis     Asthma     Chronic obstructive pulmonary disease (HCC)     Chronic pain     back/hip    Diabetes mellitus due to underlying condition, controlled, with complication, without long-term current use of insulin ([address]) 3/21/2017    Diabetic polyneuropathy associated with type 2 diabetes mellitus ([address]) 11/1/2017    Essential hypertension 6/5/2018    Fibromyalgia     Gastroesophageal reflux disease without esophagitis 5/13/2016    Mixed hyperlipidemia

## 2023-09-27 NOTE — ED NOTES
Bedside and Verbal shift change report given to Amy Obrien RN (oncoming nurse) by Nick Velasco RN (offgoing nurse). Report included the following information ED SBAR, MAR, Recent Results, and Med Rec Status.         Buddy Conti RN  09/27/23 1068

## 2023-09-27 NOTE — H&P
History and Physical    Date of Service:  9/27/2023  Primary Care Provider: NADIA Vera NP  Source of information: The patient and Chart review    Chief Complaint: Generalized Body Aches and Hypotension      History of Presenting Illness:   Giuliano Oshea is a 79 y.o. female who presents with Bilateral LE weakness. The patient says that besides the [address] below she has fibromyalgia since \"years\" but since last three and a half months she has been having worsening pain \"all over\" but mostly in proximal bilateral UE as well as proximal bilateral LE. She has seen her PMD in this regard who put her on Lyrica about a week ago. Since that time she has been noting worsening weakness in bilateral LE. That is what brought her to the hospital. She says that the pain she is having is about 8/10 in intensity, generalized, no slurred speech, dysphagia, focal weakness. The patient was hypotensive in the ER and responded to IV fluids. She has a history of REED but does not wear CPAP. She quit smoking about 3 weeks ago. No alcohol. No fever, cough, sob, dizziness     REVIEW OF SYSTEMS:  A comprehensive review of systems was negative except for that written in the History of Present Illness.      Past Medical History:   Diagnosis Date    Acute pulmonary embolism ([address]) 10/27/2017    Acute saddle pulmonary embolism without acute cor pulmonale ([address]) 11/3/2017    Arthritis     Asthma     Chronic obstructive pulmonary disease (HCC)     Chronic pain     back/hip    Diabetes mellitus due to underlying condition, controlled, with complication, without long-term current use of insulin ([address]) 3/21/2017    Diabetic polyneuropathy associated with type 2 diabetes mellitus ([address]) 11/1/2017    Essential hypertension 6/5/2018    Fibromyalgia     Gastroesophageal reflux disease without esophagitis 5/13/2016    Mixed hyperlipidemia 6/5/2018    Moderate episode of recurrent major depressive disorder ([address]) 11/1/2017

## 2023-09-27 NOTE — ED NOTES
RN attempted to call report. Floor unable to take report at this time.      Sebas Mojica RN  09/27/23 2033

## 2023-09-28 LAB
APPEARANCE UR: CLEAR
BACTERIA URNS QL MICRO: NEGATIVE /HPF
BASOPHILS # BLD: 0 K/UL (ref 0–0.1)
BASOPHILS NFR BLD: 0 % (ref 0–1)
BILIRUB UR QL: NEGATIVE
CHOLEST SERPL-MCNC: 110 MG/DL
CK SERPL-CCNC: 1667 U/L (ref 26–192)
COLOR UR: ABNORMAL
COMMENT:: NORMAL
CORTIS AM PEAK SERPL-MCNC: 13.5 UG/DL (ref 4.3–22.45)
CORTIS SERPL-MCNC: 14 UG/DL
CORTIS SERPL-MCNC: 7.9 UG/DL
DIFFERENTIAL METHOD BLD: ABNORMAL
EOSINOPHIL # BLD: 0.3 K/UL (ref 0–0.4)
EOSINOPHIL NFR BLD: 5 % (ref 0–7)
EPITH CASTS URNS QL MICRO: ABNORMAL /LPF
ERYTHROCYTE [DISTWIDTH] IN BLOOD BY AUTOMATED COUNT: 15.1 % (ref 11.5–14.5)
ERYTHROCYTE [DISTWIDTH] IN BLOOD BY AUTOMATED COUNT: 15.1 % (ref 11.5–14.5)
EST. AVERAGE GLUCOSE BLD GHB EST-MCNC: 143 MG/DL
FOLATE SERPL-MCNC: 4.5 NG/ML (ref 5–21)
GLUCOSE BLD STRIP.AUTO-MCNC: 138 MG/DL (ref 65–117)
GLUCOSE BLD STRIP.AUTO-MCNC: 164 MG/DL (ref 65–117)
GLUCOSE BLD STRIP.AUTO-MCNC: 168 MG/DL (ref 65–117)
GLUCOSE UR STRIP.AUTO-MCNC: NEGATIVE MG/DL
GRAN CASTS URNS QL MICRO: ABNORMAL /LPF
HBA1C MFR BLD: 6.6 % (ref 4–5.6)
HCT VFR BLD AUTO: 30.3 % (ref 35–47)
HCT VFR BLD AUTO: 31.4 % (ref 35–47)
HDLC SERPL-MCNC: 21 MG/DL
HDLC SERPL: 5.2 (ref 0–5)
HGB BLD-MCNC: 9.1 G/DL (ref 11.5–16)
HGB BLD-MCNC: 9.5 G/DL (ref 11.5–16)
HGB UR QL STRIP: NEGATIVE
IMM GRANULOCYTES # BLD AUTO: 0.1 K/UL (ref 0–0.04)
IMM GRANULOCYTES NFR BLD AUTO: 2 % (ref 0–0.5)
KETONES UR QL STRIP.AUTO: NEGATIVE MG/DL
LDLC SERPL CALC-MCNC: 62.2 MG/DL (ref 0–100)
LEUKOCYTE ESTERASE UR QL STRIP.AUTO: NEGATIVE
LYMPHOCYTES # BLD: 2.5 K/UL (ref 0.8–3.5)
LYMPHOCYTES NFR BLD: 37 % (ref 12–49)
MAGNESIUM SERPL-MCNC: 1.6 MG/DL (ref 1.6–2.4)
MCH RBC QN AUTO: 26.8 PG (ref 26–34)
MCH RBC QN AUTO: 27.1 PG (ref 26–34)
MCHC RBC AUTO-ENTMCNC: 30 G/DL (ref 30–36.5)
MCHC RBC AUTO-ENTMCNC: 30.3 G/DL (ref 30–36.5)
MCV RBC AUTO: 89.4 FL (ref 80–99)
MCV RBC AUTO: 89.5 FL (ref 80–99)
MONOCYTES # BLD: 0.7 K/UL (ref 0–1)
MONOCYTES NFR BLD: 10 % (ref 5–13)
NEUTS SEG # BLD: 3.2 K/UL (ref 1.8–8)
NEUTS SEG NFR BLD: 46 % (ref 32–75)
NITRITE UR QL STRIP.AUTO: NEGATIVE
NRBC # BLD: 0 K/UL (ref 0–0.01)
NRBC # BLD: 0 K/UL (ref 0–0.01)
NRBC BLD-RTO: 0 PER 100 WBC
NRBC BLD-RTO: 0 PER 100 WBC
PH UR STRIP: 5.5 (ref 5–8)
PHOSPHATE SERPL-MCNC: 3.9 MG/DL (ref 2.6–4.7)
PHOSPHATE SERPL-MCNC: 4.3 MG/DL (ref 2.6–4.7)
PLATELET # BLD AUTO: 240 K/UL (ref 150–400)
PLATELET # BLD AUTO: 311 K/UL (ref 150–400)
PMV BLD AUTO: 10.2 FL (ref 8.9–12.9)
PMV BLD AUTO: 9.9 FL (ref 8.9–12.9)
PROCALCITONIN SERPL-MCNC: <0.05 NG/ML
PROT UR STRIP-MCNC: ABNORMAL MG/DL
RBC # BLD AUTO: 3.39 M/UL (ref 3.8–5.2)
RBC # BLD AUTO: 3.51 M/UL (ref 3.8–5.2)
RBC #/AREA URNS HPF: ABNORMAL /HPF (ref 0–5)
RBC MORPH BLD: ABNORMAL
RPR SER QL: NONREACTIVE
SERVICE CMNT-IMP: ABNORMAL
SP GR UR REFRACTOMETRY: 1.01 (ref 1–1.03)
SPECIMEN HOLD: NORMAL
TRIGL SERPL-MCNC: 134 MG/DL
TSH SERPL DL<=0.05 MIU/L-ACNC: 1.89 UIU/ML (ref 0.36–3.74)
TSH SERPL DL<=0.05 MIU/L-ACNC: 2.1 UIU/ML (ref 0.36–3.74)
UROBILINOGEN UR QL STRIP.AUTO: 1 EU/DL (ref 0.2–1)
VIT B12 SERPL-MCNC: 506 PG/ML (ref 193–986)
VLDLC SERPL CALC-MCNC: 26.8 MG/DL
WBC # BLD AUTO: 6.1 K/UL (ref 3.6–11)
WBC # BLD AUTO: 6.8 K/UL (ref 3.6–11)
WBC URNS QL MICRO: ABNORMAL /HPF (ref 0–4)

## 2023-09-28 PROCEDURE — 83735 ASSAY OF MAGNESIUM: CPT

## 2023-09-28 PROCEDURE — 86592 SYPHILIS TEST NON-TREP QUAL: CPT

## 2023-09-28 PROCEDURE — 2580000003 HC RX 258: Performed by: NURSE PRACTITIONER

## 2023-09-28 PROCEDURE — 36415 COLL VENOUS BLD VENIPUNCTURE: CPT

## 2023-09-28 PROCEDURE — 82533 TOTAL CORTISOL: CPT

## 2023-09-28 PROCEDURE — 82550 ASSAY OF CK (CPK): CPT

## 2023-09-28 PROCEDURE — C1751 CATH, INF, PER/CENT/MIDLINE: HCPCS

## 2023-09-28 PROCEDURE — 6360000002 HC RX W HCPCS: Performed by: HOSPITALIST

## 2023-09-28 PROCEDURE — 6370000000 HC RX 637 (ALT 250 FOR IP): Performed by: NURSE PRACTITIONER

## 2023-09-28 PROCEDURE — 05HF33Z INSERTION OF INFUSION DEVICE INTO LEFT CEPHALIC VEIN, PERCUTANEOUS APPROACH: ICD-10-PCS | Performed by: INTERNAL MEDICINE

## 2023-09-28 PROCEDURE — 76937 US GUIDE VASCULAR ACCESS: CPT

## 2023-09-28 PROCEDURE — G0378 HOSPITAL OBSERVATION PER HR: HCPCS

## 2023-09-28 PROCEDURE — P9045 ALBUMIN (HUMAN), 5%, 250 ML: HCPCS | Performed by: NURSE PRACTITIONER

## 2023-09-28 PROCEDURE — 82746 ASSAY OF FOLIC ACID SERUM: CPT

## 2023-09-28 PROCEDURE — 85027 COMPLETE CBC AUTOMATED: CPT

## 2023-09-28 PROCEDURE — 84425 ASSAY OF VITAMIN B-1: CPT

## 2023-09-28 PROCEDURE — 2580000003 HC RX 258: Performed by: HOSPITALIST

## 2023-09-28 PROCEDURE — 6370000000 HC RX 637 (ALT 250 FOR IP): Performed by: HOSPITALIST

## 2023-09-28 PROCEDURE — 6360000002 HC RX W HCPCS: Performed by: NURSE PRACTITIONER

## 2023-09-28 PROCEDURE — 97161 PT EVAL LOW COMPLEX 20 MIN: CPT

## 2023-09-28 PROCEDURE — 82607 VITAMIN B-12: CPT

## 2023-09-28 PROCEDURE — 2709999900 HC NON-CHARGEABLE SUPPLY

## 2023-09-28 PROCEDURE — 97535 SELF CARE MNGMENT TRAINING: CPT

## 2023-09-28 PROCEDURE — 97530 THERAPEUTIC ACTIVITIES: CPT

## 2023-09-28 PROCEDURE — 94640 AIRWAY INHALATION TREATMENT: CPT

## 2023-09-28 PROCEDURE — 80061 LIPID PANEL: CPT

## 2023-09-28 PROCEDURE — 82962 GLUCOSE BLOOD TEST: CPT

## 2023-09-28 PROCEDURE — 84443 ASSAY THYROID STIM HORMONE: CPT

## 2023-09-28 PROCEDURE — 97165 OT EVAL LOW COMPLEX 30 MIN: CPT

## 2023-09-28 RX ORDER — SODIUM CHLORIDE, SODIUM LACTATE, POTASSIUM CHLORIDE, AND CALCIUM CHLORIDE .6; .31; .03; .02 G/100ML; G/100ML; G/100ML; G/100ML
1000 INJECTION, SOLUTION INTRAVENOUS ONCE
Status: COMPLETED | OUTPATIENT
Start: 2023-09-28 | End: 2023-09-28

## 2023-09-28 RX ORDER — SODIUM CHLORIDE 9 MG/ML
INJECTION, SOLUTION INTRAVENOUS CONTINUOUS
Status: DISCONTINUED | OUTPATIENT
Start: 2023-09-28 | End: 2023-10-01

## 2023-09-28 RX ORDER — ALBUMIN, HUMAN INJ 5% 5 %
12.5 SOLUTION INTRAVENOUS ONCE
Status: COMPLETED | OUTPATIENT
Start: 2023-09-28 | End: 2023-09-28

## 2023-09-28 RX ORDER — MIDODRINE HYDROCHLORIDE 5 MG/1
10 TABLET ORAL ONCE
Status: COMPLETED | OUTPATIENT
Start: 2023-09-28 | End: 2023-09-28

## 2023-09-28 RX ORDER — 0.9 % SODIUM CHLORIDE 0.9 %
500 INTRAVENOUS SOLUTION INTRAVENOUS ONCE
Status: COMPLETED | OUTPATIENT
Start: 2023-09-28 | End: 2023-09-28

## 2023-09-28 RX ADMIN — SODIUM CHLORIDE, POTASSIUM CHLORIDE, SODIUM LACTATE AND CALCIUM CHLORIDE 1000 ML: 600; 310; 30; 20 INJECTION, SOLUTION INTRAVENOUS at 06:38

## 2023-09-28 RX ADMIN — ROSUVASTATIN 20 MG: 10 TABLET, FILM COATED ORAL at 21:36

## 2023-09-28 RX ADMIN — BUDESONIDE 250 MCG: 0.25 INHALANT RESPIRATORY (INHALATION) at 07:27

## 2023-09-28 RX ADMIN — BUDESONIDE 250 MCG: 0.25 INHALANT RESPIRATORY (INHALATION) at 20:03

## 2023-09-28 RX ADMIN — ERGOCALCIFEROL 50000 UNITS: 1.25 CAPSULE ORAL at 10:01

## 2023-09-28 RX ADMIN — FLUTICASONE PROPIONATE 2 SPRAY: 50 SPRAY, METERED NASAL at 10:01

## 2023-09-28 RX ADMIN — CYANOCOBALAMIN TAB 500 MCG 1000 MCG: 500 TAB at 10:02

## 2023-09-28 RX ADMIN — ARFORMOTEROL TARTRATE 15 MCG: 15 SOLUTION RESPIRATORY (INHALATION) at 20:03

## 2023-09-28 RX ADMIN — MIDODRINE HYDROCHLORIDE 10 MG: 5 TABLET ORAL at 03:56

## 2023-09-28 RX ADMIN — SODIUM CHLORIDE: 9 INJECTION, SOLUTION INTRAVENOUS at 10:01

## 2023-09-28 RX ADMIN — TRAZODONE HYDROCHLORIDE 50 MG: 50 TABLET ORAL at 21:37

## 2023-09-28 RX ADMIN — ARFORMOTEROL TARTRATE 15 MCG: 15 SOLUTION RESPIRATORY (INHALATION) at 07:27

## 2023-09-28 RX ADMIN — ENOXAPARIN SODIUM 30 MG: 100 INJECTION SUBCUTANEOUS at 21:37

## 2023-09-28 RX ADMIN — ASPIRIN 81 MG CHEWABLE TABLET 81 MG: 81 TABLET CHEWABLE at 17:07

## 2023-09-28 RX ADMIN — TIZANIDINE 4 MG: 4 TABLET ORAL at 21:36

## 2023-09-28 RX ADMIN — POTASSIUM CHLORIDE 10 MEQ: 750 TABLET, EXTENDED RELEASE ORAL at 10:02

## 2023-09-28 RX ADMIN — ALBUMIN (HUMAN) 12.5 G: 12.5 INJECTION, SOLUTION INTRAVENOUS at 02:36

## 2023-09-28 RX ADMIN — SODIUM CHLORIDE 500 ML: 9 INJECTION, SOLUTION INTRAVENOUS at 04:01

## 2023-09-28 RX ADMIN — GABAPENTIN 300 MG: 300 CAPSULE ORAL at 21:36

## 2023-09-28 RX ADMIN — PANTOPRAZOLE SODIUM 40 MG: 40 TABLET, DELAYED RELEASE ORAL at 10:02

## 2023-09-28 RX ADMIN — SODIUM CHLORIDE, PRESERVATIVE FREE 10 ML: 5 INJECTION INTRAVENOUS at 21:43

## 2023-09-28 ASSESSMENT — PAIN DESCRIPTION - ONSET: ONSET: ON-GOING

## 2023-09-28 ASSESSMENT — PAIN DESCRIPTION - LOCATION: LOCATION: GENERALIZED

## 2023-09-28 ASSESSMENT — PAIN - FUNCTIONAL ASSESSMENT: PAIN_FUNCTIONAL_ASSESSMENT: ACTIVITIES ARE NOT PREVENTED

## 2023-09-28 ASSESSMENT — PAIN DESCRIPTION - PAIN TYPE: TYPE: CHRONIC PAIN

## 2023-09-28 ASSESSMENT — PAIN SCALES - GENERAL: PAINLEVEL_OUTOF10: 3

## 2023-09-28 ASSESSMENT — PAIN DESCRIPTION - FREQUENCY: FREQUENCY: CONTINUOUS

## 2023-09-28 ASSESSMENT — PAIN DESCRIPTION - DESCRIPTORS: DESCRIPTORS: ACHING

## 2023-09-29 PROBLEM — M60.9 MYOSITIS: Status: ACTIVE | Noted: 2023-09-29

## 2023-09-29 LAB
ANION GAP SERPL CALC-SCNC: 4 MMOL/L (ref 5–15)
BUN SERPL-MCNC: 28 MG/DL (ref 6–20)
BUN/CREAT SERPL: 34 (ref 12–20)
CALCIUM SERPL-MCNC: 8.1 MG/DL (ref 8.5–10.1)
CHLORIDE SERPL-SCNC: 111 MMOL/L (ref 97–108)
CO2 SERPL-SCNC: 23 MMOL/L (ref 21–32)
CREAT SERPL-MCNC: 0.82 MG/DL (ref 0.55–1.02)
GLUCOSE BLD STRIP.AUTO-MCNC: 127 MG/DL (ref 65–117)
GLUCOSE BLD STRIP.AUTO-MCNC: 215 MG/DL (ref 65–117)
GLUCOSE BLD STRIP.AUTO-MCNC: 224 MG/DL (ref 65–117)
GLUCOSE SERPL-MCNC: 120 MG/DL (ref 65–100)
POTASSIUM SERPL-SCNC: 4.8 MMOL/L (ref 3.5–5.1)
RPR SER QL: NONREACTIVE
SERVICE CMNT-IMP: ABNORMAL
SODIUM SERPL-SCNC: 138 MMOL/L (ref 136–145)

## 2023-09-29 PROCEDURE — 6370000000 HC RX 637 (ALT 250 FOR IP): Performed by: HOSPITALIST

## 2023-09-29 PROCEDURE — G0378 HOSPITAL OBSERVATION PER HR: HCPCS

## 2023-09-29 PROCEDURE — 80048 BASIC METABOLIC PNL TOTAL CA: CPT

## 2023-09-29 PROCEDURE — 6360000002 HC RX W HCPCS: Performed by: HOSPITALIST

## 2023-09-29 PROCEDURE — 97535 SELF CARE MNGMENT TRAINING: CPT

## 2023-09-29 PROCEDURE — 97110 THERAPEUTIC EXERCISES: CPT

## 2023-09-29 PROCEDURE — 94640 AIRWAY INHALATION TREATMENT: CPT

## 2023-09-29 PROCEDURE — 82962 GLUCOSE BLOOD TEST: CPT

## 2023-09-29 PROCEDURE — 2060000000 HC ICU INTERMEDIATE R&B

## 2023-09-29 PROCEDURE — 36415 COLL VENOUS BLD VENIPUNCTURE: CPT

## 2023-09-29 PROCEDURE — 2580000003 HC RX 258: Performed by: HOSPITALIST

## 2023-09-29 PROCEDURE — 1100000003 HC PRIVATE W/ TELEMETRY

## 2023-09-29 PROCEDURE — 82085 ASSAY OF ALDOLASE: CPT

## 2023-09-29 PROCEDURE — 97116 GAIT TRAINING THERAPY: CPT

## 2023-09-29 RX ORDER — MAGNESIUM SULFATE 1 G/100ML
1000 INJECTION INTRAVENOUS ONCE
Status: COMPLETED | OUTPATIENT
Start: 2023-09-29 | End: 2023-09-29

## 2023-09-29 RX ORDER — DEXTROSE MONOHYDRATE 100 MG/ML
INJECTION, SOLUTION INTRAVENOUS CONTINUOUS PRN
Status: DISCONTINUED | OUTPATIENT
Start: 2023-09-29 | End: 2023-10-05 | Stop reason: HOSPADM

## 2023-09-29 RX ORDER — FOLIC ACID 1 MG/1
1 TABLET ORAL DAILY
Status: DISCONTINUED | OUTPATIENT
Start: 2023-09-29 | End: 2023-10-05 | Stop reason: HOSPADM

## 2023-09-29 RX ADMIN — MAGNESIUM SULFATE HEPTAHYDRATE 1000 MG: 1 INJECTION, SOLUTION INTRAVENOUS at 09:00

## 2023-09-29 RX ADMIN — BUDESONIDE 250 MCG: 0.25 INHALANT RESPIRATORY (INHALATION) at 21:32

## 2023-09-29 RX ADMIN — INSULIN HUMAN 20 UNITS: 100 INJECTION, SUSPENSION SUBCUTANEOUS at 14:00

## 2023-09-29 RX ADMIN — TIZANIDINE 4 MG: 4 TABLET ORAL at 21:01

## 2023-09-29 RX ADMIN — ENOXAPARIN SODIUM 30 MG: 100 INJECTION SUBCUTANEOUS at 21:00

## 2023-09-29 RX ADMIN — SODIUM CHLORIDE: 9 INJECTION, SOLUTION INTRAVENOUS at 03:12

## 2023-09-29 RX ADMIN — ASPIRIN 81 MG CHEWABLE TABLET 81 MG: 81 TABLET CHEWABLE at 10:37

## 2023-09-29 RX ADMIN — CYANOCOBALAMIN TAB 500 MCG 1000 MCG: 500 TAB at 10:37

## 2023-09-29 RX ADMIN — METHYLPREDNISOLONE SODIUM SUCCINATE 1000 MG: 1 INJECTION INTRAMUSCULAR; INTRAVENOUS at 14:00

## 2023-09-29 RX ADMIN — SODIUM CHLORIDE, PRESERVATIVE FREE 10 ML: 5 INJECTION INTRAVENOUS at 21:03

## 2023-09-29 RX ADMIN — ONDANSETRON 4 MG: 2 INJECTION INTRAMUSCULAR; INTRAVENOUS at 21:00

## 2023-09-29 RX ADMIN — ARFORMOTEROL TARTRATE 15 MCG: 15 SOLUTION RESPIRATORY (INHALATION) at 08:14

## 2023-09-29 RX ADMIN — SODIUM CHLORIDE, PRESERVATIVE FREE 10 ML: 5 INJECTION INTRAVENOUS at 20:59

## 2023-09-29 RX ADMIN — POTASSIUM CHLORIDE 10 MEQ: 750 TABLET, EXTENDED RELEASE ORAL at 10:37

## 2023-09-29 RX ADMIN — ENOXAPARIN SODIUM 30 MG: 100 INJECTION SUBCUTANEOUS at 09:00

## 2023-09-29 RX ADMIN — SODIUM CHLORIDE, PRESERVATIVE FREE 10 ML: 5 INJECTION INTRAVENOUS at 10:43

## 2023-09-29 RX ADMIN — ARFORMOTEROL TARTRATE 15 MCG: 15 SOLUTION RESPIRATORY (INHALATION) at 21:32

## 2023-09-29 RX ADMIN — BUDESONIDE 250 MCG: 0.25 INHALANT RESPIRATORY (INHALATION) at 08:14

## 2023-09-29 RX ADMIN — SODIUM CHLORIDE, PRESERVATIVE FREE 10 ML: 5 INJECTION INTRAVENOUS at 10:39

## 2023-09-29 RX ADMIN — FLUTICASONE PROPIONATE 2 SPRAY: 50 SPRAY, METERED NASAL at 10:37

## 2023-09-29 RX ADMIN — PANTOPRAZOLE SODIUM 40 MG: 40 TABLET, DELAYED RELEASE ORAL at 06:49

## 2023-09-29 RX ADMIN — TRAZODONE HYDROCHLORIDE 50 MG: 50 TABLET ORAL at 21:01

## 2023-09-29 RX ADMIN — GABAPENTIN 300 MG: 300 CAPSULE ORAL at 21:01

## 2023-09-29 RX ADMIN — Medication 1 MG: at 10:37

## 2023-09-29 ASSESSMENT — PAIN SCALES - GENERAL
PAINLEVEL_OUTOF10: 0

## 2023-09-29 NOTE — CARE COORDINATION
Care Management Initial Assessment       RUR:  N/A Observation status  Readmission? No  1st IM letter given? No  1st  letter given: No       09/29/23 1447   Service Assessment   Cognition Alert   History Provided By Patient   Primary Caregiver [address] Family Members   Patient's Healthcare Decision Maker is: Legal Next of [facility]   PCP Verified by CM Yes   Last Visit to PCP Within last 3 months   Prior Functional Level Assistance with the following:;Dressing; Bathing; Toileting;Cooking;Housework; Mobility   Current Functional Level Assistance with the following:;Dressing; Bathing; Toileting;Mobility;Housework;Cooking   Can patient return to prior living arrangement Yes   Ability to make needs known: Good   Family able to assist with home care needs: Yes   Would you like for me to discuss the discharge plan with any other family members/significant others, and if so, who? Yes   Financial Resources Medicare;Medicaid; Food [place]   Social/Functional History   Lives With Family   Type of Home House   Home Layout Two level   Home Access Stairs to enter with rails   Entrance Stairs - Number of Steps 6   Entrance Stairs - Rails Both   Bathroom Shower/Tub Tub/Shower unit; Shower chair with back   [facility] chair   Bathroom Accessibility Walker accessible   [address] Help From Family   ADL Assistance Needs assistance   Bath Contact guard assistance   Dressing Contact guard assistance   Grooming Contact guard assistance   Feeding Independent   Toileting Needs assistance   Homemaking Assistance Needs assistance   Meal Prep Moderate   Laundry Maximal   Vacuuming Maximal   Cleaning Maximal   Gardening Maximal   Yard Work Maximal   Driving Total   Shopping Minimal   Homemaking Responsibilities No   Ambulation Assistance Needs assistance   Transfer Assistance Needs assistance   Active  No   Patient's  Info Daughter-in-law   Mode of

## 2023-09-30 ENCOUNTER — APPOINTMENT (OUTPATIENT)
Facility: HOSPITAL | Age: 68
DRG: 556 | End: 2023-09-30
Payer: MEDICARE

## 2023-09-30 LAB
ALDOLASE SERPL-CCNC: 21.5 U/L (ref 3.3–10.3)
ANION GAP SERPL CALC-SCNC: 4 MMOL/L (ref 5–15)
BASOPHILS # BLD: 0 K/UL (ref 0–0.1)
BASOPHILS NFR BLD: 0 % (ref 0–1)
BUN SERPL-MCNC: 23 MG/DL (ref 6–20)
BUN/CREAT SERPL: 26 (ref 12–20)
CALCIUM SERPL-MCNC: 8.2 MG/DL (ref 8.5–10.1)
CHLORIDE SERPL-SCNC: 111 MMOL/L (ref 97–108)
CK SERPL-CCNC: 1622 U/L (ref 26–192)
CO2 SERPL-SCNC: 23 MMOL/L (ref 21–32)
COMMENT:: NORMAL
CREAT SERPL-MCNC: 0.88 MG/DL (ref 0.55–1.02)
DIFFERENTIAL METHOD BLD: ABNORMAL
EOSINOPHIL # BLD: 0 K/UL (ref 0–0.4)
EOSINOPHIL NFR BLD: 0 % (ref 0–7)
ERYTHROCYTE [DISTWIDTH] IN BLOOD BY AUTOMATED COUNT: 14.8 % (ref 11.5–14.5)
FERRITIN SERPL-MCNC: 242 NG/ML (ref 8–252)
GLUCOSE BLD STRIP.AUTO-MCNC: 235 MG/DL (ref 65–117)
GLUCOSE BLD STRIP.AUTO-MCNC: 261 MG/DL (ref 65–117)
GLUCOSE BLD STRIP.AUTO-MCNC: 261 MG/DL (ref 65–117)
GLUCOSE BLD STRIP.AUTO-MCNC: 331 MG/DL (ref 65–117)
GLUCOSE SERPL-MCNC: 302 MG/DL (ref 65–100)
HAPTOGLOB SERPL-MCNC: 188 MG/DL (ref 30–200)
HBV SURFACE AG SER QL: <0.1 INDEX
HBV SURFACE AG SER QL: NEGATIVE
HCT VFR BLD AUTO: 30.9 % (ref 35–47)
HCV AB SER IA-ACNC: 0.11 INDEX
HCV AB SERPL QL IA: NONREACTIVE
HGB BLD-MCNC: 9.6 G/DL (ref 11.5–16)
IMM GRANULOCYTES # BLD AUTO: 0.1 K/UL (ref 0–0.04)
IMM GRANULOCYTES NFR BLD AUTO: 2 % (ref 0–0.5)
IRON SATN MFR SERPL: 26 % (ref 20–50)
IRON SERPL-MCNC: 29 UG/DL (ref 35–150)
LDH SERPL L TO P-CCNC: 411 U/L (ref 81–246)
LYMPHOCYTES # BLD: 1.4 K/UL (ref 0.8–3.5)
LYMPHOCYTES NFR BLD: 30 % (ref 12–49)
MAGNESIUM SERPL-MCNC: 1.7 MG/DL (ref 1.6–2.4)
MCH RBC QN AUTO: 27.2 PG (ref 26–34)
MCHC RBC AUTO-ENTMCNC: 31.1 G/DL (ref 30–36.5)
MCV RBC AUTO: 87.5 FL (ref 80–99)
MONOCYTES # BLD: 0.1 K/UL (ref 0–1)
MONOCYTES NFR BLD: 2 % (ref 5–13)
NEUTS SEG # BLD: 3.1 K/UL (ref 1.8–8)
NEUTS SEG NFR BLD: 66 % (ref 32–75)
NRBC # BLD: 0 K/UL (ref 0–0.01)
NRBC BLD-RTO: 0 PER 100 WBC
PLATELET # BLD AUTO: 315 K/UL (ref 150–400)
PMV BLD AUTO: 9.3 FL (ref 8.9–12.9)
POTASSIUM SERPL-SCNC: 5 MMOL/L (ref 3.5–5.1)
RBC # BLD AUTO: 3.53 M/UL (ref 3.8–5.2)
SERVICE CMNT-IMP: ABNORMAL
SODIUM SERPL-SCNC: 138 MMOL/L (ref 136–145)
SPECIMEN HOLD: NORMAL
TIBC SERPL-MCNC: 111 UG/DL (ref 250–450)
WBC # BLD AUTO: 4.7 K/UL (ref 3.6–11)

## 2023-09-30 PROCEDURE — 83540 ASSAY OF IRON: CPT

## 2023-09-30 PROCEDURE — 83010 ASSAY OF HAPTOGLOBIN QUANT: CPT

## 2023-09-30 PROCEDURE — 6370000000 HC RX 637 (ALT 250 FOR IP): Performed by: HOSPITALIST

## 2023-09-30 PROCEDURE — 82550 ASSAY OF CK (CPK): CPT

## 2023-09-30 PROCEDURE — 6360000002 HC RX W HCPCS: Performed by: HOSPITALIST

## 2023-09-30 PROCEDURE — 6360000004 HC RX CONTRAST MEDICATION: Performed by: RADIOLOGY

## 2023-09-30 PROCEDURE — 86235 NUCLEAR ANTIGEN ANTIBODY: CPT

## 2023-09-30 PROCEDURE — 86037 ANCA TITER EACH ANTIBODY: CPT

## 2023-09-30 PROCEDURE — 85025 COMPLETE CBC W/AUTO DIFF WBC: CPT

## 2023-09-30 PROCEDURE — 2580000003 HC RX 258: Performed by: HOSPITALIST

## 2023-09-30 PROCEDURE — 36415 COLL VENOUS BLD VENIPUNCTURE: CPT

## 2023-09-30 PROCEDURE — 1100000003 HC PRIVATE W/ TELEMETRY

## 2023-09-30 PROCEDURE — 86038 ANTINUCLEAR ANTIBODIES: CPT

## 2023-09-30 PROCEDURE — 86704 HEP B CORE ANTIBODY TOTAL: CPT

## 2023-09-30 PROCEDURE — 80048 BASIC METABOLIC PNL TOTAL CA: CPT

## 2023-09-30 PROCEDURE — 87340 HEPATITIS B SURFACE AG IA: CPT

## 2023-09-30 PROCEDURE — 82728 ASSAY OF FERRITIN: CPT

## 2023-09-30 PROCEDURE — 82962 GLUCOSE BLOOD TEST: CPT

## 2023-09-30 PROCEDURE — 82595 ASSAY OF CRYOGLOBULIN: CPT

## 2023-09-30 PROCEDURE — 86015 ACTIN ANTIBODY EACH: CPT

## 2023-09-30 PROCEDURE — 83615 LACTATE (LD) (LDH) ENZYME: CPT

## 2023-09-30 PROCEDURE — 2060000000 HC ICU INTERMEDIATE R&B

## 2023-09-30 PROCEDURE — 83735 ASSAY OF MAGNESIUM: CPT

## 2023-09-30 PROCEDURE — 86225 DNA ANTIBODY NATIVE: CPT

## 2023-09-30 PROCEDURE — 83516 IMMUNOASSAY NONANTIBODY: CPT

## 2023-09-30 PROCEDURE — 86803 HEPATITIS C AB TEST: CPT

## 2023-09-30 PROCEDURE — 72141 MRI NECK SPINE W/O DYE: CPT

## 2023-09-30 PROCEDURE — 83550 IRON BINDING TEST: CPT

## 2023-09-30 PROCEDURE — 94640 AIRWAY INHALATION TREATMENT: CPT

## 2023-09-30 PROCEDURE — 99232 SBSQ HOSP IP/OBS MODERATE 35: CPT | Performed by: PSYCHIATRY & NEUROLOGY

## 2023-09-30 PROCEDURE — 71260 CT THORAX DX C+: CPT

## 2023-09-30 RX ORDER — LORAZEPAM 0.5 MG/1
0.5 TABLET ORAL
Status: COMPLETED | OUTPATIENT
Start: 2023-09-30 | End: 2023-09-30

## 2023-09-30 RX ADMIN — TRAZODONE HYDROCHLORIDE 50 MG: 50 TABLET ORAL at 22:03

## 2023-09-30 RX ADMIN — Medication 4 UNITS: at 08:30

## 2023-09-30 RX ADMIN — METHYLPREDNISOLONE SODIUM SUCCINATE 1000 MG: 1 INJECTION INTRAMUSCULAR; INTRAVENOUS at 09:00

## 2023-09-30 RX ADMIN — LORAZEPAM 0.5 MG: 0.5 TABLET ORAL at 13:26

## 2023-09-30 RX ADMIN — Medication 8 UNITS: at 17:30

## 2023-09-30 RX ADMIN — FLUTICASONE PROPIONATE 2 SPRAY: 50 SPRAY, METERED NASAL at 10:07

## 2023-09-30 RX ADMIN — ENOXAPARIN SODIUM 30 MG: 100 INJECTION SUBCUTANEOUS at 09:30

## 2023-09-30 RX ADMIN — SODIUM CHLORIDE, PRESERVATIVE FREE 10 ML: 5 INJECTION INTRAVENOUS at 22:02

## 2023-09-30 RX ADMIN — ARFORMOTEROL TARTRATE 15 MCG: 15 SOLUTION RESPIRATORY (INHALATION) at 07:03

## 2023-09-30 RX ADMIN — Medication 40 UNITS: at 09:00

## 2023-09-30 RX ADMIN — ENOXAPARIN SODIUM 30 MG: 100 INJECTION SUBCUTANEOUS at 21:30

## 2023-09-30 RX ADMIN — GABAPENTIN 300 MG: 300 CAPSULE ORAL at 21:30

## 2023-09-30 RX ADMIN — Medication 1 MG: at 10:05

## 2023-09-30 RX ADMIN — BUDESONIDE 250 MCG: 0.25 INHALANT RESPIRATORY (INHALATION) at 07:03

## 2023-09-30 RX ADMIN — CYANOCOBALAMIN TAB 500 MCG 1000 MCG: 500 TAB at 10:07

## 2023-09-30 RX ADMIN — SODIUM CHLORIDE, PRESERVATIVE FREE 10 ML: 5 INJECTION INTRAVENOUS at 09:00

## 2023-09-30 RX ADMIN — PANTOPRAZOLE SODIUM 40 MG: 40 TABLET, DELAYED RELEASE ORAL at 06:30

## 2023-09-30 RX ADMIN — Medication 12 UNITS: at 12:00

## 2023-09-30 RX ADMIN — ASPIRIN 81 MG CHEWABLE TABLET 81 MG: 81 TABLET CHEWABLE at 10:12

## 2023-09-30 RX ADMIN — TIZANIDINE 4 MG: 4 TABLET ORAL at 22:04

## 2023-09-30 RX ADMIN — ACETAMINOPHEN 650 MG: 325 TABLET ORAL at 22:03

## 2023-09-30 RX ADMIN — IOPAMIDOL 100 ML: 755 INJECTION, SOLUTION INTRAVENOUS at 18:46

## 2023-09-30 RX ADMIN — ACETAMINOPHEN 650 MG: 325 TABLET ORAL at 10:05

## 2023-09-30 RX ADMIN — POTASSIUM CHLORIDE 10 MEQ: 750 TABLET, EXTENDED RELEASE ORAL at 10:05

## 2023-09-30 ASSESSMENT — PAIN SCALES - GENERAL
PAINLEVEL_OUTOF10: 0
PAINLEVEL_OUTOF10: 3

## 2023-09-30 ASSESSMENT — PAIN DESCRIPTION - LOCATION: LOCATION: HEAD

## 2023-10-01 LAB
ALBUMIN SERPL-MCNC: 2.4 G/DL (ref 3.5–5)
ALBUMIN/GLOB SERPL: 0.5 (ref 1.1–2.2)
ALP SERPL-CCNC: 64 U/L (ref 45–117)
ALT SERPL-CCNC: 57 U/L (ref 12–78)
ANION GAP SERPL CALC-SCNC: 5 MMOL/L (ref 5–15)
AST SERPL-CCNC: 55 U/L (ref 15–37)
BASOPHILS # BLD: 0 K/UL (ref 0–0.1)
BASOPHILS NFR BLD: 0 % (ref 0–1)
BILIRUB SERPL-MCNC: 0.3 MG/DL (ref 0.2–1)
BUN SERPL-MCNC: 26 MG/DL (ref 6–20)
BUN/CREAT SERPL: 33 (ref 12–20)
CALCIUM SERPL-MCNC: 8.6 MG/DL (ref 8.5–10.1)
CHLORIDE SERPL-SCNC: 109 MMOL/L (ref 97–108)
CK SERPL-CCNC: 696 U/L (ref 26–192)
CO2 SERPL-SCNC: 22 MMOL/L (ref 21–32)
CREAT SERPL-MCNC: 0.8 MG/DL (ref 0.55–1.02)
DIFFERENTIAL METHOD BLD: ABNORMAL
EOSINOPHIL # BLD: 0 K/UL (ref 0–0.4)
EOSINOPHIL NFR BLD: 0 % (ref 0–7)
ERYTHROCYTE [DISTWIDTH] IN BLOOD BY AUTOMATED COUNT: 15.1 % (ref 11.5–14.5)
GLOBULIN SER CALC-MCNC: 4.4 G/DL (ref 2–4)
GLUCOSE BLD STRIP.AUTO-MCNC: 212 MG/DL (ref 65–117)
GLUCOSE BLD STRIP.AUTO-MCNC: 286 MG/DL (ref 65–117)
GLUCOSE BLD STRIP.AUTO-MCNC: 291 MG/DL (ref 65–117)
GLUCOSE BLD STRIP.AUTO-MCNC: 379 MG/DL (ref 65–117)
GLUCOSE SERPL-MCNC: 237 MG/DL (ref 65–100)
HCT VFR BLD AUTO: 32.1 % (ref 35–47)
HGB BLD-MCNC: 9.9 G/DL (ref 11.5–16)
IMM GRANULOCYTES # BLD AUTO: 0.1 K/UL (ref 0–0.04)
IMM GRANULOCYTES NFR BLD AUTO: 2 % (ref 0–0.5)
LYMPHOCYTES # BLD: 1.3 K/UL (ref 0.8–3.5)
LYMPHOCYTES NFR BLD: 15 % (ref 12–49)
MCH RBC QN AUTO: 26.8 PG (ref 26–34)
MCHC RBC AUTO-ENTMCNC: 30.8 G/DL (ref 30–36.5)
MCV RBC AUTO: 87 FL (ref 80–99)
MONOCYTES # BLD: 0.6 K/UL (ref 0–1)
MONOCYTES NFR BLD: 7 % (ref 5–13)
NEUTS SEG # BLD: 6.3 K/UL (ref 1.8–8)
NEUTS SEG NFR BLD: 76 % (ref 32–75)
NRBC # BLD: 0 K/UL (ref 0–0.01)
NRBC BLD-RTO: 0 PER 100 WBC
PLATELET # BLD AUTO: 334 K/UL (ref 150–400)
PMV BLD AUTO: 9.4 FL (ref 8.9–12.9)
POTASSIUM SERPL-SCNC: 5.1 MMOL/L (ref 3.5–5.1)
PROT SERPL-MCNC: 6.8 G/DL (ref 6.4–8.2)
RBC # BLD AUTO: 3.69 M/UL (ref 3.8–5.2)
SERVICE CMNT-IMP: ABNORMAL
SODIUM SERPL-SCNC: 136 MMOL/L (ref 136–145)
WBC # BLD AUTO: 8.2 K/UL (ref 3.6–11)

## 2023-10-01 PROCEDURE — 6370000000 HC RX 637 (ALT 250 FOR IP): Performed by: HOSPITALIST

## 2023-10-01 PROCEDURE — 85025 COMPLETE CBC W/AUTO DIFF WBC: CPT

## 2023-10-01 PROCEDURE — 6360000002 HC RX W HCPCS: Performed by: HOSPITALIST

## 2023-10-01 PROCEDURE — 82550 ASSAY OF CK (CPK): CPT

## 2023-10-01 PROCEDURE — 2580000003 HC RX 258: Performed by: HOSPITALIST

## 2023-10-01 PROCEDURE — 36415 COLL VENOUS BLD VENIPUNCTURE: CPT

## 2023-10-01 PROCEDURE — 82962 GLUCOSE BLOOD TEST: CPT

## 2023-10-01 PROCEDURE — 80053 COMPREHEN METABOLIC PANEL: CPT

## 2023-10-01 PROCEDURE — 2060000000 HC ICU INTERMEDIATE R&B

## 2023-10-01 PROCEDURE — 82595 ASSAY OF CRYOGLOBULIN: CPT

## 2023-10-01 PROCEDURE — 94640 AIRWAY INHALATION TREATMENT: CPT

## 2023-10-01 PROCEDURE — 99231 SBSQ HOSP IP/OBS SF/LOW 25: CPT | Performed by: PSYCHIATRY & NEUROLOGY

## 2023-10-01 PROCEDURE — 1100000003 HC PRIVATE W/ TELEMETRY

## 2023-10-01 RX ORDER — PROPRANOLOL HYDROCHLORIDE 10 MG/1
40 TABLET ORAL DAILY
Status: DISCONTINUED | OUTPATIENT
Start: 2023-10-01 | End: 2023-10-05 | Stop reason: HOSPADM

## 2023-10-01 RX ORDER — CLONIDINE HYDROCHLORIDE 0.1 MG/1
0.1 TABLET ORAL 2 TIMES DAILY
Status: DISCONTINUED | OUTPATIENT
Start: 2023-10-01 | End: 2023-10-05 | Stop reason: HOSPADM

## 2023-10-01 RX ORDER — DULOXETIN HYDROCHLORIDE 30 MG/1
60 CAPSULE, DELAYED RELEASE ORAL DAILY
Status: DISCONTINUED | OUTPATIENT
Start: 2023-10-01 | End: 2023-10-05 | Stop reason: HOSPADM

## 2023-10-01 RX ORDER — PREGABALIN 75 MG/1
150 CAPSULE ORAL 2 TIMES DAILY
Status: DISCONTINUED | OUTPATIENT
Start: 2023-10-01 | End: 2023-10-05 | Stop reason: HOSPADM

## 2023-10-01 RX ADMIN — DULOXETINE HYDROCHLORIDE 60 MG: 30 CAPSULE, DELAYED RELEASE ORAL at 11:06

## 2023-10-01 RX ADMIN — ARFORMOTEROL TARTRATE 15 MCG: 15 SOLUTION RESPIRATORY (INHALATION) at 21:27

## 2023-10-01 RX ADMIN — Medication 8 UNITS: at 12:30

## 2023-10-01 RX ADMIN — ENOXAPARIN SODIUM 30 MG: 100 INJECTION SUBCUTANEOUS at 09:00

## 2023-10-01 RX ADMIN — SODIUM CHLORIDE, PRESERVATIVE FREE 10 ML: 5 INJECTION INTRAVENOUS at 09:00

## 2023-10-01 RX ADMIN — CLONIDINE HYDROCHLORIDE 0.1 MG: 0.1 TABLET ORAL at 11:09

## 2023-10-01 RX ADMIN — SODIUM CHLORIDE, PRESERVATIVE FREE 10 ML: 5 INJECTION INTRAVENOUS at 21:05

## 2023-10-01 RX ADMIN — FLUTICASONE PROPIONATE 2 SPRAY: 50 SPRAY, METERED NASAL at 09:00

## 2023-10-01 RX ADMIN — ASPIRIN 81 MG CHEWABLE TABLET 81 MG: 81 TABLET CHEWABLE at 09:00

## 2023-10-01 RX ADMIN — PREGABALIN 150 MG: 75 CAPSULE ORAL at 21:04

## 2023-10-01 RX ADMIN — PANTOPRAZOLE SODIUM 40 MG: 40 TABLET, DELAYED RELEASE ORAL at 07:27

## 2023-10-01 RX ADMIN — CLONIDINE HYDROCHLORIDE 0.1 MG: 0.1 TABLET ORAL at 21:04

## 2023-10-01 RX ADMIN — Medication 4 UNITS: at 09:00

## 2023-10-01 RX ADMIN — Medication 40 UNITS: at 09:00

## 2023-10-01 RX ADMIN — ENOXAPARIN SODIUM 30 MG: 100 INJECTION SUBCUTANEOUS at 21:04

## 2023-10-01 RX ADMIN — CYANOCOBALAMIN TAB 500 MCG 1000 MCG: 500 TAB at 09:00

## 2023-10-01 RX ADMIN — Medication 16 UNITS: at 17:00

## 2023-10-01 RX ADMIN — METHYLPREDNISOLONE SODIUM SUCCINATE 1000 MG: 1 INJECTION INTRAMUSCULAR; INTRAVENOUS at 09:00

## 2023-10-01 RX ADMIN — BUDESONIDE 250 MCG: 0.25 INHALANT RESPIRATORY (INHALATION) at 21:27

## 2023-10-01 RX ADMIN — SODIUM CHLORIDE, PRESERVATIVE FREE 10 ML: 5 INJECTION INTRAVENOUS at 21:04

## 2023-10-01 RX ADMIN — POTASSIUM CHLORIDE 10 MEQ: 750 TABLET, EXTENDED RELEASE ORAL at 09:00

## 2023-10-01 RX ADMIN — BUDESONIDE 250 MCG: 0.25 INHALANT RESPIRATORY (INHALATION) at 08:00

## 2023-10-01 RX ADMIN — PREGABALIN 150 MG: 75 CAPSULE ORAL at 10:15

## 2023-10-01 RX ADMIN — PROPRANOLOL HYDROCHLORIDE 40 MG: 10 TABLET ORAL at 11:06

## 2023-10-01 RX ADMIN — ARFORMOTEROL TARTRATE 15 MCG: 15 SOLUTION RESPIRATORY (INHALATION) at 08:00

## 2023-10-01 RX ADMIN — Medication 1 MG: at 09:00

## 2023-10-01 RX ADMIN — TRAZODONE HYDROCHLORIDE 50 MG: 50 TABLET ORAL at 21:04

## 2023-10-01 ASSESSMENT — PAIN SCALES - GENERAL
PAINLEVEL_OUTOF10: 0

## 2023-10-02 PROBLEM — R27.0 ATAXIA: Status: RESOLVED | Noted: 2023-09-27 | Resolved: 2023-10-02

## 2023-10-02 LAB
BACTERIA SPEC CULT: NORMAL
BACTERIA SPEC CULT: NORMAL
ENA SS-A AB SER-ACNC: 0.9 AI (ref 0–0.9)
ENA SS-B AB SER-ACNC: <0.2 AI (ref 0–0.9)
GLUCOSE BLD STRIP.AUTO-MCNC: 228 MG/DL (ref 65–117)
GLUCOSE BLD STRIP.AUTO-MCNC: 293 MG/DL (ref 65–117)
GLUCOSE BLD STRIP.AUTO-MCNC: 295 MG/DL (ref 65–117)
GLUCOSE BLD STRIP.AUTO-MCNC: 336 MG/DL (ref 65–117)
HBV CORE AB SERPL QL IA: NEGATIVE
SERVICE CMNT-IMP: ABNORMAL
SERVICE CMNT-IMP: NORMAL
SERVICE CMNT-IMP: NORMAL
SMA IGG SER-ACNC: 39 UNITS (ref 0–19)
VIT B1 BLD-SCNC: 60.8 NMOL/L (ref 66.5–200)

## 2023-10-02 PROCEDURE — 94640 AIRWAY INHALATION TREATMENT: CPT

## 2023-10-02 PROCEDURE — 6370000000 HC RX 637 (ALT 250 FOR IP): Performed by: HOSPITALIST

## 2023-10-02 PROCEDURE — 97116 GAIT TRAINING THERAPY: CPT

## 2023-10-02 PROCEDURE — 2060000000 HC ICU INTERMEDIATE R&B

## 2023-10-02 PROCEDURE — 1100000003 HC PRIVATE W/ TELEMETRY

## 2023-10-02 PROCEDURE — 6360000002 HC RX W HCPCS: Performed by: HOSPITALIST

## 2023-10-02 PROCEDURE — 2580000003 HC RX 258: Performed by: HOSPITALIST

## 2023-10-02 PROCEDURE — 82962 GLUCOSE BLOOD TEST: CPT

## 2023-10-02 PROCEDURE — 97530 THERAPEUTIC ACTIVITIES: CPT

## 2023-10-02 RX ADMIN — CLONIDINE HYDROCHLORIDE 0.1 MG: 0.1 TABLET ORAL at 09:49

## 2023-10-02 RX ADMIN — ARFORMOTEROL TARTRATE 15 MCG: 15 SOLUTION RESPIRATORY (INHALATION) at 07:19

## 2023-10-02 RX ADMIN — CLONIDINE HYDROCHLORIDE 0.1 MG: 0.1 TABLET ORAL at 21:16

## 2023-10-02 RX ADMIN — Medication 1 MG: at 09:49

## 2023-10-02 RX ADMIN — Medication 12 UNITS: at 12:46

## 2023-10-02 RX ADMIN — Medication 8 UNITS: at 18:17

## 2023-10-02 RX ADMIN — Medication 4 UNITS: at 09:50

## 2023-10-02 RX ADMIN — ENOXAPARIN SODIUM 30 MG: 100 INJECTION SUBCUTANEOUS at 09:50

## 2023-10-02 RX ADMIN — TRAZODONE HYDROCHLORIDE 50 MG: 50 TABLET ORAL at 21:17

## 2023-10-02 RX ADMIN — ENOXAPARIN SODIUM 30 MG: 100 INJECTION SUBCUTANEOUS at 21:16

## 2023-10-02 RX ADMIN — CYANOCOBALAMIN TAB 500 MCG 1000 MCG: 500 TAB at 09:49

## 2023-10-02 RX ADMIN — ARFORMOTEROL TARTRATE 15 MCG: 15 SOLUTION RESPIRATORY (INHALATION) at 21:08

## 2023-10-02 RX ADMIN — SODIUM CHLORIDE, PRESERVATIVE FREE 10 ML: 5 INJECTION INTRAVENOUS at 09:56

## 2023-10-02 RX ADMIN — ASPIRIN 81 MG CHEWABLE TABLET 81 MG: 81 TABLET CHEWABLE at 09:49

## 2023-10-02 RX ADMIN — SODIUM CHLORIDE, PRESERVATIVE FREE 10 ML: 5 INJECTION INTRAVENOUS at 21:17

## 2023-10-02 RX ADMIN — METHYLPREDNISOLONE SODIUM SUCCINATE 1000 MG: 1 INJECTION INTRAMUSCULAR; INTRAVENOUS at 11:03

## 2023-10-02 RX ADMIN — Medication 40 UNITS: at 09:50

## 2023-10-02 RX ADMIN — PROPRANOLOL HYDROCHLORIDE 40 MG: 10 TABLET ORAL at 09:49

## 2023-10-02 RX ADMIN — PREGABALIN 150 MG: 75 CAPSULE ORAL at 21:16

## 2023-10-02 RX ADMIN — PANTOPRAZOLE SODIUM 40 MG: 40 TABLET, DELAYED RELEASE ORAL at 08:17

## 2023-10-02 RX ADMIN — BUDESONIDE 250 MCG: 0.25 INHALANT RESPIRATORY (INHALATION) at 07:19

## 2023-10-02 RX ADMIN — BUDESONIDE 250 MCG: 0.25 INHALANT RESPIRATORY (INHALATION) at 21:08

## 2023-10-02 RX ADMIN — PREGABALIN 150 MG: 75 CAPSULE ORAL at 09:50

## 2023-10-02 RX ADMIN — POTASSIUM CHLORIDE 10 MEQ: 750 TABLET, EXTENDED RELEASE ORAL at 09:49

## 2023-10-02 RX ADMIN — DULOXETINE HYDROCHLORIDE 60 MG: 30 CAPSULE, DELAYED RELEASE ORAL at 09:49

## 2023-10-02 ASSESSMENT — PAIN SCALES - GENERAL
PAINLEVEL_OUTOF10: 0

## 2023-10-02 NOTE — CARE COORDINATION
Transition of Care Plan:    RUR: 13%  Prior Level of Functioning: Pt needed assistance with her ADL's. Disposition: IPR vs SNF  If SNF or IPR: Date FOC offered: 10/2/23  Date [address] received: 10/02/23  Accepting facility: pending referrals  Date authorization started with reference number: TBD  Date authorization received and expires: Follow up appointments: PCP, neurology  DME needed: n/a  Transportation at discharge: BLS vs WC Annelise Riddle  IM/[facility] Medicare/ letter given:   Is patient a  and connected with VA? If yes, was [employer] transfer form completed and VA notified? Caregiver Contact: Mercy Dykes [phone]  Discharge Caregiver contacted prior to discharge? yes  Care Conference needed? no  Barriers to discharge:  Accepting facility, insurance auth, medical stability    KEVIN noted that therapy is recommending IPR for this pt. With the attending physician's permission, KEVIN met with pt to discuss and to offer choice. She would like a referral to be sent to [facility]. Given that pt's insurance does not like to approve, IPR, CM also received choice for SNF. She wants that referral to be sent to [facility]. CM sent referrals to [facility] and [facility] SNF.   Lucero Bhagat

## 2023-10-03 LAB
ANA SER QL: POSITIVE
CENTROMERE B AB SER-ACNC: 2 AI (ref 0–0.9)
CHROMATIN AB SERPL-ACNC: 0.8 AI (ref 0–0.9)
DSDNA AB SER-ACNC: 4 IU/ML (ref 0–9)
ENA JO1 AB SER-ACNC: >8 AI (ref 0–0.9)
ENA RNP AB SER-ACNC: 2.1 AI (ref 0–0.9)
ENA SCL70 AB SER-ACNC: <0.2 AI (ref 0–0.9)
ENA SM AB SER-ACNC: 0.2 AI (ref 0–0.9)
ENA SS-A AB SER-ACNC: 1 AI (ref 0–0.9)
ENA SS-B AB SER-ACNC: <0.2 AI (ref 0–0.9)
GLUCOSE BLD STRIP.AUTO-MCNC: 198 MG/DL (ref 65–117)
GLUCOSE BLD STRIP.AUTO-MCNC: 276 MG/DL (ref 65–117)
GLUCOSE BLD STRIP.AUTO-MCNC: 295 MG/DL (ref 65–117)
GLUCOSE BLD STRIP.AUTO-MCNC: 318 MG/DL (ref 65–117)
Lab: ABNORMAL
SERVICE CMNT-IMP: ABNORMAL

## 2023-10-03 PROCEDURE — 6370000000 HC RX 637 (ALT 250 FOR IP): Performed by: HOSPITALIST

## 2023-10-03 PROCEDURE — 6360000002 HC RX W HCPCS: Performed by: HOSPITALIST

## 2023-10-03 PROCEDURE — 1100000003 HC PRIVATE W/ TELEMETRY

## 2023-10-03 PROCEDURE — 2700000000 HC OXYGEN THERAPY PER DAY

## 2023-10-03 PROCEDURE — 97535 SELF CARE MNGMENT TRAINING: CPT

## 2023-10-03 PROCEDURE — 2060000000 HC ICU INTERMEDIATE R&B

## 2023-10-03 PROCEDURE — 82962 GLUCOSE BLOOD TEST: CPT

## 2023-10-03 PROCEDURE — 94640 AIRWAY INHALATION TREATMENT: CPT

## 2023-10-03 PROCEDURE — 2580000003 HC RX 258: Performed by: HOSPITALIST

## 2023-10-03 PROCEDURE — 94760 N-INVAS EAR/PLS OXIMETRY 1: CPT

## 2023-10-03 PROCEDURE — 97116 GAIT TRAINING THERAPY: CPT

## 2023-10-03 PROCEDURE — 99231 SBSQ HOSP IP/OBS SF/LOW 25: CPT | Performed by: PSYCHIATRY & NEUROLOGY

## 2023-10-03 RX ADMIN — POTASSIUM CHLORIDE 10 MEQ: 750 TABLET, EXTENDED RELEASE ORAL at 09:31

## 2023-10-03 RX ADMIN — PREGABALIN 150 MG: 75 CAPSULE ORAL at 08:51

## 2023-10-03 RX ADMIN — SODIUM CHLORIDE: 9 INJECTION, SOLUTION INTRAVENOUS at 11:31

## 2023-10-03 RX ADMIN — Medication 12 UNITS: at 17:08

## 2023-10-03 RX ADMIN — ASPIRIN 81 MG CHEWABLE TABLET 81 MG: 81 TABLET CHEWABLE at 08:44

## 2023-10-03 RX ADMIN — FLUTICASONE PROPIONATE 2 SPRAY: 50 SPRAY, METERED NASAL at 08:52

## 2023-10-03 RX ADMIN — ARFORMOTEROL TARTRATE 15 MCG: 15 SOLUTION RESPIRATORY (INHALATION) at 21:37

## 2023-10-03 RX ADMIN — METHYLPREDNISOLONE SODIUM SUCCINATE 1000 MG: 1 INJECTION INTRAMUSCULAR; INTRAVENOUS at 11:40

## 2023-10-03 RX ADMIN — CLONIDINE HYDROCHLORIDE 0.1 MG: 0.1 TABLET ORAL at 08:49

## 2023-10-03 RX ADMIN — Medication 1 MG: at 08:49

## 2023-10-03 RX ADMIN — ENOXAPARIN SODIUM 30 MG: 100 INJECTION SUBCUTANEOUS at 08:52

## 2023-10-03 RX ADMIN — PANTOPRAZOLE SODIUM 40 MG: 40 TABLET, DELAYED RELEASE ORAL at 06:21

## 2023-10-03 RX ADMIN — ENOXAPARIN SODIUM 30 MG: 100 INJECTION SUBCUTANEOUS at 22:54

## 2023-10-03 RX ADMIN — CYANOCOBALAMIN TAB 500 MCG 1000 MCG: 500 TAB at 08:46

## 2023-10-03 RX ADMIN — PROPRANOLOL HYDROCHLORIDE 40 MG: 10 TABLET ORAL at 08:47

## 2023-10-03 RX ADMIN — TRAZODONE HYDROCHLORIDE 50 MG: 50 TABLET ORAL at 22:53

## 2023-10-03 RX ADMIN — Medication 8 UNITS: at 08:41

## 2023-10-03 RX ADMIN — ARFORMOTEROL TARTRATE 15 MCG: 15 SOLUTION RESPIRATORY (INHALATION) at 07:53

## 2023-10-03 RX ADMIN — CLONIDINE HYDROCHLORIDE 0.1 MG: 0.1 TABLET ORAL at 22:53

## 2023-10-03 RX ADMIN — DULOXETINE HYDROCHLORIDE 60 MG: 30 CAPSULE, DELAYED RELEASE ORAL at 08:57

## 2023-10-03 RX ADMIN — SODIUM CHLORIDE, PRESERVATIVE FREE 10 ML: 5 INJECTION INTRAVENOUS at 22:55

## 2023-10-03 RX ADMIN — PREGABALIN 150 MG: 75 CAPSULE ORAL at 22:54

## 2023-10-03 RX ADMIN — Medication 40 UNITS: at 08:43

## 2023-10-03 RX ADMIN — BUDESONIDE 250 MCG: 0.25 INHALANT RESPIRATORY (INHALATION) at 21:37

## 2023-10-03 ASSESSMENT — ENCOUNTER SYMPTOMS
GASTROINTESTINAL NEGATIVE: 1
EYES NEGATIVE: 1
RESPIRATORY NEGATIVE: 1

## 2023-10-03 ASSESSMENT — PAIN DESCRIPTION - FREQUENCY: FREQUENCY: INTERMITTENT

## 2023-10-03 ASSESSMENT — PAIN DESCRIPTION - DESCRIPTORS: DESCRIPTORS: SORE

## 2023-10-03 ASSESSMENT — PAIN SCALES - GENERAL
PAINLEVEL_OUTOF10: 0

## 2023-10-03 ASSESSMENT — PULMONARY FUNCTION TESTS: PEFR_L/MIN: 17

## 2023-10-03 ASSESSMENT — PAIN SCALES - WONG BAKER
WONGBAKER_NUMERICALRESPONSE: 0

## 2023-10-03 ASSESSMENT — PAIN DESCRIPTION - LOCATION: LOCATION: BACK;NECK

## 2023-10-03 ASSESSMENT — PAIN - FUNCTIONAL ASSESSMENT: PAIN_FUNCTIONAL_ASSESSMENT: ACTIVITIES ARE NOT PREVENTED

## 2023-10-03 ASSESSMENT — PAIN DESCRIPTION - PAIN TYPE: TYPE: CHRONIC PAIN

## 2023-10-03 NOTE — CARE COORDINATION
WILTON- D/c to [facility] IPR pending auth. KEVIN received a message from Lary Spencer at [facility] stating that they have accepted this pt pending insurance auth. CM asked her to start the auth today and she said that she has just started it. The reference number is #[id number]. Will follow.  CHRIS Bolden-AL

## 2023-10-04 LAB
C-ANCA TITR SER IF: NORMAL TITER
GLUCOSE BLD STRIP.AUTO-MCNC: 252 MG/DL (ref 65–117)
GLUCOSE BLD STRIP.AUTO-MCNC: 263 MG/DL (ref 65–117)
GLUCOSE BLD STRIP.AUTO-MCNC: 295 MG/DL (ref 65–117)
GLUCOSE BLD STRIP.AUTO-MCNC: 312 MG/DL (ref 65–117)
P-ANCA ATYPICAL TITR SER IF: NORMAL TITER
P-ANCA TITR SER IF: NORMAL TITER
SERVICE CMNT-IMP: ABNORMAL

## 2023-10-04 PROCEDURE — 2060000000 HC ICU INTERMEDIATE R&B

## 2023-10-04 PROCEDURE — 6370000000 HC RX 637 (ALT 250 FOR IP): Performed by: HOSPITALIST

## 2023-10-04 PROCEDURE — 6360000002 HC RX W HCPCS: Performed by: HOSPITALIST

## 2023-10-04 PROCEDURE — 97535 SELF CARE MNGMENT TRAINING: CPT

## 2023-10-04 PROCEDURE — 2580000003 HC RX 258: Performed by: HOSPITALIST

## 2023-10-04 PROCEDURE — 94640 AIRWAY INHALATION TREATMENT: CPT

## 2023-10-04 PROCEDURE — 97110 THERAPEUTIC EXERCISES: CPT

## 2023-10-04 PROCEDURE — 97116 GAIT TRAINING THERAPY: CPT

## 2023-10-04 PROCEDURE — 82962 GLUCOSE BLOOD TEST: CPT

## 2023-10-04 RX ADMIN — POLYETHYLENE GLYCOL 3350 17 G: 17 POWDER, FOR SOLUTION ORAL at 05:59

## 2023-10-04 RX ADMIN — ARFORMOTEROL TARTRATE 15 MCG: 15 SOLUTION RESPIRATORY (INHALATION) at 08:25

## 2023-10-04 RX ADMIN — PREGABALIN 150 MG: 75 CAPSULE ORAL at 08:38

## 2023-10-04 RX ADMIN — Medication 12 UNITS: at 08:37

## 2023-10-04 RX ADMIN — BUDESONIDE 250 MCG: 0.25 INHALANT RESPIRATORY (INHALATION) at 08:25

## 2023-10-04 RX ADMIN — CLONIDINE HYDROCHLORIDE 0.1 MG: 0.1 TABLET ORAL at 08:39

## 2023-10-04 RX ADMIN — Medication 8 UNITS: at 17:32

## 2023-10-04 RX ADMIN — PANTOPRAZOLE SODIUM 40 MG: 40 TABLET, DELAYED RELEASE ORAL at 05:54

## 2023-10-04 RX ADMIN — ASPIRIN 81 MG CHEWABLE TABLET 81 MG: 81 TABLET CHEWABLE at 08:39

## 2023-10-04 RX ADMIN — CYANOCOBALAMIN TAB 500 MCG 1000 MCG: 500 TAB at 08:39

## 2023-10-04 RX ADMIN — PREGABALIN 150 MG: 75 CAPSULE ORAL at 21:38

## 2023-10-04 RX ADMIN — TRAZODONE HYDROCHLORIDE 50 MG: 50 TABLET ORAL at 21:39

## 2023-10-04 RX ADMIN — Medication 1 MG: at 08:39

## 2023-10-04 RX ADMIN — Medication 8 UNITS: at 11:44

## 2023-10-04 RX ADMIN — SODIUM CHLORIDE, PRESERVATIVE FREE 10 ML: 5 INJECTION INTRAVENOUS at 08:46

## 2023-10-04 RX ADMIN — DULOXETINE HYDROCHLORIDE 60 MG: 30 CAPSULE, DELAYED RELEASE ORAL at 08:38

## 2023-10-04 RX ADMIN — BUDESONIDE 250 MCG: 0.25 INHALANT RESPIRATORY (INHALATION) at 19:49

## 2023-10-04 RX ADMIN — ENOXAPARIN SODIUM 30 MG: 100 INJECTION SUBCUTANEOUS at 21:38

## 2023-10-04 RX ADMIN — PREDNISONE 60 MG: 50 TABLET ORAL at 08:38

## 2023-10-04 RX ADMIN — ARFORMOTEROL TARTRATE 15 MCG: 15 SOLUTION RESPIRATORY (INHALATION) at 19:49

## 2023-10-04 RX ADMIN — POTASSIUM CHLORIDE 10 MEQ: 750 TABLET, EXTENDED RELEASE ORAL at 08:38

## 2023-10-04 RX ADMIN — Medication 40 UNITS: at 09:38

## 2023-10-04 RX ADMIN — ACETAMINOPHEN 650 MG: 325 TABLET ORAL at 20:57

## 2023-10-04 RX ADMIN — ENOXAPARIN SODIUM 30 MG: 100 INJECTION SUBCUTANEOUS at 08:39

## 2023-10-04 RX ADMIN — SODIUM CHLORIDE, PRESERVATIVE FREE 10 ML: 5 INJECTION INTRAVENOUS at 08:45

## 2023-10-04 RX ADMIN — CLONIDINE HYDROCHLORIDE 0.1 MG: 0.1 TABLET ORAL at 21:38

## 2023-10-04 RX ADMIN — PROPRANOLOL HYDROCHLORIDE 40 MG: 10 TABLET ORAL at 08:38

## 2023-10-04 ASSESSMENT — PAIN DESCRIPTION - LOCATION: LOCATION: HEAD

## 2023-10-04 ASSESSMENT — PAIN SCALES - GENERAL
PAINLEVEL_OUTOF10: 0
PAINLEVEL_OUTOF10: 5

## 2023-10-04 NOTE — CARE COORDINATION
Transition of Care Plan: Awaiting insurance auth     RUR: 8%  Prior Level of Functioning: Pt needed assistance with her ADL's. Disposition: IPR   If SNF or IPR: Date FOC offered: 10/2/23  Date [facility] received: 10/02/23  Accepting facility: [facility]  Date authorization started with reference 10/3/23 number: [id number]  Date authorization received and expires: TBD  Follow up appointments: PCP, neurology  DME needed: n/a  Transportation at discharge: BLS vs WC Carole Ramirez  IM/[facility] Medicare/ letter given:   Is patient a [place] and connected with VA? If yes, was [employer] transfer form completed and VA notified? Caregiver Contact: Tosha Garcia [phone]  Discharge Caregiver contacted prior to discharge? yes  Care Conference needed? no  Barriers to discharge:  Insurance auth, medical stability    VALERIY De La Rosa MSA, RN, CM

## 2023-10-05 VITALS
DIASTOLIC BLOOD PRESSURE: 71 MMHG | RESPIRATION RATE: 15 BRPM | WEIGHT: 293 LBS | HEART RATE: 61 BPM | TEMPERATURE: 97.8 F | BODY MASS INDEX: 39.68 KG/M2 | HEIGHT: 72 IN | OXYGEN SATURATION: 98 % | SYSTOLIC BLOOD PRESSURE: 114 MMHG

## 2023-10-05 LAB
GLUCOSE BLD STRIP.AUTO-MCNC: 154 MG/DL (ref 65–117)
GLUCOSE BLD STRIP.AUTO-MCNC: 233 MG/DL (ref 65–117)
SERVICE CMNT-IMP: ABNORMAL
SERVICE CMNT-IMP: ABNORMAL

## 2023-10-05 PROCEDURE — 6370000000 HC RX 637 (ALT 250 FOR IP): Performed by: HOSPITALIST

## 2023-10-05 PROCEDURE — 6360000002 HC RX W HCPCS: Performed by: HOSPITALIST

## 2023-10-05 PROCEDURE — 94640 AIRWAY INHALATION TREATMENT: CPT

## 2023-10-05 PROCEDURE — 82962 GLUCOSE BLOOD TEST: CPT

## 2023-10-05 PROCEDURE — 2580000003 HC RX 258: Performed by: HOSPITALIST

## 2023-10-05 RX ORDER — FOLIC ACID 1 MG/1
1 TABLET ORAL DAILY
Qty: 30 TABLET | Refills: 0 | Status: SHIPPED | COMMUNITY
Start: 2023-10-06

## 2023-10-05 RX ORDER — CLONIDINE HYDROCHLORIDE 0.1 MG/1
0.1 TABLET ORAL 2 TIMES DAILY
Qty: 60 TABLET | Refills: 0 | Status: SHIPPED | OUTPATIENT
Start: 2023-10-05 | End: 2023-11-04

## 2023-10-05 RX ORDER — ASPIRIN 81 MG/1
81 TABLET, CHEWABLE ORAL DAILY
Qty: 30 TABLET | Refills: 0 | Status: SHIPPED | COMMUNITY
Start: 2023-10-06

## 2023-10-05 RX ORDER — TIZANIDINE 4 MG/1
4 TABLET ORAL NIGHTLY
Qty: 30 TABLET | Refills: 0 | Status: SHIPPED | OUTPATIENT
Start: 2023-10-05 | End: 2023-11-04

## 2023-10-05 RX ADMIN — DULOXETINE HYDROCHLORIDE 60 MG: 30 CAPSULE, DELAYED RELEASE ORAL at 11:03

## 2023-10-05 RX ADMIN — BUDESONIDE 250 MCG: 0.25 INHALANT RESPIRATORY (INHALATION) at 07:39

## 2023-10-05 RX ADMIN — FLUTICASONE PROPIONATE 2 SPRAY: 50 SPRAY, METERED NASAL at 11:13

## 2023-10-05 RX ADMIN — PANTOPRAZOLE SODIUM 40 MG: 40 TABLET, DELAYED RELEASE ORAL at 07:06

## 2023-10-05 RX ADMIN — PREDNISONE 60 MG: 50 TABLET ORAL at 11:04

## 2023-10-05 RX ADMIN — SODIUM CHLORIDE, PRESERVATIVE FREE 10 ML: 5 INJECTION INTRAVENOUS at 09:32

## 2023-10-05 RX ADMIN — ENOXAPARIN SODIUM 30 MG: 100 INJECTION SUBCUTANEOUS at 11:03

## 2023-10-05 RX ADMIN — POTASSIUM CHLORIDE 10 MEQ: 750 TABLET, EXTENDED RELEASE ORAL at 11:05

## 2023-10-05 RX ADMIN — PREGABALIN 150 MG: 75 CAPSULE ORAL at 11:00

## 2023-10-05 RX ADMIN — SODIUM CHLORIDE, PRESERVATIVE FREE 10 ML: 5 INJECTION INTRAVENOUS at 11:13

## 2023-10-05 RX ADMIN — Medication 4 UNITS: at 12:16

## 2023-10-05 RX ADMIN — ERGOCALCIFEROL 50000 UNITS: 1.25 CAPSULE ORAL at 11:00

## 2023-10-05 RX ADMIN — CYANOCOBALAMIN TAB 500 MCG 1000 MCG: 500 TAB at 11:04

## 2023-10-05 RX ADMIN — Medication 1 MG: at 11:04

## 2023-10-05 RX ADMIN — ARFORMOTEROL TARTRATE 15 MCG: 15 SOLUTION RESPIRATORY (INHALATION) at 07:39

## 2023-10-05 RX ADMIN — CLONIDINE HYDROCHLORIDE 0.1 MG: 0.1 TABLET ORAL at 11:05

## 2023-10-05 RX ADMIN — PROPRANOLOL HYDROCHLORIDE 40 MG: 10 TABLET ORAL at 11:01

## 2023-10-05 RX ADMIN — ASPIRIN 81 MG CHEWABLE TABLET 81 MG: 81 TABLET CHEWABLE at 11:02

## 2023-10-05 RX ADMIN — Medication 40 UNITS: at 11:02

## 2023-10-05 RX ADMIN — SODIUM CHLORIDE, PRESERVATIVE FREE 10 ML: 5 INJECTION INTRAVENOUS at 11:06

## 2023-10-05 ASSESSMENT — PAIN SCALES - GENERAL
PAINLEVEL_OUTOF10: 0
PAINLEVEL_OUTOF10: 0

## 2023-10-05 NOTE — CARE COORDINATION
Transition of Care Plan: Awaiting insurance auth     RUR: 8%  Prior Level of Functioning: Pt needed assistance with her ADL's. Disposition: IPR   If SNF or IPR: Date FOC offered: 10/2/23  Date [address] received: 10/02/23  Accepting facility: [facility]  Date authorization started with reference 10/3/23 number: [id number]  Date authorization received and expires: TBD  Follow up appointments: PCP, neurology  DME needed: n/a  Trans[facility] at discharge: [facility] at 4:30 pm   IM/[facility] Medicare/ letter given: 2nd IMM letter given on 10/5/23  Is patient a [place] and connected with VA? If yes, was [employer] transfer form completed and VA notified? Caregiver Contact: Rimma Chan [phone]  Discharge Caregiver contacted prior to discharge? yes  Care Conference needed? no  Barriers to discharge:  N/a      244 pm-[facility] is unable to transport pt today so  arranged for [facility] to [facility]. [facility] will be here at 4:30 pm.    Initial Note-Pt is clear from a CM standpoint for d/c. Report number to [facility] is [phone]. [facility] to transport pt around 6:30 pm. Trip number is [id number].     Irma Ryan

## 2023-10-05 NOTE — DISCHARGE INSTRUCTIONS
Discharge Instructions       PATIENT ID: Stiven Neely  MRN: [id number]   YOB: 1955    DATE OF ADMISSION: 9/27/2023   DATE OF DISCHARGE: 10/5/2023    PRIMARY CARE PROVIDER: Ambrosio Grace     ATTENDING PHYSICIAN: Taty Diallo MD   DISCHARGING PROVIDER: Taty Diallo MD    To contact this individual call [phone] and ask the  to page. If unavailable ask to be transferred the Adult Hospitalist Department. DISCHARGE DIAGNOSES   Myositis    CONSULTATIONS:   Neurology  Physiatry    PROCEDURES/SURGERIES: * No surgery found *    PENDING TEST RESULTS:   At the time of discharge the following test results are still pending:   Autoimmune/rheumatology work-up    FOLLOW UP APPOINTMENTS:   PCP in 1 to 2 weeks  Rheumatology in 2 to 3 weeks  Neurology in 2 to 3 weeks to schedule and complete EMG    ADDITIONAL CARE RECOMMENDATIONS:   Please take all your medications as prescribed    DIET: cardiac diet    ACTIVITY: activity as tolerated  PT/OT to evaluate and treat; Fall precautions;    WOUND CARE: n/a    EQUIPMENT needed: Per PT/OT      DISCHARGE MEDICATIONS:   See Medication Reconciliation Form    It is important that you take the medication exactly as they are prescribed. Keep your medication in the bottles provided by the pharmacist and keep a list of the medication names, dosages, and times to be taken in your wallet. Do not take other medications without consulting your doctor. NOTIFY YOUR PHYSICIAN FOR ANY OF THE FOLLOWING:   Fever over 101 degrees for 24 hours. Chest pain, shortness of breath, fever, chills, nausea, vomiting, diarrhea, change in mentation, falling, weakness, bleeding. Severe pain or pain not relieved by medications. Or, any other signs or symptoms that you may have questions about.       DISPOSITION:    Home With:   OT  PT  HH  RN      x SNF/Inpatient Rehab/LTAC    Independent/assisted living    Hospice    Other:     CDMP Checked:   Yes IK

## 2023-10-05 NOTE — DISCHARGE SUMMARY
Discharge Summary       PATIENT ID: Boom Ramírez  MRN: [id number]   YOB: 1955    DATE OF ADMISSION: 9/27/2023 12:05 PM    DATE OF DISCHARGE: 10/05/2023  PRIMARY CARE PROVIDER: NADIA Norwood - NP     DISCHARGING PROVIDER: Katia Burch MD    To contact this individual call [phone] and ask the  to page. If unavailable ask to be transferred the Adult Hospitalist Department. CONSULTATIONS: IP CONSULT TO NEUROLOGY  IP CONSULT TO INTENSIVIST  IP CONSULT TO PICC TEAM  IP CONSULT TO RHEUMATOLOGY  IP CONSULT TO PHYSICAL MEDICINE REHAB    PROCEDURES/SURGERIES: * No surgery found *     ADMITTING DIAGNOSES & HOSPITAL COURSE:   HPI:  Patient is a 71-year-old female with a past medical history of hypertension, hyperlipidemia, diabetes mellitus type 2, COPD, and obstructive sleep apnea, who came to the emergency room with chief complaint bilateral lower extremity weakness, that is not new. In the emergency room patient was found to be hypotensive and was admitted for further work-up. HOSPITAL COURSE:  Generalized pain and weakness, worsening  Concern for myositis  Possibly fibromyalgia  -CT head unremarkable  -labs notable for elevated CRP, CK, aldolase.   -Discontinued rosuvastatin;  -Appreciate neurology consult and recommendations;  -Anti-Neumann antibody strongly positive, aldolase 21.5, CK 2315;   -Myositis panel ordered: Cryoglobulins - pending  -Myositis antibody panel pending (anti-Wilmer-1 antibody, Anti-SRP antibody, Anti-Mi2 antibody, Anti-MDA5 antibody, Anti-TIF-1gamma antibody, Anti-HMGCR antibody, anti-ribonucleoprotein (RNP) antibodies , JADIEL, ANCA); Latest Reference Range & Units 09/30/23 16:30   JADIEL Negative   Positive !    Atypical pANCA Neg:<1:20 titer <1:20   dsDNA Ab 0 - 9 IU/mL 4   FOREST WILMER-1 Ab 0.0 - 0.9 AI >8.0 (H)   FOREST SCL-70 Ab 0.0 - 0.9 AI <0.2   Perinuclear (P-ANCA) Neg:<1:20 titer <1:20   Anti-RNP 0.0 - 0.9 AI 2.1 (H)   FOREST Neumann (SM) Ab 0.0 - 0.9 AI 0.2

## 2023-10-05 NOTE — PLAN OF CARE
Problem: Discharge Planning  Goal: Discharge to home or other facility with appropriate resources  10/5/2023 1245 by Soco Denton  Outcome: Progressing  Flowsheets (Taken 10/5/2023 0800)  Discharge to home or other facility with appropriate resources:   Refer to discharge planning if patient needs post-hospital services based on physician order or complex needs related to functional status, cognitive ability or social support system   Arrange for interpreters to assist at discharge as needed   Identify discharge learning needs (meds, wound care, etc)   Arrange for needed discharge resources and transportation as appropriate   Identify barriers to discharge with patient and caregiver  10/5/2023 0231 by Silvina Morgan RN  Outcome: Progressing  Flowsheets (Taken 10/4/2023 2000)  Discharge to home or other facility with appropriate resources: Identify barriers to discharge with patient and caregiver     Problem: Pain  Goal: Verbalizes/displays adequate comfort level or baseline comfort level  10/5/2023 1245 by Soco Denton  Outcome: Progressing  10/5/2023 0231 by Silvina Bryan RN  Outcome: Progressing     Problem: Safety - Adult  Goal: Free from fall injury  10/5/2023 1245 by Soco Denton  Outcome: Progressing  10/5/2023 0231 by Silvina Bryan RN  Outcome: Progressing  Flowsheets (Taken 10/4/2023 2000)  Free From Fall Injury: Instruct family/caregiver on patient safety     Problem: Skin/Tissue Integrity  Goal: Absence of new skin breakdown  Description: 1. Monitor for areas of redness and/or skin breakdown  2. Assess vascular access sites hourly  3. Every 4-6 hours minimum:  Change oxygen saturation probe site  4. Every 4-6 hours:  If on nasal continuous positive airway pressure, respiratory therapy assess nares and determine need for appliance change or resting period.   10/5/2023 1245 by Soco Denton  Outcome: Progressing  10/5/2023 0231 by Abdoulaye Evans RN  Outcome:
Problem: Discharge Planning  Goal: Discharge to home or other facility with appropriate resources  9/28/2023 0911 by Marrianne Severs, RN  Outcome: Progressing  Flowsheets (Taken 9/28/2023 0000)  Discharge to home or other facility with appropriate resources: Identify barriers to discharge with patient and caregiver  9/27/2023 2031 by Rony Hobbs RN  Outcome: Progressing  Flowsheets (Taken 9/27/2023 1738)  Discharge to home or other facility with appropriate resources: Identify barriers to discharge with patient and caregiver     Problem: Pain  Goal: Verbalizes/displays adequate comfort level or baseline comfort level  9/28/2023 0911 by Marrianne Severs, RN  Outcome: Progressing  9/27/2023 2031 by Rony Hobbs RN  Outcome: Progressing  Flowsheets (Taken 9/27/2023 2000)  Verbalizes/displays adequate comfort level or baseline comfort level: Encourage patient to monitor pain and request assistance     Problem: Safety - Adult  Goal: Free from fall injury  9/27/2023 2031 by Rony Hobbs RN  Outcome: Progressing  Flowsheets (Taken 9/27/2023 2000)  Free From Fall Injury: Instruct family/caregiver on patient safety     Problem: Skin/Tissue Integrity  Goal: Absence of new skin breakdown  Description: 1. Monitor for areas of redness and/or skin breakdown  2. Assess vascular access sites hourly  3. Every 4-6 hours minimum:  Change oxygen saturation probe site  4. Every 4-6 hours:  If on nasal continuous positive airway pressure, respiratory therapy assess nares and determine need for appliance change or resting period.   9/28/2023 0911 by Marrianne Severs, RN  Outcome: Progressing  9/27/2023 2031 by Rony Hobbs RN  Outcome: Progressing     Problem: Chronic Conditions and Co-morbidities  Goal: Patient's chronic conditions and co-morbidity symptoms are monitored and maintained or improved  Recent Flowsheet Documentation  Taken 9/28/2023 0000 by Casa Crespo
Problem: Discharge Planning  Goal: Discharge to home or other facility with appropriate resources  9/29/2023 2200 by Davina Martin RN  Outcome: Progressing  Flowsheets (Taken 9/29/2023 2000)  Discharge to home or other facility with appropriate resources: Identify barriers to discharge with patient and caregiver  9/29/2023 1906 by Gabriela Brantley RN  Outcome: Progressing  Flowsheets (Taken 9/29/2023 0800)  Discharge to home or other facility with appropriate resources: Identify barriers to discharge with patient and caregiver     Problem: Pain  Goal: Verbalizes/displays adequate comfort level or baseline comfort level  9/29/2023 2200 by Davina Martin RN  Outcome: Progressing  9/29/2023 1906 by Gabriela Brantley RN  Outcome: Progressing     Problem: Safety - Adult  Goal: Free from fall injury  9/29/2023 2200 by Davina Martin RN  Outcome: Progressing  9/29/2023 1906 by Gabirela Brantley RN  Outcome: Progressing  Flowsheets (Taken 9/29/2023 0800)  Free From Fall Injury: Instruct family/caregiver on patient safety     Problem: Skin/Tissue Integrity  Goal: Absence of new skin breakdown  Description: 1. Monitor for areas of redness and/or skin breakdown  2. Assess vascular access sites hourly  3. Every 4-6 hours minimum:  Change oxygen saturation probe site  4. Every 4-6 hours:  If on nasal continuous positive airway pressure, respiratory therapy assess nares and determine need for appliance change or resting period.   9/29/2023 2200 by Davina Martin RN  Outcome: Progressing  9/29/2023 1906 by Gabriela Brantley RN  Outcome: Progressing     Problem: Chronic Conditions and Co-morbidities  Goal: Patient's chronic conditions and co-morbidity symptoms are monitored and maintained or improved  9/29/2023 2200 by Davina Martin RN  Outcome: Progressing  Flowsheets (Taken 9/29/2023 2000)  Care Plan - Patient's Chronic Conditions and Co-Morbidity Symptoms are Monitored and Maintained or Improved: Monitor and assess patient's chronic
Problem: Discharge Planning  Goal: Discharge to home or other facility with appropriate resources  9/30/2023 1925 by Newt Epley, RN  Outcome: Progressing  9/30/2023 1925 by Newt Epley, RN  Outcome: Progressing  Flowsheets (Taken 9/30/2023 0800)  Discharge to home or other facility with appropriate resources: Identify barriers to discharge with patient and caregiver     Problem: Pain  Goal: Verbalizes/displays adequate comfort level or baseline comfort level  9/30/2023 1925 by Silvina Wills RN  Outcome: Progressing  9/30/2023 1925 by Newt Epley, RN  Outcome: Progressing     Problem: Safety - Adult  Goal: Free from fall injury  Outcome: Progressing  Flowsheets (Taken 9/30/2023 0800)  Free From Fall Injury: Instruct family/caregiver on patient safety     Problem: Skin/Tissue Integrity  Goal: Absence of new skin breakdown  Description: 1. Monitor for areas of redness and/or skin breakdown  2. Assess vascular access sites hourly  3. Every 4-6 hours minimum:  Change oxygen saturation probe site  4. Every 4-6 hours:  If on nasal continuous positive airway pressure, respiratory therapy assess nares and determine need for appliance change or resting period.   9/30/2023 1925 by Newt Epley, RN  Outcome: Progressing  9/30/2023 1925 by Newt Epley, RN  Outcome: Progressing     Problem: Chronic Conditions and Co-morbidities  Goal: Patient's chronic conditions and co-morbidity symptoms are monitored and maintained or improved  Outcome: Progressing  Flowsheets (Taken 9/30/2023 0800)  Care Plan - Patient's Chronic Conditions and Co-Morbidity Symptoms are Monitored and Maintained or Improved: Monitor and assess patient's chronic conditions and comorbid symptoms for stability, deterioration, or improvement
Problem: Discharge Planning  Goal: Discharge to home or other facility with appropriate resources  Outcome: Progressing     Problem: Pain  Goal: Verbalizes/displays adequate comfort level or baseline comfort level  Outcome: Progressing     Problem: Safety - Adult  Goal: Free from fall injury  Outcome: Progressing     Problem: Skin/Tissue Integrity  Goal: Absence of new skin breakdown  Description: 1. Monitor for areas of redness and/or skin breakdown  2. Assess vascular access sites hourly  3. Every 4-6 hours minimum:  Change oxygen saturation probe site  4. Every 4-6 hours:  If on nasal continuous positive airway pressure, respiratory therapy assess nares and determine need for appliance change or resting period. Outcome: Progressing     Problem: Chronic Conditions and Co-morbidities  Goal: Patient's chronic conditions and co-morbidity symptoms are monitored and maintained or improved  Outcome: Progressing  Flowsheets (Taken 9/28/2023 2000)  Care Plan - Patient's Chronic Conditions and Co-Morbidity Symptoms are Monitored and Maintained or Improved: Monitor and assess patient's chronic conditions and comorbid symptoms for stability, deterioration, or improvement     Problem: Occupational Therapy - Adult  Goal: By Discharge: Performs self-care activities at highest level of function for planned discharge setting. See evaluation for individualized goals. Description: FUNCTIONAL STATUS PRIOR TO ADMISSION:  Patient was ambulatory without DME, but was using single point cane for a few days before admission and independent for ADLs and some IADLs. HOME SUPPORT: Patient lived with her son, daughter in law, and granddaughter and family provided assistance as needed for IADLs. Occupational Therapy Goals:  Initiated 9/28/2023  1. Patient will perform grooming while standing at sink with Contact Guard Assist within 7 day(s).   2.  Patient will perform lower body dressing with Moderate Assist within 7
Problem: Discharge Planning  Goal: Discharge to home or other facility with appropriate resources  Outcome: Progressing     Problem: Pain  Goal: Verbalizes/displays adequate comfort level or baseline comfort level  Outcome: Progressing  Flowsheets (Taken 10/2/2023 1730)  Verbalizes/displays adequate comfort level or baseline comfort level: Encourage patient to monitor pain and request assistance     Problem: Safety - Adult  Goal: Free from fall injury  Outcome: Progressing  Flowsheets (Taken 10/2/2023 1600)  Free From Fall Injury: Instruct family/caregiver on patient safety     Problem: Skin/Tissue Integrity  Goal: Absence of new skin breakdown  Description: 1. Monitor for areas of redness and/or skin breakdown  2. Assess vascular access sites hourly  3. Every 4-6 hours minimum:  Change oxygen saturation probe site  4. Every 4-6 hours:  If on nasal continuous positive airway pressure, respiratory therapy assess nares and determine need for appliance change or resting period. Outcome: Progressing     Problem: Chronic Conditions and Co-morbidities  Goal: Patient's chronic conditions and co-morbidity symptoms are monitored and maintained or improved  Outcome: Progressing     Problem: Physical Therapy - Adult  Goal: By Discharge: Performs mobility at highest level of function for planned discharge setting. See evaluation for individualized goals. Description: FUNCTIONAL STATUS PRIOR TO ADMISSION: Pt was ambulating without AD 3 months prior when she started having global weakness which slowly reduced functional capacity and decreased mobility. Pt not able to ambulate community distances now. Recently got RW but returning for Rolator. Denies fall hx    HOME SUPPORT PRIOR TO ADMISSION: Lives with family. In past couple weeks needed significantly more assistance with ADLs. Physical Therapy Goals  Initiated 9/28/2023  1.   Patient will move from supine to sit and sit to supine in bed with modified independence
Problem: Discharge Planning  Goal: Discharge to home or other facility with appropriate resources  Outcome: Progressing     Problem: Pain  Goal: Verbalizes/displays adequate comfort level or baseline comfort level  Outcome: Progressing  Flowsheets (Taken 9/27/2023 2000)  Verbalizes/displays adequate comfort level or baseline comfort level: Encourage patient to monitor pain and request assistance     Problem: Safety - Adult  Goal: Free from fall injury  Outcome: Progressing  Flowsheets (Taken 9/27/2023 2000)  Free From Fall Injury: Instruct family/caregiver on patient safety     Problem: Skin/Tissue Integrity  Goal: Absence of new skin breakdown  Description: 1. Monitor for areas of redness and/or skin breakdown  2. Assess vascular access sites hourly  3. Every 4-6 hours minimum:  Change oxygen saturation probe site  4. Every 4-6 hours:  If on nasal continuous positive airway pressure, respiratory therapy assess nares and determine need for appliance change or resting period.   Outcome: Progressing     Problem: Chronic Conditions and Co-morbidities  Goal: Patient's chronic conditions and co-morbidity symptoms are monitored and maintained or improved  Outcome: Progressing
Problem: Discharge Planning  Goal: Discharge to home or other facility with appropriate resources  Outcome: Progressing  Flowsheets  Taken 10/3/2023 1031 by Melanie Castorena  Discharge to home or other facility with appropriate resources:   Identify barriers to discharge with patient and caregiver   Identify discharge learning needs (meds, wound care, etc)   Refer to discharge planning if patient needs post-hospital services based on physician order or complex needs related to functional status, cognitive ability or social support system   Arrange for needed discharge resources and transportation as appropriate  Taken 10/3/2023 0800 by Sarah Del Valle RN  Discharge to home or other facility with appropriate resources:   Identify barriers to discharge with patient and caregiver   Arrange for needed discharge resources and transportation as appropriate   Identify discharge learning needs (meds, wound care, etc)   Arrange for interpreters to assist at discharge as needed   Refer to discharge planning if patient needs post-hospital services based on physician order or complex needs related to functional status, cognitive ability or social support system     Problem: Pain  Goal: Verbalizes/displays adequate comfort level or baseline comfort level  Outcome: Progressing     Problem: Safety - Adult  Goal: Free from fall injury  Outcome: Progressing  Flowsheets (Taken 10/3/2023 0800)  Free From Fall Injury:   Instruct family/caregiver on patient safety   Based on caregiver fall risk screen, instruct family/caregiver to ask for assistance with transferring infant if caregiver noted to have fall risk factors     Problem: Skin/Tissue Integrity  Goal: Absence of new skin breakdown  Description: 1. Monitor for areas of redness and/or skin breakdown  2. Assess vascular access sites hourly  3. Every 4-6 hours minimum:  Change oxygen saturation probe site  4.   Every 4-6 hours:  If on nasal continuous positive airway
Problem: Discharge Planning  Goal: Discharge to home or other facility with appropriate resources  Outcome: Progressing  Flowsheets (Taken 10/1/2023 0800)  Discharge to home or other facility with appropriate resources: Identify barriers to discharge with patient and caregiver     Problem: Pain  Goal: Verbalizes/displays adequate comfort level or baseline comfort level  Outcome: Progressing     Problem: Safety - Adult  Goal: Free from fall injury  Outcome: Progressing  Flowsheets (Taken 10/1/2023 0800)  Free From Fall Injury: Instruct family/caregiver on patient safety     Problem: Skin/Tissue Integrity  Goal: Absence of new skin breakdown  Description: 1. Monitor for areas of redness and/or skin breakdown  2. Assess vascular access sites hourly  3. Every 4-6 hours minimum:  Change oxygen saturation probe site  4. Every 4-6 hours:  If on nasal continuous positive airway pressure, respiratory therapy assess nares and determine need for appliance change or resting period.   Outcome: Progressing     Problem: Chronic Conditions and Co-morbidities  Goal: Patient's chronic conditions and co-morbidity symptoms are monitored and maintained or improved  Outcome: Progressing  Flowsheets (Taken 10/1/2023 0800)  Care Plan - Patient's Chronic Conditions and Co-Morbidity Symptoms are Monitored and Maintained or Improved: Monitor and assess patient's chronic conditions and comorbid symptoms for stability, deterioration, or improvement
Problem: Discharge Planning  Goal: Discharge to home or other facility with appropriate resources  Outcome: Progressing  Flowsheets (Taken 9/29/2023 0800)  Discharge to home or other facility with appropriate resources: Identify barriers to discharge with patient and caregiver     Problem: Pain  Goal: Verbalizes/displays adequate comfort level or baseline comfort level  Outcome: Progressing     Problem: Safety - Adult  Goal: Free from fall injury  Outcome: Progressing  Flowsheets (Taken 9/29/2023 0800)  Free From Fall Injury: Instruct family/caregiver on patient safety     Problem: Skin/Tissue Integrity  Goal: Absence of new skin breakdown  Description: 1. Monitor for areas of redness and/or skin breakdown  2. Assess vascular access sites hourly  3. Every 4-6 hours minimum:  Change oxygen saturation probe site  4. Every 4-6 hours:  If on nasal continuous positive airway pressure, respiratory therapy assess nares and determine need for appliance change or resting period. Outcome: Progressing     Problem: Chronic Conditions and Co-morbidities  Goal: Patient's chronic conditions and co-morbidity symptoms are monitored and maintained or improved  Outcome: Progressing  Flowsheets (Taken 9/29/2023 0800)  Care Plan - Patient's Chronic Conditions and Co-Morbidity Symptoms are Monitored and Maintained or Improved: Monitor and assess patient's chronic conditions and comorbid symptoms for stability, deterioration, or improvement     Problem: Occupational Therapy - Adult  Goal: By Discharge: Performs self-care activities at highest level of function for planned discharge setting. See evaluation for individualized goals. Description: FUNCTIONAL STATUS PRIOR TO ADMISSION:  Patient was ambulatory without DME, but was using single point cane for a few days before admission and independent for ADLs and some IADLs.       HOME SUPPORT: Patient lived with her son, daughter in law, and granddaughter and family provided
Problem: Discharge Planning  Goal: Discharge to home or other facility with appropriate resources  Outcome: Progressing  Flowsheets (Taken 9/30/2023 0800)  Discharge to home or other facility with appropriate resources: Identify barriers to discharge with patient and caregiver     Problem: Pain  Goal: Verbalizes/displays adequate comfort level or baseline comfort level  Outcome: Progressing     Problem: Safety - Adult  Goal: Free from fall injury  Outcome: Progressing  Flowsheets (Taken 9/30/2023 0800)  Free From Fall Injury: Instruct family/caregiver on patient safety     Problem: Skin/Tissue Integrity  Goal: Absence of new skin breakdown  Description: 1. Monitor for areas of redness and/or skin breakdown  2. Assess vascular access sites hourly  3. Every 4-6 hours minimum:  Change oxygen saturation probe site  4. Every 4-6 hours:  If on nasal continuous positive airway pressure, respiratory therapy assess nares and determine need for appliance change or resting period.   Outcome: Progressing     Problem: Chronic Conditions and Co-morbidities  Goal: Patient's chronic conditions and co-morbidity symptoms are monitored and maintained or improved  Outcome: Progressing  Flowsheets (Taken 9/30/2023 0800)  Care Plan - Patient's Chronic Conditions and Co-Morbidity Symptoms are Monitored and Maintained or Improved: Monitor and assess patient's chronic conditions and comorbid symptoms for stability, deterioration, or improvement
Problem: Occupational Therapy - Adult  Goal: By Discharge: Performs self-care activities at highest level of function for planned discharge setting. See evaluation for individualized goals. Description: FUNCTIONAL STATUS PRIOR TO ADMISSION:  Patient was ambulatory without DME, but was using single point cane for a few days before admission and independent for ADLs and some IADLs. HOME SUPPORT: Patient lived with her son, daughter in law, and granddaughter and family provided assistance as needed for IADLs. Occupational Therapy Goals:  Initiated 9/28/2023  1. Patient will perform grooming while standing at sink with Contact Guard Assist within 7 day(s). 2.  Patient will perform lower body dressing with Moderate Assist within 7 day(s). 3.  Patient will perform lower body bathing with Moderate Assist within 7 day(s). 4.  Patient will perform toilet transfers with Moderate Assist  within 7 day(s). 5.  Patient will perform all aspects of toileting with Moderate Assist within 7 day(s). 6.  Patient will participate in upper extremity therapeutic exercise/activities with Giles for 5 minutes within 7 day(s). 7.  Patient will utilize energy conservation techniques during functional activities with verbal cues within 7 day(s). Outcome: Progressing   OCCUPATIONAL THERAPY EVALUATION    Patient: Laila Hawthorne (18 y.o. female)  Date: 9/28/2023  Primary Diagnosis: Ataxia [R27.0]  Acute renal insufficiency [N28.9]  Hypotension, unspecified hypotension type [I95.9]         Precautions:                    ASSESSMENT :  The patient is limited by decreased functional mobility, independence in ADLs, ROM, strength, activity tolerance, endurance, balance. Patient initially presented with body aches and BLE weakness. CT head wo contrast shows no acute intracranial abnormality. On this date patient was received semi supine in bed and agreeable to therapy.  Patient completed all aspects of bed mobility with
Problem: Occupational Therapy - Adult  Goal: By Discharge: Performs self-care activities at highest level of function for planned discharge setting. See evaluation for individualized goals. Description: FUNCTIONAL STATUS PRIOR TO ADMISSION:  Patient was ambulatory without DME, but was using single point cane for a few days before admission and independent for ADLs and some IADLs. HOME SUPPORT: Patient lived with her son, daughter in law, and granddaughter and family provided assistance as needed for IADLs. Occupational Therapy Goals:  Initiated 9/28/2023  1. Patient will perform grooming while standing at sink with Contact Guard Assist within 7 day(s). 2.  Patient will perform lower body dressing with Moderate Assist within 7 day(s). 3.  Patient will perform lower body bathing with Moderate Assist within 7 day(s). 4.  Patient will perform toilet transfers with Moderate Assist  within 7 day(s). 5.  Patient will perform all aspects of toileting with Moderate Assist within 7 day(s). 6.  Patient will participate in upper extremity therapeutic exercise/activities with McKenzie for 5 minutes within 7 day(s). 7.  Patient will utilize energy conservation techniques during functional activities with verbal cues within 7 day(s). Outcome: Progressing   OCCUPATIONAL THERAPY TREATMENT  Patient: Malka Mcgrath (89 y.o. female)  Date: 10/4/2023  Primary Diagnosis: Ataxia [R27.0]  Acute renal insufficiency [N28.9]  Hypotension, unspecified hypotension type [I95.9]  Myositis [M60.9]       Precautions:                  Chart, occupational therapy assessment, plan of care, and goals were reviewed. ASSESSMENT  Patient continues to benefit from skilled OT services and is progressing towards goals. On this date, patient was agreeable and motivated throughout session. Patient was CGA with additional time and RW for functional transfers from elevated surfaces and SBA-mod A for seated/standing ADL tasks.
Problem: Occupational Therapy - Adult  Goal: By Discharge: Performs self-care activities at highest level of function for planned discharge setting. See evaluation for individualized goals. Description: FUNCTIONAL STATUS PRIOR TO ADMISSION:  Patient was ambulatory without DME, but was using single point cane for a few days before admission and independent for ADLs and some IADLs. HOME SUPPORT: Patient lived with her son, daughter in law, and granddaughter and family provided assistance as needed for IADLs. Occupational Therapy Goals:  Initiated 9/28/2023  1. Patient will perform grooming while standing at sink with Contact Guard Assist within 7 day(s). 2.  Patient will perform lower body dressing with Moderate Assist within 7 day(s). 3.  Patient will perform lower body bathing with Moderate Assist within 7 day(s). 4.  Patient will perform toilet transfers with Moderate Assist  within 7 day(s). 5.  Patient will perform all aspects of toileting with Moderate Assist within 7 day(s). 6.  Patient will participate in upper extremity therapeutic exercise/activities with [place] for 5 minutes within 7 day(s). 7.  Patient will utilize energy conservation techniques during functional activities with verbal cues within 7 day(s). Outcome: Progressing       OCCUPATIONAL THERAPY TREATMENT  Patient: Sabra Lopez (74 y.o. female)  Date: 10/3/2023  Primary Diagnosis: Ataxia [R27.0]  Acute renal insufficiency [N28.9]  Hypotension, unspecified hypotension type [I95.9]  Myositis [M60.9]       Precautions:                  Chart, occupational therapy assessment, plan of care, and goals were reviewed. ASSESSMENT  Patient continues to benefit from skilled OT services and is progressing towards goals. Pt remains highly motivated to work with therapy and regain her functional independence with ADLs.  She continues to demonstrate decreased activity tolerance, back pain after standing ADLs at sink x 2 minutes
Problem: Physical Therapy - Adult  Goal: By Discharge: Performs mobility at highest level of function for planned discharge setting. See evaluation for individualized goals. Description: FUNCTIONAL STATUS PRIOR TO ADMISSION: Pt was ambulating without AD 3 months prior when she started having global weakness which slowly reduced functional capacity and decreased mobility. Pt not able to ambulate community distances now. Recently got RW but returning for Rolator. Denies fall hx    HOME SUPPORT PRIOR TO ADMISSION: Lives with family. In past couple weeks needed significantly more assistance with ADLs. Physical Therapy Goals  Initiated 9/28/2023  1. Patient will move from supine to sit and sit to supine in bed with modified independence within 7 day(s). 2.  Patient will perform sit to stand with modified independence within 7 day(s). 3.  Patient will transfer from bed to chair and chair to bed with modified independence using the least restrictive device within 7 day(s). 4.  Patient will ambulate with modified independence for 100 feet with the least restrictive device within 7 day(s). 5.  Patient will ascend/descend 6 stairs with 1 handrail(s) with contact guard assist within 7 day(s). Outcome: Progressing       PHYSICAL THERAPY EVALUATION    Patient: Emeli Milner (20 y.o. female)  Date: 9/28/2023  Primary Diagnosis: Ataxia [R27.0]  Acute renal insufficiency [N28.9]  Hypotension, unspecified hypotension type [I95.9]       Precautions:                      ASSESSMENT :   DEFICITS/IMPAIRMENTS:   The patient is limited by decreased functional mobility, independence in ADLs, strength, activity tolerance, endurance, balance, posture, orthostatic hypotension. Pt with pmh of PE, arthritis, asthma, COPD, chronic pain/fibromyalgia, T2DM, HTN, HLD, GERD, and depression who presents with bilateral lower extremity weakness. Pt generally mobilized at a Max A to [place] level during today's session.  Pt received in
Problem: Physical Therapy - Adult  Goal: By Discharge: Performs mobility at highest level of function for planned discharge setting. See evaluation for individualized goals. Description: FUNCTIONAL STATUS PRIOR TO ADMISSION: Pt was ambulating without AD 3 months prior when she started having global weakness which slowly reduced functional capacity and decreased mobility. Pt not able to ambulate community distances now. Recently got RW but returning for Rolator. Denies fall hx    HOME SUPPORT PRIOR TO ADMISSION: Lives with family. In past couple weeks needed significantly more assistance with ADLs. Physical Therapy Goals  Initiated 9/28/2023  1. Patient will move from supine to sit and sit to supine in bed with modified independence within 7 day(s). 2.  Patient will perform sit to stand with modified independence within 7 day(s). 3.  Patient will transfer from bed to chair and chair to bed with modified independence using the least restrictive device within 7 day(s). 4.  Patient will ambulate with modified independence for 100 feet with the least restrictive device within 7 day(s). 5.  Patient will ascend/descend 6 stairs with 1 handrail(s) with contact guard assist within 7 day(s). Outcome: Progressing    PHYSICAL THERAPY TREATMENT    Patient: Vernell Miranda (62 y.o. female)  Date: 10/4/2023  Diagnosis: Ataxia [R27.0]  Acute renal insufficiency [N28.9]  Hypotension, unspecified hypotension type [I95.9]  Myositis [M60.9] Ataxia      Precautions:                      ASSESSMENT:  Patient continues to benefit from skilled PT services and is progressing towards goals. Pt eager to improve mobility and independence. Pt expressing frustration with LE movement and bed and desire to reposition. Pt was able to increase gait tolerance but noted LE fatigue requiring standing rest break. Pt utilizing UE to assist with upright mobility.  Pt unable to perform hip flexion in seated position without assistance
Problem: Physical Therapy - Adult  Goal: By Discharge: Performs mobility at highest level of function for planned discharge setting. See evaluation for individualized goals. Description: FUNCTIONAL STATUS PRIOR TO ADMISSION: Pt was ambulating without AD 3 months prior when she started having global weakness which slowly reduced functional capacity and decreased mobility. Pt not able to ambulate community distances now. Recently got RW but returning for Rolator. Denies fall hx    HOME SUPPORT PRIOR TO ADMISSION: Lives with family. In past couple weeks needed significantly more assistance with ADLs. Physical Therapy Goals  Initiated 9/28/2023  1. Patient will move from supine to sit and sit to supine in bed with modified independence within 7 day(s). 2.  Patient will perform sit to stand with modified independence within 7 day(s). 3.  Patient will transfer from bed to chair and chair to bed with modified independence using the least restrictive device within 7 day(s). 4.  Patient will ambulate with modified independence for 100 feet with the least restrictive device within 7 day(s). 5.  Patient will ascend/descend 6 stairs with 1 handrail(s) with contact guard assist within 7 day(s). Outcome: Progressing   PHYSICAL THERAPY TREATMENT    Patient: Giuliano Oshea (13 y.o. female)  Date: 9/29/2023  Diagnosis: Ataxia [R27.0]  Acute renal insufficiency [N28.9]  Hypotension, unspecified hypotension type [I95.9] Ataxia      Precautions:                      ASSESSMENT:  Patient continues to benefit from skilled PT services and is progressing towards goals. She demonstrates the ability to ambulate to the door and back with the use of a RW. She continues to demonstrate heavy reliance on UE on RW during gait. VC are provided to look up to improve posture. She performs active assisted short long arc quads EOB in combination with bilateral hamstring stretching.  Patient performs sit<>stand x5 from various bed
assistance as needed for IADLs. Occupational Therapy Goals:  Initiated 9/28/2023  1. Patient will perform grooming while standing at sink with Contact Guard Assist within 7 day(s). 2.  Patient will perform lower body dressing with Moderate Assist within 7 day(s). 3.  Patient will perform lower body bathing with Moderate Assist within 7 day(s). 4.  Patient will perform toilet transfers with Moderate Assist  within 7 day(s). 5.  Patient will perform all aspects of toileting with Moderate Assist within 7 day(s). 6.  Patient will participate in upper extremity therapeutic exercise/activities with [place] for 5 minutes within 7 day(s). 7.  Patient will utilize energy conservation techniques during functional activities with verbal cues within 7 day(s). 10/4/2023 1315 by Allie Lopez  Outcome: Progressing     Problem: Physical Therapy - Adult  Goal: By Discharge: Performs mobility at highest level of function for planned discharge setting. See evaluation for individualized goals. Description: FUNCTIONAL STATUS PRIOR TO ADMISSION: Pt was ambulating without AD 3 months prior when she started having global weakness which slowly reduced functional capacity and decreased mobility. Pt not able to ambulate community distances now. Recently got RW but returning for Rolator. Denies fall hx    HOME SUPPORT PRIOR TO ADMISSION: Lives with family. In past couple weeks needed significantly more assistance with ADLs. Physical Therapy Goals  Initiated 9/28/2023  1. Patient will move from supine to sit and sit to supine in bed with modified independence within 7 day(s). 2.  Patient will perform sit to stand with modified independence within 7 day(s). 3.  Patient will transfer from bed to chair and chair to bed with modified independence using the least restrictive device within 7 day(s). 4.  Patient will ambulate with modified independence for 100 feet with the least restrictive device within 7 day(s).
bathing hygiene in sitting and intermittently standing for completion, changing gown. Placed BSC over toilet and able to get on/off with rail support and CGA. Stood longest duration 5 minutes for teeth brushing, cues for using one hand support on sink for safety. Returned to chair with several blankets and air cushion to elevate seat height. Cont to present as an excellent candidate for intense, multidisciplinary IPR stay to maximize recovery of full strength and balance to ensure safe discharge home with independent functional mobility. PLAN:  Patient continues to benefit from skilled intervention to address the above impairments. Continue treatment per established plan of care. Recommendation for discharge: (in order for the patient to meet his/her long term goals): Therapy 3 hours/day 5-7 days/week    Other factors to consider for discharge: available support system works or is unable to provide adequate supervision and the patient would be alone, not safe to be alone, concern for safely navigating or managing the home environment, and well below her baseline    IF patient discharges home will need the following DME: continuing to assess with progress       SUBJECTIVE:   Patient stated, \"I've been wanting to get up and work with you today. \"    OBJECTIVE DATA SUMMARY:   Critical Behavior:          Functional Mobility Training:  Bed Mobility:  Bed Mobility Training  Supine to Sit: Stand-by assistance; Adaptive equipment; Additional time  Scooting: Supervision; Additional time  Transfers:  Transfer Training  Sit to Stand: Contact-guard assistance;Minimum assistance (CG from elevated surface, MIN from standard/lower)  Stand to Sit: Contact-guard assistance  Bed to Chair: Contact-guard assistance;Minimum assistance  Balance:  Balance  Sitting: Intact  Standing: Impaired  Standing - Static: Good;Constant support  Standing - Dynamic: Fair;Constant support   Ambulation/Gait Training:     Gait  Overall Level of
for Discharge  10/5/2023 1245 by Shoshana Denton  Outcome: Progressing  Flowsheets (Taken 10/5/2023 0800)  Care Plan - Patient's Chronic Conditions and Co-Morbidity Symptoms are Monitored and Maintained or Improved:   Collaborate with multidisciplinary team to address chronic and comorbid conditions and prevent exacerbation or deterioration   Update acute care plan with appropriate goals if chronic or comorbid symptoms are exacerbated and prevent overall improvement and discharge   Monitor and assess patient's chronic conditions and comorbid symptoms for stability, deterioration, or improvement
impairments. Continue treatment per established plan of care. Recommendation for discharge: (in order for the patient to meet his/her long term goals): Therapy 3 hours/day 5-7 days/week    Other factors to consider for discharge: patient's current support system is unable to meet their requirements for physical assistance and concern for safely navigating or managing the home environment    IF patient discharges home will need the following DME: continuing to assess with progress       SUBJECTIVE:   Patient stated, \"I am going to need help . \"    OBJECTIVE DATA SUMMARY:   Critical Behavior:          Functional Mobility Training:  Bed Mobility:  Bed Mobility Training  Supine to Sit: Stand-by assistance (with HOB raised to 52*)  Transfers:  Transfer Training  Sit to Stand: Moderate assistance (from a low surface)  Stand to Sit: Minimum assistance (decrease control)  Bed to Chair: Minimum assistance; Additional time; Adaptive equipment (with fatigue)  Balance:  Balance  Sitting: Intact  Standing: Impaired  Standing - Static: Good;Constant support  Standing - Dynamic: Fair;Constant support   Ambulation/Gait Training:     Gait  Overall Level of Assistance: Contact-guard assistance;Minimum assistance  Interventions: Safety awareness training;Verbal cues  Base of Support: Widened  Speed/Nafisa: Fluctuations  Gait Abnormalities: Decreased step clearance  Distance (ft): 30 Feet (x2 decrease LE stability with fatigue)  Assistive Device: Gait belt;Walker, rolling  Neuro Re-Education:              Pain Rating:  No complaints   Pain Intervention(s):        Activity Tolerance:   requires rest breaks    After treatment:   Patient left in no apparent distress sitting up in chair, Call bell within reach, and Bed/ chair alarm activated      COMMUNICATION/EDUCATION:   The patient's plan of care was discussed with: occupational therapist and registered nurse    Patient Education  Education Given To: Patient  Education Provided: Role
taken during this treatment.     After treatment:   Patient left in no apparent distress sitting up in chair, Call bell within reach, and Bed/ chair alarm activated    COMMUNICATION/EDUCATION:   The patient's plan of care was discussed with: physical therapist and registered nurse         Thank you for this referral.  Cathi Dillon OT  Minutes: 46

## 2023-10-07 LAB
CRYOGLOB SER QL 1D COLD INC: NORMAL
HMGCR AB SERPL IA-ACNC: <20 UNITS

## 2023-10-13 LAB
EJ AB SER QL: ABNORMAL
ENA JO1 AB SER QL: 170 UNITS
ENA SSA 52KD AB: 118 UNITS
KU AB SER QL: ABNORMAL
MDA5 ANTIBODY: <20 UNITS
MI2 AB SER QL: ABNORMAL
NXP-2 ANTIBODY: <20 UNITS
OJ AB SER QL: ABNORMAL
PL12 AB SER QL: ABNORMAL
PL7 AB SER QL: ABNORMAL
PM-SCL ANTIBODY: <20 UNITS
SRP AB SERPL QL: ABNORMAL
TIF1-GAMMA ANTIBODY: <20 UNITS
U-1 SN RNP ANTIBODIES: <20 UNITS
U2 SMALL NUCLEAR RNP AB: ABNORMAL
U3, RNP AB, IGG: ABNORMAL

## 2023-10-17 ENCOUNTER — TELEPHONE (OUTPATIENT)
Age: 68
End: 2023-10-17

## 2023-10-17 NOTE — TELEPHONE ENCOUNTER
Patient calling to reschedule EMG for 10/18/23 due to her not being discharged from rehab until tomorrow 10/18/23.     Please contact

## 2023-10-20 ENCOUNTER — TELEPHONE (OUTPATIENT)
Age: 68
End: 2023-10-20

## 2023-10-20 DIAGNOSIS — M60.9 MYOSITIS OF MULTIPLE SITES, UNSPECIFIED MYOSITIS TYPE: Primary | ICD-10-CM

## 2023-10-20 LAB
EJ AB SER QL: NEGATIVE
ENA JO1 AB SER QL: 170 UNITS
ENA SSA 52KD AB: 118 UNITS
KU AB SER QL: NEGATIVE
MDA5 ANTIBODY: <20 UNITS
MI2 AB SER QL: NEGATIVE
NXP-2 ANTIBODY: <20 UNITS
OJ AB SER QL: NEGATIVE
PL12 AB SER QL: NEGATIVE
PL7 AB SER QL: NEGATIVE
PM-SCL ANTIBODY: <20 UNITS
SRP AB SERPL QL: NEGATIVE
TIF1-GAMMA ANTIBODY: <20 UNITS
U-1 SN RNP ANTIBODIES: <20 UNITS
U2 SMALL NUCLEAR RNP AB: NEGATIVE
U3, RNP AB, IGG: NEGATIVE

## 2023-10-20 NOTE — TELEPHONE ENCOUNTER
Heike - Please return William's call. I am sorry I cannot help him. I did not prescribe any of these medications. He should call [facility] or d/w her current facility physician.

## 2023-10-20 NOTE — TELEPHONE ENCOUNTER
Eugenie Skelton is requesting a phone call to discuss list of patients medication and drug interaction.  Patient was seen by Dr. Andres Figueredo on 10/3/23 in the ER    Please contact [phone]

## 2023-10-20 NOTE — TELEPHONE ENCOUNTER
Called Desiree Pickering from [facility] (Patient was sent to [facility] after discharge). He would like a verbal \"Okay\" for the plan of care as patient completed Rehab.  He also stated that he saw a list of medication interactions with the medications she is on and wanted to know if she is okay to take it.     -Acyclovir and tizandine  -Albuterol and propanolol  -clonidine and propanolol  -flucticasone and propanolol  -fluticasone and tizandine

## 2023-10-25 ENCOUNTER — TELEPHONE (OUTPATIENT)
Age: 68
End: 2023-10-25

## 2023-10-25 NOTE — TELEPHONE ENCOUNTER
Patient needs a call to schedule an EMG appointment. Patient had to cancel their appointment that was at 2 today as their ride cancelled on them.

## 2023-11-03 ENCOUNTER — PROCEDURE VISIT (OUTPATIENT)
Age: 68
End: 2023-11-03

## 2023-11-03 DIAGNOSIS — G72.89 PROXIMAL MYOPATHY: Primary | ICD-10-CM

## 2023-11-03 NOTE — PROGRESS NOTES
[address] [facility]  [facility]  [address] [place] [address]  Phone [phone] Fax [phone]  Test Date:  11/3/2023    Patient: Mariana Valencia : 1955 Physician: Emre Terrazas. Zack Leong DO   Sex: Female Height: 5' 11\" Ref Phys: Carlos Clements MD   ID#: [id number] Weight: 312 lbs. Technician: Erica Pham     Patient Complaints:  Myalgia; Proximal upper and lower extremity muscle weakness    NCV & EMG Findings:  Evaluation of the left peroneal motor nerve showed reduced amplitude (0.4 mV) and decreased conduction velocity (Poplt-B Fib, 13 m/s). The left median sensory nerve showed prolonged distal peak latency (3.9 ms) and decreased conduction velocity (Wrist-2nd Digit, 36 m/s). The left sural sensory nerve showed no response (Calf). All remaining nerves  were within normal limits. Needle evaluation of the left anterior tibialis, the left gluteus medius, and the left biceps muscles showed decreased motor unit amplitude. The left rectus femoris muscle showed decreased motor unit amplitude, slightly increased polyphasic potentials, and very decreased interference pattern. The left extensor digitorum brevis muscle showed increased motor unit amplitude and increased motor unit duration. All remaining muscles (as indicated in the following table) showed no evidence of electrical instability. Impression:  Extensive electrodiagnostic examination of the left lower and upper extremities and related paraspinal muscles shows changes most consistent with a proximal myopathy, moderate in degree electrically. In particular, there are myotonic discharges noted in proximal upper and lower extremity myotomes along with myopathic appearing motor units. Incidental findings include a left median neuropathy at or distal to the wrist, mild in degree electrically and consistent with a clinical diagnosis of carpal tunnel syndrome.  Absence of distal

## 2023-11-05 NOTE — TELEPHONE ENCOUNTER
Mello for patient to call & schedule appt. Yesika Sterling
Pt Last seen >3 months ago  Med refill 3 month until f/u  pls call for F/u appointment    thx    Merlin Murdoch, MD  4/3/2018
PAST MEDICAL HISTORY:  Asthma     Chronic atrial fibrillation     COPD (chronic obstructive pulmonary disease)     DM (diabetes mellitus)     HLD (hyperlipidemia)     HTN (hypertension)     INOCENCIA (obstructive sleep apnea) refuses to use CPAP

## 2023-11-07 ENCOUNTER — TELEPHONE (OUTPATIENT)
Age: 68
End: 2023-11-07

## 2024-04-17 ENCOUNTER — TRANSCRIBE ORDERS (OUTPATIENT)
Facility: HOSPITAL | Age: 69
End: 2024-04-17

## 2024-04-17 DIAGNOSIS — M81.0 OSTEOPOROSIS, UNSPECIFIED OSTEOPOROSIS TYPE, UNSPECIFIED PATHOLOGICAL FRACTURE PRESENCE: Primary | ICD-10-CM

## 2024-04-17 DIAGNOSIS — Z12.31 SCREENING MAMMOGRAM FOR HIGH-RISK PATIENT: ICD-10-CM

## 2024-07-12 ENCOUNTER — TRANSCRIBE ORDERS (OUTPATIENT)
Facility: HOSPITAL | Age: 69
End: 2024-07-12

## 2024-07-12 DIAGNOSIS — I73.9 PAD (PERIPHERAL ARTERY DISEASE) (HCC): Primary | ICD-10-CM

## 2024-07-25 NOTE — TELEPHONE ENCOUNTER
----- Message from [place] sent at 6/25/2021  2:36 PM EDT -----  Regarding: Dr. Jameson Mendez  Patient return call    Caller's first and last name and relationship (if not the patient):  n/a       Best contact number(s):[phone]      Whose call is being returned:  Dr. Jasen Peralta      Details to clarify the request:  Lukas Hastings [facility]
Spoke with patient. No one from office called.
[de-identified] : hep and PT for b/l knees and RT shoulder ice, rest, activity as tolerated The patient's orthopaedic condition(s) would warrant consideration of consistent or intermittent use of a prescription strength non-steroidal anti-inflammatory medication.  These medications are associated with risks including but not limited to gastrointestinal irritation, kidney damage, hypertension, and bleeding.    The patient has one or more medical conditions that would make this class of medication a significant risk and was therefore not prescribed. Pt will take Tylenol/OTC meds as needed for pain F/up 4-6 weeks

## 2024-08-01 ENCOUNTER — HOSPITAL ENCOUNTER (OUTPATIENT)
Dept: VASCULAR SURGERY | Facility: HOSPITAL | Age: 69
Discharge: HOME | End: 2024-08-03
Payer: MEDICARE

## 2024-08-01 DIAGNOSIS — I73.9 PAD (PERIPHERAL ARTERY DISEASE) (HCC): ICD-10-CM

## 2024-08-01 PROCEDURE — 93925 LOWER EXTREMITY STUDY: CPT

## 2024-08-01 PROCEDURE — 93922 UPR/L XTREMITY ART 2 LEVELS: CPT

## 2024-08-02 LAB
VAS LEFT ABI: 0.81
VAS LEFT ARM BP: 118 MMHG
VAS LEFT ATA DIST PSV: 77.2 CM/S
VAS LEFT CFA PROX PSV: 130 CM/S
VAS LEFT DORSALIS PEDIS BP: 122 MMHG
VAS LEFT LOW THIGH PRESSURE: 68 MMHG
VAS LEFT POP A DIST PSV: 90.4 CM/S
VAS LEFT POP A PROX PSV: 81.6 CM/S
VAS LEFT POP A PROX VEL RATIO: 1.23
VAS LEFT PTA BP: 116 MMHG
VAS LEFT SFA DIST PSV: 66.3 CM/S
VAS LEFT SFA DIST VEL RATIO: 0.61
VAS LEFT SFA MID PSV: 108 CM/S
VAS LEFT SFA MID VEL RATIO: 0.98
VAS LEFT SFA PROX PSV: 110.2 CM/S
VAS LEFT SFA PROX VEL RATIO: 0.85
VAS RIGHT ABI: 0.76
VAS RIGHT ARM BP: 150 MMHG
VAS RIGHT ATA DIST PSV: 65.1 CM/S
VAS RIGHT CFA PROX PSV: 122.5 CM/S
VAS RIGHT DORSALIS PEDIS BP: 114 MMHG
VAS RIGHT LOW THIGH PRESSURE: 67 MMHG
VAS RIGHT POP A DIST PSV: 88.1 CM/S
VAS RIGHT POP A PROX PSV: 77.1 CM/S
VAS RIGHT POP A PROX VEL RATIO: 1.13
VAS RIGHT PTA BP: 113 MMHG
VAS RIGHT SFA DIST PSV: 68 CM/S
VAS RIGHT SFA DIST VEL RATIO: 0.72
VAS RIGHT SFA MID PSV: 93.9 CM/S
VAS RIGHT SFA MID VEL RATIO: 0.8
VAS RIGHT SFA PROX PSV: 112 CM/S
VAS RIGHT SFA PROX VEL RATIO: 0.9
VAS RIGHT TBI: 0.6
VAS RIGHT TOE PRESSURE: 90 MMHG

## 2024-08-08 ENCOUNTER — TRANSCRIBE ORDERS (OUTPATIENT)
Facility: HOSPITAL | Age: 69
End: 2024-08-08

## 2024-08-08 DIAGNOSIS — S93.305A OPEN DISLOCATION OF LEFT FOOT, INITIAL ENCOUNTER: ICD-10-CM

## 2024-08-08 DIAGNOSIS — S93.305A: Primary | ICD-10-CM

## 2024-08-08 DIAGNOSIS — Z87.39 PERSONAL HISTORY OF ARTHRITIS: ICD-10-CM

## 2024-08-08 DIAGNOSIS — Z87.39 PERSONAL HISTORY OF ARTHRITIS: Primary | ICD-10-CM

## 2024-08-08 DIAGNOSIS — S91.302A OPEN DISLOCATION OF LEFT FOOT, INITIAL ENCOUNTER: ICD-10-CM

## 2024-08-13 ENCOUNTER — HOSPITAL ENCOUNTER (OUTPATIENT)
Facility: HOSPITAL | Age: 69
Discharge: HOME | End: 2024-08-16
Payer: MEDICARE

## 2024-08-13 DIAGNOSIS — S93.305A: ICD-10-CM

## 2024-08-13 DIAGNOSIS — Z87.39 PERSONAL HISTORY OF ARTHRITIS: ICD-10-CM

## 2024-08-13 PROCEDURE — 73720 MRI LWR EXTREMITY W/O&W/DYE: CPT

## 2024-08-13 PROCEDURE — 6360000004 HC RX CONTRAST MEDICATION: Performed by: STUDENT IN AN ORGANIZED HEALTH CARE EDUCATION/TRAINING PROGRAM

## 2024-08-13 PROCEDURE — A9579 GAD-BASE MR CONTRAST NOS,1ML: HCPCS | Performed by: STUDENT IN AN ORGANIZED HEALTH CARE EDUCATION/TRAINING PROGRAM

## 2024-08-13 RX ADMIN — GADOTERIDOL 20 ML: 279.3 INJECTION, SOLUTION INTRAVENOUS at 13:02

## (undated) DEVICE — Device: Brand: MEDEX

## (undated) DEVICE — 1200 GUARD II KIT W/5MM TUBE W/O VAC TUBE: Brand: GUARDIAN

## (undated) DEVICE — Device

## (undated) DEVICE — BAG SPEC BIOHZRD 10 X 10 IN --

## (undated) DEVICE — CATH IV AUTOGRD BC BLU 22GA 25 -- INSYTE

## (undated) DEVICE — SOLIDIFIER MEDC 1200ML -- CONVERT TO 356117

## (undated) DEVICE — Z DISCONTINUED PER MEDLINE LINE GAS SAMPLING O2/CO2 LNG AD 13 FT NSL W/ TBNG FILTERLINE

## (undated) DEVICE — CUFF ADULT 1 PC 1 VINYL DISP --

## (undated) DEVICE — KENDALL RADIOLUCENT FOAM MONITORING ELECTRODE RECTANGULAR SHAPE: Brand: KENDALL

## (undated) DEVICE — SET ADMIN 16ML TBNG L100IN 2 Y INJ SITE IV PIGGY BK DISP

## (undated) DEVICE — TOWEL 4 PLY TISS 19X30 SUE WHT

## (undated) DEVICE — CONTAINER SPEC 20 ML LID NEUT BUFF FORMALIN 10 % POLYPR STS

## (undated) DEVICE — (D)SYR 10ML 1/5ML GRAD NSAF -- PKGING CHANGE USE ITEM 338027

## (undated) DEVICE — FORCEPS BX L240CM JAW DIA2.8MM L CAP W/ NDL MIC MESH TOOTH

## (undated) DEVICE — NEEDLE HYPO 18GA L1.5IN PNK S STL HUB POLYPR SHLD REG BVL

## (undated) DEVICE — BASIN EMESIS 500CC ROSE 250/CS 60/PLT: Brand: MEDEGEN MEDICAL PRODUCTS, LLC

## (undated) DEVICE — SYR 3ML LL TIP 1/10ML GRAD --